# Patient Record
Sex: MALE | Race: WHITE | HISPANIC OR LATINO | Employment: FULL TIME | URBAN - METROPOLITAN AREA
[De-identification: names, ages, dates, MRNs, and addresses within clinical notes are randomized per-mention and may not be internally consistent; named-entity substitution may affect disease eponyms.]

---

## 2017-01-13 RX ORDER — INSULIN GLARGINE 100 [IU]/ML
INJECTION, SOLUTION SUBCUTANEOUS
Qty: 8 PEN | Refills: 3 | Status: SHIPPED | OUTPATIENT
Start: 2017-01-13 | End: 2017-05-02 | Stop reason: SDUPTHER

## 2017-01-23 ENCOUNTER — OFFICE VISIT (OUTPATIENT)
Dept: ORTHOPEDIC SURGERY | Facility: CLINIC | Age: 52
End: 2017-01-23

## 2017-01-23 VITALS — WEIGHT: 210 LBS | HEIGHT: 66 IN | BODY MASS INDEX: 33.75 KG/M2

## 2017-01-23 DIAGNOSIS — G89.29 CHRONIC LEFT SHOULDER PAIN: Primary | ICD-10-CM

## 2017-01-23 DIAGNOSIS — M25.512 CHRONIC LEFT SHOULDER PAIN: Primary | ICD-10-CM

## 2017-01-23 PROCEDURE — 99213 OFFICE O/P EST LOW 20 MIN: CPT | Performed by: ORTHOPAEDIC SURGERY

## 2017-01-23 PROCEDURE — 20610 DRAIN/INJ JOINT/BURSA W/O US: CPT | Performed by: ORTHOPAEDIC SURGERY

## 2017-01-23 RX ADMIN — BUPIVACAINE HYDROCHLORIDE 2 ML: 5 INJECTION, SOLUTION PERINEURAL at 17:00

## 2017-01-23 RX ADMIN — LIDOCAINE HYDROCHLORIDE 2 ML: 10 INJECTION, SOLUTION INFILTRATION; PERINEURAL at 17:00

## 2017-01-23 RX ADMIN — METHYLPREDNISOLONE ACETATE 80 MG: 80 INJECTION, SUSPENSION INTRA-ARTICULAR; INTRALESIONAL; INTRAMUSCULAR; SOFT TISSUE at 17:00

## 2017-01-23 NOTE — PROGRESS NOTES
"Left Shoulder Follow Up      Patient: Marek Diego        YOB: 1965            Chief Complaints: left Shoulder pain      History of Present Illness: This patient is here follow left shoulder MRI.  I will get this previously he does have a tiny interstitial tear of the rotator cuff she's got glenoid labral tear and some degenerative changes seen as well as a tear along here the head of the biceps.  We talked about injection the past she like to do that and that if the injection fails we will consider arthroscopy.  His symptoms are still moderate to severe intermittent in nature pain with activity      Physical Exam: 51 y.o. male  General Appearance:    Alert, cooperative, in no acute distress                   Vitals:    01/23/17 1640   Weight: 210 lb (95.3 kg)   Height: 66\" (167.6 cm)        Patient is alert and read ×3 no acute distress appears her above-listed at height weight and age.  Affect is normal respiratory rate is normal unlabored. Heart rate regular rate rhythm, sclera, dentition and hearing are normal for the purpose of this exam.      Ortho Exam    Physical exam of the left shoulder reveals no overlying skin changes no lymphedema no lymphadenopathy.  Patient has active flexion 180 with mild symptoms abduction is similar external rotation is to 50 and internal rotation to the upper lumbar spine with mild symptoms.  Patient has good rotator cuff strength 4+ over 5 with isometric strength testing with pain.  Patient has a positive impingement and a positive Dykes sign.  Patient has good cervical range of motion which is full and asymptomatic no radicular symptoms.  Patient has a normal elbow exam.  Good distal pulses are presentPatient has pain with overhead activity and a positive Neer sign and a positive empty can sign           MRI is as above and was reviewed     Assessment/Plan:       left shoulder pain with glenoid labral tear some degenerative changes plan is to proceed with a " glenohumeral joint injection fails to improve we would consider arthroscopy           Large Joint Arthrocentesis  Date/Time: 1/23/2017 5:00 PM  Consent given by: patient  Site marked: site marked  Timeout: Immediately prior to procedure a time out was called to verify the correct patient, procedure, equipment, support staff and site/side marked as required   Supporting Documentation  Indications: pain   Procedure Details  Location: shoulder - L glenohumeral  Preparation: Patient was prepped and draped in the usual sterile fashion  Needle size: 25 G  Approach: posterior  Medications administered: 80 mg methylPREDNISolone acetate 80 MG/ML; 2 mL lidocaine 1 %; 2 mL bupivacaine  Patient tolerance: patient tolerated the procedure well with no immediate complications

## 2017-01-23 NOTE — MR AVS SNAPSHOT
Marek Diego   1/23/2017 3:15 PM   Office Visit    Dept Phone:  [phone]   Encounter #:  [id number]    Provider:  Brianna Castillo MD   Department:  [facility]                Your Full Care Plan              Your Updated Medication List          This list is accurate as of: 1/23/17  5:08 PM.  Always use your most recent med list.                amLODIPine-benazepril 5-20 MG per capsule   Commonly known as:  LOTREL 5-20   Take 1 capsule by mouth daily.       AXIRON 30 MG/ACT solution   Generic drug:  Testosterone   Apply 1 pump to each arm every day       Chlorcyclizine-Pseudoephed 25-60 MG tablet       CLARITIN PO       Dulaglutide 1.5 MG/0.5ML solution pen-injector   Commonly known as:  TRULICITY   Inject 1.5 mg under the skin 1 (One) Time Per Week.       esomeprazole 40 MG capsule   Commonly known as:  nexIUM   TAKE 1 CAPSULE BY MOUTH ONE TIME A DAY       ezetimibe 10 MG tablet   Commonly known as:  ZETIA   One by mouth daily for cholesterol       * glimepiride 4 MG tablet   Commonly known as:  AMARYL       * glimepiride 4 MG tablet   Commonly known as:  AMARYL   TAKE 1/2 TABLET BY MOUTH IN THE MORNING and take 1 & 1/2 tablet in the evening       INVOKANA 300 MG tablet   Generic drug:  Canagliflozin   TAKE 1 TABLET BY MOUTH EVERY DAY       LANTUS SOLOSTAR 100 UNIT/ML injection pen   Generic drug:  Insulin Glargine   INJECT 80 UNITS SUBCUTANEOUSLY DAILY-LOYALTY [id number]       metFORMIN 1000 MG tablet   Commonly known as:  GLUCOPHAGE   TAKE 1 TABLET BY MOUTH TWO TIMES A DAY       montelukast 10 MG tablet   Commonly known as:  SINGULAIR   TAKE 1 TABLET BY MOUTH ONE TIME A DAY       nystatin-triamcinolone 316384-3.1 UNIT/GM-% cream   Commonly known as:  MYCOLOG II   Apply as directed twice a day       rosuvastatin 40 MG tablet   Commonly known as:  CRESTOR   Take 1 tablet by mouth every night.       sertraline 50 MG tablet      Commonly known as:  ZOLOFT   TAKE 1 TABLET BY MOUTH ONE TIME A DAY       vitamin D3 5000 UNITS capsule capsule       * Notice:  This list has 2 medication(s) that are the same as other medications prescribed for you. Read the directions carefully, and ask your doctor or other care provider to review them with you.            We Performed the Following     Large Joint Arthrocentesis       You Were Diagnosed With        Codes Comments    Chronic left shoulder pain    -  Primary ICD-10-CM: M25.512, G89.29  ICD-9-CM: 719.41, 338.29       Instructions     None    Patient Instructions History      Upcoming Appointments     Visit Type Date Time Department    FOLLOW UP 2017  3:15 PM [facility]    [facility] 2017  8:10 AM [facility]  [place]    OFFICE VISIT 2017  7:50 AM [facility]      Intui[employer] Signup     [facility]wi allows you to send messages to your doctor, view your test results, renew your prescriptions, schedule appointments, and more. To sign up, go to [employer] and click on the Sign Up Now link in the New User? box. Enter your Kiwi Activation Code exactly as it appears below along with the last four digits of your Social Security Number and your Date of Birth () to complete the sign-up process. If you do not sign up before the expiration date, you must request a new code.    Kiwi Activation Code: [email or web address]  Expires: 2017  5:32 AM    If you have questions, you can email [email or web address] or call [phone] to talk to our Kiwi staff. Remember, Kiwi is NOT to be used for urgent needs. For medical emergencies, dial 911.               Other Info from Your Visit           Your Appointments     2017  8:10 AM EDT   [facility] (--)    [address]  [facility] [address]   [phone]            2017  7:50 AM EDT   Office  "Visit with Demario Quezada MD   [facility] (--)    [address] [place] [id number] [phone]           Arrive 15 minutes prior to appointment.              Other Notes About Your Plan     Please DO NOT MODIFY THIS CHART!!! such as moving active problems to past medical history.  As the primary care physician, I cannot assess problems in the past.  Those problems are resolved, not active.  You are causing unnecessary work for me and my staff.  If you didn't author it, leave it alone.  Demario Quezada M.D. Internal medicine.              Allergies     Latex  Itching      Reason for Visit     Left Shoulder - Follow-up, Pain           Vital Signs     Height Weight Body Mass Index Smoking Status          66\" (167.6 cm) 210 lb (95.3 kg) 33.89 kg/m2 Never Smoker        Problems and Diagnoses Noted     Chronic left shoulder pain    -  Primary        "

## 2017-01-24 DIAGNOSIS — Z12.11 SCREEN FOR COLON CANCER: Primary | ICD-10-CM

## 2017-01-25 PROBLEM — G89.29 CHRONIC LEFT SHOULDER PAIN: Status: ACTIVE | Noted: 2017-01-25

## 2017-01-25 PROBLEM — M25.512 CHRONIC LEFT SHOULDER PAIN: Status: ACTIVE | Noted: 2017-01-25

## 2017-01-25 RX ORDER — METHYLPREDNISOLONE ACETATE 80 MG/ML
80 INJECTION, SUSPENSION INTRA-ARTICULAR; INTRALESIONAL; INTRAMUSCULAR; SOFT TISSUE
Status: COMPLETED | OUTPATIENT
Start: 2017-01-23 | End: 2017-01-23

## 2017-01-25 RX ORDER — BUPIVACAINE HYDROCHLORIDE 5 MG/ML
2 INJECTION, SOLUTION PERINEURAL
Status: COMPLETED | OUTPATIENT
Start: 2017-01-23 | End: 2017-01-23

## 2017-01-25 RX ORDER — LIDOCAINE HYDROCHLORIDE 10 MG/ML
2 INJECTION, SOLUTION INFILTRATION; PERINEURAL
Status: COMPLETED | OUTPATIENT
Start: 2017-01-23 | End: 2017-01-23

## 2017-03-28 RX ORDER — GLIMEPIRIDE 4 MG/1
TABLET ORAL
Qty: 60 TABLET | Refills: 5 | Status: SHIPPED | OUTPATIENT
Start: 2017-03-28 | End: 2017-09-05 | Stop reason: SDUPTHER

## 2017-03-29 RX ORDER — AMLODIPINE BESYLATE AND BENAZEPRIL HYDROCHLORIDE 5; 20 MG/1; MG/1
CAPSULE ORAL
Qty: 30 CAPSULE | Refills: 3 | Status: SHIPPED | OUTPATIENT
Start: 2017-03-29 | End: 2017-08-06 | Stop reason: SDUPTHER

## 2017-04-27 PROBLEM — E11.9 DIABETIC EYE EXAM: Status: ACTIVE | Noted: 2017-04-27

## 2017-04-27 PROBLEM — E11.9 ENCOUNTER FOR DIABETIC FOOT EXAM: Status: ACTIVE | Noted: 2017-04-27

## 2017-04-27 PROBLEM — Z01.00 DIABETIC EYE EXAM: Status: ACTIVE | Noted: 2017-04-27

## 2017-05-02 ENCOUNTER — OFFICE VISIT (OUTPATIENT)
Dept: INTERNAL MEDICINE | Facility: CLINIC | Age: 52
End: 2017-05-02

## 2017-05-02 VITALS
HEART RATE: 95 BPM | BODY MASS INDEX: 33.27 KG/M2 | OXYGEN SATURATION: 98 % | HEIGHT: 66 IN | WEIGHT: 207 LBS | SYSTOLIC BLOOD PRESSURE: 132 MMHG | DIASTOLIC BLOOD PRESSURE: 76 MMHG

## 2017-05-02 DIAGNOSIS — J45.40 MODERATE PERSISTENT ASTHMA WITHOUT COMPLICATION: Chronic | ICD-10-CM

## 2017-05-02 DIAGNOSIS — B35.2 TINEA MANUS: Chronic | ICD-10-CM

## 2017-05-02 DIAGNOSIS — E78.5 HYPERLIPIDEMIA, UNSPECIFIED HYPERLIPIDEMIA TYPE: Chronic | ICD-10-CM

## 2017-05-02 DIAGNOSIS — M75.112 INCOMPLETE TEAR OF LEFT ROTATOR CUFF: ICD-10-CM

## 2017-05-02 DIAGNOSIS — Z91.09 MULTIPLE ENVIRONMENTAL ALLERGIES: Chronic | ICD-10-CM

## 2017-05-02 DIAGNOSIS — S46.212D RUPTURE OF LEFT BICEPS TENDON, SUBSEQUENT ENCOUNTER: ICD-10-CM

## 2017-05-02 DIAGNOSIS — E11.9 TYPE 2 DIABETES MELLITUS WITHOUT COMPLICATION, WITHOUT LONG-TERM CURRENT USE OF INSULIN (HCC): Primary | Chronic | ICD-10-CM

## 2017-05-02 DIAGNOSIS — R80.9 MICROALBUMINURIA: Chronic | ICD-10-CM

## 2017-05-02 DIAGNOSIS — E29.1 HYPOGONADISM MALE: Chronic | ICD-10-CM

## 2017-05-02 DIAGNOSIS — K21.00 GASTROESOPHAGEAL REFLUX DISEASE WITH ESOPHAGITIS: Chronic | ICD-10-CM

## 2017-05-02 DIAGNOSIS — I10 BENIGN ESSENTIAL HYPERTENSION: Chronic | ICD-10-CM

## 2017-05-02 DIAGNOSIS — E11.9 ENCOUNTER FOR DIABETIC FOOT EXAM (HCC): Chronic | ICD-10-CM

## 2017-05-02 PROBLEM — Z01.00 DIABETIC EYE EXAM (HCC): Chronic | Status: ACTIVE | Noted: 2017-04-27

## 2017-05-02 PROBLEM — M25.512 CHRONIC LEFT SHOULDER PAIN: Status: RESOLVED | Noted: 2017-01-25 | Resolved: 2017-05-02

## 2017-05-02 PROBLEM — G89.29 CHRONIC LEFT SHOULDER PAIN: Status: RESOLVED | Noted: 2017-01-25 | Resolved: 2017-05-02

## 2017-05-02 PROCEDURE — 99214 OFFICE O/P EST MOD 30 MIN: CPT | Performed by: INTERNAL MEDICINE

## 2017-05-02 RX ORDER — TERBINAFINE HYDROCHLORIDE 250 MG/1
250 TABLET ORAL DAILY
Qty: 30 TABLET | Refills: 0 | Status: SHIPPED | OUTPATIENT
Start: 2017-05-02 | End: 2017-06-01

## 2017-05-03 RX ORDER — ROSUVASTATIN CALCIUM 40 MG/1
TABLET, COATED ORAL
Qty: 30 TABLET | Refills: 1 | Status: SHIPPED | OUTPATIENT
Start: 2017-05-03 | End: 2017-08-22 | Stop reason: SDUPTHER

## 2017-06-01 ENCOUNTER — OFFICE VISIT (OUTPATIENT)
Dept: INTERNAL MEDICINE | Facility: CLINIC | Age: 52
End: 2017-06-01

## 2017-06-01 VITALS
DIASTOLIC BLOOD PRESSURE: 58 MMHG | OXYGEN SATURATION: 98 % | HEART RATE: 79 BPM | BODY MASS INDEX: 33.37 KG/M2 | SYSTOLIC BLOOD PRESSURE: 114 MMHG | HEIGHT: 66 IN | WEIGHT: 207.6 LBS

## 2017-06-01 DIAGNOSIS — R80.9 MICROALBUMINURIA: Chronic | ICD-10-CM

## 2017-06-01 DIAGNOSIS — B35.2 TINEA MANUS: Primary | Chronic | ICD-10-CM

## 2017-06-01 DIAGNOSIS — E11.9 DIABETIC EYE EXAM (HCC): Chronic | ICD-10-CM

## 2017-06-01 DIAGNOSIS — Z00.00 ROUTINE PHYSICAL EXAMINATION: ICD-10-CM

## 2017-06-01 DIAGNOSIS — E78.5 HYPERLIPIDEMIA, UNSPECIFIED HYPERLIPIDEMIA TYPE: Chronic | ICD-10-CM

## 2017-06-01 DIAGNOSIS — I10 BENIGN ESSENTIAL HYPERTENSION: Chronic | ICD-10-CM

## 2017-06-01 DIAGNOSIS — Z51.81 THERAPEUTIC DRUG MONITORING: ICD-10-CM

## 2017-06-01 DIAGNOSIS — E55.9 VITAMIN D DEFICIENCY: Chronic | ICD-10-CM

## 2017-06-01 DIAGNOSIS — Z12.11 COLON CANCER SCREENING: ICD-10-CM

## 2017-06-01 DIAGNOSIS — Z01.00 DIABETIC EYE EXAM (HCC): Chronic | ICD-10-CM

## 2017-06-01 DIAGNOSIS — E29.1 HYPOGONADISM MALE: Chronic | ICD-10-CM

## 2017-06-01 DIAGNOSIS — E11.9 TYPE 2 DIABETES MELLITUS WITHOUT COMPLICATION, WITHOUT LONG-TERM CURRENT USE OF INSULIN (HCC): Chronic | ICD-10-CM

## 2017-06-01 PROCEDURE — 99214 OFFICE O/P EST MOD 30 MIN: CPT | Performed by: INTERNAL MEDICINE

## 2017-06-01 NOTE — PROGRESS NOTES
06/01/2017    Patient Information  Marek Diego                                                                                          [address] [address] [facility] [address]      1965  [phone]      Chief Complaint:     Follow-up rash on hand and abdomen, questionable tinea, hyperlipidemia and recent medication change, poorly controlled type 2 diabetes, hypogonadism, hypertension.  No new acute complaints.    History of Present Illness:    Patient with a history of medical problems as outlined in the chief complaint presents today for a follow-up.  We started him on terbinafine about one month ago for a rash on his hands and abdomen.  This will be described in detail below.  We also adjusted his cholesterol medication by reducing his Crestor in one half.  He seemed to tolerate this change well.  Past medical history reviewed and updated where necessary including health maintenance parameters.  This reveals he needs a colonoscopy and also needs a diabetic eye exam.    Review of Systems   Constitution: Negative.   HENT: Negative.    Eyes: Negative.    Cardiovascular: Negative.    Respiratory: Negative.    Endocrine: Negative.    Hematologic/Lymphatic: Negative.    Skin: Positive for rash.   Musculoskeletal: Negative.    Gastrointestinal: Negative.    Genitourinary: Negative.    Neurological: Negative.    Psychiatric/Behavioral: Negative.    Allergic/Immunologic: Negative.        Active Problems:    Patient Active Problem List   Diagnosis   • Allergic rhinitis   • Benign essential hypertension   • Chronic neck pain   • Depression with anxiety   • Gastroesophageal reflux disease with esophagitis   • Hyperlipidemia   • Hypogonadism male, on TRT.   • Microalbuminuria   • Moderate persistent asthma   • Multiple environmental allergies   • Non morbid obesity   • Type 2 diabetes mellitus   • Vitamin D deficiency   • Incomplete tear of left rotator cuff   • Routine physical examination   •  Therapeutic drug monitoring   • Right bundle branch block   • Rupture of left biceps tendon   • Tinea manus   • Diabetic eye exam   • Diabetic foot exam         Past Medical History:   Diagnosis Date   • History of acute bronchitis with bronchospasm 2/22/2016 11/11/2016--patient seen in follow-up and reports his cough is better but he still does not feel totally well.  Chest exam is clear today.  I recommend continuation of the Advair and give it more time.  11/02/2016--patient with a history of moderate persistent asthma presents with a one-week history of chest congestion, cough productive of green phlegm, subjective but not documented fever without chills.  No significant hip symptoms.  Patient does have an inhaler but has not been using it.  Has been taking over-the-counter Mucinex without any relief.  Examination reveals an obvious cough.  There is only mild end expiratory wheezes present and an occasional rhonchi but no signs of consolidation.  Patient would prefer to avoid prednisone due to elevated blood sugars.  Advair 250/50, 2 puffs twice a day until symptoms resolved, at least 2 weeks.  Levaquin 750 mg by mouth daily × 8 days.  Tussionex.  04/19/2014--patient reports symptoms resolved.  02/10/2014--patient presents with a 5 day history of head co   • HIstory of Acute sinusitis 02/10/2014    04/19/2014--patient reports symptoms resolved.  02/10/2014--patient presents with a 5 day history of head congestion and posterior nasal drainage as well as cough productive of yellow phlegm. He was tender to percussion over the sinuses, has boggy nasal mucosa, lungs revealed mild bilateral rhonchi. Treated with Levaquin 750 mg daily x8 days. Stahist AD one by mouth twice a day. Tussionex 1 teaspo   • History of candidal balanitis 08/26/2013 08/26/2013--Patient developed fungal balanitis secondary to Invokana. 12/17/2013--patient had recurrence of fungal balanitis that was due to transparent from his wife who  had a yeast infection. Treated with Diflucan. 04/19/2013--patient reports symptoms resolved.   • History of Diffuse abdominal pain 2/24/2016 08/26/2016--patient seen in follow-up and reports his abdominal pain has resolved.  03/18/2016--patient seen in follow-up and reports his symptoms are now intermittent and somewhat better.  No new complaints.  CT scan of the abdomen with contrast reviewed.  Note that the insurance company would not allow us to perform the pelvic CT.  This reveals normal liver, gallbladder, pancreas, spleen.  Kidneys are also normal without kidney stone except there is a 3 mm cyst arising from the upper pole right kidney.  Multiple centimeters/subcentimeter sized central has enteric lymph nodes are noted.  A few centimeter sized portal lymph nodes are also noted.  Largest lymph node is precaval, 17 mm in maximum diameter.  These all could be reactive in nature.  Lymphangitis or potential lymphoma possible, however less likely particularly given the patient's spleen is normal.  Diverticulosis without diverticulitis noted.  Small bowel and stomach as well as duodenum appear normal.  Also noted is indeterminate nodule right low   • HIstory of Dysphagia 08/12/2015 08/12/2015--EGD revealed esophagitis and a distal esophageal stricture that was dilated. Small sliding hiatal hernia. Proximal gastric ulcer and gastritis present. Dysphagia resolved after dilatation.   05/22/2015--patient presents with a several month history of progressively worsening intermittent dysphagia of solids but not liquids. He describes solid food sometimes sticking and points to his u   • History of echocardiogram 04/23/2014 04/23/2014--echocardiogram reveals normal left ventricular systolic function with ejection fraction 55%. Left ventricular wall motion is normal. The right ventricle is moderately to severely dilated. Right ventricular systolic function is mildly reduced. The right atrium is moderately dilated.  Saline contrast study revealed no evidence of PFO/ASD. Status post PFO closure. There is trace mitral reg   • HIstory of eosinophilic gastroenteritis 1990s 1990s--patient had significant epigastric discomfort and workup including an EGD revealed eosinophilic gastroenteritis that was treated with prednisone and resulted in resolution.   • History of esophageal stricture 08/12/2015 08/12/2015--EGD revealed esophagitis and a distal esophageal stricture that was dilated. Small sliding hiatal hernia. Proximal gastric ulcer and gastritis present. Dysphagia resolved after dilatation. Pathology of the gastric antrum was benign with no significant histologic abnormality. Distal esophagus biopsy negative for intestinal metaplasia or dysplasia.   05/22/2015--patient presents with a s   • History of Influenza A (H1N1) 02/06/2015 02/06/2015--patient presents with a 3 day history of illness. He is complaining of fever, chills, diffuse body aches, marked fatigue, marked coughing and headache. Influenza swab is positive for a period Tamiflu 75 mg by mouth twice a day 5 days initiated. Tussionex 1 teaspoon by mouth every 12 hours as needed for cough. Also recommend an over-the-counter flu preparation, rest, fluids, Tylenol as    • History of Mesenteric lymphadenopathy 2/24/2016 09/13/2016--CT scan of the chest, abdomen, and pelvis with contrast reveals no lymphadenopathy within the abdomen or pelvis.  Mild hepatic steatosis.  Previously noted right lower lobe pulmonary nodule is currently calcified and consistent with a calcified granuloma.  08/26/2016--patient seen in follow-up and reports his abdominal pain has resolved.  Repeat CT scan of the abdomen and pelvis as well as chest ordered to reevaluate the mesenteric lymphadenopathy and pulmonary nodule.  03/18/2016--patient seen in follow-up and reports his symptoms are now intermittent and somewhat better.  No new complaints.  CT scan of the abdomen with contrast  reviewed.  Note that the insurance company would not allow us to perform the pelvic CT.  This reveals normal liver, gallbladder, pancreas, spleen.  Kidneys are also normal without kidney stone except there is a 3 mm cyst arising from the upper pole right kidney.  Multiple centimeters/subcentimeter sized central has enteric lymph nodes are noted.  A few centimeter size   • History of overnight oximetry 05/15/2013     05/15/2013--overnight oximetry revealed oxygen saturations less than 90% for six minutes and 52 seconds. Oxygen saturations less than 88 were two minutes and 44 seconds. Oxygen saturations greater than 90% for 98.5 percent of the study. There were 56 desaturation events of less than three minutes duration during which the mean high was 96.1% and the mean low was 89.6%. There were 11 desaturation    • HIstory of Pulmonary hypertension, 04/23/2014--resolved after PFO/ASD closure. 04/23/2014 04/23/2014--echocardiogram reveals normal left ventricular systolic function with ejection fraction 55%. Left ventricular wall motion is normal. The right ventricle is moderately to severely dilated. Right ventricular systolic function is mildly reduced. The right atrium is moderately dilated. Saline contrast study revealed no evidence of PFO/ASD. Status post PFO closure. There is trace mitral reg   • History of Pulmonary nodule, 03/07/2016--5 mm indeterminate nodule right lower lobe.  09/13/2016--consistent with calcified granuloma. 3/7/2016    09/13/2016--CT scan of the chest, abdomen, and pelvis with contrast reveals no lymphadenopathy within the abdomen or pelvis.  Mild hepatic steatosis.  Previously noted right lower lobe pulmonary nodule is currently calcified and consistent with a calcified granuloma.  03/07/2016--CT scan of the abdomen performed for complaints of abdominal discomfort revealed a 5 mm nodular focus right lower lobe which is indeterminate.  Recommend repeat CT scan in 6 months.   • HIstory of  repair of Congenital atrial septal defect 03/2012 March 2012--percutaneous closure of secundum ASD.   • History of Rotator cuff tendinitis 10/30/2015    12/19/2015--patient reports his left shoulder pain has resolved. He continues to have neck pain.   11/10/2015--left shoulder injected with 80 mg of Depo-Medrol with 3 mL of Xylocaine.   10/30/2015--patient presents with at least a one-month history of intermittent left-sided neck pain that is associated with left shoulder pain as well. The pain is described as shooting and is 8 out of 10 intensity         Past Surgical History:   Procedure Laterality Date   • ATRIAL SEPTAL DEFECT REPAIR  03/2012 March 2012--percutaneous closure of secundum ASD.  Dr. Mcpherson   • ESOPHAGEAL DILATATION  08/12/2015 08/12/2015--EGD revealed esophagitis and a distal esophageal stricture that was dilated. Small sliding hiatal hernia. Proximal gastric ulcer and gastritis present. Dysphagia resolved after dilatation. 05/22/2015--patient presents with a several month history of progressively worsening intermittent dysphagia of solids but not liquids. He describes solid food sometimes sticking and points to his upp   • ESOPHAGOSCOPY / EGD  08/12/2015 08/12/2015--EGD revealed esophagitis and a distal esophageal stricture that was dilated. Small sliding hiatal hernia. Proximal gastric ulcer and gastritis present. Dysphagia resolved after dilatation. 05/22/2015--patient presents with a several month history of progressively worsening intermittent dysphagia of solids but not liquids. He describes solid food sometimes sticking and points to his upp   • KNEE ACL RECONSTRUCTION Right 02/11/2013 02/11/2013--knee arthroscopy with anterior cruciate ligament repair   • TONSILLECTOMY  2009 2009--tonsillectomy as an adult.         Allergies   Allergen Reactions   • Latex Itching           Current Outpatient Prescriptions:   •  amLODIPine-benazepril (LOTREL 5-20) 5-20 MG per capsule, TAKE 1  CAPSULE BY MOUTH ONE TIME A DAY , Disp: 30 capsule, Rfl: 3  •  AXIRON 30 MG/ACT solution, Apply 1 pump to each arm every day, Disp: 90 mL, Rfl: 3  •  Chlorcyclizine-Pseudoephed 25-60 MG tablet, Take 1 tablet by mouth as needed. Due to Allergies , Disp: , Rfl:   •  Cholecalciferol (VITAMIN D3) 5000 UNITS capsule capsule, Take 1 capsule by mouth daily., Disp: , Rfl:   •  Dulaglutide (TRULICITY) 1.5 MG/0.5ML solution pen-injector, Inject 1.5 mg under the skin 1 (One) Time Per Week., Disp: 4 pen, Rfl: 11  •  esomeprazole (NexIUM) 40 MG capsule, TAKE 1 CAPSULE BY MOUTH ONE TIME A DAY , Disp: 30 capsule, Rfl: 11  •  ezetimibe (ZETIA) 10 MG tablet, One by mouth daily for cholesterol, Disp: 30 tablet, Rfl: 11  •  glimepiride (AMARYL) 4 MG tablet, take 1/2 tablet by mouth in the morning and 1 & 1/2 tablets in the evening, Disp: 60 tablet, Rfl: 5  •  Insulin Glargine (LANTUS SOLOSTAR) 100 UNIT/ML injection pen, Inject 80 units subcutaneously daily as directed., Disp: 10 pen, Rfl: 11  •  INVOKANA 300 MG tablet, TAKE 1 TABLET BY MOUTH EVERY DAY, Disp: 90 tablet, Rfl: 1  •  Loratadine (CLARITIN PO), Take 1 capsule by mouth daily as needed (when necessary for allergies.)., Disp: , Rfl:   •  metFORMIN (GLUCOPHAGE) 1000 MG tablet, TAKE 1 TABLET BY MOUTH TWO TIMES A DAY , Disp: 60 tablet, Rfl: 5  •  montelukast (SINGULAIR) 10 MG tablet, TAKE 1 TABLET BY MOUTH ONE TIME A DAY , Disp: 30 tablet, Rfl: 5  •  nystatin-triamcinolone (MYCOLOG II) 623329-2.1 UNIT/GM-% cream, Apply as directed twice a day, Disp: 30 g, Rfl: 2  •  rosuvastatin (CRESTOR) 40 MG tablet, TAKE 1 TABLET BY MOUTH AT BEDTIME , Disp: 30 tablet, Rfl: 1  •  sertraline (ZOLOFT) 50 MG tablet, TAKE 1 TABLET BY MOUTH ONE TIME A DAY , Disp: 30 tablet, Rfl: 1  •  terbinafine (lamiSIL) 250 MG tablet, Take 1 tablet by mouth Daily., Disp: 30 tablet, Rfl: 0      Family History   Problem Relation Age of Onset   • Diabetes Mother    • Stomach cancer Mother    • Diabetes Maternal  "Grandmother          Social History     Social History   • Marital status:      Spouse name: N/A   • Number of children: N/A   • Years of education: N/A     Occupational History   •  for [employer]      Social History Main Topics   • Smoking status: Never Smoker   • Smokeless tobacco: Never Used   • Alcohol use No   • Drug use: No   • Sexual activity: Yes     Partners: Female     Other Topics Concern   • Not on file     Social History Narrative         Vitals:    06/01/17 0812   BP: 114/58   Pulse: 79   SpO2: 98%   Weight: 207 lb 9.6 oz (94.2 kg)   Height: 66\" (167.6 cm)          Physical Exam:    General: Alert and oriented x 3. Obese.  No acute distress.  Normal affect.  HEENT: Pupils equal, round, reactive to light; extraocular movements intact; sclerae nonicteric; pharynx, ear canals and TMs normal.  Neck: Without JVD, thyromegaly, bruit, or adenopathy.  Lungs: Clear to auscultation in all fields.  Heart: Regular rate and rhythm without murmur, rub, gallop, or click.  Abdomen: Soft, nontender, without hepatosplenomegaly or hernia.  Bowel sounds normal.  : Deferred.  Rectal: Deferred.  Extremities: Without clubbing, cyanosis, edema, or pulse deficit.  Neurologic: Intact without focal deficit.  Normal station and gait observed during ingress and egress from the examination room.  Skin: the erythematous rash on his hands remains unchanged.  Musculoskeletal: Unremarkable.      Lab/other results:      Assessment/Plan:     Diagnosis Plan   1. Tinea manus     2. Hyperlipidemia, unspecified hyperlipidemia type     3. Type 2 diabetes mellitus without complication, without long-term current use of insulin     4. Hypogonadism male, on TRT.     5. Benign essential hypertension     6. Vitamin D deficiency     7. Therapeutic drug monitoring     8. Microalbuminuria     9. Routine physical examination       The diagnosis of tinea Mannis is now suspect.  He has failed terbinafine and therefore dermatology " referral indicated.  He has hyperlipidemia and a recent medication adjustment.  He needs lab work to monitor his liver functions and his lipid profile.  He has type 2 diabetes that is not in good control and I have strongly recommended weight loss. He has symptomatic hypogonadism with therapeutic testosterone levels.  Blood pressure seems controlled.  Note that the diagnosis of physical examination was added to the assessment and plan in order to facilitate ordering future lab work.    Plan is as follows: Check CMP, NMR, CPK.  Patient will follow-up on the phone for the results.  Dermatology referral given.  Discontinue terbinafine.  Patient will follow-up after 11/11/2017 for his annual exam.  Colonoscopy ordered.            Procedures

## 2017-06-03 LAB
ALBUMIN SERPL-MCNC: 4 G/DL (ref 3.5–5.2)
ALBUMIN/GLOB SERPL: 1.5 G/DL
ALP SERPL-CCNC: 77 U/L (ref 39–117)
ALT SERPL-CCNC: 24 U/L (ref 1–41)
AST SERPL-CCNC: 17 U/L (ref 1–40)
BILIRUB SERPL-MCNC: 0.3 MG/DL (ref 0.1–1.2)
BUN SERPL-MCNC: 25 MG/DL (ref 6–20)
BUN/CREAT SERPL: 22.1 (ref 7–25)
CALCIUM SERPL-MCNC: 9.9 MG/DL (ref 8.6–10.5)
CHLORIDE SERPL-SCNC: 99 MMOL/L (ref 98–107)
CHOLEST SERPL-MCNC: 99 MG/DL (ref 100–199)
CK SERPL-CCNC: 136 U/L (ref 20–200)
CO2 SERPL-SCNC: 25.2 MMOL/L (ref 22–29)
CREAT SERPL-MCNC: 1.13 MG/DL (ref 0.76–1.27)
GLOBULIN SER CALC-MCNC: 2.6 GM/DL
GLUCOSE SERPL-MCNC: 104 MG/DL (ref 65–99)
HDL SERPL-SCNC: 20.7 UMOL/L
HDLC SERPL-MCNC: 23 MG/DL
LDL SERPL QN: 19.6 NM
LDL SERPL-SCNC: 772 NMOL/L
LDL SMALL SERPL-SCNC: 627 NMOL/L
LDLC SERPL CALC-MCNC: 28 MG/DL (ref 0–99)
POTASSIUM SERPL-SCNC: 5.2 MMOL/L (ref 3.5–5.2)
PROT SERPL-MCNC: 6.6 G/DL (ref 6–8.5)
SODIUM SERPL-SCNC: 140 MMOL/L (ref 136–145)
TRIGL SERPL-MCNC: 239 MG/DL (ref 0–149)

## 2017-06-05 RX ORDER — CANAGLIFLOZIN 300 MG/1
TABLET, FILM COATED ORAL
Qty: 90 TABLET | Refills: 1 | Status: SHIPPED | OUTPATIENT
Start: 2017-06-05 | End: 2017-12-17 | Stop reason: SDUPTHER

## 2017-06-23 RX ORDER — MONTELUKAST SODIUM 10 MG/1
TABLET ORAL
Qty: 30 TABLET | Refills: 4 | Status: SHIPPED | OUTPATIENT
Start: 2017-06-23 | End: 2017-11-19 | Stop reason: SDUPTHER

## 2017-07-03 RX ORDER — TESTOSTERONE 30 MG/1.5ML
30 SOLUTION TOPICAL DAILY
Qty: 90 ML | Refills: 3 | OUTPATIENT
Start: 2017-07-03 | End: 2018-02-13 | Stop reason: SDUPTHER

## 2017-07-26 ENCOUNTER — TELEPHONE (OUTPATIENT)
Dept: ORTHOPEDIC SURGERY | Facility: CLINIC | Age: 52
End: 2017-07-26

## 2017-07-26 NOTE — TELEPHONE ENCOUNTER
I am sorry but my schedule is full this week, He should try the [facility], Aime Carbajal, Mani Meier, or Demario Jung

## 2017-07-28 ENCOUNTER — OFFICE VISIT (OUTPATIENT)
Dept: ORTHOPEDIC SURGERY | Facility: CLINIC | Age: 52
End: 2017-07-28

## 2017-07-28 VITALS — WEIGHT: 209.6 LBS | TEMPERATURE: 98 F | BODY MASS INDEX: 33.68 KG/M2 | HEIGHT: 66 IN

## 2017-07-28 DIAGNOSIS — M79.672 LEFT FOOT PAIN: ICD-10-CM

## 2017-07-28 DIAGNOSIS — M76.72 PERONEAL TENDINITIS OF LEFT LOWER EXTREMITY: Primary | ICD-10-CM

## 2017-07-28 DIAGNOSIS — M25.572 ACUTE LEFT ANKLE PAIN: ICD-10-CM

## 2017-07-28 PROCEDURE — 99213 OFFICE O/P EST LOW 20 MIN: CPT | Performed by: ORTHOPAEDIC SURGERY

## 2017-07-28 PROCEDURE — 73600 X-RAY EXAM OF ANKLE: CPT | Performed by: ORTHOPAEDIC SURGERY

## 2017-07-28 PROCEDURE — 73630 X-RAY EXAM OF FOOT: CPT | Performed by: ORTHOPAEDIC SURGERY

## 2017-07-28 NOTE — PROGRESS NOTES
Patient:  Marek Diego is a 52 y.o. male    Chief Complaint/ Reason for Visit:    Chief Complaint   Patient presents with   • Left Foot - Establish Care, Pain   • Left Ankle - Pain, Establish Care       HPI:  This pleasant gentleman tells me that about 5-6 weeks ago he started an exercise program involving walking and running to work on personal fitness and weight loss.  He tells me that he was typically doing a run/walk 2 miles a day perhaps 5 days a week.  He says that he had been doing this for about 3-4 weeks then notes that about 2 weeks ago after a workout routine in the evening he woke up the next morning and noticed an aching pain on the lateral aspect of his left ankle and foot.  He says he did not have any injury or misstep during his workout the day before, and did not have any pain or problems during or even after the workout that evening.  He did not have any pain until the next morning.  He does not think he saw any swelling and he has not noted any bruising or discoloration.  He is felt perhaps at small catching sensation in the area episodically especially earlier in the day.  His pain is mild and it is associated with walking and standing.  It's alleviated by rest.    He denies any history of a serious injury to either of his feet or ankles, but says that he has played soccer in some form for much of his life, and says that he had twisted both of his ankles many times, though none of these injuries does he recall ever requiring any medical intervention casts or orthoses.  He did not have any antecedent pain or complaints of instability with particular attention to the left foot and ankle.    In addition to his recent workout routine described above, the patient also regularly coaches soccer.      PMH:    Past Medical History:   Diagnosis Date   • History of acute bronchitis with bronchospasm 2/22/2016 11/11/2016--patient seen in follow-up and reports his cough is better but he still does not  feel totally well.  Chest exam is clear today.  I recommend continuation of the Advair and give it more time.  11/02/2016--patient with a history of moderate persistent asthma presents with a one-week history of chest congestion, cough productive of green phlegm, subjective but not documented fever without chills.  No significant hip symptoms.  Patient does have an inhaler but has not been using it.  Has been taking over-the-counter Mucinex without any relief.  Examination reveals an obvious cough.  There is only mild end expiratory wheezes present and an occasional rhonchi but no signs of consolidation.  Patient would prefer to avoid prednisone due to elevated blood sugars.  Advair 250/50, 2 puffs twice a day until symptoms resolved, at least 2 weeks.  Levaquin 750 mg by mouth daily × 8 days.  Tussionex.  04/19/2014--patient reports symptoms resolved.  02/10/2014--patient presents with a 5 day history of head co   • HIstory of Acute sinusitis 02/10/2014    04/19/2014--patient reports symptoms resolved.  02/10/2014--patient presents with a 5 day history of head congestion and posterior nasal drainage as well as cough productive of yellow phlegm. He was tender to percussion over the sinuses, has boggy nasal mucosa, lungs revealed mild bilateral rhonchi. Treated with Levaquin 750 mg daily x8 days. Stahist AD one by mouth twice a day. Tussionex 1 teaspo   • History of candidal balanitis 08/26/2013 08/26/2013--Patient developed fungal balanitis secondary to Invokana. 12/17/2013--patient had recurrence of fungal balanitis that was due to transparent from his wife who had a yeast infection. Treated with Diflucan. 04/19/2013--patient reports symptoms resolved.   • History of Diffuse abdominal pain 2/24/2016 08/26/2016--patient seen in follow-up and reports his abdominal pain has resolved.  03/18/2016--patient seen in follow-up and reports his symptoms are now intermittent and somewhat better.  No new complaints.  CT  scan of the abdomen with contrast reviewed.  Note that the insurance company would not allow us to perform the pelvic CT.  This reveals normal liver, gallbladder, pancreas, spleen.  Kidneys are also normal without kidney stone except there is a 3 mm cyst arising from the upper pole right kidney.  Multiple centimeters/subcentimeter sized central has enteric lymph nodes are noted.  A few centimeter sized portal lymph nodes are also noted.  Largest lymph node is precaval, 17 mm in maximum diameter.  These all could be reactive in nature.  Lymphangitis or potential lymphoma possible, however less likely particularly given the patient's spleen is normal.  Diverticulosis without diverticulitis noted.  Small bowel and stomach as well as duodenum appear normal.  Also noted is indeterminate nodule right low   • HIstory of Dysphagia 08/12/2015 08/12/2015--EGD revealed esophagitis and a distal esophageal stricture that was dilated. Small sliding hiatal hernia. Proximal gastric ulcer and gastritis present. Dysphagia resolved after dilatation.   05/22/2015--patient presents with a several month history of progressively worsening intermittent dysphagia of solids but not liquids. He describes solid food sometimes sticking and points to his u   • History of echocardiogram 04/23/2014 04/23/2014--echocardiogram reveals normal left ventricular systolic function with ejection fraction 55%. Left ventricular wall motion is normal. The right ventricle is moderately to severely dilated. Right ventricular systolic function is mildly reduced. The right atrium is moderately dilated. Saline contrast study revealed no evidence of PFO/ASD. Status post PFO closure. There is trace mitral reg   • HIstory of eosinophilic gastroenteritis 1990s 1990s--patient had significant epigastric discomfort and workup including an EGD revealed eosinophilic gastroenteritis that was treated with prednisone and resulted in resolution.   • History of  esophageal stricture 08/12/2015 08/12/2015--EGD revealed esophagitis and a distal esophageal stricture that was dilated. Small sliding hiatal hernia. Proximal gastric ulcer and gastritis present. Dysphagia resolved after dilatation. Pathology of the gastric antrum was benign with no significant histologic abnormality. Distal esophagus biopsy negative for intestinal metaplasia or dysplasia.   05/22/2015--patient presents with a s   • History of Influenza A (H1N1) 02/06/2015 02/06/2015--patient presents with a 3 day history of illness. He is complaining of fever, chills, diffuse body aches, marked fatigue, marked coughing and headache. Influenza swab is positive for a period Tamiflu 75 mg by mouth twice a day 5 days initiated. Tussionex 1 teaspoon by mouth every 12 hours as needed for cough. Also recommend an over-the-counter flu preparation, rest, fluids, Tylenol as    • History of Mesenteric lymphadenopathy 2/24/2016 09/13/2016--CT scan of the chest, abdomen, and pelvis with contrast reveals no lymphadenopathy within the abdomen or pelvis.  Mild hepatic steatosis.  Previously noted right lower lobe pulmonary nodule is currently calcified and consistent with a calcified granuloma.  08/26/2016--patient seen in follow-up and reports his abdominal pain has resolved.  Repeat CT scan of the abdomen and pelvis as well as chest ordered to reevaluate the mesenteric lymphadenopathy and pulmonary nodule.  03/18/2016--patient seen in follow-up and reports his symptoms are now intermittent and somewhat better.  No new complaints.  CT scan of the abdomen with contrast reviewed.  Note that the insurance company would not allow us to perform the pelvic CT.  This reveals normal liver, gallbladder, pancreas, spleen.  Kidneys are also normal without kidney stone except there is a 3 mm cyst arising from the upper pole right kidney.  Multiple centimeters/subcentimeter sized central has enteric lymph nodes are noted.  A few  centimeter size   • History of overnight oximetry 05/15/2013     05/15/2013--overnight oximetry revealed oxygen saturations less than 90% for six minutes and 52 seconds. Oxygen saturations less than 88 were two minutes and 44 seconds. Oxygen saturations greater than 90% for 98.5 percent of the study. There were 56 desaturation events of less than three minutes duration during which the mean high was 96.1% and the mean low was 89.6%. There were 11 desaturation    • HIstory of Pulmonary hypertension, 04/23/2014--resolved after PFO/ASD closure. 04/23/2014 04/23/2014--echocardiogram reveals normal left ventricular systolic function with ejection fraction 55%. Left ventricular wall motion is normal. The right ventricle is moderately to severely dilated. Right ventricular systolic function is mildly reduced. The right atrium is moderately dilated. Saline contrast study revealed no evidence of PFO/ASD. Status post PFO closure. There is trace mitral reg   • History of Pulmonary nodule, 03/07/2016--5 mm indeterminate nodule right lower lobe.  09/13/2016--consistent with calcified granuloma. 3/7/2016    09/13/2016--CT scan of the chest, abdomen, and pelvis with contrast reveals no lymphadenopathy within the abdomen or pelvis.  Mild hepatic steatosis.  Previously noted right lower lobe pulmonary nodule is currently calcified and consistent with a calcified granuloma.  03/07/2016--CT scan of the abdomen performed for complaints of abdominal discomfort revealed a 5 mm nodular focus right lower lobe which is indeterminate.  Recommend repeat CT scan in 6 months.   • HIstory of repair of Congenital atrial septal defect 03/2012 March 2012--percutaneous closure of secundum ASD.   • History of Rotator cuff tendinitis 10/30/2015    12/19/2015--patient reports his left shoulder pain has resolved. He continues to have neck pain.   11/10/2015--left shoulder injected with 80 mg of Depo-Medrol with 3 mL of Xylocaine.    10/30/2015--patient presents with at least a one-month history of intermittent left-sided neck pain that is associated with left shoulder pain as well. The pain is described as shooting and is 8 out of 10 intensity       PSH:    Past Surgical History:   Procedure Laterality Date   • ATRIAL SEPTAL DEFECT REPAIR  03/2012 March 2012--percutaneous closure of secundum ASD.  Dr. Mcpherson   • ESOPHAGEAL DILATATION  08/12/2015 08/12/2015--EGD revealed esophagitis and a distal esophageal stricture that was dilated. Small sliding hiatal hernia. Proximal gastric ulcer and gastritis present. Dysphagia resolved after dilatation. 05/22/2015--patient presents with a several month history of progressively worsening intermittent dysphagia of solids but not liquids. He describes solid food sometimes sticking and points to his upp   • ESOPHAGOSCOPY / EGD  08/12/2015 08/12/2015--EGD revealed esophagitis and a distal esophageal stricture that was dilated. Small sliding hiatal hernia. Proximal gastric ulcer and gastritis present. Dysphagia resolved after dilatation. 05/22/2015--patient presents with a several month history of progressively worsening intermittent dysphagia of solids but not liquids. He describes solid food sometimes sticking and points to his upp   • KNEE ACL RECONSTRUCTION Right 02/11/2013 02/11/2013--knee arthroscopy with anterior cruciate ligament repair   • KNEE ACL RECONSTRUCTION     • TONSILLECTOMY  2009 2009--tonsillectomy as an adult.       Social Hx:    Social History     Social History   • Marital status:      Spouse name: N/A   • Number of children: N/A   • Years of education: N/A     Occupational History   •  for [employer]      Social History Main Topics   • Smoking status: Never Smoker   • Smokeless tobacco: Never Used   • Alcohol use No   • Drug use: No   • Sexual activity: Yes     Partners: Female     Other Topics Concern   • Not on file     Social History Narrative        Family Hx:    Family History   Problem Relation Age of Onset   • Diabetes Mother    • Stomach cancer Mother    • Diabetes Maternal Grandmother        Meds:    Current Outpatient Prescriptions:   •  amLODIPine-benazepril (LOTREL 5-20) 5-20 MG per capsule, TAKE 1 CAPSULE BY MOUTH ONE TIME A DAY , Disp: 30 capsule, Rfl: 3  •  Cholecalciferol (VITAMIN D3) 5000 UNITS capsule capsule, Take 1 capsule by mouth daily., Disp: , Rfl:   •  Dulaglutide (TRULICITY) 1.5 MG/0.5ML solution pen-injector, Inject 1.5 mg under the skin 1 (One) Time Per Week., Disp: 4 pen, Rfl: 11  •  esomeprazole (NexIUM) 40 MG capsule, TAKE 1 CAPSULE BY MOUTH ONE TIME A DAY , Disp: 30 capsule, Rfl: 11  •  ezetimibe (ZETIA) 10 MG tablet, One by mouth daily for cholesterol, Disp: 30 tablet, Rfl: 11  •  glimepiride (AMARYL) 4 MG tablet, take 1/2 tablet by mouth in the morning and 1 & 1/2 tablets in the evening, Disp: 60 tablet, Rfl: 5  •  Insulin Glargine (LANTUS SOLOSTAR) 100 UNIT/ML injection pen, Inject 80 units subcutaneously daily as directed., Disp: 10 pen, Rfl: 11  •  INVOKANA 300 MG tablet, TAKE 1 TABLET BY MOUTH EVERY DAY, Disp: 90 tablet, Rfl: 1  •  Loratadine (CLARITIN PO), Take 1 capsule by mouth daily as needed (when necessary for allergies.)., Disp: , Rfl:   •  metFORMIN (GLUCOPHAGE) 1000 MG tablet, TAKE 1 TABLET BY MOUTH TWO TIMES A DAY , Disp: 60 tablet, Rfl: 5  •  montelukast (SINGULAIR) 10 MG tablet, TAKE 1 TABLET BY MOUTH ONE TIME A DAY , Disp: 30 tablet, Rfl: 4  •  rosuvastatin (CRESTOR) 40 MG tablet, TAKE 1 TABLET BY MOUTH AT BEDTIME , Disp: 30 tablet, Rfl: 1  •  sertraline (ZOLOFT) 50 MG tablet, TAKE 1 TABLET BY MOUTH ONE TIME A DAY , Disp: 30 tablet, Rfl: 5  •  Testosterone (AXIRON) 30 MG/ACT solution, Place 30 mg on the skin Daily., Disp: 90 mL, Rfl: 3    Allergies:    Allergies   Allergen Reactions   • Latex Itching       ROS:  Review of Systems    Vitals:    07/28/17 0849   Temp: 98 °F (36.7 °C)   TempSrc: Temporal Artery   "  Weight: 209 lb 9.6 oz (95.1 kg)   Height: 66\" (167.6 cm)   Body mass index is 33.83 kg/(m^2).      Physical Exam    The patient is awake, alert, and oriented ×3.  The patient is in no acute distress.  Breathing is regular and unlabored with a respiratory rate of 12/m.  Extraocular movements and pupillary responses are symmetrically intact. Sclerae are anicteric.   Hearing is within normal limits.  Speech is within normal limits.  There is no jugular venous distention.    He walks well with no obvious limp.  He has no atrophy or asymmetry of the musculature of the lower extremities.  His Achilles tendons are very tight bilaterally, and he has well-developed calf musculature.    Left foot and ankle: There is no swelling, bruising, deformity, or discoloration.  He has a full active range of motion.  He has tenderness on the lateral aspect of the left ankle and hindfoot, over the distal paronychial tendons.  There is, however, no subluxation of the peroneal tendons, nor could I elicit any.  I did not feel any crepitus in this area, or otherwise around the left ankle, on active or passive ranges of motion.  His left calf was quite soft and nontender with no venous cord.  Anterior drawer was about 2 mm on the left ankle compared to 0-1 mm on the right.  However, I would not classify the left ankle is grossly unstable.  There was no subtalar instability.  The patient had no tenderness over the fifth metatarsal.  Pedal pulses are intact, regular, and with normal amplitude and a current heart rate of about 72 bpm.  Skin and nails are unremarkable.  Motor strength is 5 over 5.    Radiology: X-rays: A 5 view series of the left foot and ankle including AP and oblique of each of the lateral that shows both, was ordered and reviewed to assess patient's complaints of lateral foot and ankle pain.  Comparison images were not immediately available.  On the lateral, it looks as though the patient may have had a very remote injury " involving his posterior malleolus, though he denies any knowledge of such an injury.  I also note some minor spurring on the anterior aspect of the tibial plafond.  On the AP of the foot, incidental note is made of what is likely a long-standing, mild hallux valgus.  I do not see any acute abnormalities on the images of this patient's left foot or ankle.  These films appear otherwise essentially normal.          Assessment:  1. Peroneal tendinitis of left lower extremity    - Ambulatory Referral to Physical Therapy Evaluate and treat, Ortho    2. Left foot pain    - XR Foot 3+ View Left  - Ambulatory Referral to Physical Therapy Evaluate and treat, Ortho    3. Acute left ankle pain    - XR Ankle 2 View Left  - Ambulatory Referral to Physical Therapy Evaluate and treat, Ortho          Plan:  I discussed everything with the patient at length.  I explained that he likely had strained his peroneal tendons, and perhaps, through mildly excessive activity, had created a peroneal tendinitis.  I explained the anatomy to him.  I went over the x-ray findings with him as well.    I answered all of his questions, and formulated a treatment plan.  He told me that he already had a soft ankle brace at home, but says that it was more uncomfortable wearing it than without it.    I recommended that he avoid running and other aggressive athletic activity until this problem subsided.  We discussed alternative methods for working out and cardiovascular fitness.  He agreed and voiced understanding.    Importantly, I recommended physical therapy and he also agreed.  Appropriate referral was created.  I will see him back in a few weeks for progress check.      Orders Placed This Encounter   Procedures   • XR Foot 3+ View Left     Order Specific Question:   Reason for Exam:     Answer:   left foot pain   • XR Ankle 2 View Left     Order Specific Question:   Reason for Exam:     Answer:   left ankle pain   • Ambulatory Referral to Physical  Therapy Evaluate and treat, Ortho     Referral Priority:   Routine     Referral Type:   Therapy     Referral Reason:   Specialty Services Required     Requested Specialty:   Physical Therapy     Number of Visits Requested:   1

## 2017-08-07 ENCOUNTER — TREATMENT (OUTPATIENT)
Dept: PHYSICAL THERAPY | Facility: CLINIC | Age: 52
End: 2017-08-07

## 2017-08-07 DIAGNOSIS — M76.72 PERONEAL TENDONITIS, LEFT: ICD-10-CM

## 2017-08-07 DIAGNOSIS — M79.672 LEFT FOOT PAIN: Primary | ICD-10-CM

## 2017-08-07 PROCEDURE — 97161 PT EVAL LOW COMPLEX 20 MIN: CPT | Performed by: PHYSICAL THERAPIST

## 2017-08-07 PROCEDURE — 97014 ELECTRIC STIMULATION THERAPY: CPT | Performed by: PHYSICAL THERAPIST

## 2017-08-07 PROCEDURE — 97110 THERAPEUTIC EXERCISES: CPT | Performed by: PHYSICAL THERAPIST

## 2017-08-07 RX ORDER — AMLODIPINE BESYLATE AND BENAZEPRIL HYDROCHLORIDE 5; 20 MG/1; MG/1
CAPSULE ORAL
Qty: 30 CAPSULE | Refills: 2 | Status: SHIPPED | OUTPATIENT
Start: 2017-08-07 | End: 2017-11-05 | Stop reason: SDUPTHER

## 2017-08-07 NOTE — PROGRESS NOTES
Physical Therapy Initial Evaluation and Plan of Care    Patient: Marek Diego   : 1965  Diagnosis/ICD-10 Code:  Left foot pain [M79.672]  Referring practitioner: Hema Garcia MD  Date of Initial Visit: 2017  Today's Date: 2017  Patient seen for 1 sessions           Subjective Questionnaire: LEFS: 59      Subjective Evaluation    History of Present Illness  Mechanism of injury: Pt reports pain began about 3 weeks ago. Pt reports he is diabetic and had recently increased his activity 9 weeks ago. Began a walk/run program as well as lifting weights. Pt reports he does not recall injuring his ankle or foot, but he woke up one morning feeling as if he twisted his ankle. Pain is less severe now, but he can tell he is walking a little differently/compensating. No pain at rest. After sitting for a prolonged period, ankle is stiff upon standing. Pain decreases as he walks. Increased pain with stairs. Pt reports he is still going to gym, walking around track, and using elliptical.  Pt reports he takes tylenol prn.   Pt reports history of mild ankle sprains. 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 Tore R ACL about 5 years ago.      Patient Occupation: , sedentary Quality of life: good    Pain  Current pain ratin  At worst pain ratin  Location: L lateral ankle, sole of foot  Quality: throbbing  Relieving factors: rest  Aggravating factors: ambulation and stairs  Progression: improved    Social Support  Lives in: multiple-level home  Lives with: spouse    Hand dominance: right    Diagnostic Tests  X-ray: normal    Treatments  Current treatment: physical therapy  Patient Goals  Patient goals for therapy: decreased pain, increased strength, independence with ADLs/IADLs and return to sport/leisure activities  Patient goal: Short Term Goals: 2-4 weeks. Patient will:  1. Be independent with initial HEP  2. Tolerate CKC ankle strengthening w/o increased pain    Long Term Goals: 4-6 weeks. Patient  will:  1. Demonstrate improved Left lower extremity MMT of >/= 4+/5 to allow for return to recreational/daily activities without increased pain.  2. Demonstrate normal lower extremity flexibility to allow for walking/ADL's/performance of household tasks without pain.  3. Have no soft tissue restriction in involved musculature.  4. Report pain of </= 1/10 with all daily activities.  6. Perceived disability </= 15% as measured on LEFS  7. Be independent with long term HEP           Objective     Tenderness   Left Ankle/Foot   No tenderness.     Neurological Testing     Additional Neurological Details  Pt denies tingling/numbness    Active Range of Motion   Left Ankle/Foot   Dorsiflexion (ke): 2 degrees   Plantar flexion: 65 degrees   Inversion: 35 degrees   Eversion: 65 degrees     Right Ankle/Foot   Dorsiflexion (ke): 2 degrees   Plantar flexion: 65 degrees   Inversion: 40 degrees   Eversion: 50 degrees     Strength/Myotome Testing     Left Ankle/Foot   Dorsiflexion: 5  Plantar flexion: 5  Inversion: 5  Eversion: 4+    Additional Strength Details  P! PF, eversion, inversion    Tests   Left Ankle/Foot   Negative for calcaneal squeeze, valgus tilt and varus tilt.     Ambulation     Observational Gait   Gait: within functional limits          Assessment & Plan     Assessment  Impairments: abnormal or restricted ROM, activity intolerance, impaired balance, impaired physical strength, lacks appropriate home exercise program and pain with function  Assessment details: Pt will benefit from skilled PT services in order to address listed impairments and increase tolerance to normal daily activities including ADL's, work, and recreational activities.    Prognosis: good    Goals  Short Term Goals: 2-4 weeks. Patient will:  1. Be independent with initial HEP  2. Tolerate CKC ankle strengthening w/o increased pain    Long Term Goals: 4-6 weeks. Patient will:  1. Demonstrate improved Left lower extremity MMT of >/= 4+/5 to allow  for return to recreational/daily activities without increased pain.  2. Demonstrate normal lower extremity flexibility to allow for walking/ADL's/performance of household tasks without pain.  3. Have no soft tissue restriction in involved musculature.  4. Report pain of </= 1/10 with all daily activities.  6. Perceived disability </= 15% as measured on LEFS  7. Be independent with long term HEP    Plan  Therapy options: will be seen for skilled physical therapy services  Planned modality interventions: cryotherapy, electrical stimulation/Kazakh stimulation, microcurrent electrical stimulation, thermotherapy (hydrocollator packs) and ultrasound  Planned therapy interventions: abdominal trunk stabilization, ADL retraining, balance/weight-bearing training, body mechanics training, flexibility, functional ROM exercises, gait training, home exercise program, joint mobilization, manual therapy, neuromuscular re-education, soft tissue mobilization, spinal/joint mobilization, strengthening, stretching and therapeutic activities  Frequency: 2x week  Duration in weeks: 6  Treatment plan discussed with: patient  Functional Limitations: walking and uncomfortable because of pain      Manual Therapy:    5     mins  [id number];  Therapeutic Exercise:    15     mins  [id number];     Neuromuscular Jairon:    0    mins  30922;    Therapeutic Activity:     0     mins  35057;     Gait Trainin     mins  [id number];     Ultrasound:     0     mins  36586;    Electrical Stimulation:    15     mins  [id number] ( );  Dry Needling     0     mins self-pay    Timed Treatment:   35   mins   Total Treatment:     60   mins    PT SIGNATURE: Latoya Mueller PT   DATE TREATMENT INITIATED: 2017    Initial Certification  Certification Period: 2017  I certify that the therapy services are furnished while this patient is under my care.  The services outlined above are required by this patient, and will be reviewed every 90 days.     PHYSICIAN:  Hema Garcia MD      DATE:     Please sign and return via fax to [phone].. Thank you, [facility].

## 2017-08-16 ENCOUNTER — TREATMENT (OUTPATIENT)
Dept: PHYSICAL THERAPY | Facility: CLINIC | Age: 52
End: 2017-08-16

## 2017-08-16 DIAGNOSIS — M76.72 PERONEAL TENDONITIS, LEFT: ICD-10-CM

## 2017-08-16 DIAGNOSIS — M79.672 LEFT FOOT PAIN: Primary | ICD-10-CM

## 2017-08-16 PROCEDURE — 97140 MANUAL THERAPY 1/> REGIONS: CPT | Performed by: PHYSICAL THERAPIST

## 2017-08-16 PROCEDURE — 97110 THERAPEUTIC EXERCISES: CPT | Performed by: PHYSICAL THERAPIST

## 2017-08-16 NOTE — PROGRESS NOTES
Physical Therapy Daily Progress Note      Subjective   Pt reports he is having pain posterior to lateral malleolus and along 5th metatarsal. Still going to gym regularly w/o increased pain. Pain is worse at night when laying in bed.    Objective   Non-TTP  L ankle ROM WNL    See Exercise, Manual, and Modality Logs for complete treatment.       Assessment/Plan  Good tolerance to exercise progressions w/o increased pain. Updated HEP. Educated pt in use of K tape to decrease stress on peroneal tendons.               Manual Therapy:    10     mins  32420;  Therapeutic Exercise:    13     mins  82758;     Neuromuscular Jairon:    0    mins  92782;    Therapeutic Activity:     0     mins  34330;     Gait Trainin     mins  57403;     Ultrasound:     10     mins  81808;    Work Hardening           0      mins 80114  Iontophoresis               0   mins 08843    Timed Treatment:   33   mins   Total Treatment:     33   mins    Latoya Mueller, PT  Physical Therapist

## 2017-08-22 RX ORDER — ROSUVASTATIN CALCIUM 40 MG/1
TABLET, COATED ORAL
Qty: 30 TABLET | Refills: 0 | Status: SHIPPED | OUTPATIENT
Start: 2017-08-22 | End: 2017-10-19 | Stop reason: SDUPTHER

## 2017-08-23 ENCOUNTER — TELEPHONE (OUTPATIENT)
Dept: INTERNAL MEDICINE | Facility: CLINIC | Age: 52
End: 2017-08-23

## 2017-08-23 DIAGNOSIS — B37.42 CANDIDAL BALANITIS: Primary | ICD-10-CM

## 2017-08-23 RX ORDER — FLUCONAZOLE 200 MG/1
TABLET ORAL
Qty: 7 TABLET | Refills: 0 | Status: SHIPPED | OUTPATIENT
Start: 2017-08-23 | End: 2017-11-16 | Stop reason: SDUPTHER

## 2017-08-23 NOTE — TELEPHONE ENCOUNTER
Pt C/O of rash in private area. Wife called stating that you always gave them Diflucan. She said the rash is from  e [employer]. Call [phone].

## 2017-08-28 ENCOUNTER — TREATMENT (OUTPATIENT)
Dept: PHYSICAL THERAPY | Facility: CLINIC | Age: 52
End: 2017-08-28

## 2017-08-28 DIAGNOSIS — M76.72 PERONEAL TENDONITIS, LEFT: ICD-10-CM

## 2017-08-28 DIAGNOSIS — M79.672 LEFT FOOT PAIN: Primary | ICD-10-CM

## 2017-08-28 PROCEDURE — 97110 THERAPEUTIC EXERCISES: CPT | Performed by: PHYSICAL THERAPIST

## 2017-08-28 PROCEDURE — 97140 MANUAL THERAPY 1/> REGIONS: CPT | Performed by: PHYSICAL THERAPIST

## 2017-08-28 NOTE — PROGRESS NOTES
Physical Therapy Daily Progress Note    Subjective   Pt is still having dull ache, 2/10, along plantar aspect of 5th metatarsal and along peroneals. Pain has improved and is not increased with any particular activity. Pt reports he didn't do anything last week because he was on vacation.    Objective   See Exercise, Manual, and Modality Logs for complete treatment.       Assessment/Plan  Pt had increased tenderness in lateral lower leg along peroneals and soleus. Good tolerance to STM. Reported mild ankle pain initially w/ squatting. Demonstrated adequate hip strength during SL lowering w/o significant knee valgus. Plan to follow-up in 2 weeks to address persistant tenderness and pain w/ ADLs.               Manual Therapy:   15     mins  10539;  Therapeutic Exercise:    10     mins  93320;     Neuromuscular Jairon:    0    mins  04230;    Therapeutic Activity:     0     mins  09731;     Gait Trainin     mins  53731;     Ultrasound:     8     mins  52772;    Work Hardening           0      mins 54391  Iontophoresis               0   mins 30390    Timed Treatment:   33   mins   Total Treatment:     33   mins    Latoya Mueller, PT  Physical Therapist

## 2017-09-05 RX ORDER — GLIMEPIRIDE 4 MG/1
TABLET ORAL
Qty: 60 TABLET | Refills: 4 | Status: SHIPPED | OUTPATIENT
Start: 2017-09-05 | End: 2018-05-09 | Stop reason: SDUPTHER

## 2017-09-05 RX ORDER — GLIMEPIRIDE 4 MG/1
4 TABLET ORAL 2 TIMES DAILY
Qty: 60 TABLET | Refills: 5 | Status: SHIPPED | OUTPATIENT
Start: 2017-09-05 | End: 2017-09-22 | Stop reason: SDUPTHER

## 2017-09-22 RX ORDER — GLIMEPIRIDE 4 MG/1
TABLET ORAL
Qty: 60 TABLET | Refills: 5 | Status: SHIPPED | OUTPATIENT
Start: 2017-09-22 | End: 2017-11-20 | Stop reason: SDUPTHER

## 2017-10-13 ENCOUNTER — PREP FOR SURGERY (OUTPATIENT)
Dept: OTHER | Facility: HOSPITAL | Age: 52
End: 2017-10-13

## 2017-10-13 DIAGNOSIS — Z12.11 SCREENING FOR COLON CANCER: Primary | ICD-10-CM

## 2017-10-13 RX ORDER — ESOMEPRAZOLE MAGNESIUM 40 MG/1
CAPSULE, DELAYED RELEASE ORAL
Qty: 30 CAPSULE | Refills: 10 | Status: SHIPPED | OUTPATIENT
Start: 2017-10-13 | End: 2018-09-18 | Stop reason: SDUPTHER

## 2017-10-20 RX ORDER — ROSUVASTATIN CALCIUM 40 MG/1
TABLET, COATED ORAL
Qty: 30 TABLET | Refills: 1 | Status: SHIPPED | OUTPATIENT
Start: 2017-10-20 | End: 2018-02-05 | Stop reason: SDUPTHER

## 2017-10-24 PROBLEM — Z12.11 SCREENING FOR COLON CANCER: Status: ACTIVE | Noted: 2017-10-24

## 2017-11-06 RX ORDER — AMLODIPINE BESYLATE AND BENAZEPRIL HYDROCHLORIDE 5; 20 MG/1; MG/1
CAPSULE ORAL
Qty: 30 CAPSULE | Refills: 1 | Status: SHIPPED | OUTPATIENT
Start: 2017-11-06 | End: 2018-01-05 | Stop reason: SDUPTHER

## 2017-11-10 DIAGNOSIS — E55.9 VITAMIN D DEFICIENCY: Chronic | ICD-10-CM

## 2017-11-10 DIAGNOSIS — Z00.00 ROUTINE PHYSICAL EXAMINATION: ICD-10-CM

## 2017-11-10 DIAGNOSIS — E78.5 HYPERLIPIDEMIA, UNSPECIFIED HYPERLIPIDEMIA TYPE: Chronic | ICD-10-CM

## 2017-11-10 DIAGNOSIS — E29.1 HYPOGONADISM MALE: Chronic | ICD-10-CM

## 2017-11-10 DIAGNOSIS — E11.9 TYPE 2 DIABETES MELLITUS WITHOUT COMPLICATION, WITHOUT LONG-TERM CURRENT USE OF INSULIN (HCC): Chronic | ICD-10-CM

## 2017-11-10 DIAGNOSIS — Z51.81 THERAPEUTIC DRUG MONITORING: ICD-10-CM

## 2017-11-12 DIAGNOSIS — E78.5 HYPERLIPIDEMIA, UNSPECIFIED HYPERLIPIDEMIA TYPE: ICD-10-CM

## 2017-11-13 RX ORDER — EZETIMIBE 10 MG/1
TABLET ORAL
Qty: 30 TABLET | Refills: 0 | Status: SHIPPED | OUTPATIENT
Start: 2017-11-13 | End: 2017-12-15 | Stop reason: SDUPTHER

## 2017-11-16 ENCOUNTER — TELEPHONE (OUTPATIENT)
Dept: INTERNAL MEDICINE | Facility: CLINIC | Age: 52
End: 2017-11-16

## 2017-11-16 DIAGNOSIS — B37.42 CANDIDAL BALANITIS: ICD-10-CM

## 2017-11-16 LAB
25(OH)D3+25(OH)D2 SERPL-MCNC: 50.5 NG/ML (ref 30–100)
ALBUMIN SERPL-MCNC: 4.3 G/DL (ref 3.5–5.2)
ALBUMIN/CREAT UR: <5.4 MG/G CREAT (ref 0–30)
ALBUMIN/GLOB SERPL: 2 G/DL
ALP SERPL-CCNC: 82 U/L (ref 39–117)
ALT SERPL-CCNC: 24 U/L (ref 1–41)
APPEARANCE UR: CLEAR
AST SERPL-CCNC: 7 U/L (ref 1–40)
BILIRUB SERPL-MCNC: 0.2 MG/DL (ref 0.1–1.2)
BILIRUB UR QL STRIP: NEGATIVE
BUN SERPL-MCNC: 23 MG/DL (ref 6–20)
BUN/CREAT SERPL: 19 (ref 7–25)
CALCIUM SERPL-MCNC: 9.8 MG/DL (ref 8.6–10.5)
CHLORIDE SERPL-SCNC: 102 MMOL/L (ref 98–107)
CHOLEST SERPL-MCNC: 103 MG/DL (ref 100–199)
CK SERPL-CCNC: 125 U/L (ref 20–200)
CO2 SERPL-SCNC: 25.3 MMOL/L (ref 22–29)
COLOR UR: YELLOW
CREAT SERPL-MCNC: 1.21 MG/DL (ref 0.76–1.27)
CREAT UR-MCNC: 55.8 MG/DL
ERYTHROCYTE [DISTWIDTH] IN BLOOD BY AUTOMATED COUNT: 13.8 % (ref 11.5–14.5)
GFR SERPLBLD CREATININE-BSD FMLA CKD-EPI: 63 ML/MIN/1.73
GFR SERPLBLD CREATININE-BSD FMLA CKD-EPI: 77 ML/MIN/1.73
GLOBULIN SER CALC-MCNC: 2.1 GM/DL
GLUCOSE SERPL-MCNC: 216 MG/DL (ref 65–99)
GLUCOSE UR QL: ABNORMAL
HBA1C MFR BLD: 9.9 % (ref 4.8–5.6)
HCT VFR BLD AUTO: 41.7 % (ref 40.4–52.2)
HDL SERPL-SCNC: 25.9 UMOL/L
HDLC SERPL-MCNC: 25 MG/DL
HGB BLD-MCNC: 13 G/DL (ref 13.7–17.6)
HGB UR QL STRIP: NEGATIVE
KETONES UR QL STRIP: NEGATIVE
LDL SERPL QN: 19.6 NM
LDL SERPL-SCNC: 880 NMOL/L
LDL SMALL SERPL-SCNC: 741 NMOL/L
LDLC SERPL CALC-MCNC: 41 MG/DL (ref 0–99)
LEUKOCYTE ESTERASE UR QL STRIP: NEGATIVE
MCH RBC QN AUTO: 28.3 PG (ref 27–32.7)
MCHC RBC AUTO-ENTMCNC: 31.2 G/DL (ref 32.6–36.4)
MCV RBC AUTO: 90.7 FL (ref 79.8–96.2)
MICROALBUMIN UR-MCNC: <3 UG/ML
NITRITE UR QL STRIP: NEGATIVE
PH UR STRIP: 6 [PH] (ref 5–8)
PLATELET # BLD AUTO: 292 10*3/MM3 (ref 140–500)
POTASSIUM SERPL-SCNC: 4.6 MMOL/L (ref 3.5–5.2)
PROT SERPL-MCNC: 6.4 G/DL (ref 6–8.5)
PROT UR QL STRIP: NEGATIVE
PSA SERPL-MCNC: 0.23 NG/ML (ref 0–4)
RBC # BLD AUTO: 4.6 10*6/MM3 (ref 4.6–6)
SODIUM SERPL-SCNC: 143 MMOL/L (ref 136–145)
SP GR UR: ABNORMAL (ref 1–1.03)
T3FREE SERPL-MCNC: 3.6 PG/ML (ref 2–4.4)
T4 FREE SERPL-MCNC: 0.99 NG/DL (ref 0.93–1.7)
TESTOST SERPL-MCNC: 514 NG/DL (ref 264–916)
TESTOSTERONE.FREE+WB MFR SERPL: 46.5 % (ref 9–46)
TESTOSTERONE.FREE+WB SERPL-MCNC: 239 NG/DL (ref 40–250)
TRIGL SERPL-MCNC: 184 MG/DL (ref 0–149)
TSH SERPL DL<=0.005 MIU/L-ACNC: 3.15 MIU/ML (ref 0.27–4.2)
UROBILINOGEN UR STRIP-MCNC: ABNORMAL MG/DL
WBC # BLD AUTO: 7.86 10*3/MM3 (ref 4.5–10.7)

## 2017-11-16 RX ORDER — FLUCONAZOLE 200 MG/1
TABLET ORAL
Qty: 7 TABLET | Refills: 0 | Status: ON HOLD | OUTPATIENT
Start: 2017-11-16 | End: 2017-11-29

## 2017-11-20 ENCOUNTER — OFFICE VISIT (OUTPATIENT)
Dept: INTERNAL MEDICINE | Facility: CLINIC | Age: 52
End: 2017-11-20

## 2017-11-20 VITALS
WEIGHT: 207.2 LBS | HEART RATE: 88 BPM | OXYGEN SATURATION: 98 % | BODY MASS INDEX: 33.3 KG/M2 | HEIGHT: 66 IN | SYSTOLIC BLOOD PRESSURE: 118 MMHG | DIASTOLIC BLOOD PRESSURE: 68 MMHG

## 2017-11-20 DIAGNOSIS — Z91.09 MULTIPLE ENVIRONMENTAL ALLERGIES: Chronic | ICD-10-CM

## 2017-11-20 DIAGNOSIS — R06.83 SNORING: ICD-10-CM

## 2017-11-20 DIAGNOSIS — E11.9 TYPE 2 DIABETES MELLITUS WITHOUT COMPLICATION, WITHOUT LONG-TERM CURRENT USE OF INSULIN (HCC): Chronic | ICD-10-CM

## 2017-11-20 DIAGNOSIS — E29.1 HYPOGONADISM MALE: Chronic | ICD-10-CM

## 2017-11-20 DIAGNOSIS — E11.9 DIABETIC EYE EXAM (HCC): Chronic | ICD-10-CM

## 2017-11-20 DIAGNOSIS — K21.00 GASTROESOPHAGEAL REFLUX DISEASE WITH ESOPHAGITIS: Chronic | ICD-10-CM

## 2017-11-20 DIAGNOSIS — Z00.00 ROUTINE PHYSICAL EXAMINATION: Primary | ICD-10-CM

## 2017-11-20 DIAGNOSIS — R80.9 MICROALBUMINURIA: Chronic | ICD-10-CM

## 2017-11-20 DIAGNOSIS — E11.9 ENCOUNTER FOR DIABETIC FOOT EXAM (HCC): Chronic | ICD-10-CM

## 2017-11-20 DIAGNOSIS — Z01.00 DIABETIC EYE EXAM (HCC): Chronic | ICD-10-CM

## 2017-11-20 DIAGNOSIS — K59.09 CHRONIC CONSTIPATION: Chronic | ICD-10-CM

## 2017-11-20 DIAGNOSIS — J45.40 MODERATE PERSISTENT ASTHMA WITHOUT COMPLICATION: Chronic | ICD-10-CM

## 2017-11-20 DIAGNOSIS — E78.49 OTHER HYPERLIPIDEMIA: Chronic | ICD-10-CM

## 2017-11-20 DIAGNOSIS — Z51.81 THERAPEUTIC DRUG MONITORING: ICD-10-CM

## 2017-11-20 DIAGNOSIS — I10 BENIGN ESSENTIAL HYPERTENSION: Chronic | ICD-10-CM

## 2017-11-20 DIAGNOSIS — E55.9 VITAMIN D DEFICIENCY: Chronic | ICD-10-CM

## 2017-11-20 PROBLEM — B37.42 CANDIDAL BALANITIS: Status: RESOLVED | Noted: 2017-08-23 | Resolved: 2017-11-20

## 2017-11-20 PROBLEM — Z12.11 COLON CANCER SCREENING: Status: ACTIVE | Noted: 2017-10-24

## 2017-11-20 PROBLEM — Z12.11 SCREENING FOR COLON CANCER: Status: RESOLVED | Noted: 2017-10-24 | Resolved: 2017-11-20

## 2017-11-20 PROCEDURE — 99213 OFFICE O/P EST LOW 20 MIN: CPT | Performed by: INTERNAL MEDICINE

## 2017-11-20 PROCEDURE — 99396 PREV VISIT EST AGE 40-64: CPT | Performed by: INTERNAL MEDICINE

## 2017-11-20 RX ORDER — MONTELUKAST SODIUM 10 MG/1
TABLET ORAL
Qty: 30 TABLET | Refills: 3 | Status: SHIPPED | OUTPATIENT
Start: 2017-11-20 | End: 2018-03-21 | Stop reason: SDUPTHER

## 2017-11-20 RX ORDER — POLYETHYLENE GLYCOL 3350 17 G/17G
POWDER, FOR SOLUTION ORAL
Start: 2017-11-20 | End: 2020-03-23

## 2017-11-20 NOTE — PROGRESS NOTES
11/20/2017    Patient Information  Marek Diego                                                                                          [id number] [address] [id number]      1965  [phone]      Chief Complaint:     Routine annual physical examination and follow-up.  Complaining of chronic constipation.    History of Present Illness:    Patient with a history of type 2 diabetes, hyperlipidemia, esophageal reflux, hypertension, hypogonadism, moderate persistent asthma and allergy, vitamin D deficiency.  He presents today for his routine annual physical exam and follow-up lab work in order to monitor his chronic medical issues.  He has complaints of constipation as described below.  Past medical history reviewed and updated where necessary including health maintenance parameters.  This reveals he needs a diabetic eye exam which she is going Month it also reveals he needs a colonoscopy which is going Week.    The history regarding chronic constipation:    11/20/2017--patient reports approximately 8 month history of intermittent constipation that at times can last as much as a month.  He notes his blood sugar readings tend to increase when this happens.  He is scheduled for colonoscopy next week.  I recommend a generic probiotic daily as well as MiraLAX and adjust as needed.    Review of Systems   Constitution: Negative.   HENT: Negative.    Eyes: Negative.    Cardiovascular: Negative.    Respiratory: Positive for snoring.    Endocrine: Negative.    Hematologic/Lymphatic: Negative.    Skin: Negative.    Musculoskeletal: Negative.    Gastrointestinal: Positive for constipation.   Genitourinary: Negative.    Neurological: Negative.    Psychiatric/Behavioral: Negative.    Allergic/Immunologic: Negative.        Active Problems:    Patient Active Problem List   Diagnosis   • Allergic rhinitis   • Benign essential hypertension   • Chronic neck pain   • Depression with anxiety   • Gastroesophageal  reflux disease with esophagitis   • Hyperlipidemia   • Hypogonadism male, on TRT.   • Microalbuminuria   • Moderate persistent asthma   • Multiple environmental allergies   • Non morbid obesity   • Type 2 diabetes mellitus   • Vitamin D deficiency   • Routine physical examination   • Therapeutic drug monitoring   • Right bundle branch block   • Tinea manus   • Diabetic eye exam   • Diabetic foot exam   • Colon cancer screening   • Chronic constipation         Past Medical History:   Diagnosis Date   • Allergic rhinitis 10/24/2013    10/24/2013--skin testing revealed impressive reactions to grass following, tree pollen, weed pollen, ragweed, dust mite, and cat. Recommend immunotherapy.   • Benign essential hypertension 2/22/2016   • Chronic constipation 11/20/2017 11/20/2017--patient reports approximately 8 month history of intermittent constipation that at times can last as much as a month.  He notes his blood sugar readings tend to increase when this happens.  He is scheduled for colonoscopy next week.  I recommend a generic probiotic daily as well as MiraLAX and adjust as needed.   • Chronic neck pain 10/30/2015    12/19/2015--patient reports his left shoulder pain has resolved. He continues to have neck pain. X-ray of the cervical spine ordered. Physical therapy ordered.   11/10/2015--left shoulder injected with 80 mg of Depo-Medrol with 3 mL of Xylocaine.   10/30/2015--patient presents with at least a one-month history of intermittent left-sided neck pain that is associated with left shoulder pain as well. The pain is described as shooting and is 8 out of 10 intensity at its worst. The pain is worse with certain movements of his head and left shoulder. No trauma. No numbness or paresthesias.   • Depression with anxiety 2/22/2016   • Diabetic eye exam 5/27/2016    May 2016--patient reports a normal diabetic eye exam.   • Diabetic foot exam 4/27/2017 05/02/2017--routine diabetic foot exam reveals no evidence  of diabetic foot ulcer or pre-ulcerative callus.  Pulses are palpable and there is no signs of ischemia.  Sensation subjectively intact.   • Gastroesophageal reflux disease with esophagitis 5/22/2015 08/12/2015--EGD revealed esophagitis and a distal esophageal stricture that was dilated. Small sliding hiatal hernia. Proximal gastric ulcer and gastritis present. Dysphagia resolved after dilatation. Pathology of the gastric antrum was benign with no significant histologic abnormality. Distal esophagus biopsy negative for intestinal metaplasia or dysplasia.   05/22/2015--patient presents with a several month history of progressively worsening intermittent dysphagia of solids but not liquids. He describes solid food sometimes sticking and points to his upper chest/lower throat. No other associated symptoms. Patient referred to Dr. Aime Parada for an EGD. This is negative, may need ENT evaluation.   • History of acute bronchitis with bronchospasm 2/22/2016 11/11/2016--patient seen in follow-up and reports his cough is better but he still does not feel totally well.  Chest exam is clear today.  I recommend continuation of the Advair and give it more time.  11/02/2016--patient with a history of moderate persistent asthma presents with a one-week history of chest congestion, cough productive of green phlegm, subjective but not documented fever without chills.  No significant hip symptoms.  Patient does have an inhaler but has not been using it.  Has been taking over-the-counter Mucinex without any relief.  Examination reveals an obvious cough.  There is only mild end expiratory wheezes present and an occasional rhonchi but no signs of consolidation.  Patient would prefer to avoid prednisone due to elevated blood sugars.  Advair 250/50, 2 puffs twice a day until symptoms resolved, at least 2 weeks.  Levaquin 750 mg by mouth daily × 8 days.  Tussionex.  04/19/2014--patient reports symptoms resolved.  02/10/2014--patient  presents with a 5 day history of head co   • HIstory of Acute sinusitis 02/10/2014    04/19/2014--patient reports symptoms resolved.  02/10/2014--patient presents with a 5 day history of head congestion and posterior nasal drainage as well as cough productive of yellow phlegm. He was tender to percussion over the sinuses, has boggy nasal mucosa, lungs revealed mild bilateral rhonchi. Treated with Levaquin 750 mg daily x8 days. Stahist AD one by mouth twice a day. Tussionex 1 teaspo   • History of Candidal balanitis 8/23/2017 08/23/2017--patient called in the office with symptoms consistent with recurrence of fungal balanitis.  Diflucan 200 mg by mouth daily ×1 week.  12/17/2013--patient had recurrence of fungal balanitis that was due to transparent from his wife who had a yeast infection. Treated with Diflucan.   08/26/2013--Patient developed fungal balanitis secondary to Invokana.    • History of Diffuse abdominal pain 2/24/2016 08/26/2016--patient seen in follow-up and reports his abdominal pain has resolved.  03/18/2016--patient seen in follow-up and reports his symptoms are now intermittent and somewhat better.  No new complaints.  CT scan of the abdomen with contrast reviewed.  Note that the insurance company would not allow us to perform the pelvic CT.  This reveals normal liver, gallbladder, pancreas, spleen.  Kidneys are also normal without kidney stone except there is a 3 mm cyst arising from the upper pole right kidney.  Multiple centimeters/subcentimeter sized central has enteric lymph nodes are noted.  A few centimeter sized portal lymph nodes are also noted.  Largest lymph node is precaval, 17 mm in maximum diameter.  These all could be reactive in nature.  Lymphangitis or potential lymphoma possible, however less likely particularly given the patient's spleen is normal.  Diverticulosis without diverticulitis noted.  Small bowel and stomach as well as duodenum appear normal.  Also noted is  indeterminate nodule right low   • HIstory of Dysphagia 08/12/2015 08/12/2015--EGD revealed esophagitis and a distal esophageal stricture that was dilated. Small sliding hiatal hernia. Proximal gastric ulcer and gastritis present. Dysphagia resolved after dilatation.   05/22/2015--patient presents with a several month history of progressively worsening intermittent dysphagia of solids but not liquids. He describes solid food sometimes sticking and points to his u   • History of echocardiogram 04/23/2014 04/23/2014--echocardiogram reveals normal left ventricular systolic function with ejection fraction 55%. Left ventricular wall motion is normal. The right ventricle is moderately to severely dilated. Right ventricular systolic function is mildly reduced. The right atrium is moderately dilated. Saline contrast study revealed no evidence of PFO/ASD. Status post PFO closure. There is trace mitral reg   • HIstory of eosinophilic gastroenteritis 1990s    1990s--patient had significant epigastric discomfort and workup including an EGD revealed eosinophilic gastroenteritis that was treated with prednisone and resulted in resolution.   • History of esophageal stricture 08/12/2015 08/12/2015--EGD revealed esophagitis and a distal esophageal stricture that was dilated. Small sliding hiatal hernia. Proximal gastric ulcer and gastritis present. Dysphagia resolved after dilatation. Pathology of the gastric antrum was benign with no significant histologic abnormality. Distal esophagus biopsy negative for intestinal metaplasia or dysplasia.   05/22/2015--patient presents with a s   • History of Incomplete tear of left rotator cuff 11/24/2014 05/02/2017--patient reports he saw the orthopedic surgeon and received cortisone injections ×2.  He reports that this was effective in no longer has pain.  10/07/2016--MRI of the left shoulder reveals a complete tear of the long head of the biceps and suspected glenoid labrum tear.   5 mm wide interstitial tear of the distal supraspinatus.  08/26/2016--patient seen in follow-up and reports his left shoulder pain has returned.  Orthopedic referral given.  01/23/2015--patient seen in follow up and reports his symptoms have resolved.   11/24/2014--patient presents with at least a one-month history of progressively worsening left shoulder pain. The pain is worse with certain movements such as flexing the left arm at the elbow when picking something up. Also extension and abduction. A total of 80 mg of Depo-Medrol with 3 mL of Xylocaine was injected, one half of this amount was injected into the shoulder itself via a posterior approach and the other half was injected into the bicipital groove area. X-ray of   • History of Influenza A (H1N1) 02/06/2015 02/06/2015--patient presents with a 3 day history of illness. He is complaining of fever, chills, diffuse body aches, marked fatigue, marked coughing and headache. Influenza swab is positive for a period Tamiflu 75 mg by mouth twice a day 5 days initiated. Tussionex 1 teaspoon by mouth every 12 hours as needed for cough. Also recommend an over-the-counter flu preparation, rest, fluids, Tylenol as    • History of Mesenteric lymphadenopathy 2/24/2016 09/13/2016--CT scan of the chest, abdomen, and pelvis with contrast reveals no lymphadenopathy within the abdomen or pelvis.  Mild hepatic steatosis.  Previously noted right lower lobe pulmonary nodule is currently calcified and consistent with a calcified granuloma.  08/26/2016--patient seen in follow-up and reports his abdominal pain has resolved.  Repeat CT scan of the abdomen and pelvis as well as chest ordered to reevaluate the mesenteric lymphadenopathy and pulmonary nodule.  03/18/2016--patient seen in follow-up and reports his symptoms are now intermittent and somewhat better.  No new complaints.  CT scan of the abdomen with contrast reviewed.  Note that the insurance company would not allow us  to perform the pelvic CT.  This reveals normal liver, gallbladder, pancreas, spleen.  Kidneys are also normal without kidney stone except there is a 3 mm cyst arising from the upper pole right kidney.  Multiple centimeters/subcentimeter sized central has enteric lymph nodes are noted.  A few centimeter size   • History of overnight oximetry 05/15/2013     05/15/2013--overnight oximetry revealed oxygen saturations less than 90% for six minutes and 52 seconds. Oxygen saturations less than 88 were two minutes and 44 seconds. Oxygen saturations greater than 90% for 98.5 percent of the study. There were 56 desaturation events of less than three minutes duration during which the mean high was 96.1% and the mean low was 89.6%. There were 11 desaturation    • HIstory of Pulmonary hypertension, 04/23/2014--resolved after PFO/ASD closure. 04/23/2014 04/23/2014--echocardiogram reveals normal left ventricular systolic function with ejection fraction 55%. Left ventricular wall motion is normal. The right ventricle is moderately to severely dilated. Right ventricular systolic function is mildly reduced. The right atrium is moderately dilated. Saline contrast study revealed no evidence of PFO/ASD. Status post PFO closure. There is trace mitral reg   • History of Pulmonary nodule, 03/07/2016--5 mm indeterminate nodule right lower lobe.  09/13/2016--consistent with calcified granuloma. 3/7/2016    09/13/2016--CT scan of the chest, abdomen, and pelvis with contrast reveals no lymphadenopathy within the abdomen or pelvis.  Mild hepatic steatosis.  Previously noted right lower lobe pulmonary nodule is currently calcified and consistent with a calcified granuloma.  03/07/2016--CT scan of the abdomen performed for complaints of abdominal discomfort revealed a 5 mm nodular focus right lower lobe which is indeterminate.  Recommend repeat CT scan in 6 months.   • HIstory of repair of Congenital atrial septal defect 03/2012 March  2012--percutaneous closure of secundum ASD.   • History of Rotator cuff tendinitis 10/30/2015    12/19/2015--patient reports his left shoulder pain has resolved. He continues to have neck pain.   11/10/2015--left shoulder injected with 80 mg of Depo-Medrol with 3 mL of Xylocaine.   10/30/2015--patient presents with at least a one-month history of intermittent left-sided neck pain that is associated with left shoulder pain as well. The pain is described as shooting and is 8 out of 10 intensity   • History of Rupture of left biceps tendon 11/11/2016 05/02/2017--patient reports he saw the orthopedic surgeon and received cortisone injections ×2.  He reports that this was effective in no longer has pain.  10/07/2016--MRI of the left shoulder reveals a complete tear of the long head of the biceps and suspected glenoid labrum tear.  5 mm wide interstitial tear of the distal supraspinatus.  08/26/2016--patient seen in follow-up and reports his left shoulder pain has returned.  Orthopedic referral given.  01/23/2015--patient seen in follow up and reports his symptoms have resolved.   11/24/2014--patient presents with at least a one-month history of progressively worsening left shoulder pain. The pain is worse with certain movements such as flexing the left arm at the elbow when picking something up. Also extension and abduction. A total of 80 mg of Depo-Medrol with 3 mL of Xylocaine was injected, one half of this amount was injected into the shoulder itself via a posterior approach and the other half was injected into the bicipital groove area. X-ray of   • Hyperlipidemia 2/22/2016   • Hypogonadism male, on TRT. 12/26/2012 12/26/2012--treatment for hypogonadism begun.   • Microalbuminuria 10/19/2014    10/19/2014--urine microalbumin mildly elevated at 27.8. Normal range 0.0--17.0.   • Moderate persistent asthma 2/22/2016    Seasonal, spring and fall.   • Multiple environmental allergies 10/24/2013    10/24/2013--skin  testing revealed impressive reactions to grass following, tree pollen, weed pollen, ragweed, dust mite, and cat. Recommend immunotherapy.   • Non morbid obesity 2/22/2016   • Right bundle branch block     ECG reveals sinus rhythm, rate of 75, right bundle branch block, left anterior hemiblock   • Type 2 diabetes mellitus 1/1/2004 2004--initial diagnosis of type 2 diabetes.   • Vitamin D deficiency 2/22/2016         Past Surgical History:   Procedure Laterality Date   • ATRIAL SEPTAL DEFECT REPAIR  03/2012 March 2012--percutaneous closure of secundum ASD.  Dr. Mcpherson   • ESOPHAGEAL DILATATION  08/12/2015 08/12/2015--EGD revealed esophagitis and a distal esophageal stricture that was dilated. Small sliding hiatal hernia. Proximal gastric ulcer and gastritis present. Dysphagia resolved after dilatation. 05/22/2015--patient presents with a several month history of progressively worsening intermittent dysphagia of solids but not liquids. He describes solid food sometimes sticking and points to his upp   • ESOPHAGOSCOPY / EGD  08/12/2015 08/12/2015--EGD revealed esophagitis and a distal esophageal stricture that was dilated. Small sliding hiatal hernia. Proximal gastric ulcer and gastritis present. Dysphagia resolved after dilatation. 05/22/2015--patient presents with a several month history of progressively worsening intermittent dysphagia of solids but not liquids. He describes solid food sometimes sticking and points to his upp   • KNEE ACL RECONSTRUCTION Right 02/11/2013 02/11/2013--knee arthroscopy with anterior cruciate ligament repair   • TONSILLECTOMY  2009 2009--tonsillectomy as an adult.         Allergies   Allergen Reactions   • Latex Itching           Current Outpatient Prescriptions:   •  amLODIPine-benazepril (LOTREL 5-20) 5-20 MG per capsule, TAKE 1 CAPSULE BY MOUTH ONE TIME A DAY , Disp: 30 capsule, Rfl: 1  •  Cholecalciferol (VITAMIN D3) 5000 UNITS capsule capsule, Take 1 capsule by mouth  daily., Disp: , Rfl:   •  Dulaglutide (TRULICITY) 1.5 MG/0.5ML solution pen-injector, Inject 1.5 mg under the skin 1 (One) Time Per Week., Disp: 4 pen, Rfl: 11  •  esomeprazole (nexIUM) 40 MG capsule, TAKE 1 CAPSULE BY MOUTH ONE TIME A DAY , Disp: 30 capsule, Rfl: 10  •  ezetimibe (ZETIA) 10 MG tablet, TAKE ONE TABLET BY MOUTH DAILY FOR CHOLESTEROL, Disp: 30 tablet, Rfl: 0  •  fluconazole (DIFLUCAN) 200 MG tablet, 1 by mouth daily until gone, Disp: 7 tablet, Rfl: 0  •  glimepiride (AMARYL) 4 MG tablet, take 1/2 tablet by mouth in the morning and take 1&1/2 in the evening, Disp: 60 tablet, Rfl: 4  •  Insulin Glargine (LANTUS SOLOSTAR) 100 UNIT/ML injection pen, Inject 80 units subcutaneously daily as directed., Disp: 10 pen, Rfl: 11  •  INVOKANA 300 MG tablet, TAKE 1 TABLET BY MOUTH EVERY DAY, Disp: 90 tablet, Rfl: 1  •  Loratadine (CLARITIN PO), Take 1 capsule by mouth daily as needed (when necessary for allergies.)., Disp: , Rfl:   •  metFORMIN (GLUCOPHAGE) 1000 MG tablet, TAKE 1 TABLET BY MOUTH TWO TIMES A DAY , Disp: 60 tablet, Rfl: 2  •  montelukast (SINGULAIR) 10 MG tablet, TAKE 1 TABLET BY MOUTH ONE TIME A DAY , Disp: 30 tablet, Rfl: 3  •  rosuvastatin (CRESTOR) 40 MG tablet, TAKE 1 TABLET BY MOUTH AT BEDTIME , Disp: 30 tablet, Rfl: 1  •  sertraline (ZOLOFT) 50 MG tablet, TAKE 1 TABLET BY MOUTH ONE TIME A DAY , Disp: 30 tablet, Rfl: 5  •  Testosterone (AXIRON) 30 MG/ACT solution, Place 30 mg on the skin Daily., Disp: 90 mL, Rfl: 3      Family History   Problem Relation Age of Onset   • Diabetes Mother    • Stomach cancer Mother    • Diabetes Maternal Grandmother          Social History     Social History   • Marital status:      Spouse name: N/A   • Number of children: N/A   • Years of education: N/A     Occupational History   •  for [employer]      Social History Main Topics   • Smoking status: Never Smoker   • Smokeless tobacco: Never Used   • Alcohol use No   • Drug use: No   • Sexual  "activity: Yes     Partners: Female     Other Topics Concern   • Not on file     Social History Narrative         Vitals:    11/20/17 0757   BP: 118/68   Pulse: 88   SpO2: 98%   Weight: 207 lb 3.2 oz (94 kg)   Height: 66\" (167.6 cm)          Physical Exam:    General: Alert and oriented x 3, with appropriate affect; no acute distress. Obese. HEENT: pupils equal, round, and reactive to light; extraocular movements intact; sclera nonicteric; nasal mucosa normal; pharynx normal; tympanic membranes and ear canals normal.  Neck: without JVD, thyromegaly, bruit, or adenopathy.  Lungs: clear to auscultation in all fields.  Heart: auscultation reveals regular rate and rhythm without murmur, rub, gallop, or click.  Abdomen: is soft and nontender, without hepato-splenomegaly, mass or hernia. Normal bowel sounds; .  Urologic exam: reveals normal male genitalia without testicular mass or penile/scrotal lesion.  Digital rectal exam and Prostate: deferred.  Extremities: are without clubbing, cyanosis, or edema.  Vascular: no signs of peripheral arterial disease or venous insufficiency/varicosities.  Neurological: intact without focal deficit, including cranial and peripheral nerves.  Station and gait observed to be normal during ingress and egress from the examination area.  Sensation and deep tendon reflexes tested if clinically indicated and are normal.  Musculoskeletal: exam is normal, without signs of synovitis, significant degeneration or deformity. Skin examination: without rash or significant lesions.      Lab/other results:    NMR reveals a total cholesterol of only 103.  Triglycerides mildly elevated at 184.  LDL particle number is excellent at 880.  Small LDL particle number mildly elevated at 741.  HDL particle number is low at 25.9.  CMP normal except blood sugar elevated at 216.  Urinalysis normal except 3+ glucose as expected.  CBC normal except hemoglobin low at 13.0.  However, hematocrit is normal at 41.7.  " Microalbumin/creatinine ratio was less than 5.4.  Total testosterone normal at 514.  Free and weakly bound testosterone normal at 239.  Hemoglobin A1c elevated at 9.9.  Thyroid function tests normal.  PSA normal at 0.228.  Vitamin D normal.  CPK normal.    Assessment/Plan:     Diagnosis Plan   1. Routine physical examination     2. Type 2 diabetes mellitus without complication, without long-term current use of insulin     3. Hyperlipidemia     4. Gastroesophageal reflux disease with esophagitis     5. Benign essential hypertension     6. Hypogonadism male, on TRT.     7. Microalbuminuria     8. Moderate persistent asthma without complication     9. Multiple environmental allergies     10. Vitamin D deficiency     11. Diabetic eye exam     12. Chronic constipation     13. Therapeutic drug monitoring     14. Diabetic foot exam       Patient presents with essentially normal annual exam except for the following issues: He has poorly controlled type 2 diabetes and I do think he would benefit from diabetic educator/nutritionist consultation.  Hyperlipidemia is under the best control but we can get it.  Reflux symptoms controlled.  Blood pressure controlled.  Patient has symptomatic hypogonadism and has therapeutic testosterone levels.  Microalbuminuria has improved.  His asthma and allergy stable at present time.  Vitamin D therapeutic.  He is scheduled for diabetic eye exam in the near future.  Diabetic foot exam documented under that diagnosis.  Patient has new complaints of chronic constipation as described.    Plan is as follows: Start a generic probiotic once or twice daily.  Use MiraLAX for constipation and adjust as needed.  Diabetic educator referral given.  I will have patient follow-up in about 4 months with lab prior to reassess his situation.    Addendum: At the end of visit patient indicates that his wife says that he snores very badly and he is concerned about the possibility of sleep apnea.  He had a  borderline overnight oximetry test back in 2013 and I think it would be reasonable to repeat this.    Procedures

## 2017-11-29 ENCOUNTER — ANESTHESIA EVENT (OUTPATIENT)
Dept: GASTROENTEROLOGY | Facility: HOSPITAL | Age: 52
End: 2017-11-29

## 2017-11-29 ENCOUNTER — HOSPITAL ENCOUNTER (OUTPATIENT)
Facility: HOSPITAL | Age: 52
Setting detail: HOSPITAL OUTPATIENT SURGERY
Discharge: HOME | End: 2017-11-29
Attending: SURGERY | Admitting: SURGERY

## 2017-11-29 ENCOUNTER — ANESTHESIA (OUTPATIENT)
Dept: GASTROENTEROLOGY | Facility: HOSPITAL | Age: 52
End: 2017-11-29

## 2017-11-29 VITALS
TEMPERATURE: 98 F | HEIGHT: 65 IN | WEIGHT: 203.6 LBS | SYSTOLIC BLOOD PRESSURE: 118 MMHG | DIASTOLIC BLOOD PRESSURE: 68 MMHG | RESPIRATION RATE: 14 BRPM | BODY MASS INDEX: 33.92 KG/M2 | HEART RATE: 84 BPM | OXYGEN SATURATION: 96 %

## 2017-11-29 DIAGNOSIS — Z12.11 SCREENING FOR COLON CANCER: ICD-10-CM

## 2017-11-29 LAB
GLUCOSE BLDC GLUCOMTR-MCNC: 85 MG/DL (ref 70–130)
GLUCOSE BLDC GLUCOMTR-MCNC: 85 MG/DL (ref 70–130)

## 2017-11-29 PROCEDURE — 25010000002 PROPOFOL 10 MG/ML EMULSION: Performed by: ANESTHESIOLOGY

## 2017-11-29 PROCEDURE — S0260 H&P FOR SURGERY: HCPCS | Performed by: SURGERY

## 2017-11-29 PROCEDURE — 45384 COLONOSCOPY W/LESION REMOVAL: CPT | Performed by: SURGERY

## 2017-11-29 PROCEDURE — 45385 COLONOSCOPY W/LESION REMOVAL: CPT | Performed by: SURGERY

## 2017-11-29 PROCEDURE — 88305 TISSUE EXAM BY PATHOLOGIST: CPT | Performed by: SURGERY

## 2017-11-29 PROCEDURE — 25010000002 FENTANYL CITRATE (PF) 100 MCG/2ML SOLUTION: Performed by: ANESTHESIOLOGY

## 2017-11-29 PROCEDURE — 82962 GLUCOSE BLOOD TEST: CPT

## 2017-11-29 RX ORDER — SODIUM CHLORIDE, SODIUM LACTATE, POTASSIUM CHLORIDE, CALCIUM CHLORIDE 600; 310; 30; 20 MG/100ML; MG/100ML; MG/100ML; MG/100ML
30 INJECTION, SOLUTION INTRAVENOUS CONTINUOUS PRN
Status: DISCONTINUED | OUTPATIENT
Start: 2017-11-29 | End: 2017-11-29 | Stop reason: HOSPADM

## 2017-11-29 RX ORDER — LIDOCAINE HYDROCHLORIDE 20 MG/ML
INJECTION, SOLUTION INFILTRATION; PERINEURAL AS NEEDED
Status: DISCONTINUED | OUTPATIENT
Start: 2017-11-29 | End: 2017-11-29 | Stop reason: SURG

## 2017-11-29 RX ORDER — PROPOFOL 10 MG/ML
VIAL (ML) INTRAVENOUS CONTINUOUS PRN
Status: DISCONTINUED | OUTPATIENT
Start: 2017-11-29 | End: 2017-11-29 | Stop reason: SURG

## 2017-11-29 RX ORDER — SODIUM CHLORIDE 0.9 % (FLUSH) 0.9 %
1-10 SYRINGE (ML) INJECTION AS NEEDED
Status: DISCONTINUED | OUTPATIENT
Start: 2017-11-29 | End: 2017-11-29 | Stop reason: HOSPADM

## 2017-11-29 RX ORDER — FENTANYL CITRATE 50 UG/ML
INJECTION, SOLUTION INTRAMUSCULAR; INTRAVENOUS AS NEEDED
Status: DISCONTINUED | OUTPATIENT
Start: 2017-11-29 | End: 2017-11-29 | Stop reason: SURG

## 2017-11-29 RX ORDER — PROPOFOL 10 MG/ML
VIAL (ML) INTRAVENOUS AS NEEDED
Status: DISCONTINUED | OUTPATIENT
Start: 2017-11-29 | End: 2017-11-29 | Stop reason: SURG

## 2017-11-29 RX ADMIN — LIDOCAINE HYDROCHLORIDE 50 MG: 20 INJECTION, SOLUTION INFILTRATION; PERINEURAL at 10:02

## 2017-11-29 RX ADMIN — FENTANYL CITRATE 50 MCG: 50 INJECTION INTRAMUSCULAR; INTRAVENOUS at 10:02

## 2017-11-29 RX ADMIN — SODIUM CHLORIDE, POTASSIUM CHLORIDE, SODIUM LACTATE AND CALCIUM CHLORIDE: 600; 310; 30; 20 INJECTION, SOLUTION INTRAVENOUS at 10:02

## 2017-11-29 RX ADMIN — PROPOFOL 70 MG: 10 INJECTION, EMULSION INTRAVENOUS at 10:03

## 2017-11-29 RX ADMIN — PROPOFOL 200 MCG/KG/MIN: 10 INJECTION, EMULSION INTRAVENOUS at 10:03

## 2017-11-29 RX ADMIN — SODIUM CHLORIDE, POTASSIUM CHLORIDE, SODIUM LACTATE AND CALCIUM CHLORIDE 30 ML/HR: 600; 310; 30; 20 INJECTION, SOLUTION INTRAVENOUS at 09:47

## 2017-11-29 NOTE — ANESTHESIA PREPROCEDURE EVALUATION
Anesthesia Evaluation     Patient summary reviewed and Nursing notes reviewed          Airway   Mallampati: III  possible difficult intubation  Dental - normal exam     Pulmonary - normal exam   (+) asthma,   Cardiovascular - normal exam    (+) hypertension, dysrhythmias, hyperlipidemia      Neuro/Psych  GI/Hepatic/Renal/Endo    (+) obesity,  diabetes mellitus,     Musculoskeletal     (+) neck pain,   Abdominal    Substance History      OB/GYN          Other   (+) arthritis                                 Anesthesia Plan    ASA 3     MAC     intravenous induction   Anesthetic plan and risks discussed with patient.

## 2017-11-29 NOTE — ANESTHESIA POSTPROCEDURE EVALUATION
"Patient: Marek Diego    Procedure Summary     Date Anesthesia Start Anesthesia Stop Room / Location    11/29/17 1001 1041  [facility]       Procedure Diagnosis Surgeon Provider    COLONOSCOPY into cecum and ti with hot snare polypectomy, hot biopsy polypectomy (N/A ) Screening for colon cancer  (Screening for colon cancer [Z12.11]) MD Nayeli Peace MD          Anesthesia Type: MAC  Last vitals  BP   118/68 (11/29/17 1100)   Temp   36.7 °C (98 °F) (11/29/17 1041)   Pulse   84 (11/29/17 1100)   Resp   14 (11/29/17 1100)     SpO2   96 % (11/29/17 1100)     Post Anesthesia Care and Evaluation    Patient location during evaluation: PHASE II  Patient participation: complete - patient participated  Level of consciousness: awake  Pain management: adequate  Airway patency: patent  Anesthetic complications: No anesthetic complications    Cardiovascular status: acceptable  Respiratory status: acceptable  Hydration status: acceptable    Comments: /68 (BP Location: Left arm, Patient Position: Lying)  Pulse 84  Temp 36.7 °C (98 °F) (Oral)   Resp 14  Ht 65\" (165.1 cm)  Wt 203 lb 9.6 oz (92.4 kg)  SpO2 96%  BMI 33.88 kg/m2      "

## 2017-11-30 LAB
CYTO UR: NORMAL
LAB AP CASE REPORT: NORMAL
Lab: NORMAL
PATH REPORT.FINAL DX SPEC: NORMAL
PATH REPORT.GROSS SPEC: NORMAL

## 2017-12-06 ENCOUNTER — HOSPITAL ENCOUNTER (OUTPATIENT)
Dept: DIABETES SERVICES | Facility: HOSPITAL | Age: 52
Setting detail: RECURRING SERIES
Discharge: HOME | End: 2017-12-06

## 2017-12-06 PROCEDURE — G0109 DIAB MANAGE TRN IND/GROUP: HCPCS

## 2017-12-07 ENCOUNTER — TELEPHONE (OUTPATIENT)
Dept: SURGERY | Facility: CLINIC | Age: 52
End: 2017-12-07

## 2017-12-07 DIAGNOSIS — G47.34 NOCTURNAL HYPOXEMIA: Primary | ICD-10-CM

## 2017-12-07 DIAGNOSIS — R06.83 SNORING: ICD-10-CM

## 2017-12-07 NOTE — TELEPHONE ENCOUNTER
"----- Message from Wellington Chang MD sent at 12/6/2017  1:11 PM EST -----  I called patient and left a message about the recent colonoscopy.  The path report shows a tubular adenoma with low-grade dysplasia.  As such, the next recommended colonoscopy should take place in 5 years. It should be scheduled as \"history of adenomatous colon polyp (Z86.010)\"    "

## 2017-12-13 ENCOUNTER — HOSPITAL ENCOUNTER (OUTPATIENT)
Dept: DIABETES SERVICES | Facility: HOSPITAL | Age: 52
Setting detail: RECURRING SERIES
Discharge: HOME | End: 2017-12-13

## 2017-12-13 PROCEDURE — G0109 DIAB MANAGE TRN IND/GROUP: HCPCS

## 2017-12-15 DIAGNOSIS — E78.5 HYPERLIPIDEMIA, UNSPECIFIED HYPERLIPIDEMIA TYPE: ICD-10-CM

## 2017-12-15 RX ORDER — EZETIMIBE 10 MG/1
TABLET ORAL
Qty: 30 TABLET | Refills: 2 | Status: SHIPPED | OUTPATIENT
Start: 2017-12-15 | End: 2018-03-12 | Stop reason: SDUPTHER

## 2017-12-18 ENCOUNTER — OFFICE VISIT (OUTPATIENT)
Dept: INTERNAL MEDICINE | Facility: CLINIC | Age: 52
End: 2017-12-18

## 2017-12-18 VITALS
OXYGEN SATURATION: 99 % | DIASTOLIC BLOOD PRESSURE: 64 MMHG | HEART RATE: 98 BPM | WEIGHT: 210 LBS | BODY MASS INDEX: 34.99 KG/M2 | SYSTOLIC BLOOD PRESSURE: 118 MMHG | HEIGHT: 65 IN | TEMPERATURE: 100.3 F

## 2017-12-18 DIAGNOSIS — R50.9 FEBRILE ILLNESS: Primary | ICD-10-CM

## 2017-12-18 DIAGNOSIS — Z86.010 HISTORY OF COLON POLYPS: Chronic | ICD-10-CM

## 2017-12-18 DIAGNOSIS — R06.83 SNORING: ICD-10-CM

## 2017-12-18 DIAGNOSIS — E11.9 TYPE 2 DIABETES MELLITUS WITHOUT COMPLICATION, WITHOUT LONG-TERM CURRENT USE OF INSULIN (HCC): Chronic | ICD-10-CM

## 2017-12-18 DIAGNOSIS — G47.34 NOCTURNAL HYPOXEMIA: ICD-10-CM

## 2017-12-18 DIAGNOSIS — B37.42 CANDIDAL BALANITIS: ICD-10-CM

## 2017-12-18 PROBLEM — Z86.0100 HISTORY OF COLON POLYPS: Status: ACTIVE | Noted: 2017-12-18

## 2017-12-18 PROBLEM — Z86.0100 HISTORY OF COLON POLYPS: Chronic | Status: ACTIVE | Noted: 2017-12-18

## 2017-12-18 PROBLEM — Z12.11 COLON CANCER SCREENING: Status: RESOLVED | Noted: 2017-10-24 | Resolved: 2017-12-18

## 2017-12-18 LAB
EXPIRATION DATE: NORMAL
FLUAV AG NPH QL: NORMAL
FLUBV AG NPH QL: NORMAL
INTERNAL CONTROL: NORMAL
Lab: NORMAL

## 2017-12-18 PROCEDURE — 99214 OFFICE O/P EST MOD 30 MIN: CPT | Performed by: INTERNAL MEDICINE

## 2017-12-18 PROCEDURE — 87804 INFLUENZA ASSAY W/OPTIC: CPT | Performed by: INTERNAL MEDICINE

## 2017-12-18 RX ORDER — FLUCONAZOLE 200 MG/1
TABLET ORAL
Qty: 7 TABLET | Refills: 4 | Status: SHIPPED | OUTPATIENT
Start: 2017-12-18 | End: 2018-05-09

## 2017-12-18 RX ORDER — CANAGLIFLOZIN 300 MG/1
TABLET, FILM COATED ORAL
Qty: 90 TABLET | Refills: 0 | Status: SHIPPED | OUTPATIENT
Start: 2017-12-18 | End: 2017-12-22 | Stop reason: SDUPTHER

## 2017-12-18 NOTE — PROGRESS NOTES
2017    Patient Information  Marek Diego                                                                                          [id number] [facility] [address]      1965  [phone]      Chief Complaint:     Complaining of fever and possible flu.  Follow-up overnight oximetry testing to evaluate nocturnal hypoxemia and snoring.  Follow-up recent colonoscopy and discovery of colon polyps.  Complaining of return of rash and irritation of penis.    History of Present Illness:    Patient with a history of type 2 diabetes, hyperlipidemia, hypertension, chronic constipation, environmental allergies/allergic rhinitis, hypogonadism, moderate persistent asthma.  He presents today with complaints of a febrile illness as will be described below.  Patient also recently had a colonoscopy and this will be described below as will evaluation of nocturnal hypoxemia.  Past medical history reviewed and updated where necessary including health maintenance parameters.  This reveals he needs a diabetic eye exam.    The history regarding recent febrile illness:          The history regarding nocturnal hypoxemia and snorin2017--patient seen in follow-up and the results of the overnight oximetry discussed.  Although the oxygen desaturation index is only a little about 5, his symptoms are significant and therefore we will proceed with a sleep study.    2017--overnight oximetry reveals oxygen desaturation index of 5.04.  Oxygen saturations less than or equal to 88% for 21 minutes and 24 seconds.  Oxygen saturations less than or equal to 89% for 45 minutes and 36 seconds.    2017--At the end of visit patient indicates that his wife says that he snores very badly and he is concerned about the possibility of sleep apnea.  He had a borderline overnight oximetry test back in  and I think it would be reasonable to repeat this.    The history regarding recent colonoscopy and colon  polyps:    11/29/2017--colonoscopy revealed 2 small (3 mm) polyps in the sigmoid colon and cecum.  Removed.  Bowel prep.  Sigmoid diverticuli noted.  No hemorrhoids.  Pathology returned hyperplastic polyp of the sigmoid and the cecal polyp was a tubular adenoma.  Repeat study due in 5 years.    The history regarding candidal balanitis:    12/18/2017--diabetic patient who takes Invokana and has recurrent candida balanitis again presents with penile irritation consistent with Candida balanitis.  Diflucan 200 mg per day ×7 days.  Since patient will be prone for recurrence, I will add refills to this prescription.    08/23/2017--patient called in the office with symptoms consistent with recurrence of fungal balanitis.  Diflucan 200 mg by mouth daily ×1 week.    12/17/2013--patient had recurrence of fungal balanitis that was due to transparent from his wife who had a yeast infection. Treated with Diflucan.     08/26/2013--Patient developed fungal balanitis secondary to Invokana.     Review of Systems   Constitution: Positive for chills, fever and malaise/fatigue.   HENT: Negative.    Eyes: Negative.    Cardiovascular: Negative.    Respiratory: Positive for sleep disturbances due to breathing and snoring.    Endocrine: Negative.    Hematologic/Lymphatic: Negative.    Skin: Negative.    Musculoskeletal: Negative.    Gastrointestinal: Negative.    Genitourinary: Negative.    Neurological: Negative.    Psychiatric/Behavioral: Negative.    Allergic/Immunologic: Negative.        Active Problems:    Patient Active Problem List   Diagnosis   • Allergic rhinitis   • Benign essential hypertension   • Chronic neck pain   • Depression with anxiety   • Gastroesophageal reflux disease with esophagitis   • Hyperlipidemia   • Hypogonadism male, on TRT.   • Microalbuminuria   • Moderate persistent asthma   • Multiple environmental allergies   • Non morbid obesity   • Type 2 diabetes mellitus   • Vitamin D deficiency   • Routine physical  examination   • Therapeutic drug monitoring   • Right bundle branch block   • Tinea manus   • Diabetic eye exam   • Diabetic foot exam   • Candidal balanitis   • Chronic constipation   • Snoring   • Nocturnal hypoxemia   • History of colon polyps, 11/29/2017--tubular adenoma times one.  Hyperplastic ×1.  Repeat 5 years.   • Febrile illness         Past Medical History:   Diagnosis Date   • Allergic rhinitis 10/24/2013    10/24/2013--skin testing revealed impressive reactions to grass following, tree pollen, weed pollen, ragweed, dust mite, and cat. Recommend immunotherapy.   • Benign essential hypertension 2/22/2016   • Chronic constipation 11/20/2017 11/20/2017--patient reports approximately 8 month history of intermittent constipation that at times can last as much as a month.  He notes his blood sugar readings tend to increase when this happens.  He is scheduled for colonoscopy next week.  I recommend a generic probiotic daily as well as MiraLAX and adjust as needed.   • Chronic neck pain 10/30/2015    12/19/2015--patient reports his left shoulder pain has resolved. He continues to have neck pain. X-ray of the cervical spine ordered. Physical therapy ordered.   11/10/2015--left shoulder injected with 80 mg of Depo-Medrol with 3 mL of Xylocaine.   10/30/2015--patient presents with at least a one-month history of intermittent left-sided neck pain that is associated with left shoulder pain as well. The pain is described as shooting and is 8 out of 10 intensity at its worst. The pain is worse with certain movements of his head and left shoulder. No trauma. No numbness or paresthesias.   • Depression with anxiety 2/22/2016   • Diabetic eye exam 5/27/2016    May 2016--patient reports a normal diabetic eye exam.   • Diabetic foot exam 4/27/2017 05/02/2017--routine diabetic foot exam reveals no evidence of diabetic foot ulcer or pre-ulcerative callus.  Pulses are palpable and there is no signs of ischemia.   Sensation subjectively intact.   • Gastroesophageal reflux disease with esophagitis 5/22/2015 08/12/2015--EGD revealed esophagitis and a distal esophageal stricture that was dilated. Small sliding hiatal hernia. Proximal gastric ulcer and gastritis present. Dysphagia resolved after dilatation. Pathology of the gastric antrum was benign with no significant histologic abnormality. Distal esophagus biopsy negative for intestinal metaplasia or dysplasia.   05/22/2015--patient presents with a several month history of progressively worsening intermittent dysphagia of solids but not liquids. He describes solid food sometimes sticking and points to his upper chest/lower throat. No other associated symptoms. Patient referred to Dr. Aime Parada for an EGD. This is negative, may need ENT evaluation.   • History of acute bronchitis with bronchospasm 2/22/2016 11/11/2016--patient seen in follow-up and reports his cough is better but he still does not feel totally well.  Chest exam is clear today.  I recommend continuation of the Advair and give it more time.  11/02/2016--patient with a history of moderate persistent asthma presents with a one-week history of chest congestion, cough productive of green phlegm, subjective but not documented fever without chills.  No significant hip symptoms.  Patient does have an inhaler but has not been using it.  Has been taking over-the-counter Mucinex without any relief.  Examination reveals an obvious cough.  There is only mild end expiratory wheezes present and an occasional rhonchi but no signs of consolidation.  Patient would prefer to avoid prednisone due to elevated blood sugars.  Advair 250/50, 2 puffs twice a day until symptoms resolved, at least 2 weeks.  Levaquin 750 mg by mouth daily × 8 days.  Tussionex.  04/19/2014--patient reports symptoms resolved.  02/10/2014--patient presents with a 5 day history of head co   • HIstory of Acute sinusitis 02/10/2014    04/19/2014--patient  reports symptoms resolved.  02/10/2014--patient presents with a 5 day history of head congestion and posterior nasal drainage as well as cough productive of yellow phlegm. He was tender to percussion over the sinuses, has boggy nasal mucosa, lungs revealed mild bilateral rhonchi. Treated with Levaquin 750 mg daily x8 days. Stahist AD one by mouth twice a day. Tussionex 1 teaspo   • History of Candidal balanitis 8/23/2017 08/23/2017--patient called in the office with symptoms consistent with recurrence of fungal balanitis.  Diflucan 200 mg by mouth daily ×1 week.  12/17/2013--patient had recurrence of fungal balanitis that was due to transparent from his wife who had a yeast infection. Treated with Diflucan.   08/26/2013--Patient developed fungal balanitis secondary to Invokana.    • History of colon polyps, 11/29/2017--tubular adenoma times one.  Hyperplastic ×1.  Repeat 5 years. 12/18/2017 11/29/2017--colonoscopy revealed 2 small (3 mm) polyps in the sigmoid colon and cecum.  Removed.  Bowel prep.  Sigmoid diverticuli noted.  No hemorrhoids.  Pathology returned hyperplastic polyp of the sigmoid and the cecal polyp was a tubular adenoma.   • History of Diffuse abdominal pain 2/24/2016 08/26/2016--patient seen in follow-up and reports his abdominal pain has resolved.  03/18/2016--patient seen in follow-up and reports his symptoms are now intermittent and somewhat better.  No new complaints.  CT scan of the abdomen with contrast reviewed.  Note that the insurance company would not allow us to perform the pelvic CT.  This reveals normal liver, gallbladder, pancreas, spleen.  Kidneys are also normal without kidney stone except there is a 3 mm cyst arising from the upper pole right kidney.  Multiple centimeters/subcentimeter sized central has enteric lymph nodes are noted.  A few centimeter sized portal lymph nodes are also noted.  Largest lymph node is precaval, 17 mm in maximum diameter.  These all could be  reactive in nature.  Lymphangitis or potential lymphoma possible, however less likely particularly given the patient's spleen is normal.  Diverticulosis without diverticulitis noted.  Small bowel and stomach as well as duodenum appear normal.  Also noted is indeterminate nodule right low   • HIstory of Dysphagia 08/12/2015 08/12/2015--EGD revealed esophagitis and a distal esophageal stricture that was dilated. Small sliding hiatal hernia. Proximal gastric ulcer and gastritis present. Dysphagia resolved after dilatation.   05/22/2015--patient presents with a several month history of progressively worsening intermittent dysphagia of solids but not liquids. He describes solid food sometimes sticking and points to his u   • History of echocardiogram 04/23/2014 04/23/2014--echocardiogram reveals normal left ventricular systolic function with ejection fraction 55%. Left ventricular wall motion is normal. The right ventricle is moderately to severely dilated. Right ventricular systolic function is mildly reduced. The right atrium is moderately dilated. Saline contrast study revealed no evidence of PFO/ASD. Status post PFO closure. There is trace mitral reg   • HIstory of eosinophilic gastroenteritis 1990s    1990s--patient had significant epigastric discomfort and workup including an EGD revealed eosinophilic gastroenteritis that was treated with prednisone and resulted in resolution.   • History of esophageal stricture 08/12/2015 08/12/2015--EGD revealed esophagitis and a distal esophageal stricture that was dilated. Small sliding hiatal hernia. Proximal gastric ulcer and gastritis present. Dysphagia resolved after dilatation. Pathology of the gastric antrum was benign with no significant histologic abnormality. Distal esophagus biopsy negative for intestinal metaplasia or dysplasia.   05/22/2015--patient presents with a s   • History of Incomplete tear of left rotator cuff 11/24/2014 05/02/2017--patient  reports he saw the orthopedic surgeon and received cortisone injections ×2.  He reports that this was effective in no longer has pain.  10/07/2016--MRI of the left shoulder reveals a complete tear of the long head of the biceps and suspected glenoid labrum tear.  5 mm wide interstitial tear of the distal supraspinatus.  08/26/2016--patient seen in follow-up and reports his left shoulder pain has returned.  Orthopedic referral given.  01/23/2015--patient seen in follow up and reports his symptoms have resolved.   11/24/2014--patient presents with at least a one-month history of progressively worsening left shoulder pain. The pain is worse with certain movements such as flexing the left arm at the elbow when picking something up. Also extension and abduction. A total of 80 mg of Depo-Medrol with 3 mL of Xylocaine was injected, one half of this amount was injected into the shoulder itself via a posterior approach and the other half was injected into the bicipital groove area. X-ray of   • History of Influenza A (H1N1) 02/06/2015 02/06/2015--patient presents with a 3 day history of illness. He is complaining of fever, chills, diffuse body aches, marked fatigue, marked coughing and headache. Influenza swab is positive for a period Tamiflu 75 mg by mouth twice a day 5 days initiated. Tussionex 1 teaspoon by mouth every 12 hours as needed for cough. Also recommend an over-the-counter flu preparation, rest, fluids, Tylenol as    • History of Mesenteric lymphadenopathy 2/24/2016 09/13/2016--CT scan of the chest, abdomen, and pelvis with contrast reveals no lymphadenopathy within the abdomen or pelvis.  Mild hepatic steatosis.  Previously noted right lower lobe pulmonary nodule is currently calcified and consistent with a calcified granuloma.  08/26/2016--patient seen in follow-up and reports his abdominal pain has resolved.  Repeat CT scan of the abdomen and pelvis as well as chest ordered to reevaluate the mesenteric  lymphadenopathy and pulmonary nodule.  03/18/2016--patient seen in follow-up and reports his symptoms are now intermittent and somewhat better.  No new complaints.  CT scan of the abdomen with contrast reviewed.  Note that the insurance company would not allow us to perform the pelvic CT.  This reveals normal liver, gallbladder, pancreas, spleen.  Kidneys are also normal without kidney stone except there is a 3 mm cyst arising from the upper pole right kidney.  Multiple centimeters/subcentimeter sized central has enteric lymph nodes are noted.  A few centimeter size   • History of overnight oximetry 05/15/2013     05/15/2013--overnight oximetry revealed oxygen saturations less than 90% for six minutes and 52 seconds. Oxygen saturations less than 88 were two minutes and 44 seconds. Oxygen saturations greater than 90% for 98.5 percent of the study. There were 56 desaturation events of less than three minutes duration during which the mean high was 96.1% and the mean low was 89.6%. There were 11 desaturation    • HIstory of Pulmonary hypertension, 04/23/2014--resolved after PFO/ASD closure. 04/23/2014 04/23/2014--echocardiogram reveals normal left ventricular systolic function with ejection fraction 55%. Left ventricular wall motion is normal. The right ventricle is moderately to severely dilated. Right ventricular systolic function is mildly reduced. The right atrium is moderately dilated. Saline contrast study revealed no evidence of PFO/ASD. Status post PFO closure. There is trace mitral reg   • History of Pulmonary nodule, 03/07/2016--5 mm indeterminate nodule right lower lobe.  09/13/2016--consistent with calcified granuloma. 3/7/2016    09/13/2016--CT scan of the chest, abdomen, and pelvis with contrast reveals no lymphadenopathy within the abdomen or pelvis.  Mild hepatic steatosis.  Previously noted right lower lobe pulmonary nodule is currently calcified and consistent with a calcified granuloma.   03/07/2016--CT scan of the abdomen performed for complaints of abdominal discomfort revealed a 5 mm nodular focus right lower lobe which is indeterminate.  Recommend repeat CT scan in 6 months.   • HIstory of repair of Congenital atrial septal defect 03/2012 March 2012--percutaneous closure of secundum ASD.   • History of Rotator cuff tendinitis 10/30/2015    12/19/2015--patient reports his left shoulder pain has resolved. He continues to have neck pain.   11/10/2015--left shoulder injected with 80 mg of Depo-Medrol with 3 mL of Xylocaine.   10/30/2015--patient presents with at least a one-month history of intermittent left-sided neck pain that is associated with left shoulder pain as well. The pain is described as shooting and is 8 out of 10 intensity   • History of Rupture of left biceps tendon 11/11/2016 05/02/2017--patient reports he saw the orthopedic surgeon and received cortisone injections ×2.  He reports that this was effective in no longer has pain.  10/07/2016--MRI of the left shoulder reveals a complete tear of the long head of the biceps and suspected glenoid labrum tear.  5 mm wide interstitial tear of the distal supraspinatus.  08/26/2016--patient seen in follow-up and reports his left shoulder pain has returned.  Orthopedic referral given.  01/23/2015--patient seen in follow up and reports his symptoms have resolved.   11/24/2014--patient presents with at least a one-month history of progressively worsening left shoulder pain. The pain is worse with certain movements such as flexing the left arm at the elbow when picking something up. Also extension and abduction. A total of 80 mg of Depo-Medrol with 3 mL of Xylocaine was injected, one half of this amount was injected into the shoulder itself via a posterior approach and the other half was injected into the bicipital groove area. X-ray of   • Hyperlipidemia 2/22/2016   • Hypogonadism male, on TRT. 12/26/2012 12/26/2012--treatment for  hypogonadism begun.   • Microalbuminuria 10/19/2014    10/19/2014--urine microalbumin mildly elevated at 27.8. Normal range 0.0--17.0.   • Moderate persistent asthma 2/22/2016    Seasonal, spring and fall.   • Multiple environmental allergies 10/24/2013    10/24/2013--skin testing revealed impressive reactions to grass following, tree pollen, weed pollen, ragweed, dust mite, and cat. Recommend immunotherapy.   • Non morbid obesity 2/22/2016   • Right bundle branch block     ECG reveals sinus rhythm, rate of 75, right bundle branch block, left anterior hemiblock   • Type 2 diabetes mellitus 1/1/2004 2004--initial diagnosis of type 2 diabetes.   • Vitamin D deficiency 2/22/2016         Past Surgical History:   Procedure Laterality Date   • ATRIAL SEPTAL DEFECT REPAIR  03/2012 March 2012--percutaneous closure of secundum ASD.  Dr. Mcpherson   • COLONOSCOPY N/A 11/29/2017 11/29/2017--colonoscopy revealed 2 small (3 mm) polyps in the sigmoid colon and cecum.  Removed.  Bowel prep.  Sigmoid diverticuli noted.  No hemorrhoids.  Pathology returned hyperplastic polyp of the sigmoid and the cecal polyp was a tubular adenoma.   • ESOPHAGEAL DILATATION  08/12/2015 08/12/2015--EGD revealed esophagitis and a distal esophageal stricture that was dilated. Small sliding hiatal hernia. Proximal gastric ulcer and gastritis present. Dysphagia resolved after dilatation. 05/22/2015--patient presents with a several month history of progressively worsening intermittent dysphagia of solids but not liquids. He describes solid food sometimes sticking and points to his upp   • ESOPHAGOSCOPY / EGD  08/12/2015 08/12/2015--EGD revealed esophagitis and a distal esophageal stricture that was dilated. Small sliding hiatal hernia. Proximal gastric ulcer and gastritis present. Dysphagia resolved after dilatation. 05/22/2015--patient presents with a several month history of progressively worsening intermittent dysphagia of solids but not  liquids. He describes solid food sometimes sticking and points to his upp   • KNEE ACL RECONSTRUCTION Right 02/11/2013 02/11/2013--knee arthroscopy with anterior cruciate ligament repair   • TONSILLECTOMY  2009 2009--tonsillectomy as an adult.         Allergies   Allergen Reactions   • Latex Itching           Current Outpatient Prescriptions:   •  amLODIPine-benazepril (LOTREL 5-20) 5-20 MG per capsule, TAKE 1 CAPSULE BY MOUTH ONE TIME A DAY , Disp: 30 capsule, Rfl: 1  •  Cholecalciferol (VITAMIN D3) 5000 UNITS capsule capsule, Take 1 capsule by mouth daily., Disp: , Rfl:   •  Dulaglutide (TRULICITY) 1.5 MG/0.5ML solution pen-injector, Inject 1.5 mg under the skin 1 (One) Time Per Week., Disp: 4 pen, Rfl: 11  •  esomeprazole (nexIUM) 40 MG capsule, TAKE 1 CAPSULE BY MOUTH ONE TIME A DAY , Disp: 30 capsule, Rfl: 10  •  ezetimibe (ZETIA) 10 MG tablet, TAKE ONE TABLET BY MOUTH DAILY FOR CHOLESTEROL, Disp: 30 tablet, Rfl: 2  •  glimepiride (AMARYL) 4 MG tablet, take 1/2 tablet by mouth in the morning and take 1&1/2 in the evening  , Disp: 60 tablet, Rfl: 4  •  Insulin Glargine (LANTUS SOLOSTAR) 100 UNIT/ML injection pen, Inject 80 units subcutaneously daily as directed., Disp: 10 pen, Rfl: 11  •  INVOKANA 300 MG tablet, TAKE 1 TABLET BY MOUTH EVERY DAY, Disp: 90 tablet, Rfl: 0  •  Loratadine (CLARITIN PO), Take 1 capsule by mouth daily as needed (when necessary for allergies.)., Disp: , Rfl:   •  metFORMIN (GLUCOPHAGE) 1000 MG tablet, Take 1 tablet by mouth 2 (Two) Times a Day With Meals., Disp: 180 tablet, Rfl: 1  •  montelukast (SINGULAIR) 10 MG tablet, TAKE 1 TABLET BY MOUTH ONE TIME A DAY , Disp: 30 tablet, Rfl: 3  •  Probiotic capsule, 1 by mouth daily, Disp: , Rfl:   •  rosuvastatin (CRESTOR) 40 MG tablet, TAKE 1 TABLET BY MOUTH AT BEDTIME , Disp: 30 tablet, Rfl: 1  •  sertraline (ZOLOFT) 50 MG tablet, TAKE 1 TABLET BY MOUTH ONE TIME A DAY , Disp: 30 tablet, Rfl: 5  •  Testosterone (AXIRON) 30 MG/ACT  "solution, Place 30 mg on the skin Daily., Disp: 90 mL, Rfl: 3  •  polyethylene glycol (MIRALAX) packet, Take orally as directed daily for constipation, Disp: , Rfl:       Family History   Problem Relation Age of Onset   • Diabetes Mother    • Stomach cancer Mother    • Diabetes Maternal Grandmother          Social History     Social History   • Marital status:      Spouse name: N/A   • Number of children: N/A   • Years of education: N/A     Occupational History   •  for [employer]      Social History Main Topics   • Smoking status: Never Smoker   • Smokeless tobacco: Never Used   • Alcohol use No   • Drug use: No   • Sexual activity: Yes     Partners: Female     Other Topics Concern   • Not on file     Social History Narrative         Vitals:    12/18/17 1538   BP: 118/64   Pulse: 98   Temp: 100.3 °F (37.9 °C)   SpO2: 99%   Weight: 95.3 kg (210 lb)   Height: 165.1 cm (65\")          Physical Exam:    General: Alert and oriented x 3.  Obese. No acute distress.  Normal affect.  HEENT: Pupils equal, round, reactive to light; extraocular movements intact; sclerae nonicteric; pharynx, ear canals and TMs normal.  Neck: Without JVD, thyromegaly, bruit, or adenopathy.  Lungs: Clear to auscultation in all fields.  Heart: Regular rate and rhythm without murmur, rub, gallop, or click.  Abdomen: Soft, nontender, without hepatosplenomegaly or hernia.  Bowel sounds normal.  : Deferred.  Rectal: Deferred.  Extremities: Without clubbing, cyanosis, edema, or pulse deficit.  Neurologic: Intact without focal deficit.  Normal station and gait observed during ingress and egress from the examination room.  Skin: Without significant lesion.  Musculoskeletal: Unremarkable.      Lab/other results:    I reviewed the results of the recent overnight oximetry test as well as a colonoscopy and pathology report with the patient.    Influenza swab returned    Assessment/Plan:     Diagnosis Plan   1. Febrile illness     2. " Nocturnal hypoxemia     3. Snoring     4. History of colon polyps, 11/29/2017--tubular adenoma times one.  Hyperplastic ×1.  Repeat 5 years.     5. Candidal balanitis     6. Type 2 diabetes mellitus without complication, without long-term current use of insulin         Patient presents with a febrile illness possibly consistent with influenza but his influenza test is negative.  I still suspect viral illness. Patient has somewhat mild nocturnal hypoxemia given the overall clinical picture, sleep study indicated.  Patient has a history of colon polyps on a recent colonoscopy.  Patient is aware that tubular adenomatous colon polyps due increased risk for colon cancer and he is aware of her repeat study needs to be done in about 5 years.  Patient has a return of candidal balanitis as described.  He is prone to this given his type 2 diabetes and treatment with Invokana.    Plan is as follows: Recommend symptomatic over-the-counter medication such as TheraFlu, rest, Tylenol, plenty of fluids.  Home sleep study ordered.  Diflucan 200 mg by mouth daily ×1 week.  Refills given.  Home sleep study ordered.  Patient will keep previously scheduled lab and follow-up appointment, or follow-up as needed.    Procedures

## 2017-12-20 ENCOUNTER — HOSPITAL ENCOUNTER (OUTPATIENT)
Dept: DIABETES SERVICES | Facility: HOSPITAL | Age: 52
Setting detail: RECURRING SERIES
Discharge: HOME | End: 2017-12-20

## 2017-12-20 ENCOUNTER — TELEPHONE (OUTPATIENT)
Dept: INTERNAL MEDICINE | Facility: CLINIC | Age: 52
End: 2017-12-20

## 2017-12-20 PROCEDURE — G0109 DIAB MANAGE TRN IND/GROUP: HCPCS

## 2017-12-20 RX ORDER — AZITHROMYCIN 250 MG/1
TABLET, FILM COATED ORAL
Qty: 6 TABLET | Refills: 0 | Status: SHIPPED | OUTPATIENT
Start: 2017-12-20 | End: 2018-05-09

## 2017-12-20 RX ORDER — OSELTAMIVIR PHOSPHATE 75 MG/1
75 CAPSULE ORAL 2 TIMES DAILY
Qty: 10 CAPSULE | Refills: 0 | Status: SHIPPED | OUTPATIENT
Start: 2017-12-20 | End: 2018-05-09

## 2017-12-20 NOTE — TELEPHONE ENCOUNTER
I still think this may be influenza despite the negative flu test.  Tamiflu 75 mg, one by mouth twice a day ×5 days.  Also send in a Z-Preet times one.

## 2017-12-20 NOTE — TELEPHONE ENCOUNTER
Pt is not feeling any better. Can you please call in an Antibiotic. Very congested and sore all over. Call [phone]

## 2018-01-05 RX ORDER — AMLODIPINE BESYLATE AND BENAZEPRIL HYDROCHLORIDE 5; 20 MG/1; MG/1
CAPSULE ORAL
Qty: 30 CAPSULE | Refills: 4 | Status: SHIPPED | OUTPATIENT
Start: 2018-01-05 | End: 2018-07-18 | Stop reason: SDUPTHER

## 2018-01-07 DIAGNOSIS — E11.9 TYPE 2 DIABETES MELLITUS WITHOUT COMPLICATION, WITHOUT LONG-TERM CURRENT USE OF INSULIN (HCC): ICD-10-CM

## 2018-01-08 ENCOUNTER — OFFICE VISIT (OUTPATIENT)
Dept: ORTHOPEDIC SURGERY | Facility: CLINIC | Age: 53
End: 2018-01-08

## 2018-01-08 VITALS — HEIGHT: 66 IN | BODY MASS INDEX: 32.62 KG/M2 | WEIGHT: 203 LBS | TEMPERATURE: 97.6 F

## 2018-01-08 DIAGNOSIS — M25.512 CHRONIC PAIN OF BOTH SHOULDERS: Primary | ICD-10-CM

## 2018-01-08 DIAGNOSIS — G89.29 CHRONIC PAIN OF BOTH SHOULDERS: Primary | ICD-10-CM

## 2018-01-08 DIAGNOSIS — M25.511 CHRONIC PAIN OF BOTH SHOULDERS: Primary | ICD-10-CM

## 2018-01-08 PROCEDURE — 73030 X-RAY EXAM OF SHOULDER: CPT | Performed by: ORTHOPAEDIC SURGERY

## 2018-01-08 PROCEDURE — 99214 OFFICE O/P EST MOD 30 MIN: CPT | Performed by: ORTHOPAEDIC SURGERY

## 2018-01-08 PROCEDURE — 20610 DRAIN/INJ JOINT/BURSA W/O US: CPT | Performed by: ORTHOPAEDIC SURGERY

## 2018-01-08 RX ORDER — DULAGLUTIDE 1.5 MG/.5ML
INJECTION, SOLUTION SUBCUTANEOUS
Qty: 2 ML | Refills: 10 | Status: SHIPPED | OUTPATIENT
Start: 2018-01-08 | End: 2019-01-10 | Stop reason: SDUPTHER

## 2018-01-08 RX ADMIN — METHYLPREDNISOLONE ACETATE 80 MG: 80 INJECTION, SUSPENSION INTRA-ARTICULAR; INTRALESIONAL; INTRAMUSCULAR; SOFT TISSUE at 16:20

## 2018-01-08 RX ADMIN — BUPIVACAINE HYDROCHLORIDE 4 ML: 5 INJECTION, SOLUTION EPIDURAL; INTRACAUDAL at 16:20

## 2018-01-08 NOTE — PROGRESS NOTES
New Shoulder      Patient: Marek Diego        YOB: 1965    Medical Record Number: [id number]        Chief Complaints:  Right shoulder pain  Left shoulder pain  Chief Complaint   Patient presents with   • Right Shoulder - Pain, Establish Care   • Left Shoulder - Pain, Establish Care            History of Present Illness: This is a  52 y.o. male who presents with Complaints of bilateral shoulder pain he is right-hand-dominant states her right one got a couple months ago when it popped when he was doing some twisting-type motion.  She had pain since that time as intermittent and moderate I've seen him in the past for the left he states is the same as it was last time right is worse and left the left is intermittent and mild to moderate does have night pain on both he is right-hand-dominant his symptoms are aching burning she's tried anti-inflammatories ice and heat he is a  his past medical history is remarkable for diabetes and depression anxiety GERD constipation chronic neck pain          Allergies:   Allergies   Allergen Reactions   • Latex Itching       Medications:   Home Medications:  Current Outpatient Prescriptions on File Prior to Visit   Medication Sig   • amLODIPine-benazepril (LOTREL 5-20) 5-20 MG per capsule TAKE 1 CAPSULE BY MOUTH ONE TIME A DAY    • azithromycin (ZITHROMAX) 250 MG tablet Take 2 tablets the first day, then 1 tablet daily for 4 days.   • Canagliflozin (INVOKANA) 300 MG tablet Take 300 mg by mouth Daily.   • Cholecalciferol (VITAMIN D3) 5000 UNITS capsule capsule Take 1 capsule by mouth daily.   • esomeprazole (nexIUM) 40 MG capsule TAKE 1 CAPSULE BY MOUTH ONE TIME A DAY    • ezetimibe (ZETIA) 10 MG tablet TAKE ONE TABLET BY MOUTH DAILY FOR CHOLESTEROL   • fluconazole (DIFLUCAN) 200 MG tablet 1 by mouth daily until gone   • glimepiride (AMARYL) 4 MG tablet take 1/2 tablet by mouth in the morning and take 1&1/2 in the evening   • Insulin Glargine (LANTUS  SOLOSTAR) 100 UNIT/ML injection pen Inject 80 units subcutaneously daily as directed.   • Loratadine (CLARITIN PO) Take 1 capsule by mouth daily as needed (when necessary for allergies.).   • metFORMIN (GLUCOPHAGE) 1000 MG tablet Take 1 tablet by mouth 2 (Two) Times a Day With Meals.   • montelukast (SINGULAIR) 10 MG tablet TAKE 1 TABLET BY MOUTH ONE TIME A DAY    • Probiotic capsule 1 by mouth daily   • rosuvastatin (CRESTOR) 40 MG tablet TAKE 1 TABLET BY MOUTH AT BEDTIME    • sertraline (ZOLOFT) 50 MG tablet TAKE 1 TABLET BY MOUTH ONE TIME A DAY    • Testosterone (AXIRON) 30 MG/ACT solution Place 30 mg on the skin Daily.   • TRULICITY 1.5 MG/0.5ML solution pen-injector inject 1.5mg under the skin once weekly as directed   • oseltamivir (TAMIFLU) 75 MG capsule Take 1 capsule by mouth 2 (Two) Times a Day.   • polyethylene glycol (MIRALAX) packet Take orally as directed daily for constipation     No current facility-administered medications on file prior to visit.      Current Medications:  Scheduled Meds:  Continuous Infusions:  No current facility-administered medications for this visit.   PRN Meds:.    Past Medical History:   Diagnosis Date   • Allergic rhinitis 10/24/2013    10/24/2013--skin testing revealed impressive reactions to grass following, tree pollen, weed pollen, ragweed, dust mite, and cat. Recommend immunotherapy.   • Benign essential hypertension 2/22/2016   • Chronic constipation 11/20/2017 11/20/2017--patient reports approximately 8 month history of intermittent constipation that at times can last as much as a month.  He notes his blood sugar readings tend to increase when this happens.  He is scheduled for colonoscopy next week.  I recommend a generic probiotic daily as well as MiraLAX and adjust as needed.   • Chronic neck pain 10/30/2015    12/19/2015--patient reports his left shoulder pain has resolved. He continues to have neck pain. X-ray of the cervical spine ordered. Physical therapy  ordered.   11/10/2015--left shoulder injected with 80 mg of Depo-Medrol with 3 mL of Xylocaine.   10/30/2015--patient presents with at least a one-month history of intermittent left-sided neck pain that is associated with left shoulder pain as well. The pain is described as shooting and is 8 out of 10 intensity at its worst. The pain is worse with certain movements of his head and left shoulder. No trauma. No numbness or paresthesias.   • Depression with anxiety 2/22/2016   • Diabetic eye exam 5/27/2016    May 2016--patient reports a normal diabetic eye exam.   • Diabetic foot exam 4/27/2017 05/02/2017--routine diabetic foot exam reveals no evidence of diabetic foot ulcer or pre-ulcerative callus.  Pulses are palpable and there is no signs of ischemia.  Sensation subjectively intact.   • Gastroesophageal reflux disease with esophagitis 5/22/2015 08/12/2015--EGD revealed esophagitis and a distal esophageal stricture that was dilated. Small sliding hiatal hernia. Proximal gastric ulcer and gastritis present. Dysphagia resolved after dilatation. Pathology of the gastric antrum was benign with no significant histologic abnormality. Distal esophagus biopsy negative for intestinal metaplasia or dysplasia.   05/22/2015--patient presents with a several month history of progressively worsening intermittent dysphagia of solids but not liquids. He describes solid food sometimes sticking and points to his upper chest/lower throat. No other associated symptoms. Patient referred to Dr. Aime Parada for an EGD. This is negative, may need ENT evaluation.   • History of acute bronchitis with bronchospasm 2/22/2016 11/11/2016--patient seen in follow-up and reports his cough is better but he still does not feel totally well.  Chest exam is clear today.  I recommend continuation of the Advair and give it more time.  11/02/2016--patient with a history of moderate persistent asthma presents with a one-week history of chest  congestion, cough productive of green phlegm, subjective but not documented fever without chills.  No significant hip symptoms.  Patient does have an inhaler but has not been using it.  Has been taking over-the-counter Mucinex without any relief.  Examination reveals an obvious cough.  There is only mild end expiratory wheezes present and an occasional rhonchi but no signs of consolidation.  Patient would prefer to avoid prednisone due to elevated blood sugars.  Advair 250/50, 2 puffs twice a day until symptoms resolved, at least 2 weeks.  Levaquin 750 mg by mouth daily × 8 days.  Tussionex.  04/19/2014--patient reports symptoms resolved.  02/10/2014--patient presents with a 5 day history of head co   • HIstory of Acute sinusitis 02/10/2014    04/19/2014--patient reports symptoms resolved.  02/10/2014--patient presents with a 5 day history of head congestion and posterior nasal drainage as well as cough productive of yellow phlegm. He was tender to percussion over the sinuses, has boggy nasal mucosa, lungs revealed mild bilateral rhonchi. Treated with Levaquin 750 mg daily x8 days. Stahist AD one by mouth twice a day. Tussionex 1 teaspo   • History of Candidal balanitis 8/23/2017 08/23/2017--patient called in the office with symptoms consistent with recurrence of fungal balanitis.  Diflucan 200 mg by mouth daily ×1 week.  12/17/2013--patient had recurrence of fungal balanitis that was due to transparent from his wife who had a yeast infection. Treated with Diflucan.   08/26/2013--Patient developed fungal balanitis secondary to Invokana.    • History of colon polyps, 11/29/2017--tubular adenoma times one.  Hyperplastic ×1.  Repeat 5 years. 12/18/2017 11/29/2017--colonoscopy revealed 2 small (3 mm) polyps in the sigmoid colon and cecum.  Removed.  Bowel prep.  Sigmoid diverticuli noted.  No hemorrhoids.  Pathology returned hyperplastic polyp of the sigmoid and the cecal polyp was a tubular adenoma.   • History  of Diffuse abdominal pain 2/24/2016 08/26/2016--patient seen in follow-up and reports his abdominal pain has resolved.  03/18/2016--patient seen in follow-up and reports his symptoms are now intermittent and somewhat better.  No new complaints.  CT scan of the abdomen with contrast reviewed.  Note that the insurance company would not allow us to perform the pelvic CT.  This reveals normal liver, gallbladder, pancreas, spleen.  Kidneys are also normal without kidney stone except there is a 3 mm cyst arising from the upper pole right kidney.  Multiple centimeters/subcentimeter sized central has enteric lymph nodes are noted.  A few centimeter sized portal lymph nodes are also noted.  Largest lymph node is precaval, 17 mm in maximum diameter.  These all could be reactive in nature.  Lymphangitis or potential lymphoma possible, however less likely particularly given the patient's spleen is normal.  Diverticulosis without diverticulitis noted.  Small bowel and stomach as well as duodenum appear normal.  Also noted is indeterminate nodule right low   • HIstory of Dysphagia 08/12/2015 08/12/2015--EGD revealed esophagitis and a distal esophageal stricture that was dilated. Small sliding hiatal hernia. Proximal gastric ulcer and gastritis present. Dysphagia resolved after dilatation.   05/22/2015--patient presents with a several month history of progressively worsening intermittent dysphagia of solids but not liquids. He describes solid food sometimes sticking and points to his u   • History of echocardiogram 04/23/2014 04/23/2014--echocardiogram reveals normal left ventricular systolic function with ejection fraction 55%. Left ventricular wall motion is normal. The right ventricle is moderately to severely dilated. Right ventricular systolic function is mildly reduced. The right atrium is moderately dilated. Saline contrast study revealed no evidence of PFO/ASD. Status post PFO closure. 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   • History of esophageal stricture 08/12/2015 08/12/2015--EGD revealed esophagitis and a distal esophageal stricture that was dilated. Small sliding hiatal hernia. Proximal gastric ulcer and gastritis present. Dysphagia resolved after dilatation. Pathology of the gastric antrum was benign with no significant histologic abnormality. Distal esophagus biopsy negative for intestinal metaplasia or dysplasia.   05/22/2015--patient presents with a s   • History of Incomplete tear of left rotator cuff 11/24/2014 05/02/2017--patient reports he saw the orthopedic surgeon and received cortisone injections ×2.  He reports that this was effective in no longer has pain.  10/07/2016--MRI of the left shoulder reveals a complete tear of the long head of the biceps and suspected glenoid labrum tear.  5 mm wide interstitial tear of the distal supraspinatus.  08/26/2016--patient seen in follow-up and reports his left shoulder pain has returned.  Orthopedic referral given.  01/23/2015--patient seen in follow up and reports his symptoms have resolved.   11/24/2014--patient presents with at least a one-month history of progressively worsening left shoulder pain. The pain is worse with certain movements such as flexing the left arm at the elbow when picking something up. Also extension and abduction. A total of 80 mg of Depo-Medrol with 3 mL of Xylocaine was injected, one half of this amount was injected into the shoulder itself via a posterior approach and the other half was injected into the bicipital groove area. X-ray of   • History of Influenza A (H1N1) 02/06/2015 02/06/2015--patient presents with a 3 day history of illness. He is complaining of fever, chills, diffuse body aches, marked fatigue,  marked coughing and headache. Influenza swab is positive for a period Tamiflu 75 mg by mouth twice a day 5 days initiated. Tussionex 1 teaspoon by mouth every 12 hours as needed for cough. Also recommend an over-the-counter flu preparation, rest, fluids, Tylenol as    • History of Mesenteric lymphadenopathy 2/24/2016 09/13/2016--CT scan of the chest, abdomen, and pelvis with contrast reveals no lymphadenopathy within the abdomen or pelvis.  Mild hepatic steatosis.  Previously noted right lower lobe pulmonary nodule is currently calcified and consistent with a calcified granuloma.  08/26/2016--patient seen in follow-up and reports his abdominal pain has resolved.  Repeat CT scan of the abdomen and pelvis as well as chest ordered to reevaluate the mesenteric lymphadenopathy and pulmonary nodule.  03/18/2016--patient seen in follow-up and reports his symptoms are now intermittent and somewhat better.  No new complaints.  CT scan of the abdomen with contrast reviewed.  Note that the insurance company would not allow us to perform the pelvic CT.  This reveals normal liver, gallbladder, pancreas, spleen.  Kidneys are also normal without kidney stone except there is a 3 mm cyst arising from the upper pole right kidney.  Multiple centimeters/subcentimeter sized central has enteric lymph nodes are noted.  A few centimeter size   • History of overnight oximetry 05/15/2013     05/15/2013--overnight oximetry revealed oxygen saturations less than 90% for six minutes and 52 seconds. Oxygen saturations less than 88 were two minutes and 44 seconds. Oxygen saturations greater than 90% for 98.5 percent of the study. There were 56 desaturation events of less than three minutes duration during which the mean high was 96.1% and the mean low was 89.6%. There were 11 desaturation    • HIstory of Pulmonary hypertension, 04/23/2014--resolved after PFO/ASD closure. 04/23/2014 04/23/2014--echocardiogram reveals normal left ventricular  systolic function with ejection fraction 55%. Left ventricular wall motion is normal. The right ventricle is moderately to severely dilated. Right ventricular systolic function is mildly reduced. The right atrium is moderately dilated. Saline contrast study revealed no evidence of PFO/ASD. Status post PFO closure. There is trace mitral reg   • History of Pulmonary nodule, 03/07/2016--5 mm indeterminate nodule right lower lobe.  09/13/2016--consistent with calcified granuloma. 3/7/2016    09/13/2016--CT scan of the chest, abdomen, and pelvis with contrast reveals no lymphadenopathy within the abdomen or pelvis.  Mild hepatic steatosis.  Previously noted right lower lobe pulmonary nodule is currently calcified and consistent with a calcified granuloma.  03/07/2016--CT scan of the abdomen performed for complaints of abdominal discomfort revealed a 5 mm nodular focus right lower lobe which is indeterminate.  Recommend repeat CT scan in 6 months.   • HIstory of repair of Congenital atrial septal defect 03/2012 March 2012--percutaneous closure of secundum ASD.   • History of Rotator cuff tendinitis 10/30/2015    12/19/2015--patient reports his left shoulder pain has resolved. He continues to have neck pain.   11/10/2015--left shoulder injected with 80 mg of Depo-Medrol with 3 mL of Xylocaine.   10/30/2015--patient presents with at least a one-month history of intermittent left-sided neck pain that is associated with left shoulder pain as well. The pain is described as shooting and is 8 out of 10 intensity   • History of Rupture of left biceps tendon 11/11/2016 05/02/2017--patient reports he saw the orthopedic surgeon and received cortisone injections ×2.  He reports that this was effective in no longer has pain.  10/07/2016--MRI of the left shoulder reveals a complete tear of the long head of the biceps and suspected glenoid labrum tear.  5 mm wide interstitial tear of the distal supraspinatus.  08/26/2016--patient  seen in follow-up and reports his left shoulder pain has returned.  Orthopedic referral given.  01/23/2015--patient seen in follow up and reports his symptoms have resolved.   11/24/2014--patient presents with at least a one-month history of progressively worsening left shoulder pain. The pain is worse with certain movements such as flexing the left arm at the elbow when picking something up. Also extension and abduction. A total of 80 mg of Depo-Medrol with 3 mL of Xylocaine was injected, one half of this amount was injected into the shoulder itself via a posterior approach and the other half was injected into the bicipital groove area. X-ray of   • Hyperlipidemia 2/22/2016   • Hypogonadism male, on TRT. 12/26/2012 12/26/2012--treatment for hypogonadism begun.   • Microalbuminuria 10/19/2014    10/19/2014--urine microalbumin mildly elevated at 27.8. Normal range 0.0--17.0.   • Moderate persistent asthma 2/22/2016    Seasonal, spring and fall.   • Multiple environmental allergies 10/24/2013    10/24/2013--skin testing revealed impressive reactions to grass following, tree pollen, weed pollen, ragweed, dust mite, and cat. Recommend immunotherapy.   • Non morbid obesity 2/22/2016   • Right bundle branch block     ECG reveals sinus rhythm, rate of 75, right bundle branch block, left anterior hemiblock   • Type 2 diabetes mellitus 1/1/2004 2004--initial diagnosis of type 2 diabetes.   • Vitamin D deficiency 2/22/2016        Past Surgical History:   Procedure Laterality Date   • ATRIAL SEPTAL DEFECT REPAIR  03/2012 March 2012--percutaneous closure of secundum ASD.  Dr. Mcpherson   • COLONOSCOPY N/A 11/29/2017 11/29/2017--colonoscopy revealed 2 small (3 mm) polyps in the sigmoid colon and cecum.  Removed.  Bowel prep.  Sigmoid diverticuli noted.  No hemorrhoids.  Pathology returned hyperplastic polyp of the sigmoid and the cecal polyp was a tubular adenoma.   • ESOPHAGEAL DILATATION  08/12/2015 08/12/2015--EGD  "revealed esophagitis and a distal esophageal stricture that was dilated. Small sliding hiatal hernia. Proximal gastric ulcer and gastritis present. Dysphagia resolved after dilatation. 05/22/2015--patient presents with a several month history of progressively worsening intermittent dysphagia of solids but not liquids. He describes solid food sometimes sticking and points to his upp   • ESOPHAGOSCOPY / EGD  08/12/2015 08/12/2015--EGD revealed esophagitis and a distal esophageal stricture that was dilated. Small sliding hiatal hernia. Proximal gastric ulcer and gastritis present. Dysphagia resolved after dilatation. 05/22/2015--patient presents with a several month history of progressively worsening intermittent dysphagia of solids but not liquids. He describes solid food sometimes sticking and points to his upp   • KNEE ACL RECONSTRUCTION Right 02/11/2013 02/11/2013--knee arthroscopy with anterior cruciate ligament repair   • TONSILLECTOMY  2009 2009--tonsillectomy as an adult.        Social History     Occupational History   •  for [employer]      Social History Main Topics   • Smoking status: Never Smoker   • Smokeless tobacco: Never Used   • Alcohol use No   • Drug use: No   • Sexual activity: Yes     Partners: Female    Social History     Social History Narrative        Family History   Problem Relation Age of Onset   • Diabetes Mother    • Stomach cancer Mother    • Diabetes Maternal Grandmother              Review of Systems: 14 point review of systems are remarkable for her pain only the remainder are negative    Review of Systems      Physical Exam: 52 y.o. male  General Appearance:    Alert, cooperative, in no acute distress                 Vitals:    01/08/18 1541   Temp: 97.6 °F (36.4 °C)   TempSrc: Temporal Artery    Weight: 92.1 kg (203 lb)   Height: 167.6 cm (66\")      Patient is alert and read ×3 no acute distress appears her above-listed at height weight and age.  Affect is " normal respiratory rate is normal unlabored. Heart rate regular rate rhythm, sclera, dentition and hearing are normal for the purpose of this exam.    Ortho Exam Physical exam of the right shoulder reveals no overlying skin changes no lymphedema no lymphadenopathy.  Patient has active flexion 180 with mild symptoms abduction is similar external rotation is to 50 and internal rotation to the upper lumbar spine with mild symptoms.  Patient has good rotator cuff strength 4+ over 5 with isometric strength testing with pain.  Patient has a positive impingement and a positive Dykes sign.  Patient has good cervical range of motion which is full and asymptomatic no radicular symptoms.  Patient has a normal elbow exam.  Good distal pulses are present  Patient has pain with overhead activity and a positive Neer sign and a positive empty can sign , a positive drop arm and a definitive painful arc  Physical exam of the left shoulder reveals no overlying skin changes no lymphedema no lymphadenopathy.  Patient has active flexion 180 with mild symptoms abduction is similar external rotation is to 50 and internal rotation to the upper lumbar spine with mild symptoms.  Patient has good rotator cuff strength 4+ over 5 with isometric strength testing with pain.  Patient has a positive impingement and a positive Dykes sign.  Patient has good cervical range of motion which is full and asymptomatic no radicular symptoms.  Patient has a normal elbow exam.  Good distal pulses are presentPatient has pain with overhead activity and a positive Neer sign and a positive empty can sign  They have a positive drop arm any definitive painful arc      Large Joint Arthrocentesis  Date/Time: 1/8/2018 4:20 PM  Consent given by: patient  Site marked: site marked  Timeout: Immediately prior to procedure a time out was called to verify the correct patient, procedure, equipment, support staff and site/side marked as required   Supporting  Documentation  Indications: pain   Procedure Details  Location: shoulder - R subacromial bursa  Preparation: Patient was prepped and draped in the usual sterile fashion  Needle size: 22 G  Approach: posterior  Medications administered: 80 mg methylPREDNISolone acetate 80 MG/ML; 4 mL bupivacaine (PF) 0.5 %  Patient tolerance: patient tolerated the procedure well with no immediate complications                Radiology:   AP, Scapular Y and Axillary Lateral of the right and left shoulder were ordered/reviewed to evauate shoulder pain.  I do have comparative films on the left not on the right these show some acromioclavicular arthritis otherwise no acute bony pathology    Assessment/Plan:    Bilateral shoulder pain I think is rotator cuff in origin I think he benefit from an injection which she like to do we talked about the importance of physical therapy he fails improvement we will pursue other means of testing

## 2018-01-11 RX ORDER — BUPIVACAINE HYDROCHLORIDE 5 MG/ML
4 INJECTION, SOLUTION EPIDURAL; INTRACAUDAL
Status: COMPLETED | OUTPATIENT
Start: 2018-01-08 | End: 2018-01-08

## 2018-01-11 RX ORDER — METHYLPREDNISOLONE ACETATE 80 MG/ML
80 INJECTION, SUSPENSION INTRA-ARTICULAR; INTRALESIONAL; INTRAMUSCULAR; SOFT TISSUE
Status: COMPLETED | OUTPATIENT
Start: 2018-01-08 | End: 2018-01-08

## 2018-01-22 ENCOUNTER — TREATMENT (OUTPATIENT)
Dept: PHYSICAL THERAPY | Facility: CLINIC | Age: 53
End: 2018-01-22

## 2018-01-22 DIAGNOSIS — G89.29 CHRONIC PAIN OF BOTH SHOULDERS: Primary | ICD-10-CM

## 2018-01-22 DIAGNOSIS — M25.511 CHRONIC PAIN OF BOTH SHOULDERS: Primary | ICD-10-CM

## 2018-01-22 DIAGNOSIS — M25.512 CHRONIC PAIN OF BOTH SHOULDERS: Primary | ICD-10-CM

## 2018-01-22 PROCEDURE — 97110 THERAPEUTIC EXERCISES: CPT | Performed by: PHYSICAL THERAPIST

## 2018-01-22 PROCEDURE — 97161 PT EVAL LOW COMPLEX 20 MIN: CPT | Performed by: PHYSICAL THERAPIST

## 2018-01-22 NOTE — PROGRESS NOTES
Physical Therapy Initial Evaluation and Plan of Care      Patient: Marek Diego   : 1965  Diagnosis/ICD-10 Code:  Chronic pain of both shoulders [M25.511, G89.29, M25.512]  Referring practitioner: Brianna Castillo MD  Date of Initial Visit: 2018  Today's Date: 2018  Patient seen for 1 sessions           Subjective Questionnaire: QuickDASH: 54.55%      Subjective Evaluation    History of Present Illness  Date of onset: 2018  Mechanism of injury: Patient reports having left shoulder pain for years, recently onset of right shoulder while working on his car-states he felt a pop in the top of his shoulder.  Reports the pain is the worst in the mornings due to sleeping on his sides.    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    Subjective comment: Patient reports pain in both shoulders, most recently his right side.  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    Social Support  Lives in: multiple-level home  Lives with: spouse    Hand dominance: right    Diagnostic Tests  X-ray: normal (Right shoulder)  MRI studies: abnormal (Left shoulder 1+ yrs ago)    Treatments  Previous treatment: injection treatment  Current treatment: injection treatment  Patient Goals  Patient goals for therapy: decreased pain  Patient goal: Avoid surgery, improve sleep quality.           Objective       Postural Observations    Additional  Postural Observation Details  Forward head, rounded shoulders posture.    Palpation     Additional Palpation Details  No TTP    Tenderness     Additional Tenderness Details  No TTP    Active Range of Motion   Left Shoulder   Flexion: 156 degrees   Extension: 52 degrees   Abduction: 145 degrees   External rotation 90°: 84 degrees   Internal rotation 90°: 37 degrees     Right Shoulder   Flexion: 137 degrees   Extension: 66 degrees   Abduction: 150 degrees   External rotation 90°: 72 degrees  Internal rotation 90°: 38 degrees     Joint Play   Left Shoulder  Joints within functional limits are the anterior capsule. Hypomobile in the posterior capsule, inferior capsule and thoracic spine.    Right Shoulder  Joints within functional limits are the anterior capsule. Hypomobile in the posterior capsule, inferior capsule and thoracic spine.     Strength/Myotome Testing     Left Shoulder     Planes of Motion   Flexion: 5   Extension: 5   Abduction: 4   Adduction: 5   External rotation at 0°: 4   Internal rotation at 0°: 5     Isolated Muscles   Biceps: 5   Lower trapezius: 4-   Middle trapezius: 4-   Triceps: 5     Right Shoulder     Planes of Motion   Flexion: 5   Extension: 5   Abduction: 4   Adduction: 5   External rotation at 0°: 4   Internal rotation at 0°: 5     Isolated Muscles   Biceps: 5   Lower trapezius: 4-   Middle trapezius: 4-   Triceps: 5     Tests     Left Shoulder   Positive Hawkin's and Neer's.   Negative empty can and full can.     Right Shoulder   Positive Hawkin's and Neer's.   Negative empty can and full can.          Assessment & Plan     Assessment  Impairments: abnormal or restricted ROM, impaired physical strength and pain with function  Assessment details: Marek Diego is a pleasant 52 y.o. male that presents with signs and symptoms consistent with the above diagnosis. Pt would benefit from skilled PT services in order to address listed impairments and increase tolerance to normal daily activities  including ADLs, work and recreational activities    Prognosis: good  Prognosis details: Pt is a good candidate for skilled PT intervention to restore functional AROM and strength to return to previous level of ADL's.    Functional Limitations: lifting, sleeping, uncomfortable because of pain and reaching overhead  Goals  Plan Goals: Short Term (2 wks):  1. Patient to have increased right shoulder ROM WNLs to restore functional joint mobility and achieve symmetry with uninvolved side.  2. Patient will have increased right shoulder strength to 5/5 to allow for increased joint stability and to decrease joint/soft tissue stresses.  3. Patient will have increased bilateral scapular strength to 5/5 to allow for increased scapular stabilization and to decrease joint/soft tissue stresses.  4. Patient will have increased GHJ mobility to WNLs to allow for restoration of normal joint mechanics and decrease joint/soft tissue stresses.    Long Term Goal (4 wks):  1.  Patient will have improved DASH score of 5% or less.  2.  Patient will reports decreased shoulder pain to 0/10 to allow for restorative sleep and return to PLOF/Work/Sport/Leisure activities.  3.  Patient will be independent in performance of HEP for carryover upon discharge from skilled PT services.      Plan  Therapy options: will be seen for skilled physical therapy services  Planned modality interventions: iontophoresis, TENS, ultrasound, cryotherapy, thermotherapy (hydrocollator packs) and high voltage pulsed current (pain management)  Planned therapy interventions: manual therapy, soft tissue mobilization, strengthening, stretching, functional ROM exercises, joint mobilization, home exercise program and spinal/joint mobilization  Frequency: 2x week  Duration in weeks: 4  Treatment plan discussed with: patient  Plan details: Pt was educated on the importance of their HEP and their current need for continued skilled physical therapy. Patients goals and potential  limitations were discussed and pt is in agreement with current plan of care and treatment emphasis.            Manual Therapy:         mins  34041;  Therapeutic Exercise:    10     mins  69829;     Neuromuscular Jairon:        mins  12808;    Therapeutic Activity:          mins  08877;     Gait Training:           mins  27137;     Ultrasound:          mins  46022;    Electrical Stimulation:         mins  74978 ( );  Dry Needling          mins self-pay    Timed Treatment:   10   mins   Total Treatment:     45   mins    PT SIGNATURE: Carissa Chávez, JACOBO   DATE TREATMENT INITIATED: 1/22/2018    Initial Certification  Certification Period: 4/22/2018  I certify that the therapy services are furnished while this patient is under my care.  The services outlined above are required by this patient, and will be reviewed every 90 days.     PHYSICIAN: Brianna Castillo MD      DATE:     Please sign and return via fax to [phone].. Thank you, [facility].

## 2018-01-26 ENCOUNTER — TREATMENT (OUTPATIENT)
Dept: PHYSICAL THERAPY | Facility: CLINIC | Age: 53
End: 2018-01-26

## 2018-01-26 DIAGNOSIS — M25.512 CHRONIC PAIN OF BOTH SHOULDERS: Primary | ICD-10-CM

## 2018-01-26 DIAGNOSIS — M25.511 CHRONIC PAIN OF BOTH SHOULDERS: Primary | ICD-10-CM

## 2018-01-26 DIAGNOSIS — G89.29 CHRONIC PAIN OF BOTH SHOULDERS: Primary | ICD-10-CM

## 2018-01-26 PROCEDURE — 97110 THERAPEUTIC EXERCISES: CPT | Performed by: PHYSICAL THERAPIST

## 2018-01-26 PROCEDURE — 97140 MANUAL THERAPY 1/> REGIONS: CPT | Performed by: PHYSICAL THERAPIST

## 2018-01-26 NOTE — PROGRESS NOTES
Physical Therapy Daily Progress Note        Patient: Marek Diego   : 1965  Diagnosis/ICD-10 Code:  Chronic pain of both shoulders [M25.511, G89.29, M25.512]  Referring practitioner: Brianna Castillo MD  Date of Initial Visit: Type: THERAPY  Noted: 2018  Today's Date: 2018  Patient seen for 2 sessions         Marek Diego reports:       Subjective   Patient reports his shoulder are the same.  Reports the stretches have been difficult.    Objective   See Exercise, Manual, and Modality Logs for complete treatment.       Assessment/Plan  Patient tolerated treatment and exercise progression well with improved ROM post treatment.  Progress per Plan of Care           Manual Therapy:    10     mins  97918;  Therapeutic Exercise:    20     mins  60351;     Neuromuscular Jairon:        mins  62543;    Therapeutic Activity:          mins  40660;     Gait Training:           mins  61533;     Ultrasound:          mins  85482;    Electrical Stimulation:         mins  25961 ( );  Dry Needling          mins self-pay    Timed Treatment:   30   mins   Total Treatment:     30   mins    Carissa Chávez, PT  Physical Therapist

## 2018-02-02 ENCOUNTER — TREATMENT (OUTPATIENT)
Dept: PHYSICAL THERAPY | Facility: CLINIC | Age: 53
End: 2018-02-02

## 2018-02-02 DIAGNOSIS — G89.29 CHRONIC PAIN OF BOTH SHOULDERS: Primary | ICD-10-CM

## 2018-02-02 DIAGNOSIS — M25.512 CHRONIC PAIN OF BOTH SHOULDERS: Primary | ICD-10-CM

## 2018-02-02 DIAGNOSIS — M25.511 CHRONIC PAIN OF BOTH SHOULDERS: Primary | ICD-10-CM

## 2018-02-02 PROCEDURE — 97140 MANUAL THERAPY 1/> REGIONS: CPT | Performed by: PHYSICAL THERAPIST

## 2018-02-02 PROCEDURE — 97035 APP MDLTY 1+ULTRASOUND EA 15: CPT | Performed by: PHYSICAL THERAPIST

## 2018-02-02 NOTE — PROGRESS NOTES
Physical Therapy Daily Progress Note        Patient: Marek Diego   : 1965  Diagnosis/ICD-10 Code:  Chronic pain of both shoulders [M25.511, G89.29, M25.512]  Referring practitioner: Brianna Castillo MD  Date of Initial Visit: Type: THERAPY  Noted: 2018  Today's Date: 2018  Patient seen for 3 sessions         Marek Diego reports:    Subjective   Patient reports his shoulders continue to hurt.  States the pain is a deep ache in the shoulders, not a pain he can touch from the outside.    Objective   See Exercise, Manual, and Modality Logs for complete treatment.       Assessment/Plan  Patient tolerated treatment well with decreased pain post treatment.  Progress per Plan of Care           Manual Therapy:    8     mins  72013;  Therapeutic Exercise:         mins  62469;     Neuromuscular Jairon:        mins  94674;    Therapeutic Activity:          mins  83042;     Gait Training:           mins  40949;     Ultrasound:     20     mins  92691;    Electrical Stimulation:         mins  59562 ( );  Dry Needling          mins self-pay    Timed Treatment:   28   mins   Total Treatment:     30   mins    Carissa Chávez PT  Physical Therapist

## 2018-02-06 RX ORDER — ROSUVASTATIN CALCIUM 40 MG/1
TABLET, COATED ORAL
Qty: 30 TABLET | Refills: 2 | Status: SHIPPED | OUTPATIENT
Start: 2018-02-06 | End: 2018-07-18 | Stop reason: SDUPTHER

## 2018-02-09 ENCOUNTER — TREATMENT (OUTPATIENT)
Dept: PHYSICAL THERAPY | Facility: CLINIC | Age: 53
End: 2018-02-09

## 2018-02-09 DIAGNOSIS — M25.511 CHRONIC PAIN OF BOTH SHOULDERS: Primary | ICD-10-CM

## 2018-02-09 DIAGNOSIS — M25.512 CHRONIC PAIN OF BOTH SHOULDERS: Primary | ICD-10-CM

## 2018-02-09 DIAGNOSIS — G89.29 CHRONIC PAIN OF BOTH SHOULDERS: Primary | ICD-10-CM

## 2018-02-09 PROCEDURE — 97140 MANUAL THERAPY 1/> REGIONS: CPT | Performed by: PHYSICAL THERAPIST

## 2018-02-09 NOTE — PROGRESS NOTES
Physical Therapy Daily Progress Note        Patient: Marek Diego   : 1965  Diagnosis/ICD-10 Code:  Chronic pain of both shoulders [M25.511, G89.29, M25.512]  Referring practitioner: Brianna Castillo MD  Date of Initial Visit: Type: THERAPY  Noted: 2018  Today's Date: 2018  Patient seen for 4 sessions         Marek Diego reports:     Subjective   Patient reports he had a few hours of relief after last session.  States the tape felt itchy but stayed on for a few days.  Reports he wonders if he is sleeping in a way that is aggravating his shoulders.    Objective   See Exercise, Manual, and Modality Logs for complete treatment.       Assessment/Plan  Patient tolerated treatment well with decreased pain intensity and improved pectoral/anterior shoulder flexibility.  US deferred due to patient being limited on time.  Progress per Plan of Care           Manual Therapy:    25     mins  54859;  Therapeutic Exercise:         mins  78725;     Neuromuscular Jairon:        mins  13304;    Therapeutic Activity:          mins  84644;     Gait Training:           mins  69195;     Ultrasound:          mins  20558;    Electrical Stimulation:         mins  70885 ( );  Dry Needling          mins self-pay    Timed Treatment:   25   mins   Total Treatment:     30   mins    Carissa Chávez PT  Physical Therapist

## 2018-02-12 ENCOUNTER — OFFICE VISIT (OUTPATIENT)
Dept: ORTHOPEDIC SURGERY | Facility: CLINIC | Age: 53
End: 2018-02-12

## 2018-02-12 ENCOUNTER — TREATMENT (OUTPATIENT)
Dept: PHYSICAL THERAPY | Facility: CLINIC | Age: 53
End: 2018-02-12

## 2018-02-12 VITALS — HEIGHT: 66 IN | WEIGHT: 203 LBS | BODY MASS INDEX: 32.62 KG/M2 | TEMPERATURE: 98.4 F

## 2018-02-12 DIAGNOSIS — M25.512 CHRONIC PAIN OF BOTH SHOULDERS: Primary | ICD-10-CM

## 2018-02-12 DIAGNOSIS — G89.29 CHRONIC PAIN OF BOTH SHOULDERS: Primary | ICD-10-CM

## 2018-02-12 DIAGNOSIS — M75.101 TEAR OF RIGHT ROTATOR CUFF, UNSPECIFIED TEAR EXTENT: Primary | ICD-10-CM

## 2018-02-12 DIAGNOSIS — M25.511 CHRONIC PAIN OF BOTH SHOULDERS: Primary | ICD-10-CM

## 2018-02-12 PROCEDURE — 97035 APP MDLTY 1+ULTRASOUND EA 15: CPT | Performed by: PHYSICAL THERAPIST

## 2018-02-12 PROCEDURE — 97140 MANUAL THERAPY 1/> REGIONS: CPT | Performed by: PHYSICAL THERAPIST

## 2018-02-12 PROCEDURE — 99213 OFFICE O/P EST LOW 20 MIN: CPT | Performed by: ORTHOPAEDIC SURGERY

## 2018-02-12 NOTE — PROGRESS NOTES
Physical Therapy Daily Progress Note        Patient: Marek Diego   : 1965  Diagnosis/ICD-10 Code:  Chronic pain of both shoulders [M25.511, G89.29, M25.512]  Referring practitioner: Brianna Castillo MD  Date of Initial Visit: Type: THERAPY  Noted: 2018  Today's Date: 2018  Patient seen for 5 sessions         Marek Diego reports:       Subjective   Patient reports his shoulder felt a little better after last session and he can raise them higher with less pain but the pain is still there.  Patient reports he has been having some neck pain and is was more bothersome this weekend than usual.    Objective   TTP bilateral pectoralis minor  See Exercise, Manual, and Modality Logs for complete treatment.       Assessment/Plan  Patient tolerated treatment well with decreased pain and improved pectoral muscle flexibility and decreased shoulder impingement signs post treatment.  Encouraged him to continue home stretching program and inform his doctor of his neck pain symptoms.  Progress per Plan of Care           Manual Therapy:    10     mins  62294;  Therapeutic Exercise:         mins  12381;     Neuromuscular Jairon:        mins  81106;    Therapeutic Activity:          mins  28251;     Gait Training:           mins  84906;     Ultrasound:          mins  12093;    Electrical Stimulation:         mins  94901 (MC );  Dry Needling          mins self-pay    Timed Treatment:   28   mins   Total Treatment:     35   mins    Carissa Chávez PT  Physical Therapist

## 2018-02-12 NOTE — PROGRESS NOTES
"Shoulder Follow Up      Patient: Marek Diego        YOB: 1965            Chief Complaints: right Shoulder pain  Chief Complaint   Patient presents with   • Right Shoulder - Follow-up, Pain           History of Present Illness:Patient is here follow-up of right shoulder.  We injected him last visit he got no relief at all (symptoms now are worse with physical therapy.  He has pain with activity some night pain quite frustrated      Physical Exam: 52 y.o. male  General Appearance:    Alert, cooperative, in no acute distress                   Vitals:    02/12/18 1519   Temp: 98.4 °F (36.9 °C)   TempSrc: Temporal Artery    Weight: 92.1 kg (203 lb)   Height: 167.6 cm (66\")        Patient is alert and read ×3 no acute distress appears her above-listed at height weight and age.  Affect is normal respiratory rate is normal unlabored. Heart rate regular rate rhythm, sclera, dentition and hearing are normal for the purpose of this exam.      Ortho Exam  Physical exam of the right shoulder reveals no overlying skin changes no lymphedema no lymphadenopathy.  Patient has active flexion 180 with mild symptoms abduction is similar external rotation is to 50 and internal rotation to the upper lumbar spine with mild symptoms.  Patient has good rotator cuff strength 4+ over 5 with isometric strength testing with pain.  Patient has a positive impingement and a positive Dykes sign.  Patient has good cervical range of motion which is full and asymptomatic no radicular symptoms.  Patient has a normal elbow exam.  Good distal pulses are present  Patient has pain with overhead activity and a positive Neer sign and a positive empty can sign , a positive drop arm and a definitive painful arc            Assessment/Plan:      Persistent right shoulder pain despite conservative measures plan is proceed with an MRI and follow-up after that test          "

## 2018-02-13 RX ORDER — TESTOSTERONE 30 MG/1.5ML
SOLUTION TOPICAL
Qty: 90 ML | Refills: 2 | OUTPATIENT
Start: 2018-02-13 | End: 2018-08-17

## 2018-02-16 ENCOUNTER — TELEPHONE (OUTPATIENT)
Dept: ORTHOPEDIC SURGERY | Facility: CLINIC | Age: 53
End: 2018-02-16

## 2018-03-12 DIAGNOSIS — E78.5 HYPERLIPIDEMIA, UNSPECIFIED HYPERLIPIDEMIA TYPE: ICD-10-CM

## 2018-03-12 RX ORDER — EZETIMIBE 10 MG/1
TABLET ORAL
Qty: 30 TABLET | Refills: 2 | Status: SHIPPED | OUTPATIENT
Start: 2018-03-12 | End: 2018-05-25 | Stop reason: SDUPTHER

## 2018-03-22 RX ORDER — MONTELUKAST SODIUM 10 MG/1
TABLET ORAL
Qty: 30 TABLET | Refills: 2 | Status: SHIPPED | OUTPATIENT
Start: 2018-03-22 | End: 2018-06-18 | Stop reason: SDUPTHER

## 2018-03-22 RX ORDER — GLIMEPIRIDE 4 MG/1
TABLET ORAL
Qty: 60 TABLET | Refills: 4 | Status: SHIPPED | OUTPATIENT
Start: 2018-03-22 | End: 2018-08-13 | Stop reason: SDUPTHER

## 2018-04-19 DIAGNOSIS — E11.9 TYPE 2 DIABETES MELLITUS WITHOUT COMPLICATION, WITHOUT LONG-TERM CURRENT USE OF INSULIN (HCC): Chronic | ICD-10-CM

## 2018-05-01 DIAGNOSIS — E11.9 TYPE 2 DIABETES MELLITUS WITHOUT COMPLICATION, WITHOUT LONG-TERM CURRENT USE OF INSULIN (HCC): Chronic | ICD-10-CM

## 2018-05-01 DIAGNOSIS — E29.1 HYPOGONADISM MALE: Chronic | ICD-10-CM

## 2018-05-01 DIAGNOSIS — Z51.81 THERAPEUTIC DRUG MONITORING: ICD-10-CM

## 2018-05-01 DIAGNOSIS — E78.49 OTHER HYPERLIPIDEMIA: Chronic | ICD-10-CM

## 2018-05-05 LAB
ALBUMIN SERPL-MCNC: 4 G/DL (ref 3.5–5.2)
ALBUMIN/GLOB SERPL: 1.7 G/DL
ALP SERPL-CCNC: 100 U/L (ref 39–117)
ALT SERPL-CCNC: 13 U/L (ref 1–41)
AST SERPL-CCNC: 12 U/L (ref 1–40)
BILIRUB SERPL-MCNC: 0.2 MG/DL (ref 0.1–1.2)
BUN SERPL-MCNC: 24 MG/DL (ref 6–20)
BUN/CREAT SERPL: 18.8 (ref 7–25)
CALCIUM SERPL-MCNC: 9.2 MG/DL (ref 8.6–10.5)
CHLORIDE SERPL-SCNC: 99 MMOL/L (ref 98–107)
CHOLEST SERPL-MCNC: 91 MG/DL (ref 100–199)
CK SERPL-CCNC: 107 U/L (ref 20–200)
CO2 SERPL-SCNC: 25.7 MMOL/L (ref 22–29)
CREAT SERPL-MCNC: 1.28 MG/DL (ref 0.76–1.27)
ERYTHROCYTE [DISTWIDTH] IN BLOOD BY AUTOMATED COUNT: 13.5 % (ref 11.5–14.5)
GFR SERPLBLD CREATININE-BSD FMLA CKD-EPI: 59 ML/MIN/1.73
GFR SERPLBLD CREATININE-BSD FMLA CKD-EPI: 72 ML/MIN/1.73
GLOBULIN SER CALC-MCNC: 2.4 GM/DL
GLUCOSE SERPL-MCNC: 270 MG/DL (ref 65–99)
HBA1C MFR BLD: 10.11 % (ref 4.8–5.6)
HCT VFR BLD AUTO: 44.7 % (ref 40.4–52.2)
HDL SERPL-SCNC: 20.1 UMOL/L
HDLC SERPL-MCNC: 20 MG/DL
HGB BLD-MCNC: 14.2 G/DL (ref 13.7–17.6)
LDL SERPL QN: 19.5 NM
LDL SERPL-SCNC: 763 NMOL/L
LDL SMALL SERPL-SCNC: 665 NMOL/L
LDLC SERPL CALC-MCNC: 22 MG/DL (ref 0–99)
MCH RBC QN AUTO: 28 PG (ref 27–32.7)
MCHC RBC AUTO-ENTMCNC: 31.8 G/DL (ref 32.6–36.4)
MCV RBC AUTO: 88.2 FL (ref 79.8–96.2)
PLATELET # BLD AUTO: 329 10*3/MM3 (ref 140–500)
POTASSIUM SERPL-SCNC: 5 MMOL/L (ref 3.5–5.2)
PROT SERPL-MCNC: 6.4 G/DL (ref 6–8.5)
RBC # BLD AUTO: 5.07 10*6/MM3 (ref 4.6–6)
SODIUM SERPL-SCNC: 141 MMOL/L (ref 136–145)
TESTOST SERPL-MCNC: 560 NG/DL (ref 264–916)
TESTOSTERONE.FREE+WB MFR SERPL: 51.5 % (ref 9–46)
TESTOSTERONE.FREE+WB SERPL-MCNC: 288.4 NG/DL (ref 40–250)
TRIGL SERPL-MCNC: 245 MG/DL (ref 0–149)
WBC # BLD AUTO: 9.66 10*3/MM3 (ref 4.5–10.7)

## 2018-05-09 ENCOUNTER — OFFICE VISIT (OUTPATIENT)
Dept: INTERNAL MEDICINE | Facility: CLINIC | Age: 53
End: 2018-05-09

## 2018-05-09 VITALS
HEART RATE: 98 BPM | BODY MASS INDEX: 33.75 KG/M2 | OXYGEN SATURATION: 98 % | SYSTOLIC BLOOD PRESSURE: 120 MMHG | DIASTOLIC BLOOD PRESSURE: 70 MMHG | WEIGHT: 210 LBS | HEIGHT: 66 IN

## 2018-05-09 DIAGNOSIS — R80.9 MICROALBUMINURIA: Chronic | ICD-10-CM

## 2018-05-09 DIAGNOSIS — Z91.09 MULTIPLE ENVIRONMENTAL ALLERGIES: Chronic | ICD-10-CM

## 2018-05-09 DIAGNOSIS — J45.40 MODERATE PERSISTENT ASTHMA WITHOUT COMPLICATION: Chronic | ICD-10-CM

## 2018-05-09 DIAGNOSIS — Z01.00 DIABETIC EYE EXAM (HCC): Chronic | ICD-10-CM

## 2018-05-09 DIAGNOSIS — K21.00 GASTROESOPHAGEAL REFLUX DISEASE WITH ESOPHAGITIS: Chronic | ICD-10-CM

## 2018-05-09 DIAGNOSIS — E78.49 OTHER HYPERLIPIDEMIA: Chronic | ICD-10-CM

## 2018-05-09 DIAGNOSIS — Z86.010 HISTORY OF COLON POLYPS: Chronic | ICD-10-CM

## 2018-05-09 DIAGNOSIS — F41.8 DEPRESSION WITH ANXIETY: Chronic | ICD-10-CM

## 2018-05-09 DIAGNOSIS — R06.83 SNORING: ICD-10-CM

## 2018-05-09 DIAGNOSIS — E29.1 HYPOGONADISM MALE: Chronic | ICD-10-CM

## 2018-05-09 DIAGNOSIS — G47.34 NOCTURNAL HYPOXEMIA: ICD-10-CM

## 2018-05-09 DIAGNOSIS — E55.9 VITAMIN D DEFICIENCY: Chronic | ICD-10-CM

## 2018-05-09 DIAGNOSIS — I10 BENIGN ESSENTIAL HYPERTENSION: Chronic | ICD-10-CM

## 2018-05-09 DIAGNOSIS — E11.9 TYPE 2 DIABETES MELLITUS WITHOUT COMPLICATION, WITHOUT LONG-TERM CURRENT USE OF INSULIN (HCC): Primary | Chronic | ICD-10-CM

## 2018-05-09 DIAGNOSIS — K59.09 CHRONIC CONSTIPATION: Chronic | ICD-10-CM

## 2018-05-09 DIAGNOSIS — E11.9 DIABETIC EYE EXAM (HCC): Chronic | ICD-10-CM

## 2018-05-09 PROBLEM — R50.9 FEBRILE ILLNESS: Status: RESOLVED | Noted: 2017-12-18 | Resolved: 2018-05-09

## 2018-05-09 PROBLEM — B37.42 CANDIDAL BALANITIS: Status: RESOLVED | Noted: 2017-08-23 | Resolved: 2018-05-09

## 2018-05-09 PROCEDURE — 99214 OFFICE O/P EST MOD 30 MIN: CPT | Performed by: INTERNAL MEDICINE

## 2018-05-09 RX ORDER — INSULIN GLARGINE 100 [IU]/ML
80 INJECTION, SOLUTION SUBCUTANEOUS DAILY
Refills: 0 | COMMUNITY
Start: 2018-04-20 | End: 2018-05-29 | Stop reason: SDUPTHER

## 2018-05-09 NOTE — PROGRESS NOTES
05/09/2018    Patient Information  Marek Diego                                                                                          [id number] [facility] [address]      1965  [phone]      Chief Complaint:     Follow-up type 2 diabetes, hyperlipidemia, hypogonadism, microalbuminuria, moderate persistent asthma, environmental allergies, hypertension, history of depression with anxiety, esophageal reflux, vitamin D deficiency, history of colon polyps, chronic constipation, snoring and nocturnal hypoxemia.  No new acute complaints.  Is complaining of worsening allergy symptoms as expected this time of year.    History of Present Illness:    Patient with a history of medical problems as outlined in the chief complaint presents today for a follow-up with lab prior.  Patient has undergone some personal issues including loss of his job and reports that he was unable to take a lot of his medications for months.  He now has a job back on his medications.  I'm sure his lab work will reflect this situation.  Past medical history reviewed and updated where necessary including health maintenance parameters.  This reveals he is up-to-date with the exception of diabetic eye exam the patient reports he had this in February of this year and Dr. Trevor Pereira office.  We will obtain those records.    Review of Systems   Constitution: Negative.   HENT: Negative.    Eyes: Negative.    Cardiovascular: Negative.    Respiratory: Negative.    Endocrine: Negative.    Hematologic/Lymphatic: Negative.    Skin: Negative.    Musculoskeletal: Negative.    Gastrointestinal: Negative.    Genitourinary: Negative.    Neurological: Negative.    Psychiatric/Behavioral: Negative.    Allergic/Immunologic: Positive for environmental allergies.       Active Problems:    Patient Active Problem List   Diagnosis   • Allergic rhinitis   • Benign essential hypertension   • Chronic neck pain   • Depression with anxiety   •  Gastroesophageal reflux disease with esophagitis   • Hyperlipidemia   • Hypogonadism male, on TRT.   • Microalbuminuria   • Moderate persistent asthma   • Multiple environmental allergies   • Non morbid obesity   • Type 2 diabetes mellitus   • Vitamin D deficiency   • Routine physical examination   • Therapeutic drug monitoring   • Right bundle branch block   • Tinea manus   • Diabetic eye exam   • Diabetic foot exam   • Chronic constipation   • Snoring   • Nocturnal hypoxemia   • History of colon polyps, 11/29/2017--tubular adenoma times one.  Hyperplastic ×1.  Repeat 5 years.         Past Medical History:   Diagnosis Date   • Allergic rhinitis 10/24/2013    10/24/2013--skin testing revealed impressive reactions to grass following, tree pollen, weed pollen, ragweed, dust mite, and cat. Recommend immunotherapy.   • Benign essential hypertension 2/22/2016   • Chronic constipation 11/20/2017 11/20/2017--patient reports approximately 8 month history of intermittent constipation that at times can last as much as a month.  He notes his blood sugar readings tend to increase when this happens.  He is scheduled for colonoscopy next week.  I recommend a generic probiotic daily as well as MiraLAX and adjust as needed.   • Chronic neck pain 10/30/2015    12/19/2015--patient reports his left shoulder pain has resolved. He continues to have neck pain. X-ray of the cervical spine ordered. Physical therapy ordered.   11/10/2015--left shoulder injected with 80 mg of Depo-Medrol with 3 mL of Xylocaine.   10/30/2015--patient presents with at least a one-month history of intermittent left-sided neck pain that is associated with left shoulder pain as well. The pain is described as shooting and is 8 out of 10 intensity at its worst. The pain is worse with certain movements of his head and left shoulder. No trauma. No numbness or paresthesias.   • Depression with anxiety 2/22/2016   • Diabetic eye exam 5/27/2016    May 2016--patient  reports a normal diabetic eye exam.   • Diabetic foot exam 4/27/2017 05/02/2017--routine diabetic foot exam reveals no evidence of diabetic foot ulcer or pre-ulcerative callus.  Pulses are palpable and there is no signs of ischemia.  Sensation subjectively intact.   • Gastroesophageal reflux disease with esophagitis 5/22/2015 08/12/2015--EGD revealed esophagitis and a distal esophageal stricture that was dilated. Small sliding hiatal hernia. Proximal gastric ulcer and gastritis present. Dysphagia resolved after dilatation. Pathology of the gastric antrum was benign with no significant histologic abnormality. Distal esophagus biopsy negative for intestinal metaplasia or dysplasia.   05/22/2015--patient presents with a several month history of progressively worsening intermittent dysphagia of solids but not liquids. He describes solid food sometimes sticking and points to his upper chest/lower throat. No other associated symptoms. Patient referred to Dr. Aime Parada for an EGD. This is negative, may need ENT evaluation.   • History of acute bronchitis with bronchospasm 2/22/2016 11/11/2016--patient seen in follow-up and reports his cough is better but he still does not feel totally well.  Chest exam is clear today.  I recommend continuation of the Advair and give it more time.  11/02/2016--patient with a history of moderate persistent asthma presents with a one-week history of chest congestion, cough productive of green phlegm, subjective but not documented fever without chills.  No significant hip symptoms.  Patient does have an inhaler but has not been using it.  Has been taking over-the-counter Mucinex without any relief.  Examination reveals an obvious cough.  There is only mild end expiratory wheezes present and an occasional rhonchi but no signs of consolidation.  Patient would prefer to avoid prednisone due to elevated blood sugars.  Advair 250/50, 2 puffs twice a day until symptoms resolved, at least  2 weeks.  Levaquin 750 mg by mouth daily × 8 days.  Tussionex.  04/19/2014--patient reports symptoms resolved.  02/10/2014--patient presents with a 5 day history of head co   • HIstory of Acute sinusitis 02/10/2014    04/19/2014--patient reports symptoms resolved.  02/10/2014--patient presents with a 5 day history of head congestion and posterior nasal drainage as well as cough productive of yellow phlegm. He was tender to percussion over the sinuses, has boggy nasal mucosa, lungs revealed mild bilateral rhonchi. Treated with Levaquin 750 mg daily x8 days. Stahist AD one by mouth twice a day. Tussionex 1 teaspo   • History of Candidal balanitis 8/23/2017 12/18/2017--diabetic patient who takes Invokana and has recurrent candida balanitis again presents with penile irritation consistent with Candida balanitis.  Diflucan 200 mg per day ×7 days.  Since patient will be prone for recurrence, I will add refills to this prescription.  08/23/2017--patient called in the office with symptoms consistent with recurrence of fungal balanitis.  Diflucan 200 mg by mouth daily ×1 week.  12/17/2013--patient had recurrence of fungal balanitis that was due to transparent from his wife who had a yeast infection. Treated with Diflucan.   08/26/2013--Patient developed fungal balanitis secondary to Invokana.    • History of colon polyps, 11/29/2017--tubular adenoma times one.  Hyperplastic ×1.  Repeat 5 years. 12/18/2017 11/29/2017--colonoscopy revealed 2 small (3 mm) polyps in the sigmoid colon and cecum.  Removed.  Bowel prep.  Sigmoid diverticuli noted.  No hemorrhoids.  Pathology returned hyperplastic polyp of the sigmoid and the cecal polyp was a tubular adenoma.   • HIstory of Dysphagia 08/12/2015 08/12/2015--EGD revealed esophagitis and a distal esophageal stricture that was dilated. Small sliding hiatal hernia. Proximal gastric ulcer and gastritis present. Dysphagia resolved after dilatation.   05/22/2015--patient presents  with a several month history of progressively worsening intermittent dysphagia of solids but not liquids. He describes solid food sometimes sticking and points to his u   • History of echocardiogram 04/23/2014 04/23/2014--echocardiogram reveals normal left ventricular systolic function with ejection fraction 55%. Left ventricular wall motion is normal. The right ventricle is moderately to severely dilated. Right ventricular systolic function is mildly reduced. The right atrium is moderately dilated. Saline contrast study revealed no evidence of PFO/ASD. Status post PFO closure. There is trace mitral reg   • HIstory of eosinophilic gastroenteritis 1990s    1990s--patient had significant epigastric discomfort and workup including an EGD revealed eosinophilic gastroenteritis that was treated with prednisone and resulted in resolution.   • History of esophageal stricture 08/12/2015 08/12/2015--EGD revealed esophagitis and a distal esophageal stricture that was dilated. Small sliding hiatal hernia. Proximal gastric ulcer and gastritis present. Dysphagia resolved after dilatation. Pathology of the gastric antrum was benign with no significant histologic abnormality. Distal esophagus biopsy negative for intestinal metaplasia or dysplasia.   05/22/2015--patient presents with a s   • History of Incomplete tear of left rotator cuff 11/24/2014 05/02/2017--patient reports he saw the orthopedic surgeon and received cortisone injections ×2.  He reports that this was effective in no longer has pain.  10/07/2016--MRI of the left shoulder reveals a complete tear of the long head of the biceps and suspected glenoid labrum tear.  5 mm wide interstitial tear of the distal supraspinatus.  08/26/2016--patient seen in follow-up and reports his left shoulder pain has returned.  Orthopedic referral given.  01/23/2015--patient seen in follow up and reports his symptoms have resolved.   11/24/2014--patient presents with at least a  one-month history of progressively worsening left shoulder pain. The pain is worse with certain movements such as flexing the left arm at the elbow when picking something up. Also extension and abduction. A total of 80 mg of Depo-Medrol with 3 mL of Xylocaine was injected, one half of this amount was injected into the shoulder itself via a posterior approach and the other half was injected into the bicipital groove area. X-ray of   • History of Mesenteric lymphadenopathy 2/24/2016 09/13/2016--CT scan of the chest, abdomen, and pelvis with contrast reveals no lymphadenopathy within the abdomen or pelvis.  Mild hepatic steatosis.  Previously noted right lower lobe pulmonary nodule is currently calcified and consistent with a calcified granuloma.  08/26/2016--patient seen in follow-up and reports his abdominal pain has resolved.  Repeat CT scan of the abdomen and pelvis as well as chest ordered to reevaluate the mesenteric lymphadenopathy and pulmonary nodule.  03/18/2016--patient seen in follow-up and reports his symptoms are now intermittent and somewhat better.  No new complaints.  CT scan of the abdomen with contrast reviewed.  Note that the insurance company would not allow us to perform the pelvic CT.  This reveals normal liver, gallbladder, pancreas, spleen.  Kidneys are also normal without kidney stone except there is a 3 mm cyst arising from the upper pole right kidney.  Multiple centimeters/subcentimeter sized central has enteric lymph nodes are noted.  A few centimeter size   • History of overnight oximetry 05/15/2013     05/15/2013--overnight oximetry revealed oxygen saturations less than 90% for six minutes and 52 seconds. Oxygen saturations less than 88 were two minutes and 44 seconds. Oxygen saturations greater than 90% for 98.5 percent of the study. There were 56 desaturation events of less than three minutes duration during which the mean high was 96.1% and the mean low was 89.6%. There were 11  desaturation    • HIstory of Pulmonary hypertension, 04/23/2014--resolved after PFO/ASD closure. 04/23/2014 04/23/2014--echocardiogram reveals normal left ventricular systolic function with ejection fraction 55%. Left ventricular wall motion is normal. The right ventricle is moderately to severely dilated. Right ventricular systolic function is mildly reduced. The right atrium is moderately dilated. Saline contrast study revealed no evidence of PFO/ASD. Status post PFO closure. There is trace mitral reg   • History of Pulmonary nodule, 03/07/2016--5 mm indeterminate nodule right lower lobe.  09/13/2016--consistent with calcified granuloma. 3/7/2016    09/13/2016--CT scan of the chest, abdomen, and pelvis with contrast reveals no lymphadenopathy within the abdomen or pelvis.  Mild hepatic steatosis.  Previously noted right lower lobe pulmonary nodule is currently calcified and consistent with a calcified granuloma.  03/07/2016--CT scan of the abdomen performed for complaints of abdominal discomfort revealed a 5 mm nodular focus right lower lobe which is indeterminate.  Recommend repeat CT scan in 6 months.   • HIstory of repair of Congenital atrial septal defect 03/2012 March 2012--percutaneous closure of secundum ASD.   • History of Rotator cuff tendinitis 10/30/2015    12/19/2015--patient reports his left shoulder pain has resolved. He continues to have neck pain.   11/10/2015--left shoulder injected with 80 mg of Depo-Medrol with 3 mL of Xylocaine.   10/30/2015--patient presents with at least a one-month history of intermittent left-sided neck pain that is associated with left shoulder pain as well. The pain is described as shooting and is 8 out of 10 intensity   • History of Rupture of left biceps tendon 11/11/2016 05/02/2017--patient reports he saw the orthopedic surgeon and received cortisone injections ×2.  He reports that this was effective in no longer has pain.  10/07/2016--MRI of the left shoulder  reveals a complete tear of the long head of the biceps and suspected glenoid labrum tear.  5 mm wide interstitial tear of the distal supraspinatus.  08/26/2016--patient seen in follow-up and reports his left shoulder pain has returned.  Orthopedic referral given.  01/23/2015--patient seen in follow up and reports his symptoms have resolved.   11/24/2014--patient presents with at least a one-month history of progressively worsening left shoulder pain. The pain is worse with certain movements such as flexing the left arm at the elbow when picking something up. Also extension and abduction. A total of 80 mg of Depo-Medrol with 3 mL of Xylocaine was injected, one half of this amount was injected into the shoulder itself via a posterior approach and the other half was injected into the bicipital groove area. X-ray of   • Hyperlipidemia 2/22/2016   • Hypogonadism male, on TRT. 12/26/2012 12/26/2012--treatment for hypogonadism begun.   • Microalbuminuria 10/19/2014    10/19/2014--urine microalbumin mildly elevated at 27.8. Normal range 0.0--17.0.   • Moderate persistent asthma 2/22/2016    Seasonal, spring and fall.   • Multiple environmental allergies 10/24/2013    10/24/2013--skin testing revealed impressive reactions to grass following, tree pollen, weed pollen, ragweed, dust mite, and cat. Recommend immunotherapy.   • Non morbid obesity 2/22/2016   • Right bundle branch block     ECG reveals sinus rhythm, rate of 75, right bundle branch block, left anterior hemiblock   • Type 2 diabetes mellitus 1/1/2004 2004--initial diagnosis of type 2 diabetes.   • Vitamin D deficiency 2/22/2016         Past Surgical History:   Procedure Laterality Date   • ATRIAL SEPTAL DEFECT REPAIR  03/2012 March 2012--percutaneous closure of secundum ASD.  Dr. Mcpherson   • COLONOSCOPY N/A 11/29/2017 11/29/2017--colonoscopy revealed 2 small (3 mm) polyps in the sigmoid colon and cecum.  Removed.  Bowel prep.  Sigmoid diverticuli noted.  No  hemorrhoids.  Pathology returned hyperplastic polyp of the sigmoid and the cecal polyp was a tubular adenoma.   • ESOPHAGEAL DILATATION  08/12/2015 08/12/2015--EGD revealed esophagitis and a distal esophageal stricture that was dilated. Small sliding hiatal hernia. Proximal gastric ulcer and gastritis present. Dysphagia resolved after dilatation. 05/22/2015--patient presents with a several month history of progressively worsening intermittent dysphagia of solids but not liquids. He describes solid food sometimes sticking and points to his upp   • ESOPHAGOSCOPY / EGD  08/12/2015 08/12/2015--EGD revealed esophagitis and a distal esophageal stricture that was dilated. Small sliding hiatal hernia. Proximal gastric ulcer and gastritis present. Dysphagia resolved after dilatation. 05/22/2015--patient presents with a several month history of progressively worsening intermittent dysphagia of solids but not liquids. He describes solid food sometimes sticking and points to his upp   • KNEE ACL RECONSTRUCTION Right 02/11/2013 02/11/2013--knee arthroscopy with anterior cruciate ligament repair   • TONSILLECTOMY  2009 2009--tonsillectomy as an adult.         Allergies   Allergen Reactions   • Latex Itching           Current Outpatient Prescriptions:   •  amLODIPine-benazepril (LOTREL 5-20) 5-20 MG per capsule, TAKE 1 CAPSULE BY MOUTH ONE TIME A DAY , Disp: 30 capsule, Rfl: 4  •  Canagliflozin (INVOKANA) 300 MG tablet, Take 300 mg by mouth Daily. PT NEED LABS AND FOLLOW UP ASAP !!!, Disp: 30 tablet, Rfl: 2  •  Cholecalciferol (VITAMIN D3) 5000 UNITS capsule capsule, Take 1 capsule by mouth daily., Disp: , Rfl:   •  esomeprazole (nexIUM) 40 MG capsule, TAKE 1 CAPSULE BY MOUTH ONE TIME A DAY , Disp: 30 capsule, Rfl: 10  •  ezetimibe (ZETIA) 10 MG tablet, TAKE ONE TABLET BY MOUTH DAILY FOR CHOLESTEROL, Disp: 30 tablet, Rfl: 2  •  glimepiride (AMARYL) 4 MG tablet, take 1/2 tablet by mouth in the morning and 1 & 1/2  "tablets in the evening , Disp: 60 tablet, Rfl: 4  •  Insulin Glargine (BASAGLAR KWIKPEN) 100 UNIT/ML injection pen, 80 Units Daily., Disp: , Rfl: 0  •  Loratadine (CLARITIN PO), Take 1 capsule by mouth daily as needed (when necessary for allergies.)., Disp: , Rfl:   •  metFORMIN (GLUCOPHAGE) 1000 MG tablet, Take 1 tablet by mouth 2 (Two) Times a Day With Meals., Disp: 180 tablet, Rfl: 1  •  montelukast (SINGULAIR) 10 MG tablet, TAKE 1 TABLET BY MOUTH ONE TIME A DAY , Disp: 30 tablet, Rfl: 2  •  Probiotic capsule, 1 by mouth daily, Disp: , Rfl:   •  rosuvastatin (CRESTOR) 40 MG tablet, TAKE 1 TABLET BY MOUTH AT BEDTIME , Disp: 30 tablet, Rfl: 2  •  sertraline (ZOLOFT) 50 MG tablet, TAKE 1 TABLET BY MOUTH ONE TIME A DAY , Disp: 30 tablet, Rfl: 4  •  Testosterone 30 MG/ACT solution, Apply 1 pump to each arm every day, Disp: 90 mL, Rfl: 2  •  TRULICITY 1.5 MG/0.5ML solution pen-injector, inject 1.5mg under the skin once weekly as directed, Disp: 2 mL, Rfl: 10  •  polyethylene glycol (MIRALAX) packet, Take orally as directed daily for constipation, Disp: , Rfl:       Family History   Problem Relation Age of Onset   • Diabetes Mother    • Stomach cancer Mother    • Diabetes Maternal Grandmother          Social History     Social History   • Marital status:      Spouse name: N/A   • Number of children: N/A   • Years of education: N/A     Occupational History   •  for [employer]      Social History Main Topics   • Smoking status: Never Smoker   • Smokeless tobacco: Never Used   • Alcohol use No   • Drug use: No   • Sexual activity: Yes     Partners: Female     Other Topics Concern   • Not on file     Social History Narrative   • No narrative on file         Vitals:    05/09/18 0727   BP: 120/70   Pulse: 98   SpO2: 98%   Weight: 95.3 kg (210 lb)   Height: 167.6 cm (65.98\")          Physical Exam:    General: Alert and oriented x 3.  No acute distress. Obese. Normal affect.  HEENT: Pupils equal, round, " reactive to light; extraocular movements intact; sclerae nonicteric; pharynx, ear canals and TMs normal.  Neck: Without JVD, thyromegaly, bruit, or adenopathy.  Lungs: Clear to auscultation in all fields.  Heart: Regular rate and rhythm without murmur, rub, gallop, or click.  Abdomen: Soft, nontender, without hepatosplenomegaly or hernia.  Bowel sounds normal.  : Deferred.  Rectal: Deferred.  Extremities: Without clubbing, cyanosis, edema, or pulse deficit.  Neurologic: Intact without focal deficit.  Normal station and gait observed during ingress and egress from the examination room.  Skin: Without significant lesion.  Musculoskeletal: Unremarkable.      Lab/other results:    NMR reveals total cholesterol 91.  Triglycerides elevated at 245.  LDL particle number excellent at 763.  Small LDL particle number elevated at 665.  HDL particle number is low at 20.1.  CMP normal except blood sugar elevated at 270, BUN elevated at 24, creatinine elevated at 1.28.  CBC is normal.    Testosterone is normal at 560.  Free and weakly bound testosterone slightly elevated at 288.4.hemoglobin A1c 10.11.  CPK normal.    Assessment/Plan:     Diagnosis Plan   1. Type 2 diabetes mellitus without complication, without long-term current use of insulin     2. Hyperlipidemia     3. Hypogonadism male, on TRT.     4. Microalbuminuria     5. Moderate persistent asthma without complication     6. Multiple environmental allergies     7. Benign essential hypertension     8. Depression with anxiety     9. Gastroesophageal reflux disease with esophagitis     10. Vitamin D deficiency     11. History of colon polyps, 11/29/2017--tubular adenoma times one.  Hyperplastic ×1.  Repeat 5 years.     12. Chronic constipation     13. Diabetic eye exam     14. Snoring     15. Nocturnal hypoxemia       She has type 2 diabetes is under poor control.  This is because of unintentional noncompliance of medication due to the fact that he lost his job and  insurance.  He is now back on his medication and hopefully things will improve.  Hyperlipidemia is under the best control we can get it.  His testosterone levels are therapeutic.  Microalbuminuria has been stable.  Currently his asthma is stable but his allergies are acting up.  Due to his poorly controlled blood sugar, I would not want to consider steroid injection at this time.  Blood pressure seems to be controlled.  Depression and anxiety controlled with Zoloft.  Vitamin D therapeutic.  Patient has a history of colon polyps and is up-to-date on his colonoscopy.  Chronic constipation is being treated with MiraLAX.  Patient is up-to-date on his diabetic eye exam despite what the computer indicates.  Patient does have snoring and nocturnal hypoxemia and suspected sleep apnea and I have encouraged him to get the home sleep study.    Plan is as follows: No change in current medical regimen.  Strongly encouraged patient to work on diet and weight loss.  I will have him follow-up with lab priorin about 3-4 months to reassess the situation.        Procedures

## 2018-05-15 DIAGNOSIS — E11.9 TYPE 2 DIABETES MELLITUS WITHOUT COMPLICATION, WITHOUT LONG-TERM CURRENT USE OF INSULIN (HCC): Chronic | ICD-10-CM

## 2018-05-15 RX ORDER — INSULIN GLARGINE 100 [IU]/ML
INJECTION, SOLUTION SUBCUTANEOUS
Qty: 27 PEN | Refills: 1 | Status: SHIPPED | OUTPATIENT
Start: 2018-05-15 | End: 2018-08-17 | Stop reason: ALTCHOICE

## 2018-05-17 DIAGNOSIS — E11.9 TYPE 2 DIABETES MELLITUS WITHOUT COMPLICATION, WITHOUT LONG-TERM CURRENT USE OF INSULIN (HCC): Chronic | ICD-10-CM

## 2018-05-18 RX ORDER — INSULIN GLARGINE 100 [IU]/ML
INJECTION, SOLUTION SUBCUTANEOUS
Qty: 30 PEN | Refills: 0 | Status: SHIPPED | OUTPATIENT
Start: 2018-05-18 | End: 2018-05-29 | Stop reason: SDUPTHER

## 2018-05-25 ENCOUNTER — TELEPHONE (OUTPATIENT)
Dept: ORTHOPEDIC SURGERY | Facility: CLINIC | Age: 53
End: 2018-05-25

## 2018-05-25 DIAGNOSIS — E78.5 HYPERLIPIDEMIA, UNSPECIFIED HYPERLIPIDEMIA TYPE: ICD-10-CM

## 2018-05-25 RX ORDER — EZETIMIBE 10 MG/1
10 TABLET ORAL DAILY
Qty: 90 TABLET | Refills: 0 | Status: SHIPPED | OUTPATIENT
Start: 2018-05-25 | End: 2018-09-12 | Stop reason: SDUPTHER

## 2018-05-26 DIAGNOSIS — E11.9 TYPE 2 DIABETES MELLITUS WITHOUT COMPLICATION, WITHOUT LONG-TERM CURRENT USE OF INSULIN (HCC): Chronic | ICD-10-CM

## 2018-05-29 RX ORDER — INSULIN GLARGINE 100 [IU]/ML
80 INJECTION, SOLUTION SUBCUTANEOUS DAILY
Qty: 30 PEN | Refills: 0 | Status: SHIPPED | OUTPATIENT
Start: 2018-05-29 | End: 2018-09-25 | Stop reason: SDUPTHER

## 2018-05-30 NOTE — TELEPHONE ENCOUNTER
Have a call into the patient to see if his insurance is the same. Will let you know, but will try to get it without another office visit. I will take care of this. The order from February is still good. I do not need a new order.

## 2018-06-10 ENCOUNTER — APPOINTMENT (OUTPATIENT)
Dept: MRI IMAGING | Facility: HOSPITAL | Age: 53
End: 2018-06-10
Attending: ORTHOPAEDIC SURGERY

## 2018-06-17 ENCOUNTER — HOSPITAL ENCOUNTER (OUTPATIENT)
Dept: MRI IMAGING | Facility: HOSPITAL | Age: 53
Discharge: HOME | End: 2018-06-17
Attending: ORTHOPAEDIC SURGERY | Admitting: ORTHOPAEDIC SURGERY

## 2018-06-17 DIAGNOSIS — M75.101 TEAR OF RIGHT ROTATOR CUFF, UNSPECIFIED TEAR EXTENT: ICD-10-CM

## 2018-06-17 PROCEDURE — 73221 MRI JOINT UPR EXTREM W/O DYE: CPT

## 2018-06-19 RX ORDER — MONTELUKAST SODIUM 10 MG/1
TABLET ORAL
Qty: 30 TABLET | Refills: 1 | Status: SHIPPED | OUTPATIENT
Start: 2018-06-19 | End: 2018-08-13 | Stop reason: SDUPTHER

## 2018-07-03 ENCOUNTER — OFFICE VISIT (OUTPATIENT)
Dept: ORTHOPEDIC SURGERY | Facility: CLINIC | Age: 53
End: 2018-07-03

## 2018-07-03 VITALS — BODY MASS INDEX: 34.04 KG/M2 | TEMPERATURE: 97.6 F | WEIGHT: 211.8 LBS | HEIGHT: 66 IN

## 2018-07-03 DIAGNOSIS — M75.121 COMPLETE TEAR OF RIGHT ROTATOR CUFF: Primary | ICD-10-CM

## 2018-07-03 PROCEDURE — 99213 OFFICE O/P EST LOW 20 MIN: CPT | Performed by: ORTHOPAEDIC SURGERY

## 2018-07-18 RX ORDER — AMLODIPINE BESYLATE AND BENAZEPRIL HYDROCHLORIDE 5; 20 MG/1; MG/1
CAPSULE ORAL
Qty: 30 CAPSULE | Refills: 1 | Status: SHIPPED | OUTPATIENT
Start: 2018-07-18 | End: 2018-09-18 | Stop reason: SDUPTHER

## 2018-07-18 RX ORDER — ROSUVASTATIN CALCIUM 40 MG/1
TABLET, COATED ORAL
Qty: 30 TABLET | Refills: 1 | Status: SHIPPED | OUTPATIENT
Start: 2018-07-18 | End: 2018-09-12 | Stop reason: SDUPTHER

## 2018-07-27 DIAGNOSIS — E11.9 TYPE 2 DIABETES MELLITUS WITHOUT COMPLICATION, WITHOUT LONG-TERM CURRENT USE OF INSULIN (HCC): Chronic | ICD-10-CM

## 2018-07-27 RX ORDER — INSULIN GLARGINE 100 [IU]/ML
INJECTION, SOLUTION SUBCUTANEOUS
Qty: 30 ML | Refills: 0 | Status: SHIPPED | OUTPATIENT
Start: 2018-07-27 | End: 2018-08-17 | Stop reason: SDUPTHER

## 2018-08-06 ENCOUNTER — OFFICE VISIT (OUTPATIENT)
Dept: INTERNAL MEDICINE | Facility: CLINIC | Age: 53
End: 2018-08-06

## 2018-08-06 VITALS
WEIGHT: 211.2 LBS | RESPIRATION RATE: 16 BRPM | HEIGHT: 66 IN | DIASTOLIC BLOOD PRESSURE: 69 MMHG | BODY MASS INDEX: 33.94 KG/M2 | OXYGEN SATURATION: 99 % | TEMPERATURE: 98.6 F | HEART RATE: 77 BPM | SYSTOLIC BLOOD PRESSURE: 99 MMHG

## 2018-08-06 DIAGNOSIS — E11.9 TYPE 2 DIABETES MELLITUS WITHOUT COMPLICATION, WITHOUT LONG-TERM CURRENT USE OF INSULIN (HCC): Chronic | ICD-10-CM

## 2018-08-06 DIAGNOSIS — I10 BENIGN ESSENTIAL HYPERTENSION: Chronic | ICD-10-CM

## 2018-08-06 DIAGNOSIS — J20.9 ACUTE BRONCHITIS WITH BRONCHOSPASM: ICD-10-CM

## 2018-08-06 DIAGNOSIS — J30.1 CHRONIC SEASONAL ALLERGIC RHINITIS DUE TO POLLEN: Chronic | ICD-10-CM

## 2018-08-06 DIAGNOSIS — Z91.09 MULTIPLE ENVIRONMENTAL ALLERGIES: Chronic | ICD-10-CM

## 2018-08-06 DIAGNOSIS — J45.40 MODERATE PERSISTENT ASTHMA WITHOUT COMPLICATION: Primary | Chronic | ICD-10-CM

## 2018-08-06 PROCEDURE — 99214 OFFICE O/P EST MOD 30 MIN: CPT | Performed by: INTERNAL MEDICINE

## 2018-08-06 RX ORDER — CEFDINIR 300 MG/1
CAPSULE ORAL
Qty: 20 CAPSULE | Refills: 0 | Status: SHIPPED | OUTPATIENT
Start: 2018-08-06 | End: 2018-08-17

## 2018-08-06 NOTE — PROGRESS NOTES
08/06/2018    Patient Information  Marek Diego                                                                                          [id number] [facility] [address]      1965  [phone]      Chief Complaint:     Complaining of head and chest congestion and wheezing.    History of Present Illness:    Patient with a history of environmental allergies/allergic rhinitis and moderate persistent asthma presents with a one-week history of head congestion, cough and wheezing that is only occasionally productive of yellowish phlegm.  He has posterior nasal drainage as well.  No fever, chills, or other significant signs or symptoms.  He is more short of breath with exertion.  No pain anywhere.  Past medical history reviewed and updated where necessary including health maintenance parameters.  This reveals he is up-to-date or else accounted for.    Review of Systems   Constitution: Negative.   HENT: Positive for congestion.    Eyes: Negative.    Cardiovascular: Negative.    Respiratory: Positive for cough, sputum production and wheezing.    Endocrine: Negative.    Hematologic/Lymphatic: Negative.    Skin: Negative.    Musculoskeletal: Negative.    Gastrointestinal: Negative.    Genitourinary: Negative.    Neurological: Negative.    Psychiatric/Behavioral: Negative.    Allergic/Immunologic: Negative.        Active Problems:    Patient Active Problem List   Diagnosis   • Allergic rhinitis   • Benign essential hypertension   • Chronic neck pain   • Depression with anxiety   • Gastroesophageal reflux disease with esophagitis   • Hyperlipidemia   • Hypogonadism male, on TRT.   • Microalbuminuria   • Moderate persistent asthma   • Multiple environmental allergies   • Non morbid obesity   • Type 2 diabetes mellitus (CMS/[facility])   • Vitamin D deficiency   • Routine physical examination   • Therapeutic drug monitoring   • Right bundle branch block   • Tinea manus   • Diabetic eye exam (CMS/[facility])   •  Diabetic foot exam   • Chronic constipation   • Snoring   • Nocturnal hypoxemia   • History of colon polyps, 11/29/2017--tubular adenoma times one.  Hyperplastic ×1.  Repeat 5 years.   • Acute bronchitis with bronchospasm         Past Medical History:   Diagnosis Date   • Allergic rhinitis 10/24/2013    10/24/2013--skin testing revealed impressive reactions to grass following, tree pollen, weed pollen, ragweed, dust mite, and cat. Recommend immunotherapy.   • Benign essential hypertension 2/22/2016   • Chronic constipation 11/20/2017 11/20/2017--patient reports approximately 8 month history of intermittent constipation that at times can last as much as a month.  He notes his blood sugar readings tend to increase when this happens.  He is scheduled for colonoscopy next week.  I recommend a generic probiotic daily as well as MiraLAX and adjust as needed.   • Chronic neck pain 10/30/2015    12/19/2015--patient reports his left shoulder pain has resolved. He continues to have neck pain. X-ray of the cervical spine ordered. Physical therapy ordered.   11/10/2015--left shoulder injected with 80 mg of Depo-Medrol with 3 mL of Xylocaine.   10/30/2015--patient presents with at least a one-month history of intermittent left-sided neck pain that is associated with left shoulder pain as well. The pain is described as shooting and is 8 out of 10 intensity at its worst. The pain is worse with certain movements of his head and left shoulder. No trauma. No numbness or paresthesias.   • Depression with anxiety 2/22/2016   • Diabetic eye exam (CMS/[facility]) 5/27/2016    May 2016--patient reports a normal diabetic eye exam.   • Diabetic foot exam 4/27/2017 05/02/2017--routine diabetic foot exam reveals no evidence of diabetic foot ulcer or pre-ulcerative callus.  Pulses are palpable and there is no signs of ischemia.  Sensation subjectively intact.   • Gastroesophageal reflux disease with esophagitis 5/22/2015 08/12/2015--EGD  revealed esophagitis and a distal esophageal stricture that was dilated. Small sliding hiatal hernia. Proximal gastric ulcer and gastritis present. Dysphagia resolved after dilatation. Pathology of the gastric antrum was benign with no significant histologic abnormality. Distal esophagus biopsy negative for intestinal metaplasia or dysplasia.   05/22/2015--patient presents with a several month history of progressively worsening intermittent dysphagia of solids but not liquids. He describes solid food sometimes sticking and points to his upper chest/lower throat. No other associated symptoms. Patient referred to Dr. Aime Parada for an EGD. This is negative, may need ENT evaluation.   • History of colon polyps, 11/29/2017--tubular adenoma times one.  Hyperplastic ×1.  Repeat 5 years. 12/18/2017 11/29/2017--colonoscopy revealed 2 small (3 mm) polyps in the sigmoid colon and cecum.  Removed.  Bowel prep.  Sigmoid diverticuli noted.  No hemorrhoids.  Pathology returned hyperplastic polyp of the sigmoid and the cecal polyp was a tubular adenoma.   • HIstory of eosinophilic gastroenteritis 1990s    1990s--patient had significant epigastric discomfort and workup including an EGD revealed eosinophilic gastroenteritis that was treated with prednisone and resulted in resolution.   • History of esophageal stricture 08/12/2015 08/12/2015--EGD revealed esophagitis and a distal esophageal stricture that was dilated. Small sliding hiatal hernia. Proximal gastric ulcer and gastritis present. Dysphagia resolved after dilatation. Pathology of the gastric antrum was benign with no significant histologic abnormality. Distal esophagus biopsy negative for intestinal metaplasia or dysplasia.   05/22/2015--patient presents with a s   • HIstory of Pulmonary hypertension, 04/23/2014--resolved after PFO/ASD closure. 04/23/2014 04/23/2014--echocardiogram reveals normal left ventricular systolic function with ejection fraction 55%. Left  ventricular wall motion is normal. The right ventricle is moderately to severely dilated. Right ventricular systolic function is mildly reduced. The right atrium is moderately dilated. Saline contrast study revealed no evidence of PFO/ASD. Status post PFO closure. There is trace mitral reg   • History of Pulmonary nodule, 03/07/2016--5 mm indeterminate nodule right lower lobe.  09/13/2016--consistent with calcified granuloma. 3/7/2016    09/13/2016--CT scan of the chest, abdomen, and pelvis with contrast reveals no lymphadenopathy within the abdomen or pelvis.  Mild hepatic steatosis.  Previously noted right lower lobe pulmonary nodule is currently calcified and consistent with a calcified granuloma.  03/07/2016--CT scan of the abdomen performed for complaints of abdominal discomfort revealed a 5 mm nodular focus right lower lobe which is indeterminate.  Recommend repeat CT scan in 6 months.   • HIstory of repair of Congenital atrial septal defect 03/2012 March 2012--percutaneous closure of secundum ASD.   • Hyperlipidemia 2/22/2016   • Hypogonadism male, on TRT. 12/26/2012 12/26/2012--treatment for hypogonadism begun.   • Microalbuminuria 10/19/2014    10/19/2014--urine microalbumin mildly elevated at 27.8. Normal range 0.0--17.0.   • Moderate persistent asthma 2/22/2016    Seasonal, spring and fall.   • Multiple environmental allergies 10/24/2013    10/24/2013--skin testing revealed impressive reactions to grass following, tree pollen, weed pollen, ragweed, dust mite, and cat. Recommend immunotherapy.   • Non morbid obesity 2/22/2016   • Right bundle branch block     ECG reveals sinus rhythm, rate of 75, right bundle branch block, left anterior hemiblock   • Type 2 diabetes mellitus (CMS/[facility]) 1/1/2004 2004--initial diagnosis of type 2 diabetes.   • Vitamin D deficiency 2/22/2016         Past Surgical History:   Procedure Laterality Date   • ATRIAL SEPTAL DEFECT REPAIR  03/2012 March 2012--percutaneous  closure of secundum ASD.  Dr. Mcpherson   • COLONOSCOPY N/A 11/29/2017 11/29/2017--colonoscopy revealed 2 small (3 mm) polyps in the sigmoid colon and cecum.  Removed.  Bowel prep.  Sigmoid diverticuli noted.  No hemorrhoids.  Pathology returned hyperplastic polyp of the sigmoid and the cecal polyp was a tubular adenoma.   • ESOPHAGEAL DILATATION  08/12/2015 08/12/2015--EGD revealed esophagitis and a distal esophageal stricture that was dilated. Small sliding hiatal hernia. Proximal gastric ulcer and gastritis present. Dysphagia resolved after dilatation. 05/22/2015--patient presents with a several month history of progressively worsening intermittent dysphagia of solids but not liquids. He describes solid food sometimes sticking and points to his upp   • ESOPHAGOSCOPY / EGD  08/12/2015 08/12/2015--EGD revealed esophagitis and a distal esophageal stricture that was dilated. Small sliding hiatal hernia. Proximal gastric ulcer and gastritis present. Dysphagia resolved after dilatation. 05/22/2015--patient presents with a several month history of progressively worsening intermittent dysphagia of solids but not liquids. He describes solid food sometimes sticking and points to his upp   • KNEE ACL RECONSTRUCTION Right 02/11/2013 02/11/2013--knee arthroscopy with anterior cruciate ligament repair   • TONSILLECTOMY  2009 2009--tonsillectomy as an adult.         Allergies   Allergen Reactions   • Latex Itching           Current Outpatient Prescriptions:   •  amLODIPine-benazepril (LOTREL 5-20) 5-20 MG per capsule, TAKE 1 CAPSULE BY MOUTH ONE TIME A DAY , Disp: 30 capsule, Rfl: 1  •  Canagliflozin (INVOKANA) 300 MG tablet, Take 300 mg by mouth Daily. PT NEED LABS AND FOLLOW UP ASAP !!!, Disp: 30 tablet, Rfl: 5  •  Cholecalciferol (VITAMIN D3) 5000 UNITS capsule capsule, Take 1 capsule by mouth daily., Disp: , Rfl:   •  esomeprazole (nexIUM) 40 MG capsule, TAKE 1 CAPSULE BY MOUTH ONE TIME A DAY , Disp: 30 capsule,  Rfl: 10  •  ezetimibe (ZETIA) 10 MG tablet, Take 1 tablet by mouth Daily. for cholesterol., Disp: 90 tablet, Rfl: 0  •  glimepiride (AMARYL) 4 MG tablet, take 1/2 tablet by mouth in the morning and 1 & 1/2 tablets in the evening , Disp: 60 tablet, Rfl: 4  •  Insulin Glargine (BASAGLAR KWIKPEN) 100 UNIT/ML injection pen, Inject 80 Units under the skin Daily., Disp: 30 pen, Rfl: 0  •  Insulin Glargine (BASAGLAR KWIKPEN) 100 UNIT/ML injection pen, INJECT 80 UNITS UNDER THE SKIN DAILY. INJECT 80 UNITS SUBCUTANEOUSLY DAILY AS DIRECTED. PT NEEDS LAB, Disp: 30 mL, Rfl: 0  •  Loratadine (CLARITIN PO), Take 1 capsule by mouth daily as needed (when necessary for allergies.)., Disp: , Rfl:   •  metFORMIN (GLUCOPHAGE) 1000 MG tablet, TAKE 1 TABLET BY MOUTH TWO TIMES A DAY WITH MEALS, Disp: 180 tablet, Rfl: 0  •  montelukast (SINGULAIR) 10 MG tablet, TAKE 1 TABLET BY MOUTH ONE TIME A DAY , Disp: 30 tablet, Rfl: 1  •  polyethylene glycol (MIRALAX) packet, Take orally as directed daily for constipation, Disp: , Rfl:   •  Probiotic capsule, 1 by mouth daily, Disp: , Rfl:   •  rosuvastatin (CRESTOR) 40 MG tablet, TAKE 1 TABLET BY MOUTH AT BEDTIME , Disp: 30 tablet, Rfl: 1  •  sertraline (ZOLOFT) 50 MG tablet, TAKE 1 TABLET BY MOUTH ONE TIME A DAY , Disp: 30 tablet, Rfl: 3  •  Testosterone 30 MG/ACT solution, Apply 1 pump to each arm every day, Disp: 90 mL, Rfl: 2  •  TRULICITY 1.5 MG/0.5ML solution pen-injector, inject 1.5mg under the skin once weekly as directed, Disp: 2 mL, Rfl: 10  •  LANTUS SOLOSTAR 100 UNIT/ML injection pen, INJECT 80 UNITS SUBCUTANEOUSLY DAILY AS DIRECTED., Disp: 27 pen, Rfl: 1      Family History   Problem Relation Age of Onset   • Diabetes Mother    • Stomach cancer Mother    • Diabetes Maternal Grandmother          Social History     Social History   • Marital status:      Spouse name: N/A   • Number of children: N/A   • Years of education: N/A     Occupational History   •  for  "[employer]      Social History Main Topics   • Smoking status: Never Smoker   • Smokeless tobacco: Never Used   • Alcohol use No   • Drug use: No   • Sexual activity: Yes     Partners: Female     Other Topics Concern   • Not on file     Social History Narrative   • No narrative on file         Vitals:    08/06/18 1519   BP: 99/69   BP Location: Right arm   Patient Position: Sitting   Cuff Size: Large Adult   Pulse: 77   Resp: 16   Temp: 98.6 °F (37 °C)   TempSrc: Oral   SpO2: 99%   Weight: 95.8 kg (211 lb 3.2 oz)   Height: 167.6 cm (66\")          Physical Exam:    General: Alert and oriented x 3.  No acute distress. Obese. Normal affect.  HEENT: Pupils equal, round, reactive to light; extraocular movements intact; sclerae nonicteric; pharynx, ear canals and TMs normal.  Boggy nasal mucosa but no definite tenderness to percussion over the sinuses.  Neck: Without JVD, thyromegaly, bruit, or adenopathy.  Lungs: Mild bilateral wheezes and rhonchi without signs of consolidation.  Heart: Regular rate and rhythm without murmur, rub, gallop, or click.  Abdomen: Soft, nontender, without hepatosplenomegaly or hernia.  Bowel sounds normal.  : Deferred.  Rectal: Deferred.  Extremities: Without clubbing, cyanosis, edema, or pulse deficit.  Neurologic: Intact without focal deficit.  Normal station and gait observed during ingress and egress from the examination room.  Skin: Without significant lesion.  Musculoskeletal: Unremarkable.      Lab/other results:      Assessment/Plan:     Diagnosis Plan   1. Moderate persistent asthma without complication     2. Acute bronchitis with bronchospasm     3. Multiple environmental allergies     4. Allergic rhinitis     5. Benign essential hypertension     6. Type 2 diabetes mellitus without complication, without long-term current use of insulin (CMS/[facility])       Patient with moderate persistent asthma as well as multiple environmental allergies/allergic rhinitis that are undoubtedly playing a " role.  Patient does have type 2 diabetes is been poorly controlled and we would rather try to avoid systemic steroids if at all possible.  Patient has used Advair in the past but has not had to use this continuously.  I think that may be a good option.  He has scant phlegm production that is yellow and we will err on the side of caution and treat him with an antibiotic as well.    Plan is as follows: Cefdinir 300 mg by mouth twice a day ×10 days.  Advair 250/50, use twice a day as directed.  I have recommended that he continue this at least for a month and perhaps longer.  Stahist AD one by mouth every 6 hours, not greater than 6 in 24 hours.        Procedures

## 2018-08-09 DIAGNOSIS — E55.9 VITAMIN D DEFICIENCY: Chronic | ICD-10-CM

## 2018-08-09 DIAGNOSIS — E11.9 TYPE 2 DIABETES MELLITUS WITHOUT COMPLICATION, WITHOUT LONG-TERM CURRENT USE OF INSULIN (HCC): Chronic | ICD-10-CM

## 2018-08-09 DIAGNOSIS — E78.49 OTHER HYPERLIPIDEMIA: Chronic | ICD-10-CM

## 2018-08-09 DIAGNOSIS — E29.1 HYPOGONADISM MALE: Chronic | ICD-10-CM

## 2018-08-13 RX ORDER — GLIMEPIRIDE 4 MG/1
TABLET ORAL
Qty: 60 TABLET | Refills: 3 | Status: SHIPPED | OUTPATIENT
Start: 2018-08-13 | End: 2018-08-17 | Stop reason: DRUGHIGH

## 2018-08-13 RX ORDER — MONTELUKAST SODIUM 10 MG/1
TABLET ORAL
Qty: 30 TABLET | Refills: 0 | Status: SHIPPED | OUTPATIENT
Start: 2018-08-13 | End: 2018-08-17 | Stop reason: SDUPTHER

## 2018-08-15 LAB
25(OH)D3+25(OH)D2 SERPL-MCNC: 49.5 NG/ML (ref 30–100)
ALBUMIN SERPL-MCNC: 4.2 G/DL (ref 3.5–5.2)
ALBUMIN/CREAT UR: 4.4 MG/G CREAT (ref 0–30)
ALBUMIN/GLOB SERPL: 1.8 G/DL
ALP SERPL-CCNC: 79 U/L (ref 39–117)
ALT SERPL-CCNC: 58 U/L (ref 1–41)
AST SERPL-CCNC: 39 U/L (ref 1–40)
BILIRUB SERPL-MCNC: 0.2 MG/DL (ref 0.1–1.2)
BUN SERPL-MCNC: 20 MG/DL (ref 6–20)
BUN/CREAT SERPL: 16.4 (ref 7–25)
CALCIUM SERPL-MCNC: 9.8 MG/DL (ref 8.6–10.5)
CHLORIDE SERPL-SCNC: 102 MMOL/L (ref 98–107)
CHOLEST SERPL-MCNC: 100 MG/DL (ref 100–199)
CK SERPL-CCNC: 131 U/L (ref 20–200)
CO2 SERPL-SCNC: 24.4 MMOL/L (ref 22–29)
CREAT SERPL-MCNC: 1.22 MG/DL (ref 0.76–1.27)
CREAT UR-MCNC: 79 MG/DL
GLOBULIN SER CALC-MCNC: 2.4 GM/DL
GLUCOSE SERPL-MCNC: 158 MG/DL (ref 65–99)
HBA1C MFR BLD: 9.92 % (ref 4.8–5.6)
HDL SERPL-SCNC: 24 UMOL/L
HDLC SERPL-MCNC: 26 MG/DL
LDL SERPL QN: 19.6 NM
LDL SERPL-SCNC: 564 NMOL/L
LDL SMALL SERPL-SCNC: 479 NMOL/L
LDLC SERPL CALC-MCNC: 34 MG/DL (ref 0–99)
MICROALBUMIN UR-MCNC: 3.5 UG/ML
POTASSIUM SERPL-SCNC: 5.2 MMOL/L (ref 3.5–5.2)
PROT SERPL-MCNC: 6.6 G/DL (ref 6–8.5)
SODIUM SERPL-SCNC: 140 MMOL/L (ref 136–145)
T3FREE SERPL-MCNC: 3.1 PG/ML (ref 2–4.4)
T4 FREE SERPL-MCNC: 1.14 NG/DL (ref 0.93–1.7)
TESTOST SERPL-MCNC: 193 NG/DL (ref 264–916)
TESTOSTERONE.FREE+WB MFR SERPL: 38 % (ref 9–46)
TESTOSTERONE.FREE+WB SERPL-MCNC: 73.3 NG/DL (ref 40–250)
TRIGL SERPL-MCNC: 202 MG/DL (ref 0–149)
TSH SERPL DL<=0.005 MIU/L-ACNC: 4.49 MIU/ML (ref 0.27–4.2)

## 2018-08-17 ENCOUNTER — OFFICE VISIT (OUTPATIENT)
Dept: INTERNAL MEDICINE | Facility: CLINIC | Age: 53
End: 2018-08-17

## 2018-08-17 VITALS
OXYGEN SATURATION: 97 % | HEIGHT: 66 IN | SYSTOLIC BLOOD PRESSURE: 112 MMHG | BODY MASS INDEX: 33.27 KG/M2 | DIASTOLIC BLOOD PRESSURE: 62 MMHG | HEART RATE: 101 BPM | WEIGHT: 207 LBS

## 2018-08-17 DIAGNOSIS — J45.40 MODERATE PERSISTENT ASTHMA WITHOUT COMPLICATION: Chronic | ICD-10-CM

## 2018-08-17 DIAGNOSIS — Z51.81 THERAPEUTIC DRUG MONITORING: ICD-10-CM

## 2018-08-17 DIAGNOSIS — E03.8 SUBCLINICAL HYPOTHYROIDISM: ICD-10-CM

## 2018-08-17 DIAGNOSIS — E78.49 OTHER HYPERLIPIDEMIA: Chronic | ICD-10-CM

## 2018-08-17 DIAGNOSIS — E11.9 TYPE 2 DIABETES MELLITUS WITHOUT COMPLICATION, WITHOUT LONG-TERM CURRENT USE OF INSULIN (HCC): Chronic | ICD-10-CM

## 2018-08-17 DIAGNOSIS — K21.00 GASTROESOPHAGEAL REFLUX DISEASE WITH ESOPHAGITIS: Chronic | ICD-10-CM

## 2018-08-17 DIAGNOSIS — E29.1 HYPOGONADISM MALE: Chronic | ICD-10-CM

## 2018-08-17 DIAGNOSIS — E11.9 TYPE 2 DIABETES MELLITUS WITHOUT COMPLICATION, WITHOUT LONG-TERM CURRENT USE OF INSULIN (HCC): Primary | Chronic | ICD-10-CM

## 2018-08-17 DIAGNOSIS — R06.83 SNORING: Chronic | ICD-10-CM

## 2018-08-17 DIAGNOSIS — R80.9 MICROALBUMINURIA: Chronic | ICD-10-CM

## 2018-08-17 DIAGNOSIS — E55.9 VITAMIN D DEFICIENCY: Chronic | ICD-10-CM

## 2018-08-17 DIAGNOSIS — E66.09 NON MORBID OBESITY DUE TO EXCESS CALORIES: Chronic | ICD-10-CM

## 2018-08-17 DIAGNOSIS — Z91.09 MULTIPLE ENVIRONMENTAL ALLERGIES: Chronic | ICD-10-CM

## 2018-08-17 DIAGNOSIS — I10 BENIGN ESSENTIAL HYPERTENSION: Chronic | ICD-10-CM

## 2018-08-17 DIAGNOSIS — J20.9 ACUTE BRONCHITIS WITH BRONCHOSPASM: ICD-10-CM

## 2018-08-17 DIAGNOSIS — G47.34 NOCTURNAL HYPOXEMIA: Chronic | ICD-10-CM

## 2018-08-17 DIAGNOSIS — J30.1 CHRONIC SEASONAL ALLERGIC RHINITIS DUE TO POLLEN: Chronic | ICD-10-CM

## 2018-08-17 PROCEDURE — 99215 OFFICE O/P EST HI 40 MIN: CPT | Performed by: INTERNAL MEDICINE

## 2018-08-17 RX ORDER — MONTELUKAST SODIUM 10 MG/1
TABLET ORAL
Qty: 30 TABLET | Refills: 2 | Status: SHIPPED | OUTPATIENT
Start: 2018-08-17 | End: 2018-12-07 | Stop reason: SDUPTHER

## 2018-08-17 RX ORDER — PHENTERMINE HYDROCHLORIDE 37.5 MG/1
37.5 TABLET ORAL
Qty: 30 TABLET | Refills: 1 | Status: SHIPPED | OUTPATIENT
Start: 2018-08-17 | End: 2018-10-24 | Stop reason: SDUPTHER

## 2018-08-17 RX ORDER — TESTOSTERONE GEL, 1% 10 MG/G
GEL TRANSDERMAL
Qty: 300 G | Refills: 5 | Status: SHIPPED | OUTPATIENT
Start: 2018-08-17 | End: 2019-04-12 | Stop reason: HOSPADM

## 2018-08-17 RX ORDER — GLIMEPIRIDE 4 MG/1
4 TABLET ORAL 2 TIMES DAILY
COMMUNITY
End: 2018-12-07 | Stop reason: SDUPTHER

## 2018-08-17 RX ORDER — TOPIRAMATE 25 MG/1
CAPSULE, EXTENDED RELEASE ORAL
Qty: 30 CAPSULE
Start: 2018-08-17 | End: 2018-11-19

## 2018-08-17 NOTE — PROGRESS NOTES
08/17/2018    Patient Information  Marek Diego                                                                                          [id number] [facility] [id number]      1965  [phone]      Chief Complaint:     Follow-up type 2 diabetes, hyperlipidemia, hypogonadism, microalbuminuria, moderate persistent asthma/environmental allergies/allergic rhinitis, recent acute bronchitis with bronchospasm, history of snoring and nocturnal hypoxemia and suspected sleep apnea, esophageal reflux, hypertension, vitamin D deficiency.  Patient complaining of continued cough.    History of Present Illness:    Patient with a history of medical problems as outlined in chief complaint presents today for a follow-up with lab prior in order to monitor his chronic medical issues.  Patient has had poorly controlled type 2 diabetes and we are doing our best to try to improve that situation.  However, patient has suspected underlying sleep apnea which I think may be severe and I think this is contributing to his obesity which is subsequently adversely affecting his type 2 diabetes.  Patient was previously scheduled for home sleep study but this had to be delayed because of change of jobs/insurance.  Also patient has symptomatic hypogonadism and his insurance company will no longer cover his testosterone replacement therapy.  We must switch to Testim.  Past medical history reviewed and updated where necessary including health maintenance parameters.  This reveals he is up-to-date with the exception of new shingles vaccination.  I encouraged him to stop at the local drug store to receive this.  He is aware takes 2 shot vaccination.    Review of Systems   Constitution: Positive for weight gain.   HENT: Negative.    Eyes: Negative.    Cardiovascular: Negative.    Respiratory: Positive for cough.    Endocrine: Negative.    Hematologic/Lymphatic: Negative.    Skin: Negative.    Musculoskeletal: Negative.     Gastrointestinal: Negative.    Genitourinary: Negative.    Neurological: Negative.    Psychiatric/Behavioral: Negative.    Allergic/Immunologic: Negative.        Active Problems:    Patient Active Problem List   Diagnosis   • Allergic rhinitis   • Benign essential hypertension   • Chronic neck pain   • Depression with anxiety   • Gastroesophageal reflux disease with esophagitis   • Hyperlipidemia   • Hypogonadism male, on TRT.   • Microalbuminuria   • Moderate persistent asthma   • Multiple environmental allergies   • Non morbid obesity   • Type 2 diabetes mellitus (CMS/[facility])   • Vitamin D deficiency   • Routine physical examination   • Therapeutic drug monitoring   • Right bundle branch block   • Diabetic eye exam (CMS/[facility])   • Diabetic foot exam   • Chronic constipation   • Snoring   • Nocturnal hypoxemia   • History of colon polyps, 11/29/2017--tubular adenoma times one.  Hyperplastic ×1.  Repeat 5 years.   • Subclinical hypothyroidism         Past Medical History:   Diagnosis Date   • Allergic rhinitis 10/24/2013    10/24/2013--skin testing revealed impressive reactions to grass following, tree pollen, weed pollen, ragweed, dust mite, and cat. Recommend immunotherapy.   • Benign essential hypertension 2/22/2016   • Chronic constipation 11/20/2017 11/20/2017--patient reports approximately 8 month history of intermittent constipation that at times can last as much as a month.  He notes his blood sugar readings tend to increase when this happens.  He is scheduled for colonoscopy next week.  I recommend a generic probiotic daily as well as MiraLAX and adjust as needed.   • Chronic neck pain 10/30/2015    12/19/2015--patient reports his left shoulder pain has resolved. He continues to have neck pain. X-ray of the cervical spine ordered. Physical therapy ordered.   11/10/2015--left shoulder injected with 80 mg of Depo-Medrol with 3 mL of Xylocaine.   10/30/2015--patient presents with at least a one-month history  of intermittent left-sided neck pain that is associated with left shoulder pain as well. The pain is described as shooting and is 8 out of 10 intensity at its worst. The pain is worse with certain movements of his head and left shoulder. No trauma. No numbness or paresthesias.   • Depression with anxiety 2/22/2016   • Diabetic eye exam (CMS/[facility]) 5/27/2016    May 2016--patient reports a normal diabetic eye exam.   • Diabetic foot exam 4/27/2017 05/02/2017--routine diabetic foot exam reveals no evidence of diabetic foot ulcer or pre-ulcerative callus.  Pulses are palpable and there is no signs of ischemia.  Sensation subjectively intact.   • Gastroesophageal reflux disease with esophagitis 5/22/2015 08/12/2015--EGD revealed esophagitis and a distal esophageal stricture that was dilated. Small sliding hiatal hernia. Proximal gastric ulcer and gastritis present. Dysphagia resolved after dilatation. Pathology of the gastric antrum was benign with no significant histologic abnormality. Distal esophagus biopsy negative for intestinal metaplasia or dysplasia.   05/22/2015--patient presents with a several month history of progressively worsening intermittent dysphagia of solids but not liquids. He describes solid food sometimes sticking and points to his upper chest/lower throat. No other associated symptoms. Patient referred to Dr. Aime Parada for an EGD. This is negative, may need ENT evaluation.   • History of colon polyps, 11/29/2017--tubular adenoma times one.  Hyperplastic ×1.  Repeat 5 years. 12/18/2017 11/29/2017--colonoscopy revealed 2 small (3 mm) polyps in the sigmoid colon and cecum.  Removed.  Bowel prep.  Sigmoid diverticuli noted.  No hemorrhoids.  Pathology returned hyperplastic polyp of the sigmoid and the cecal polyp was a tubular adenoma.   • HIstory of eosinophilic gastroenteritis 1990s 1990s--patient had significant epigastric discomfort and workup including an EGD revealed eosinophilic  gastroenteritis that was treated with prednisone and resulted in resolution.   • History of esophageal stricture 08/12/2015 08/12/2015--EGD revealed esophagitis and a distal esophageal stricture that was dilated. Small sliding hiatal hernia. Proximal gastric ulcer and gastritis present. Dysphagia resolved after dilatation. Pathology of the gastric antrum was benign with no significant histologic abnormality. Distal esophagus biopsy negative for intestinal metaplasia or dysplasia.   05/22/2015--patient presents with a s   • HIstory of Pulmonary hypertension, 04/23/2014--resolved after PFO/ASD closure. 04/23/2014 04/23/2014--echocardiogram reveals normal left ventricular systolic function with ejection fraction 55%. Left ventricular wall motion is normal. The right ventricle is moderately to severely dilated. Right ventricular systolic function is mildly reduced. The right atrium is moderately dilated. Saline contrast study revealed no evidence of PFO/ASD. Status post PFO closure. There is trace mitral reg   • History of Pulmonary nodule, 03/07/2016--5 mm indeterminate nodule right lower lobe.  09/13/2016--consistent with calcified granuloma. 3/7/2016    09/13/2016--CT scan of the chest, abdomen, and pelvis with contrast reveals no lymphadenopathy within the abdomen or pelvis.  Mild hepatic steatosis.  Previously noted right lower lobe pulmonary nodule is currently calcified and consistent with a calcified granuloma.  03/07/2016--CT scan of the abdomen performed for complaints of abdominal discomfort revealed a 5 mm nodular focus right lower lobe which is indeterminate.  Recommend repeat CT scan in 6 months.   • HIstory of repair of Congenital atrial septal defect 03/2012 March 2012--percutaneous closure of secundum ASD.   • Hyperlipidemia 2/22/2016   • Hypogonadism male, on TRT. 12/26/2012 12/26/2012--treatment for hypogonadism begun.   • Microalbuminuria 10/19/2014    10/19/2014--urine microalbumin  mildly elevated at 27.8. Normal range 0.0--17.0.   • Moderate persistent asthma 2/22/2016    Seasonal, spring and fall.   • Multiple environmental allergies 10/24/2013    10/24/2013--skin testing revealed impressive reactions to grass following, tree pollen, weed pollen, ragweed, dust mite, and cat. Recommend immunotherapy.   • Non morbid obesity 2/22/2016   • Right bundle branch block     ECG reveals sinus rhythm, rate of 75, right bundle branch block, left anterior hemiblock   • Type 2 diabetes mellitus (CMS/[facility]) 1/1/2004 2004--initial diagnosis of type 2 diabetes.   • Vitamin D deficiency 2/22/2016         Past Surgical History:   Procedure Laterality Date   • ATRIAL SEPTAL DEFECT REPAIR  03/2012 March 2012--percutaneous closure of secundum ASD.  Dr. Mcpherson   • COLONOSCOPY N/A 11/29/2017 11/29/2017--colonoscopy revealed 2 small (3 mm) polyps in the sigmoid colon and cecum.  Removed.  Bowel prep.  Sigmoid diverticuli noted.  No hemorrhoids.  Pathology returned hyperplastic polyp of the sigmoid and the cecal polyp was a tubular adenoma.   • ESOPHAGEAL DILATATION  08/12/2015 08/12/2015--EGD revealed esophagitis and a distal esophageal stricture that was dilated. Small sliding hiatal hernia. Proximal gastric ulcer and gastritis present. Dysphagia resolved after dilatation. 05/22/2015--patient presents with a several month history of progressively worsening intermittent dysphagia of solids but not liquids. He describes solid food sometimes sticking and points to his upp   • ESOPHAGOSCOPY / EGD  08/12/2015 08/12/2015--EGD revealed esophagitis and a distal esophageal stricture that was dilated. Small sliding hiatal hernia. Proximal gastric ulcer and gastritis present. Dysphagia resolved after dilatation. 05/22/2015--patient presents with a several month history of progressively worsening intermittent dysphagia of solids but not liquids. He describes solid food sometimes sticking and points to his upp   • KNEE  ACL RECONSTRUCTION Right 02/11/2013 02/11/2013--knee arthroscopy with anterior cruciate ligament repair   • TONSILLECTOMY  2009 2009--tonsillectomy as an adult.         Allergies   Allergen Reactions   • Latex Itching           Current Outpatient Prescriptions:   •  amLODIPine-benazepril (LOTREL 5-20) 5-20 MG per capsule, TAKE 1 CAPSULE BY MOUTH ONE TIME A DAY , Disp: 30 capsule, Rfl: 1  •  Canagliflozin (INVOKANA) 300 MG tablet, Take 300 mg by mouth Daily., Disp: 30 tablet, Rfl: 5  •  Chlorcyclizine-Pseudoephed (STAHIST AD) 25-60 MG tablet, 1 by mouth every 6 hours as needed for congestion and allergies, not greater than 3 in 24 hours, Disp: 60 tablet, Rfl: 2  •  Cholecalciferol (VITAMIN D3) 5000 UNITS capsule capsule, Take 1 capsule by mouth daily., Disp: , Rfl:   •  esomeprazole (nexIUM) 40 MG capsule, TAKE 1 CAPSULE BY MOUTH ONE TIME A DAY , Disp: 30 capsule, Rfl: 10  •  ezetimibe (ZETIA) 10 MG tablet, Take 1 tablet by mouth Daily. for cholesterol., Disp: 90 tablet, Rfl: 0  •  fluticasone-salmeterol (ADVAIR DISKUS) 250-50 MCG/DOSE DISKUS, Use inhaler twice daily as directed, Disp: 60 each, Rfl: 6  •  glimepiride (AMARYL) 4 MG tablet, Take 4 mg by mouth 2 (Two) Times a Day., Disp: , Rfl:   •  Insulin Glargine (BASAGLAR KWIKPEN) 100 UNIT/ML injection pen, Inject 80 Units under the skin Daily., Disp: 30 pen, Rfl: 0  •  Loratadine (CLARITIN PO), Take 1 capsule by mouth daily as needed (when necessary for allergies.)., Disp: , Rfl:   •  metFORMIN (GLUCOPHAGE) 1000 MG tablet, TAKE 1 TABLET BY MOUTH TWO TIMES A DAY WITH MEALS, Disp: 180 tablet, Rfl: 0  •  montelukast (SINGULAIR) 10 MG tablet, TAKE 1 TABLET BY MOUTH ONE TIME A DAY , Disp: 30 tablet, Rfl: 0  •  Probiotic capsule, 1 by mouth daily, Disp: , Rfl:   •  rosuvastatin (CRESTOR) 40 MG tablet, TAKE 1 TABLET BY MOUTH AT BEDTIME , Disp: 30 tablet, Rfl: 1  •  sertraline (ZOLOFT) 50 MG tablet, TAKE 1 TABLET BY MOUTH ONE TIME A DAY , Disp: 30 tablet, Rfl: 3  •   "TRULICITY 1.5 MG/0.5ML solution pen-injector, inject 1.5mg under the skin once weekly as directed, Disp: 2 mL, Rfl: 10  •  polyethylene glycol (MIRALAX) packet, Take orally as directed daily for constipation, Disp: , Rfl:   •  testosterone (TESTIM) 50 MG/5GM (1%) gel gel, Apply 2 packs daily as directed for low testosterone, Disp: 300 g, Rfl: 5      Family History   Problem Relation Age of Onset   • Diabetes Mother    • Stomach cancer Mother    • Diabetes Maternal Grandmother          Social History     Social History   • Marital status:      Spouse name: N/A   • Number of children: N/A   • Years of education: N/A     Occupational History   •  for [employer])      Social History Main Topics   • Smoking status: Never Smoker   • Smokeless tobacco: Never Used   • Alcohol use No   • Drug use: No   • Sexual activity: Yes     Partners: Female     Other Topics Concern   • Not on file     Social History Narrative   • No narrative on file         Vitals:    08/17/18 0733   BP: 112/62   Pulse: 101   SpO2: 97%   Weight: 93.9 kg (207 lb)   Height: 167.6 cm (65.98\")          Physical Exam:    General: Alert and oriented x 3.  No acute distress.  Normal affect.  HEENT: Pupils equal, round, reactive to light; extraocular movements intact; sclerae nonicteric; pharynx, ear canals and TMs normal.  Neck: Without JVD, thyromegaly, bruit, or adenopathy.  Lungs: Clear to auscultation in all fields.  Heart: Regular rate and rhythm without murmur, rub, gallop, or click.  Abdomen: Soft, nontender, without hepatosplenomegaly or hernia.  Bowel sounds normal.  : Deferred.  Rectal: Deferred.  Extremities: Without clubbing, cyanosis, edema, or pulse deficit.  Neurologic: Intact without focal deficit.  Normal station and gait observed during ingress and egress from the examination room.  Skin: Without significant lesion.  Musculoskeletal: Unremarkable.      Lab/other results:    NMR reveals a total " cholesterol 100.  Triglycerides elevated at 202.  LDL particle number excellent at 564.  Small LDL particle number excellent at 4 and 79.  HDL particle number is low at 24.  CMP normal except blood sugar 158, ALT elevated at 58.  Albumin/creatinine ratio normal at 4.4.  Total testosterone was low 193.  Free and weakly bound testosterone low normal at 73.3.  TSH is elevated at 4.49.  Vitamin D normal.  CPK normal.    Assessment/Plan:     Diagnosis Plan   1. Type 2 diabetes mellitus without complication, without long-term current use of insulin (CMS/[facility])  Canagliflozin (INVOKANA) 300 MG tablet   2. Hyperlipidemia     3. Hypogonadism male, on TRT.  testosterone (TESTIM) 50 MG/5GM (1%) gel gel   4. Microalbuminuria     5. Moderate persistent asthma without complication     6. Multiple environmental allergies     7. Allergic rhinitis     8. Acute bronchitis with bronchospasm     9. Snoring     10. Nocturnal hypoxemia     11. Gastroesophageal reflux disease with esophagitis     12. Benign essential hypertension     13. Vitamin D deficiency     14. Subclinical hypothyroidism     15. Non morbid obesity     16. Therapeutic drug monitoring       Patient has type 2 diabetes is under poor control.  This is despite maximum medical therapy.  Patient has non-morbid obesity and I suspect that this is playing a major role.  Patient has failed multiple diets and exercise regimens.  I do think medical management for weight loss is indicated.  Patient also has underlying suspected sleep apnea and this could certainly be playing a role as well.  This needs to be assessed as soon as possible.  Hyperlipidemia is under reasonable control.  Patient has symptomatic hypogonadism and we need to make a change in his medication because of insurance reasons.  He has moderate persistent asthma and environmental allergies and a recent episode of acute bronchitis with bronchospasm.  Patient reports he has a persistent cough after treatment and after  examining his lungs today which are clear, I think he should give it more time because I really do not want to add prednisone and all of its associated issues including weight gain and elevated blood sugar.  Blood pressure seems to be controlled.  Vitamin D therapeutic.    Plan is as follows: Sleep medicine referral given ASAP.  Repeat thyroid function tests today.  Thyroid ultrasound ordered.  Thyroid antibodies.  Start phentermine one by mouth daily along with Trokendi XR 25 mg per day.  I will have patient follow-up in 5 weeks to reassess.  We will discontinue his current testosterone replacement therapy and start Testim, 2 packs daily.  I will have patient obtain lab work prior to the follow-up visit.    Note: Greater than 40 minutes was spent today evaluating patient including history and physical, examination, reviewing lab work.  Greater than 50% of this time was spent counseling patient regarding his numerous diagnoses, prognoses, therapeutic options and plans.  Very complex patient with multiple medical problems.  [id number] level of service justified.    Procedures

## 2018-08-20 LAB
T3FREE SERPL-MCNC: 3.1 PG/ML (ref 2–4.4)
T4 FREE SERPL-MCNC: 1.2 NG/DL (ref 0.93–1.7)
THYROGLOB AB SERPL-ACNC: <1 IU/ML (ref 0–0.9)
THYROPEROXIDASE AB SERPL-ACNC: 6 IU/ML (ref 0–34)
TSH SERPL DL<=0.005 MIU/L-ACNC: 3.64 MIU/ML (ref 0.27–4.2)

## 2018-08-26 ENCOUNTER — HOSPITAL ENCOUNTER (OUTPATIENT)
Dept: ULTRASOUND IMAGING | Facility: HOSPITAL | Age: 53
Discharge: HOME | End: 2018-08-26
Admitting: INTERNAL MEDICINE

## 2018-08-26 PROCEDURE — 76536 US EXAM OF HEAD AND NECK: CPT

## 2018-08-30 ENCOUNTER — TELEPHONE (OUTPATIENT)
Dept: INTERNAL MEDICINE | Facility: CLINIC | Age: 53
End: 2018-08-30

## 2018-09-12 DIAGNOSIS — E78.5 HYPERLIPIDEMIA, UNSPECIFIED HYPERLIPIDEMIA TYPE: ICD-10-CM

## 2018-09-12 RX ORDER — ROSUVASTATIN CALCIUM 40 MG/1
TABLET, COATED ORAL
Qty: 30 TABLET | Refills: 0 | Status: SHIPPED | OUTPATIENT
Start: 2018-09-12 | End: 2018-11-07 | Stop reason: SDUPTHER

## 2018-09-13 RX ORDER — EZETIMIBE 10 MG/1
TABLET ORAL
Qty: 90 TABLET | Refills: 0 | Status: SHIPPED | OUTPATIENT
Start: 2018-09-13 | End: 2018-10-17 | Stop reason: SDUPTHER

## 2018-09-18 RX ORDER — AMLODIPINE BESYLATE AND BENAZEPRIL HYDROCHLORIDE 5; 20 MG/1; MG/1
CAPSULE ORAL
Qty: 30 CAPSULE | Refills: 2 | Status: SHIPPED | OUTPATIENT
Start: 2018-09-18 | End: 2018-12-07 | Stop reason: SDUPTHER

## 2018-09-19 RX ORDER — ESOMEPRAZOLE MAGNESIUM 40 MG/1
CAPSULE, DELAYED RELEASE ORAL
Qty: 30 CAPSULE | Refills: 9 | Status: SHIPPED | OUTPATIENT
Start: 2018-09-19 | End: 2019-08-05 | Stop reason: SDUPTHER

## 2018-09-25 DIAGNOSIS — E11.9 TYPE 2 DIABETES MELLITUS WITHOUT COMPLICATION, WITHOUT LONG-TERM CURRENT USE OF INSULIN (HCC): Chronic | ICD-10-CM

## 2018-09-26 RX ORDER — INSULIN GLARGINE 100 [IU]/ML
INJECTION, SOLUTION SUBCUTANEOUS
Qty: 30 ML | Refills: 2 | Status: SHIPPED | OUTPATIENT
Start: 2018-09-26 | End: 2019-01-04 | Stop reason: SDUPTHER

## 2018-09-27 DIAGNOSIS — E29.1 HYPOGONADISM MALE: Chronic | ICD-10-CM

## 2018-09-27 DIAGNOSIS — Z51.81 THERAPEUTIC DRUG MONITORING: ICD-10-CM

## 2018-10-02 LAB
TESTOST SERPL-MCNC: 203 NG/DL (ref 264–916)
TESTOSTERONE.FREE+WB MFR SERPL: 36.7 % (ref 9–46)
TESTOSTERONE.FREE+WB SERPL-MCNC: 74.5 NG/DL (ref 40–250)

## 2018-10-05 ENCOUNTER — OFFICE VISIT (OUTPATIENT)
Dept: INTERNAL MEDICINE | Facility: CLINIC | Age: 53
End: 2018-10-05

## 2018-10-05 VITALS
WEIGHT: 200.4 LBS | BODY MASS INDEX: 32.21 KG/M2 | HEART RATE: 90 BPM | DIASTOLIC BLOOD PRESSURE: 64 MMHG | HEIGHT: 66 IN | OXYGEN SATURATION: 98 % | SYSTOLIC BLOOD PRESSURE: 120 MMHG

## 2018-10-05 DIAGNOSIS — J45.40 MODERATE PERSISTENT ASTHMA WITHOUT COMPLICATION: Chronic | ICD-10-CM

## 2018-10-05 DIAGNOSIS — Z23 NEED FOR INFLUENZA VACCINATION: ICD-10-CM

## 2018-10-05 DIAGNOSIS — E11.9 TYPE 2 DIABETES MELLITUS WITHOUT COMPLICATION, WITHOUT LONG-TERM CURRENT USE OF INSULIN (HCC): Chronic | ICD-10-CM

## 2018-10-05 DIAGNOSIS — E04.1 THYROID NODULE: ICD-10-CM

## 2018-10-05 DIAGNOSIS — E29.1 HYPOGONADISM MALE: Chronic | ICD-10-CM

## 2018-10-05 DIAGNOSIS — R80.9 MICROALBUMINURIA: Chronic | ICD-10-CM

## 2018-10-05 DIAGNOSIS — E66.09 NON MORBID OBESITY DUE TO EXCESS CALORIES: Chronic | ICD-10-CM

## 2018-10-05 DIAGNOSIS — E03.8 SUBCLINICAL HYPOTHYROIDISM: Primary | ICD-10-CM

## 2018-10-05 DIAGNOSIS — Z91.09 MULTIPLE ENVIRONMENTAL ALLERGIES: Chronic | ICD-10-CM

## 2018-10-05 DIAGNOSIS — R63.5 ABNORMAL WEIGHT GAIN: ICD-10-CM

## 2018-10-05 DIAGNOSIS — I10 BENIGN ESSENTIAL HYPERTENSION: Chronic | ICD-10-CM

## 2018-10-05 PROCEDURE — 90471 IMMUNIZATION ADMIN: CPT | Performed by: INTERNAL MEDICINE

## 2018-10-05 PROCEDURE — 99214 OFFICE O/P EST MOD 30 MIN: CPT | Performed by: INTERNAL MEDICINE

## 2018-10-05 PROCEDURE — 90674 CCIIV4 VAC NO PRSV 0.5 ML IM: CPT | Performed by: INTERNAL MEDICINE

## 2018-10-05 NOTE — PROGRESS NOTES
10/05/2018    Patient Information  Marek Diego                                                                                          [address] [address] [facility] [address]      1965  [phone]      Chief Complaint:     Follow-up possible subclinical hypothyroidism, thyroid ultrasound, hypertension, hypogonadism, recent medication change, type 2 diabetes, environmental allergies and asthma, abnormal weight gain/nonmorbid obesity.  Patient complaining of worsening allergy symptoms    History of Present Illness:    Patient with a history of medical problems as outlined in chief complaint presents today to follow-up on several issues.  He was noted to have possible subclinical hypothyroidism repeat ordered repeat thyroid function tests which we will review today.  Also schedule him for a thyroid ultrasound which we will review.  Patient has symptomatic hypogonadism and we had to change his testosterone replacement therapy.  He had testosterone levels which we will review.  Also patient has had problems with abnormal weight gain/nonmorbid obesity as described below.  He is here today to follow-up on medical management of that.  Past medical history reviewed and updated where necessary including health maintenance parameters.  This reveals he is up-to-date or else accounted for other than he needs influenza vaccination which we can give today.    The history regarding medical management for weight loss/abnormal weight gain/obesity:    10/05/2018--patient seen in follow-up and current weight is down 7 pounds at 200 pounds.  Patient could not tolerate the Trokendi XR.  He seems to be tolerating the phentermine.  Continue current regimen and reassess in about 6 weeks.    08/17/2018--patient is a long history of problems with abnormal weight gain/obesity.  He has multiple comorbidities justify medical management.  Current weight is 207 pounds.  Phentermine one by mouth daily along with Trokendi XR  initiated.    Review of Systems   Constitution: Negative.   HENT: Negative.    Eyes: Negative.    Cardiovascular: Negative.    Respiratory: Negative.    Endocrine: Negative.    Hematologic/Lymphatic: Negative.    Skin: Negative.    Musculoskeletal: Negative.    Gastrointestinal: Negative.    Genitourinary: Negative.    Neurological: Negative.    Psychiatric/Behavioral: Negative.    Allergic/Immunologic: Positive for environmental allergies.       Active Problems:    Patient Active Problem List   Diagnosis   • Allergic rhinitis   • Benign essential hypertension   • Chronic neck pain   • Depression with anxiety   • Gastroesophageal reflux disease with esophagitis   • Hyperlipidemia   • Hypogonadism male, on TRT.   • Microalbuminuria   • Moderate persistent asthma   • Multiple environmental allergies   • Non morbid obesity   • Type 2 diabetes mellitus (CMS/[facility])   • Vitamin D deficiency   • Routine physical examination   • Therapeutic drug monitoring   • Right bundle branch block   • Diabetic eye exam (CMS/[facility])   • Diabetic foot exam   • Chronic constipation   • Snoring   • Nocturnal hypoxemia   • History of colon polyps, 11/29/2017--tubular adenoma times one.  Hyperplastic ×1.  Repeat 5 years.   • Subclinical hypothyroidism   • Thyroid nodule   • Abnormal weight gain         Past Medical History:   Diagnosis Date   • Allergic rhinitis 10/24/2013    10/24/2013--skin testing revealed impressive reactions to grass following, tree pollen, weed pollen, ragweed, dust mite, and cat. Recommend immunotherapy.   • Benign essential hypertension 2/22/2016   • Chronic constipation 11/20/2017 11/20/2017--patient reports approximately 8 month history of intermittent constipation that at times can last as much as a month.  He notes his blood sugar readings tend to increase when this happens.  He is scheduled for colonoscopy next week.  I recommend a generic probiotic daily as well as MiraLAX and adjust as needed.   • Chronic neck  pain 10/30/2015    12/19/2015--patient reports his left shoulder pain has resolved. He continues to have neck pain. X-ray of the cervical spine ordered. Physical therapy ordered.   11/10/2015--left shoulder injected with 80 mg of Depo-Medrol with 3 mL of Xylocaine.   10/30/2015--patient presents with at least a one-month history of intermittent left-sided neck pain that is associated with left shoulder pain as well. The pain is described as shooting and is 8 out of 10 intensity at its worst. The pain is worse with certain movements of his head and left shoulder. No trauma. No numbness or paresthesias.   • Depression with anxiety 2/22/2016   • Diabetic eye exam (CMS/[facility]) 5/27/2016    May 2016--patient reports a normal diabetic eye exam.   • Diabetic foot exam 4/27/2017 05/02/2017--routine diabetic foot exam reveals no evidence of diabetic foot ulcer or pre-ulcerative callus.  Pulses are palpable and there is no signs of ischemia.  Sensation subjectively intact.   • Gastroesophageal reflux disease with esophagitis 5/22/2015 08/12/2015--EGD revealed esophagitis and a distal esophageal stricture that was dilated. Small sliding hiatal hernia. Proximal gastric ulcer and gastritis present. Dysphagia resolved after dilatation. Pathology of the gastric antrum was benign with no significant histologic abnormality. Distal esophagus biopsy negative for intestinal metaplasia or dysplasia.   05/22/2015--patient presents with a several month history of progressively worsening intermittent dysphagia of solids but not liquids. He describes solid food sometimes sticking and points to his upper chest/lower throat. No other associated symptoms. Patient referred to Dr. Aime Parada for an EGD. This is negative, may need ENT evaluation.   • History of colon polyps, 11/29/2017--tubular adenoma times one.  Hyperplastic ×1.  Repeat 5 years. 12/18/2017 11/29/2017--colonoscopy revealed 2 small (3 mm) polyps in the sigmoid colon and  cecum.  Removed.  Bowel prep.  Sigmoid diverticuli noted.  No hemorrhoids.  Pathology returned hyperplastic polyp of the sigmoid and the cecal polyp was a tubular adenoma.   • HIstory of eosinophilic gastroenteritis 1990s    1990s--patient had significant epigastric discomfort and workup including an EGD revealed eosinophilic gastroenteritis that was treated with prednisone and resulted in resolution.   • History of esophageal stricture 08/12/2015 08/12/2015--EGD revealed esophagitis and a distal esophageal stricture that was dilated. Small sliding hiatal hernia. Proximal gastric ulcer and gastritis present. Dysphagia resolved after dilatation. Pathology of the gastric antrum was benign with no significant histologic abnormality. Distal esophagus biopsy negative for intestinal metaplasia or dysplasia.   05/22/2015--patient presents with a s   • HIstory of Pulmonary hypertension, 04/23/2014--resolved after PFO/ASD closure. 04/23/2014 04/23/2014--echocardiogram reveals normal left ventricular systolic function with ejection fraction 55%. Left ventricular wall motion is normal. The right ventricle is moderately to severely dilated. Right ventricular systolic function is mildly reduced. The right atrium is moderately dilated. Saline contrast study revealed no evidence of PFO/ASD. Status post PFO closure. There is trace mitral reg   • History of Pulmonary nodule, 03/07/2016--5 mm indeterminate nodule right lower lobe.  09/13/2016--consistent with calcified granuloma. 3/7/2016    09/13/2016--CT scan of the chest, abdomen, and pelvis with contrast reveals no lymphadenopathy within the abdomen or pelvis.  Mild hepatic steatosis.  Previously noted right lower lobe pulmonary nodule is currently calcified and consistent with a calcified granuloma.  03/07/2016--CT scan of the abdomen performed for complaints of abdominal discomfort revealed a 5 mm nodular focus right lower lobe which is indeterminate.  Recommend repeat CT  scan in 6 months.   • HIstory of repair of Congenital atrial septal defect 03/2012 March 2012--percutaneous closure of secundum ASD.   • Hyperlipidemia 2/22/2016   • Hypogonadism male, on TRT. 12/26/2012 12/26/2012--treatment for hypogonadism begun.   • Microalbuminuria 10/19/2014    10/19/2014--urine microalbumin mildly elevated at 27.8. Normal range 0.0--17.0.   • Moderate persistent asthma 2/22/2016    Seasonal, spring and fall.   • Multiple environmental allergies 10/24/2013    10/24/2013--skin testing revealed impressive reactions to grass following, tree pollen, weed pollen, ragweed, dust mite, and cat. Recommend immunotherapy.   • Non morbid obesity 2/22/2016   • Right bundle branch block     ECG reveals sinus rhythm, rate of 75, right bundle branch block, left anterior hemiblock   • Thyroid nodule 10/5/2018    08/27/2018--thyroid ultrasound reveals subcentimeter nodule in the right thyroid lobe.  Possibly cystic.  There is a question of an ill-defined 1 cm nodular lesion posteriorly in the mid left lateral thyroid.  Appearance could be technical in origin.  Recommend repeat thyroid ultrasound in 6 months.  08/17/2018--routine follow-up.  TSH mildly elevated at 4.49.  Free T3 and free T4 are normal.  Repeat thyroid function tests ordered and returned normal.  Thyroid ultrasound ordered.   • Type 2 diabetes mellitus (CMS/HCC) 1/1/2004    2004--initial diagnosis of type 2 diabetes.   • Vitamin D deficiency 2/22/2016         Past Surgical History:   Procedure Laterality Date   • ATRIAL SEPTAL DEFECT REPAIR  03/2012 March 2012--percutaneous closure of secundum ASD.  Dr. Mcpherson   • COLONOSCOPY N/A 11/29/2017 11/29/2017--colonoscopy revealed 2 small (3 mm) polyps in the sigmoid colon and cecum.  Removed.  Bowel prep.  Sigmoid diverticuli noted.  No hemorrhoids.  Pathology returned hyperplastic polyp of the sigmoid and the cecal polyp was a tubular adenoma.   • ESOPHAGEAL DILATATION  08/12/2015     08/12/2015--EGD revealed esophagitis and a distal esophageal stricture that was dilated. Small sliding hiatal hernia. Proximal gastric ulcer and gastritis present. Dysphagia resolved after dilatation. 05/22/2015--patient presents with a several month history of progressively worsening intermittent dysphagia of solids but not liquids. He describes solid food sometimes sticking and points to his upp   • ESOPHAGOSCOPY / EGD  08/12/2015 08/12/2015--EGD revealed esophagitis and a distal esophageal stricture that was dilated. Small sliding hiatal hernia. Proximal gastric ulcer and gastritis present. Dysphagia resolved after dilatation. 05/22/2015--patient presents with a several month history of progressively worsening intermittent dysphagia of solids but not liquids. He describes solid food sometimes sticking and points to his upp   • KNEE ACL RECONSTRUCTION Right 02/11/2013 02/11/2013--knee arthroscopy with anterior cruciate ligament repair   • TONSILLECTOMY  2009 2009--tonsillectomy as an adult.         Allergies   Allergen Reactions   • Latex Itching           Current Outpatient Prescriptions:   •  amLODIPine-benazepril (LOTREL 5-20) 5-20 MG per capsule, TAKE 1 CAPSULE BY MOUTH ONE TIME A DAY , Disp: 30 capsule, Rfl: 2  •  Canagliflozin (INVOKANA) 300 MG tablet, Take 300 mg by mouth Daily., Disp: 30 tablet, Rfl: 5  •  Chlorcyclizine-Pseudoephed (STAHIST AD) 25-60 MG tablet, 1 by mouth every 6 hours as needed for congestion and allergies, not greater than 3 in 24 hours, Disp: 60 tablet, Rfl: 2  •  Cholecalciferol (VITAMIN D3) 5000 UNITS capsule capsule, Take 1 capsule by mouth daily., Disp: , Rfl:   •  esomeprazole (nexIUM) 40 MG capsule, TAKE 1 CAPSULE BY MOUTH ONE TIME A DAY , Disp: 30 capsule, Rfl: 9  •  ezetimibe (ZETIA) 10 MG tablet, TAKE 1 TABLET BY MOUTH ONE TIME A DAY , Disp: 90 tablet, Rfl: 0  •  fluticasone-salmeterol (ADVAIR DISKUS) 250-50 MCG/DOSE DISKUS, Use inhaler twice daily as directed, Disp:  60 each, Rfl: 6  •  glimepiride (AMARYL) 4 MG tablet, Take 4 mg by mouth 2 (Two) Times a Day., Disp: , Rfl:   •  Insulin Glargine (BASAGLAR KWIKPEN) 100 UNIT/ML injection pen, inject 80 units subcutaneously once daily, Disp: 30 mL, Rfl: 2  •  Loratadine (CLARITIN PO), Take 1 capsule by mouth daily as needed (when necessary for allergies.)., Disp: , Rfl:   •  metFORMIN (GLUCOPHAGE) 1000 MG tablet, TAKE 1 TABLET BY MOUTH TWO TIMES A DAY WITH MEALS, Disp: 180 tablet, Rfl: 0  •  montelukast (SINGULAIR) 10 MG tablet, TAKE 1 TABLET BY MOUTH ONE TIME A DAY , Disp: 30 tablet, Rfl: 2  •  phentermine (ADIPEX-P) 37.5 MG tablet, Take 1 tablet by mouth Every Morning Before Breakfast., Disp: 30 tablet, Rfl: 1  •  polyethylene glycol (MIRALAX) packet, Take orally as directed daily for constipation, Disp: , Rfl:   •  Probiotic capsule, 1 by mouth daily, Disp: , Rfl:   •  rosuvastatin (CRESTOR) 40 MG tablet, TAKE 1 TABLET BY MOUTH AT BEDTIME , Disp: 30 tablet, Rfl: 0  •  sertraline (ZOLOFT) 50 MG tablet, TAKE 1 TABLET BY MOUTH ONE TIME A DAY , Disp: 30 tablet, Rfl: 0  •  testosterone (TESTIM) 50 MG/5GM (1%) gel gel, Apply 2 packs daily as directed for low testosterone, Disp: 300 g, Rfl: 5  •  TRULICITY 1.5 MG/0.5ML solution pen-injector, inject 1.5mg under the skin once weekly as directed, Disp: 2 mL, Rfl: 10  •  Topiramate ER (TROKENDI XR) 25 MG capsule sustained-release 24 hr, One by mouth daily as directed, Disp: 30 capsule, Rfl:       Family History   Problem Relation Age of Onset   • Diabetes Mother    • Stomach cancer Mother    • Diabetes Maternal Grandmother          Social History     Social History   • Marital status:      Spouse name: N/A   • Number of children: N/A   • Years of education: N/A     Occupational History   •  for [employer])      Social History Main Topics   • Smoking status: Never Smoker   • Smokeless tobacco: Never Used   • Alcohol use No   • Drug use: No   • Sexual  "activity: Yes     Partners: Female     Other Topics Concern   • Not on file     Social History Narrative   • No narrative on file         Vitals:    10/05/18 0732   BP: 120/64   BP Location: Right arm   Pulse: 90   SpO2: 98%   Weight: 90.9 kg (200 lb 6.4 oz)   Height: 167.6 cm (65.98\")          Physical Exam:    General: Alert and oriented x 3.  No acute distress. Obese. Normal affect.  HEENT: Pupils equal, round, reactive to light; extraocular movements intact; sclerae nonicteric; pharynx, ear canals and TMs normal.  Neck: Without JVD, thyromegaly, bruit, or adenopathy.  Lungs: Clear to auscultation in all fields.  Heart: Regular rate and rhythm without murmur, rub, gallop, or click.  Abdomen: Soft, nontender, without hepatosplenomegaly or hernia.  Bowel sounds normal.  : Deferred.  Rectal: Deferred.  Extremities: Without clubbing, cyanosis, edema, or pulse deficit.  Neurologic: Intact without focal deficit.  Normal station and gait observed during ingress and egress from the examination room.  Skin: Without significant lesion.  Musculoskeletal: Unremarkable.      Lab/other results:    Repeat thyroid function tests returned normal.  Thyroid ultrasound reviewed and this reveals a right thyroid nodule.total testosterone is low at 203.  However, free and weakly bound testosterone is normal at 74.5.    Assessment/Plan:     Diagnosis Plan   1. Subclinical hypothyroidism     2. Thyroid nodule     3. Benign essential hypertension     4. Hypogonadism male, on TRT.     5. Type 2 diabetes mellitus without complication, without long-term current use of insulin (CMS/[facility])     6. Multiple environmental allergies     7. Moderate persistent asthma without complication     8. Microalbuminuria     9. Non morbid obesity     10. Abnormal weight gain     11. Need for influenza vaccination  Flucelvax Quad=>4Years ([id number])     The diagnosis of subclinical hypothyroidism is questionable what we will continue to monitor.  However, he " has a thyroid nodule that needs to be reevaluated with a repeat ultrasound in the next 6-12 months.  Blood pressure seems controlled.  Patient has symptomatic hypogonadism and has fairly therapeutic testosterone levels after medication adjustment.  His type 2 diabetes is under poor control and therefore weight loss is important.  He is made some good progress with medical management for weight loss although he did not tolerate the Trokendi XR.  We will have to use phentermine alone.    Plan is as follows: Influenza vaccine given.  Continue phentermine.  Patient will follow-up in about 6 weeks to reassess the weight loss.        Procedures

## 2018-10-17 DIAGNOSIS — E78.5 HYPERLIPIDEMIA, UNSPECIFIED HYPERLIPIDEMIA TYPE: ICD-10-CM

## 2018-10-18 RX ORDER — EZETIMIBE 10 MG/1
TABLET ORAL
Qty: 90 TABLET | Refills: 1 | Status: SHIPPED | OUTPATIENT
Start: 2018-10-18 | End: 2019-04-06 | Stop reason: SDUPTHER

## 2018-10-24 DIAGNOSIS — E66.09 NON MORBID OBESITY DUE TO EXCESS CALORIES: Chronic | ICD-10-CM

## 2018-10-25 RX ORDER — PHENTERMINE HYDROCHLORIDE 37.5 MG/1
TABLET ORAL
Qty: 30 TABLET | Refills: 2 | OUTPATIENT
Start: 2018-10-25 | End: 2019-05-14 | Stop reason: SDUPTHER

## 2018-11-07 RX ORDER — ROSUVASTATIN CALCIUM 40 MG/1
TABLET, COATED ORAL
Qty: 30 TABLET | Refills: 5 | Status: SHIPPED | OUTPATIENT
Start: 2018-11-07 | End: 2020-01-20

## 2018-11-19 ENCOUNTER — OFFICE VISIT (OUTPATIENT)
Dept: INTERNAL MEDICINE | Facility: CLINIC | Age: 53
End: 2018-11-19

## 2018-11-19 VITALS
DIASTOLIC BLOOD PRESSURE: 68 MMHG | BODY MASS INDEX: 31.88 KG/M2 | OXYGEN SATURATION: 99 % | TEMPERATURE: 98.5 F | WEIGHT: 198.4 LBS | HEIGHT: 66 IN | HEART RATE: 68 BPM | SYSTOLIC BLOOD PRESSURE: 122 MMHG

## 2018-11-19 DIAGNOSIS — Z00.00 ROUTINE PHYSICAL EXAMINATION: ICD-10-CM

## 2018-11-19 DIAGNOSIS — E03.8 SUBCLINICAL HYPOTHYROIDISM: Chronic | ICD-10-CM

## 2018-11-19 DIAGNOSIS — Z91.09 MULTIPLE ENVIRONMENTAL ALLERGIES: Chronic | ICD-10-CM

## 2018-11-19 DIAGNOSIS — E78.49 OTHER HYPERLIPIDEMIA: Chronic | ICD-10-CM

## 2018-11-19 DIAGNOSIS — Z51.81 THERAPEUTIC DRUG MONITORING: ICD-10-CM

## 2018-11-19 DIAGNOSIS — E11.9 TYPE 2 DIABETES MELLITUS WITHOUT COMPLICATION, WITHOUT LONG-TERM CURRENT USE OF INSULIN (HCC): Chronic | ICD-10-CM

## 2018-11-19 DIAGNOSIS — R80.9 MICROALBUMINURIA: Chronic | ICD-10-CM

## 2018-11-19 DIAGNOSIS — E29.1 HYPOGONADISM MALE: Chronic | ICD-10-CM

## 2018-11-19 DIAGNOSIS — E66.09 NON MORBID OBESITY DUE TO EXCESS CALORIES: Chronic | ICD-10-CM

## 2018-11-19 DIAGNOSIS — J30.1 CHRONIC SEASONAL ALLERGIC RHINITIS DUE TO POLLEN: Chronic | ICD-10-CM

## 2018-11-19 DIAGNOSIS — G89.29 CHRONIC PAIN OF LEFT THUMB: ICD-10-CM

## 2018-11-19 DIAGNOSIS — M79.645 CHRONIC PAIN OF LEFT THUMB: ICD-10-CM

## 2018-11-19 DIAGNOSIS — E55.9 VITAMIN D DEFICIENCY: Chronic | ICD-10-CM

## 2018-11-19 DIAGNOSIS — R63.5 ABNORMAL WEIGHT GAIN: Primary | Chronic | ICD-10-CM

## 2018-11-19 PROBLEM — E04.1 THYROID NODULE: Chronic | Status: ACTIVE | Noted: 2018-10-05

## 2018-11-19 PROCEDURE — 99214 OFFICE O/P EST MOD 30 MIN: CPT | Performed by: INTERNAL MEDICINE

## 2018-11-19 NOTE — PROGRESS NOTES
11/19/2018    Patient Information  Marek Diego                                                                                          [id number] [address] [facility] [address]      1965  [unfilled]  There is no work phone number on file.    Chief Complaint:     Follow-up medical management for weight loss.  Complaining of chest congestion and worsening allergy symptoms.  Complaining of left thumb pain.    History of Present Illness:    Patient with a history of type 2 diabetes that has not been controlled.  He also has subclinical hypothyroidism, thyroid nodule, hypertension, hyperlipidemia, and symptomatic hypogonadism. He presents today to follow-up on medical management for weight loss as described below.  Patient has complaints of left thumb pain as described under that diagnosis.  Also patient is complaining of worsening allergy symptoms.  He reports he has not been using Advair regularly.  His wife reports he is wheezing some particularly at night.  He had allergy testing about 5 years ago and I explained to him I think he would benefit from reevaluation.  Past medical history reviewed and updated where necessary including health maintenance parameters.  This reveals he is up-to-date or else accounted for except for the new shingles vaccine which he can receive at the local drug store.    The history regarding abnormal weight gain/nonmorbid obesity:    11/19/2018--current weight is 198 pounds representing a 2 pound weight loss.  Encouraged patient to work harder.    10/05/2018--patient seen in follow-up and current weight is down 7 pounds at 200 pounds.  Patient could not tolerate the Trokendi XR.  He seems to be tolerating the phentermine.  Continue current regimen and reassess in about 6 weeks.    08/17/2018--patient is a long history of problems with abnormal weight gain/obesity.  He has multiple comorbidities justify medical management.  Current weight is 207 pounds.  Phentermine one  by mouth daily along with Trokendi XR initiated.    Review of Systems   Constitution: Positive for weight gain.   HENT: Positive for congestion.    Eyes: Negative.    Cardiovascular: Negative.    Respiratory: Positive for cough and wheezing.    Endocrine: Negative.    Hematologic/Lymphatic: Negative.    Skin: Negative.    Musculoskeletal: Negative.         Left thumb pain.  No history of trauma.   Gastrointestinal: Negative.    Genitourinary: Negative.    Neurological: Negative.    Psychiatric/Behavioral: Negative.    Allergic/Immunologic: Positive for environmental allergies.       Active Problems:    Patient Active Problem List   Diagnosis   • Allergic rhinitis   • Benign essential hypertension   • Chronic neck pain   • Depression with anxiety   • Gastroesophageal reflux disease with esophagitis   • Hyperlipidemia   • Hypogonadism male, on TRT.   • Microalbuminuria   • Moderate persistent asthma   • Multiple environmental allergies   • Non morbid obesity   • Type 2 diabetes mellitus (CMS/HCC)   • Vitamin D deficiency   • Routine physical examination   • Therapeutic drug monitoring   • Right bundle branch block   • Diabetic eye exam (CMS/HCC)   • Diabetic foot exam   • Chronic constipation   • Snoring   • Nocturnal hypoxemia   • History of colon polyps, 11/29/2017--tubular adenoma times one.  Hyperplastic ×1.  Repeat 5 years.   • Subclinical hypothyroidism   • Thyroid nodule   • Abnormal weight gain   • Chronic pain of left thumb         Past Medical History:   Diagnosis Date   • Allergic rhinitis 10/24/2013    10/24/2013--skin testing revealed impressive reactions to grass following, tree pollen, weed pollen, ragweed, dust mite, and cat. Recommend immunotherapy.   • Benign essential hypertension 2/22/2016   • Chronic constipation 11/20/2017 11/20/2017--patient reports approximately 8 month history of intermittent constipation that at times can last as much as a month.  He notes his blood sugar readings tend to  increase when this happens.  He is scheduled for colonoscopy next week.  I recommend a generic probiotic daily as well as MiraLAX and adjust as needed.   • Chronic neck pain 10/30/2015    12/19/2015--patient reports his left shoulder pain has resolved. He continues to have neck pain. X-ray of the cervical spine ordered. Physical therapy ordered.   11/10/2015--left shoulder injected with 80 mg of Depo-Medrol with 3 mL of Xylocaine.   10/30/2015--patient presents with at least a one-month history of intermittent left-sided neck pain that is associated with left shoulder pain as well. The pain is described as shooting and is 8 out of 10 intensity at its worst. The pain is worse with certain movements of his head and left shoulder. No trauma. No numbness or paresthesias.   • Depression with anxiety 2/22/2016   • Diabetic eye exam (CMS/[facility]) 5/27/2016    May 2016--patient reports a normal diabetic eye exam.   • Diabetic foot exam 4/27/2017 05/02/2017--routine diabetic foot exam reveals no evidence of diabetic foot ulcer or pre-ulcerative callus.  Pulses are palpable and there is no signs of ischemia.  Sensation subjectively intact.   • Gastroesophageal reflux disease with esophagitis 5/22/2015 08/12/2015--EGD revealed esophagitis and a distal esophageal stricture that was dilated. Small sliding hiatal hernia. Proximal gastric ulcer and gastritis present. Dysphagia resolved after dilatation. Pathology of the gastric antrum was benign with no significant histologic abnormality. Distal esophagus biopsy negative for intestinal metaplasia or dysplasia.   05/22/2015--patient presents with a several month history of progressively worsening intermittent dysphagia of solids but not liquids. He describes solid food sometimes sticking and points to his upper chest/lower throat. No other associated symptoms. Patient referred to Dr. Aime Parada for an EGD. This is negative, may need ENT evaluation.   • History of colon polyps,  11/29/2017--tubular adenoma times one.  Hyperplastic ×1.  Repeat 5 years. 12/18/2017 11/29/2017--colonoscopy revealed 2 small (3 mm) polyps in the sigmoid colon and cecum.  Removed.  Bowel prep.  Sigmoid diverticuli noted.  No hemorrhoids.  Pathology returned hyperplastic polyp of the sigmoid and the cecal polyp was a tubular adenoma.   • HIstory of eosinophilic gastroenteritis 1990s    1990s--patient had significant epigastric discomfort and workup including an EGD revealed eosinophilic gastroenteritis that was treated with prednisone and resulted in resolution.   • History of esophageal stricture 08/12/2015 08/12/2015--EGD revealed esophagitis and a distal esophageal stricture that was dilated. Small sliding hiatal hernia. Proximal gastric ulcer and gastritis present. Dysphagia resolved after dilatation. Pathology of the gastric antrum was benign with no significant histologic abnormality. Distal esophagus biopsy negative for intestinal metaplasia or dysplasia.   05/22/2015--patient presents with a s   • HIstory of Pulmonary hypertension, 04/23/2014--resolved after PFO/ASD closure. 04/23/2014 04/23/2014--echocardiogram reveals normal left ventricular systolic function with ejection fraction 55%. Left ventricular wall motion is normal. The right ventricle is moderately to severely dilated. Right ventricular systolic function is mildly reduced. The right atrium is moderately dilated. Saline contrast study revealed no evidence of PFO/ASD. Status post PFO closure. There is trace mitral reg   • History of Pulmonary nodule, 03/07/2016--5 mm indeterminate nodule right lower lobe.  09/13/2016--consistent with calcified granuloma. 3/7/2016    09/13/2016--CT scan of the chest, abdomen, and pelvis with contrast reveals no lymphadenopathy within the abdomen or pelvis.  Mild hepatic steatosis.  Previously noted right lower lobe pulmonary nodule is currently calcified and consistent with a calcified granuloma.   03/07/2016--CT scan of the abdomen performed for complaints of abdominal discomfort revealed a 5 mm nodular focus right lower lobe which is indeterminate.  Recommend repeat CT scan in 6 months.   • HIstory of repair of Congenital atrial septal defect 03/2012 March 2012--percutaneous closure of secundum ASD.   • Hyperlipidemia 2/22/2016   • Hypogonadism male, on TRT. 12/26/2012 12/26/2012--treatment for hypogonadism begun.   • Microalbuminuria 10/19/2014    10/19/2014--urine microalbumin mildly elevated at 27.8. Normal range 0.0--17.0.   • Moderate persistent asthma 2/22/2016    Seasonal, spring and fall.   • Multiple environmental allergies 10/24/2013    10/24/2013--skin testing revealed impressive reactions to grass following, tree pollen, weed pollen, ragweed, dust mite, and cat. Recommend immunotherapy.   • Non morbid obesity 2/22/2016   • Right bundle branch block     ECG reveals sinus rhythm, rate of 75, right bundle branch block, left anterior hemiblock   • Subclinical hypothyroidism 8/17/2018 08/27/2018--thyroid ultrasound reveals subcentimeter nodule in the right thyroid lobe.  Possibly cystic.  There is a question of an ill-defined 1 cm nodular lesion posteriorly in the mid left lateral thyroid.  Appearance could be technical in origin.  Recommend repeat thyroid ultrasound in 6 months.  08/17/2018--routine follow-up.  TSH mildly elevated at 4.49.  Free T3 and free T4 are normal.  Rep   • Thyroid nodule 10/5/2018    08/27/2018--thyroid ultrasound reveals subcentimeter nodule in the right thyroid lobe.  Possibly cystic.  There is a question of an ill-defined 1 cm nodular lesion posteriorly in the mid left lateral thyroid.  Appearance could be technical in origin.  Recommend repeat thyroid ultrasound in 6 months.  08/17/2018--routine follow-up.  TSH mildly elevated at 4.49.  Free T3 and free T4 are normal.  Repeat thyroid function tests ordered and returned normal.  Thyroid ultrasound ordered.   •  Type 2 diabetes mellitus (CMS/[facility]) 1/1/2004 2004--initial diagnosis of type 2 diabetes.   • Vitamin D deficiency 2/22/2016         Past Surgical History:   Procedure Laterality Date   • ATRIAL SEPTAL DEFECT REPAIR  03/2012 March 2012--percutaneous closure of secundum ASD.  Dr. Mcpherson   • ESOPHAGEAL DILATATION  08/12/2015 08/12/2015--EGD revealed esophagitis and a distal esophageal stricture that was dilated. Small sliding hiatal hernia. Proximal gastric ulcer and gastritis present. Dysphagia resolved after dilatation. 05/22/2015--patient presents with a several month history of progressively worsening intermittent dysphagia of solids but not liquids. He describes solid food sometimes sticking and points to his upp   • ESOPHAGOSCOPY / EGD  08/12/2015 08/12/2015--EGD revealed esophagitis and a distal esophageal stricture that was dilated. Small sliding hiatal hernia. Proximal gastric ulcer and gastritis present. Dysphagia resolved after dilatation. 05/22/2015--patient presents with a several month history of progressively worsening intermittent dysphagia of solids but not liquids. He describes solid food sometimes sticking and points to his upp   • KNEE ACL RECONSTRUCTION Right 02/11/2013 02/11/2013--knee arthroscopy with anterior cruciate ligament repair   • TONSILLECTOMY  2009 2009--tonsillectomy as an adult.         Allergies   Allergen Reactions   • Latex Itching           Current Outpatient Medications:   •  amLODIPine-benazepril (LOTREL 5-20) 5-20 MG per capsule, TAKE 1 CAPSULE BY MOUTH ONE TIME A DAY , Disp: 30 capsule, Rfl: 2  •  Canagliflozin (INVOKANA) 300 MG tablet, Take 300 mg by mouth Daily., Disp: 30 tablet, Rfl: 5  •  Chlorcyclizine-Pseudoephed (STAHIST AD) 25-60 MG tablet, 1 by mouth every 6 hours as needed for congestion and allergies, not greater than 3 in 24 hours, Disp: 60 tablet, Rfl: 2  •  Cholecalciferol (VITAMIN D3) 5000 UNITS capsule capsule, Take 1 capsule by mouth daily., Disp:  , Rfl:   •  esomeprazole (nexIUM) 40 MG capsule, TAKE 1 CAPSULE BY MOUTH ONE TIME A DAY , Disp: 30 capsule, Rfl: 9  •  ezetimibe (ZETIA) 10 MG tablet, TAKE 1 TABLET BY MOUTH ONE TIME A DAY , Disp: 90 tablet, Rfl: 1  •  fluticasone-salmeterol (ADVAIR DISKUS) 250-50 MCG/DOSE DISKUS, Use inhaler twice daily as directed, Disp: 60 each, Rfl: 6  •  glimepiride (AMARYL) 4 MG tablet, Take 4 mg by mouth 2 (Two) Times a Day., Disp: , Rfl:   •  Insulin Glargine (BASAGLAR KWIKPEN) 100 UNIT/ML injection pen, inject 80 units subcutaneously once daily, Disp: 30 mL, Rfl: 2  •  Loratadine (CLARITIN PO), Take 1 capsule by mouth daily as needed (when necessary for allergies.)., Disp: , Rfl:   •  metFORMIN (GLUCOPHAGE) 1000 MG tablet, TAKE 1 TABLET BY MOUTH TWO TIMES A DAY WITH MEALS, Disp: 180 tablet, Rfl: 0  •  montelukast (SINGULAIR) 10 MG tablet, TAKE 1 TABLET BY MOUTH ONE TIME A DAY , Disp: 30 tablet, Rfl: 2  •  phentermine (ADIPEX-P) 37.5 MG tablet, TAKE 1 TABLET BY MOUTH IN THE MORNING BEFORE BREAKFAST , Disp: 30 tablet, Rfl: 2  •  polyethylene glycol (MIRALAX) packet, Take orally as directed daily for constipation, Disp: , Rfl:   •  Probiotic capsule, 1 by mouth daily, Disp: , Rfl:   •  rosuvastatin (CRESTOR) 40 MG tablet, TAKE 1 TABLET BY MOUTH AT BEDTIME , Disp: 30 tablet, Rfl: 5  •  sertraline (ZOLOFT) 50 MG tablet, TAKE 1 TABLET BY MOUTH ONE TIME A DAY , Disp: 30 tablet, Rfl: 1  •  testosterone (TESTIM) 50 MG/5GM (1%) gel gel, Apply 2 packs daily as directed for low testosterone, Disp: 300 g, Rfl: 5  •  TRULICITY 1.5 MG/0.5ML solution pen-injector, inject 1.5mg under the skin once weekly as directed, Disp: 2 mL, Rfl: 10      Family History   Problem Relation Age of Onset   • Diabetes Mother    • Stomach cancer Mother    • Diabetes Maternal Grandmother          Social History     Socioeconomic History   • Marital status:      Spouse name: [place]    • Number of children: 2   • Years of education: 16   • Highest education  "level: Bachelor's degree (e.g., BA, AB, BS)   Social Needs   • Financial resource strain: Not hard at all   • Food insecurity - worry: Never true   • Food insecurity - inability: Never true   • Transportation needs - medical: No   • Transportation needs - non-medical: No   Occupational History   • Occupation:  for [employer]   Tobacco Use   • Smoking status: Never Smoker   • Smokeless tobacco: Never Used   Substance and Sexual Activity   • Alcohol use: No   • Drug use: No   • Sexual activity: Yes     Partners: Female   Other Topics Concern   • Not on file   Social History Narrative   • Not on file         Vitals:    11/19/18 0730   BP: 122/68   BP Location: Right arm   Pulse: 68   Temp: 98.5 °F (36.9 °C)   SpO2: 99%   Weight: 90 kg (198 lb 6.4 oz)   Height: 167.6 cm (65.98\")          Physical Exam:    General: Alert and oriented x 3.  No acute distress. Obese. Normal affect.  HEENT: Pupils equal, round, reactive to light; extraocular movements intact; sclerae nonicteric; pharynx, ear canals and TMs normal.  Neck: Without JVD, thyromegaly, bruit, or adenopathy.  Lungs: Clear to auscultation in all fields.  Heart: Regular rate and rhythm without murmur, rub, gallop, or click.  Abdomen: Soft, nontender, without hepatosplenomegaly or hernia.  Bowel sounds normal.  : Deferred.  Rectal: Deferred.  Extremities: Without clubbing, cyanosis, edema, or pulse deficit.  Neurologic: Intact without focal deficit.  Normal station and gait observed during ingress and egress from the examination room.  Skin: Without significant lesion.  Musculoskeletal: Unremarkable, except left thumb has definite clicking at the DIP joint.  No deformity.    Lab/other results:      Assessment/Plan:     Diagnosis Plan   1. Abnormal weight gain     2. Non morbid obesity     3. Allergic rhinitis     4. Multiple environmental allergies     5. Chronic pain of left thumb     6. Type 2 diabetes mellitus without " complication, without long-term current use of insulin (CMS/[facility])     7. Vitamin D deficiency     8. Hyperlipidemia     9. Hypogonadism male, on TRT.     10. Subclinical hypothyroidism     11. Routine physical examination     12. Therapeutic drug monitoring       Patient with multiple comorbidities including poorly controlled type 2 diabetes presents for follow-up for medical management for weight gain/nonmorbid obesity.  He continues to make slow but steady progress on the current regimen.  I do think that continued medical management is justified.  Patient also presents with worsening allergy symptoms.  Patient has had significant allergies for many many years and I think he would benefit from reevaluation by the environmental allergens.  His hyperlipidemia and hypogonadism as well as type 2 diabetes and needs to be reevaluated in the near future.  Patient is just about due for his annual physical.    Plan is as follows: Allergy referral given.  Hand surgery referral given.  We will schedule routine physical with lab in about one month.        Procedures

## 2018-12-07 DIAGNOSIS — B37.42 CANDIDAL BALANITIS: ICD-10-CM

## 2018-12-07 RX ORDER — FLUCONAZOLE 200 MG/1
TABLET ORAL
Qty: 7 TABLET | Refills: 0 | Status: SHIPPED | OUTPATIENT
Start: 2018-12-07 | End: 2018-12-31

## 2018-12-10 RX ORDER — AMLODIPINE BESYLATE AND BENAZEPRIL HYDROCHLORIDE 5; 20 MG/1; MG/1
CAPSULE ORAL
Qty: 30 CAPSULE | Refills: 1 | Status: SHIPPED | OUTPATIENT
Start: 2018-12-10 | End: 2019-02-05 | Stop reason: SDUPTHER

## 2018-12-10 RX ORDER — MONTELUKAST SODIUM 10 MG/1
TABLET ORAL
Qty: 30 TABLET | Refills: 1 | Status: SHIPPED | OUTPATIENT
Start: 2018-12-10 | End: 2019-02-05 | Stop reason: SDUPTHER

## 2018-12-10 RX ORDER — GLIMEPIRIDE 4 MG/1
TABLET ORAL
Qty: 60 TABLET | Refills: 1 | Status: SHIPPED | OUTPATIENT
Start: 2018-12-10 | End: 2019-01-10 | Stop reason: SDUPTHER

## 2018-12-21 DIAGNOSIS — E11.9 TYPE 2 DIABETES MELLITUS WITHOUT COMPLICATION, WITHOUT LONG-TERM CURRENT USE OF INSULIN (HCC): Chronic | ICD-10-CM

## 2018-12-21 DIAGNOSIS — E78.49 OTHER HYPERLIPIDEMIA: Chronic | ICD-10-CM

## 2018-12-21 DIAGNOSIS — R80.9 MICROALBUMINURIA: Chronic | ICD-10-CM

## 2018-12-21 DIAGNOSIS — E29.1 HYPOGONADISM MALE: Chronic | ICD-10-CM

## 2018-12-21 DIAGNOSIS — E03.8 SUBCLINICAL HYPOTHYROIDISM: Chronic | ICD-10-CM

## 2018-12-21 DIAGNOSIS — Z00.00 ROUTINE PHYSICAL EXAMINATION: ICD-10-CM

## 2018-12-27 LAB
25(OH)D3+25(OH)D2 SERPL-MCNC: 38.5 NG/ML (ref 30–100)
ALBUMIN SERPL-MCNC: 4 G/DL (ref 3.5–5.2)
ALBUMIN/CREAT UR: <3.2 MG/G CREAT (ref 0–30)
ALBUMIN/GLOB SERPL: 1.7 G/DL
ALP SERPL-CCNC: 83 U/L (ref 39–117)
ALT SERPL-CCNC: 31 U/L (ref 1–41)
AST SERPL-CCNC: 19 U/L (ref 1–40)
BILIRUB SERPL-MCNC: 0.2 MG/DL (ref 0.1–1.2)
BUN SERPL-MCNC: 25 MG/DL (ref 6–20)
BUN/CREAT SERPL: 19.8 (ref 7–25)
CALCIUM SERPL-MCNC: 9.4 MG/DL (ref 8.6–10.5)
CHLORIDE SERPL-SCNC: 102 MMOL/L (ref 98–107)
CHOLEST SERPL-MCNC: 148 MG/DL (ref 100–199)
CK SERPL-CCNC: 249 U/L (ref 20–200)
CO2 SERPL-SCNC: 23.8 MMOL/L (ref 22–29)
CREAT SERPL-MCNC: 1.26 MG/DL (ref 0.76–1.27)
CREAT UR-MCNC: 93 MG/DL
ERYTHROCYTE [DISTWIDTH] IN BLOOD BY AUTOMATED COUNT: 14.8 % (ref 11.5–14.5)
GLOBULIN SER CALC-MCNC: 2.3 GM/DL
GLUCOSE SERPL-MCNC: 150 MG/DL (ref 65–99)
HBA1C MFR BLD: 8.4 % (ref 4.8–5.6)
HCT VFR BLD AUTO: 42.8 % (ref 40.4–52.2)
HDL SERPL-SCNC: 23.6 UMOL/L
HDLC SERPL-MCNC: 31 MG/DL
HGB BLD-MCNC: 13.5 G/DL (ref 13.7–17.6)
LDL SERPL QN: 20 NM
LDL SERPL-SCNC: 1433 NMOL/L
LDL SMALL SERPL-SCNC: 1005 NMOL/L
LDLC SERPL CALC-MCNC: 81 MG/DL (ref 0–99)
MCH RBC QN AUTO: 28.8 PG (ref 27–32.7)
MCHC RBC AUTO-ENTMCNC: 31.5 G/DL (ref 32.6–36.4)
MCV RBC AUTO: 91.3 FL (ref 79.8–96.2)
MICROALBUMIN UR-MCNC: <3 UG/ML
PLATELET # BLD AUTO: 290 10*3/MM3 (ref 140–500)
POTASSIUM SERPL-SCNC: 4.6 MMOL/L (ref 3.5–5.2)
PROT SERPL-MCNC: 6.3 G/DL (ref 6–8.5)
PSA SERPL-MCNC: 0.31 NG/ML (ref 0–4)
RBC # BLD AUTO: 4.69 10*6/MM3 (ref 4.6–6)
SODIUM SERPL-SCNC: 141 MMOL/L (ref 136–145)
T3FREE SERPL-MCNC: 3 PG/ML (ref 2–4.4)
T4 FREE SERPL-MCNC: 1.25 NG/DL (ref 0.93–1.7)
TESTOST SERPL-MCNC: 206 NG/DL (ref 264–916)
TESTOSTERONE.FREE+WB MFR SERPL: 36.7 % (ref 9–46)
TESTOSTERONE.FREE+WB SERPL-MCNC: 75.6 NG/DL (ref 40–250)
TRIGL SERPL-MCNC: 180 MG/DL (ref 0–149)
TSH SERPL DL<=0.005 MIU/L-ACNC: 3.25 MIU/ML (ref 0.27–4.2)
WBC # BLD AUTO: 8.72 10*3/MM3 (ref 4.5–10.7)

## 2018-12-31 ENCOUNTER — OFFICE VISIT (OUTPATIENT)
Dept: INTERNAL MEDICINE | Facility: CLINIC | Age: 53
End: 2018-12-31

## 2018-12-31 VITALS
HEIGHT: 66 IN | WEIGHT: 194 LBS | DIASTOLIC BLOOD PRESSURE: 68 MMHG | OXYGEN SATURATION: 99 % | HEART RATE: 64 BPM | SYSTOLIC BLOOD PRESSURE: 130 MMHG | BODY MASS INDEX: 31.18 KG/M2

## 2018-12-31 DIAGNOSIS — Z86.010 HISTORY OF COLON POLYPS: Chronic | ICD-10-CM

## 2018-12-31 DIAGNOSIS — Z00.00 ROUTINE PHYSICAL EXAMINATION: Primary | ICD-10-CM

## 2018-12-31 DIAGNOSIS — E55.9 VITAMIN D DEFICIENCY: Chronic | ICD-10-CM

## 2018-12-31 DIAGNOSIS — Z91.09 MULTIPLE ENVIRONMENTAL ALLERGIES: Chronic | ICD-10-CM

## 2018-12-31 DIAGNOSIS — E11.9 TYPE 2 DIABETES MELLITUS WITHOUT COMPLICATION, WITHOUT LONG-TERM CURRENT USE OF INSULIN (HCC): Chronic | ICD-10-CM

## 2018-12-31 DIAGNOSIS — E29.1 HYPOGONADISM MALE: Chronic | ICD-10-CM

## 2018-12-31 DIAGNOSIS — J45.40 MODERATE PERSISTENT ASTHMA WITHOUT COMPLICATION: Chronic | ICD-10-CM

## 2018-12-31 DIAGNOSIS — R80.9 MICROALBUMINURIA: Chronic | ICD-10-CM

## 2018-12-31 DIAGNOSIS — I10 BENIGN ESSENTIAL HYPERTENSION: Chronic | ICD-10-CM

## 2018-12-31 DIAGNOSIS — K21.00 GASTROESOPHAGEAL REFLUX DISEASE WITH ESOPHAGITIS: Chronic | ICD-10-CM

## 2018-12-31 DIAGNOSIS — E11.9 ENCOUNTER FOR DIABETIC FOOT EXAM (HCC): Chronic | ICD-10-CM

## 2018-12-31 DIAGNOSIS — E03.8 SUBCLINICAL HYPOTHYROIDISM: Chronic | ICD-10-CM

## 2018-12-31 DIAGNOSIS — E04.1 THYROID NODULE: Chronic | ICD-10-CM

## 2018-12-31 DIAGNOSIS — E78.49 OTHER HYPERLIPIDEMIA: Chronic | ICD-10-CM

## 2018-12-31 PROBLEM — G89.29 CHRONIC PAIN OF LEFT THUMB: Status: RESOLVED | Noted: 2018-11-19 | Resolved: 2018-12-31

## 2018-12-31 PROBLEM — M79.645 CHRONIC PAIN OF LEFT THUMB: Status: RESOLVED | Noted: 2018-11-19 | Resolved: 2018-12-31

## 2018-12-31 PROCEDURE — 99396 PREV VISIT EST AGE 40-64: CPT | Performed by: INTERNAL MEDICINE

## 2018-12-31 NOTE — PROGRESS NOTES
12/31/2018    Patient Information  Marek Diego                                                                                          [id number] [address] [facility] [address]      1965  [unfilled]  There is no work phone number on file.    Chief Complaint:     Routine annual physical and follow-up lab work.    History of Present Illness:    Patient with a history of type 2 diabetes, hyperlipidemia, hypogonadism, microalbuminuria, esophageal reflux, hypertension, asthma and allergy, vitamin D deficiency, history of colon polyps and subclinical hypothyroidism with thyroid nodule.  He presents today for his routine annual exam and follow-up lab work.  Patient reports he is doing fairly well and has no new acute complaints.  His past medical history reviewed and updated where necessary including health maintenance parameters.  This reveals he is up-to-date or else accounted.    Review of Systems   Constitution: Negative.   HENT: Negative.    Eyes: Negative.    Cardiovascular: Negative.    Respiratory: Negative.    Endocrine: Negative.    Hematologic/Lymphatic: Negative.    Skin: Negative.    Musculoskeletal: Negative.    Gastrointestinal: Negative.    Genitourinary: Negative.    Neurological: Negative.    Psychiatric/Behavioral: Negative.    Allergic/Immunologic: Negative.        Active Problems:    Patient Active Problem List   Diagnosis   • Allergic rhinitis   • Benign essential hypertension   • Chronic neck pain   • Depression with anxiety   • Gastroesophageal reflux disease with esophagitis   • Hyperlipidemia   • Hypogonadism male, on TRT.   • Microalbuminuria   • Moderate persistent asthma   • Multiple environmental allergies   • Non morbid obesity   • Type 2 diabetes mellitus (CMS/HCC)   • Vitamin D deficiency   • Routine physical examination   • Therapeutic drug monitoring   • Right bundle branch block   • Diabetic eye exam (CMS/HCC)   • Diabetic foot exam   • Chronic constipation   •  Snoring   • Nocturnal hypoxemia   • History of colon polyps, 11/29/2017--tubular adenoma times one.  Hyperplastic ×1.  Repeat 5 years.   • Subclinical hypothyroidism   • Thyroid nodule   • Abnormal weight gain         Past Medical History:   Diagnosis Date   • Allergic rhinitis 10/24/2013    10/24/2013--skin testing revealed impressive reactions to grass following, tree pollen, weed pollen, ragweed, dust mite, and cat. Recommend immunotherapy.   • Benign essential hypertension 2/22/2016   • Chronic constipation 11/20/2017 11/20/2017--patient reports approximately 8 month history of intermittent constipation that at times can last as much as a month.  He notes his blood sugar readings tend to increase when this happens.  He is scheduled for colonoscopy next week.  I recommend a generic probiotic daily as well as MiraLAX and adjust as needed.   • Chronic neck pain 10/30/2015    12/19/2015--patient reports his left shoulder pain has resolved. He continues to have neck pain. X-ray of the cervical spine ordered. Physical therapy ordered.   11/10/2015--left shoulder injected with 80 mg of Depo-Medrol with 3 mL of Xylocaine.   10/30/2015--patient presents with at least a one-month history of intermittent left-sided neck pain that is associated with left shoulder pain as well. The pain is described as shooting and is 8 out of 10 intensity at its worst. The pain is worse with certain movements of his head and left shoulder. No trauma. No numbness or paresthesias.   • Depression with anxiety 2/22/2016   • Diabetic eye exam (CMS/[facility]) 5/27/2016    May 2016--patient reports a normal diabetic eye exam.   • Diabetic foot exam 4/27/2017 05/02/2017--routine diabetic foot exam reveals no evidence of diabetic foot ulcer or pre-ulcerative callus.  Pulses are palpable and there is no signs of ischemia.  Sensation subjectively intact.   • Gastroesophageal reflux disease with esophagitis 5/22/2015 08/12/2015--EGD revealed  esophagitis and a distal esophageal stricture that was dilated. Small sliding hiatal hernia. Proximal gastric ulcer and gastritis present. Dysphagia resolved after dilatation. Pathology of the gastric antrum was benign with no significant histologic abnormality. Distal esophagus biopsy negative for intestinal metaplasia or dysplasia.   05/22/2015--patient presents with a several month history of progressively worsening intermittent dysphagia of solids but not liquids. He describes solid food sometimes sticking and points to his upper chest/lower throat. No other associated symptoms. Patient referred to Dr. Aime Parada for an EGD. This is negative, may need ENT evaluation.   • History of colon polyps, 11/29/2017--tubular adenoma times one.  Hyperplastic ×1.  Repeat 5 years. 12/18/2017 11/29/2017--colonoscopy revealed 2 small (3 mm) polyps in the sigmoid colon and cecum.  Removed.  Bowel prep.  Sigmoid diverticuli noted.  No hemorrhoids.  Pathology returned hyperplastic polyp of the sigmoid and the cecal polyp was a tubular adenoma.   • HIstory of eosinophilic gastroenteritis 1990s    1990s--patient had significant epigastric discomfort and workup including an EGD revealed eosinophilic gastroenteritis that was treated with prednisone and resulted in resolution.   • History of esophageal stricture 08/12/2015 08/12/2015--EGD revealed esophagitis and a distal esophageal stricture that was dilated. Small sliding hiatal hernia. Proximal gastric ulcer and gastritis present. Dysphagia resolved after dilatation. Pathology of the gastric antrum was benign with no significant histologic abnormality. Distal esophagus biopsy negative for intestinal metaplasia or dysplasia.   05/22/2015--patient presents with a s   • HIstory of Pulmonary hypertension, 04/23/2014--resolved after PFO/ASD closure. 04/23/2014 04/23/2014--echocardiogram reveals normal left ventricular systolic function with ejection fraction 55%. Left  ventricular wall motion is normal. The right ventricle is moderately to severely dilated. Right ventricular systolic function is mildly reduced. The right atrium is moderately dilated. Saline contrast study revealed no evidence of PFO/ASD. Status post PFO closure. There is trace mitral reg   • History of Pulmonary nodule, 03/07/2016--5 mm indeterminate nodule right lower lobe.  09/13/2016--consistent with calcified granuloma. 3/7/2016    09/13/2016--CT scan of the chest, abdomen, and pelvis with contrast reveals no lymphadenopathy within the abdomen or pelvis.  Mild hepatic steatosis.  Previously noted right lower lobe pulmonary nodule is currently calcified and consistent with a calcified granuloma.  03/07/2016--CT scan of the abdomen performed for complaints of abdominal discomfort revealed a 5 mm nodular focus right lower lobe which is indeterminate.  Recommend repeat CT scan in 6 months.   • HIstory of repair of Congenital atrial septal defect 03/2012 March 2012--percutaneous closure of secundum ASD.   • Hyperlipidemia 2/22/2016   • Hypogonadism male, on TRT. 12/26/2012 12/26/2012--treatment for hypogonadism begun.   • Microalbuminuria 10/19/2014    10/19/2014--urine microalbumin mildly elevated at 27.8. Normal range 0.0--17.0.   • Moderate persistent asthma 2/22/2016    Seasonal, spring and fall.   • Multiple environmental allergies 10/24/2013    10/24/2013--skin testing revealed impressive reactions to grass following, tree pollen, weed pollen, ragweed, dust mite, and cat. Recommend immunotherapy.   • Non morbid obesity 2/22/2016   • Right bundle branch block     ECG reveals sinus rhythm, rate of 75, right bundle branch block, left anterior hemiblock   • Subclinical hypothyroidism 8/17/2018 08/27/2018--thyroid ultrasound reveals subcentimeter nodule in the right thyroid lobe.  Possibly cystic.  There is a question of an ill-defined 1 cm nodular lesion posteriorly in the mid left lateral thyroid.   Appearance could be technical in origin.  Recommend repeat thyroid ultrasound in 6 months.  08/17/2018--routine follow-up.  TSH mildly elevated at 4.49.  Free T3 and free T4 are normal.  Rep   • Thyroid nodule 10/5/2018    08/27/2018--thyroid ultrasound reveals subcentimeter nodule in the right thyroid lobe.  Possibly cystic.  There is a question of an ill-defined 1 cm nodular lesion posteriorly in the mid left lateral thyroid.  Appearance could be technical in origin.  Recommend repeat thyroid ultrasound in 6 months.  08/17/2018--routine follow-up.  TSH mildly elevated at 4.49.  Free T3 and free T4 are normal.  Repeat thyroid function tests ordered and returned normal.  Thyroid ultrasound ordered.   • Type 2 diabetes mellitus (CMS/HCC) 1/1/2004 2004--initial diagnosis of type 2 diabetes.   • Vitamin D deficiency 2/22/2016         Past Surgical History:   Procedure Laterality Date   • ATRIAL SEPTAL DEFECT REPAIR  03/2012 March 2012--percutaneous closure of secundum ASD.  Dr. Mcpherson   • COLONOSCOPY N/A 11/29/2017 11/29/2017--colonoscopy revealed 2 small (3 mm) polyps in the sigmoid colon and cecum.  Removed.  Bowel prep.  Sigmoid diverticuli noted.  No hemorrhoids.  Pathology returned hyperplastic polyp of the sigmoid and the cecal polyp was a tubular adenoma.   • ESOPHAGEAL DILATATION  08/12/2015 08/12/2015--EGD revealed esophagitis and a distal esophageal stricture that was dilated. Small sliding hiatal hernia. Proximal gastric ulcer and gastritis present. Dysphagia resolved after dilatation. 05/22/2015--patient presents with a several month history of progressively worsening intermittent dysphagia of solids but not liquids. He describes solid food sometimes sticking and points to his upp   • ESOPHAGOSCOPY / EGD  08/12/2015 08/12/2015--EGD revealed esophagitis and a distal esophageal stricture that was dilated. Small sliding hiatal hernia. Proximal gastric ulcer and gastritis present. Dysphagia resolved  after dilatation. 05/22/2015--patient presents with a several month history of progressively worsening intermittent dysphagia of solids but not liquids. He describes solid food sometimes sticking and points to his upp   • KNEE ACL RECONSTRUCTION Right 02/11/2013 02/11/2013--knee arthroscopy with anterior cruciate ligament repair   • TONSILLECTOMY  2009 2009--tonsillectomy as an adult.         Allergies   Allergen Reactions   • Latex Itching           Current Outpatient Medications:   •  amLODIPine-benazepril (LOTREL 5-20) 5-20 MG per capsule, TAKE 1 CAPSULE BY MOUTH ONE TIME A DAY , Disp: 30 capsule, Rfl: 1  •  Canagliflozin (INVOKANA) 300 MG tablet, Take 300 mg by mouth Daily., Disp: 30 tablet, Rfl: 5  •  Chlorcyclizine-Pseudoephed (STAHIST AD) 25-60 MG tablet, 1 by mouth every 6 hours as needed for congestion and allergies, not greater than 3 in 24 hours, Disp: 60 tablet, Rfl: 2  •  Cholecalciferol (VITAMIN D3) 5000 UNITS capsule capsule, Take 1 capsule by mouth daily., Disp: , Rfl:   •  esomeprazole (nexIUM) 40 MG capsule, TAKE 1 CAPSULE BY MOUTH ONE TIME A DAY , Disp: 30 capsule, Rfl: 9  •  ezetimibe (ZETIA) 10 MG tablet, TAKE 1 TABLET BY MOUTH ONE TIME A DAY , Disp: 90 tablet, Rfl: 1  •  fluconazole (DIFLUCAN) 200 MG tablet, TAKE 1 TABLET BY MOUTH ONE TIME A DAY UNTIL GONE, Disp: 7 tablet, Rfl: 0  •  fluticasone-salmeterol (ADVAIR DISKUS) 250-50 MCG/DOSE DISKUS, Use inhaler twice daily as directed, Disp: 60 each, Rfl: 6  •  glimepiride (AMARYL) 4 MG tablet, take 1/2 tablet by mouth in the morning. then take 1 and 1/2 tablet by mouth in the evening , Disp: 60 tablet, Rfl: 1  •  Insulin Glargine (BASAGLAR KWIKPEN) 100 UNIT/ML injection pen, inject 80 units subcutaneously once daily, Disp: 30 mL, Rfl: 2  •  Loratadine (CLARITIN PO), Take 1 capsule by mouth daily as needed (when necessary for allergies.)., Disp: , Rfl:   •  metFORMIN (GLUCOPHAGE) 1000 MG tablet, TAKE 1 TABLET BY MOUTH TWO TIMES A DAY WITH  MEALS, Disp: 180 tablet, Rfl: 1  •  montelukast (SINGULAIR) 10 MG tablet, TAKE 1 TABLET BY MOUTH ONE TIME A DAY , Disp: 30 tablet, Rfl: 1  •  phentermine (ADIPEX-P) 37.5 MG tablet, TAKE 1 TABLET BY MOUTH IN THE MORNING BEFORE BREAKFAST , Disp: 30 tablet, Rfl: 2  •  polyethylene glycol (MIRALAX) packet, Take orally as directed daily for constipation, Disp: , Rfl:   •  Probiotic capsule, 1 by mouth daily, Disp: , Rfl:   •  rosuvastatin (CRESTOR) 40 MG tablet, TAKE 1 TABLET BY MOUTH AT BEDTIME , Disp: 30 tablet, Rfl: 5  •  sertraline (ZOLOFT) 50 MG tablet, TAKE 1 TABLET BY MOUTH ONE TIME A DAY , Disp: 30 tablet, Rfl: 1  •  testosterone (TESTIM) 50 MG/5GM (1%) gel gel, Apply 2 packs daily as directed for low testosterone, Disp: 300 g, Rfl: 5  •  TRULICITY 1.5 MG/0.5ML solution pen-injector, inject 1.5mg under the skin once weekly as directed, Disp: 2 mL, Rfl: 10      Family History   Problem Relation Age of Onset   • Diabetes Mother    • Stomach cancer Mother    • Diabetes Maternal Grandmother          Social History     Socioeconomic History   • Marital status:      Spouse name: Luis    • Number of children: 2   • Years of education: 16   • Highest education level: Bachelor's degree (e.g., BA, AB, BS)   Social Needs   • Financial resource strain: Not hard at all   • Food insecurity - worry: Never true   • Food insecurity - inability: Never true   • Transportation needs - medical: No   • Transportation needs - non-medical: No   Occupational History   • 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  HEENT: pupils equal, round, and reactive to light; extraocular movements intact; sclera nonicteric; nasal mucosa normal; pharynx normal; tympanic membranes and ear canals normal.  Neck: without JVD, thyromegaly, bruit, or adenopathy.  Lungs: clear to auscultation in all fields.  Heart: auscultation reveals regular rate and rhythm without murmur, rub, gallop, or click.  Abdomen: is soft and nontender, without hepato-splenomegaly, mass or hernia. Normal bowel sounds; .  Urologic exam: reveals normal male genitalia without testicular mass or penile/scrotal lesion.  Digital rectal exam and Prostate: deferred.  Extremities: are without clubbing, cyanosis, or edema.  Vascular: no signs of peripheral arterial disease or venous insufficiency/varicosities.  Neurological: intact without focal deficit, including cranial and peripheral nerves.  Station and gait observed to be normal during ingress and egress from the examination area.  Sensation and deep tendon reflexes tested if clinically indicated and are normal.  Musculoskeletal: exam is normal, without signs of synovitis, significant degeneration or deformity. Skin examination: without rash or significant lesions.    12/31/2018--routine diabetic foot exam reveals no evidence of diabetic foot ulcer or pre-ulcerative callus.  Pulses palpable.  Sensation subjectively intact.    Lab/other results:    NMR reveals total cholesterol 148.  Triglycerides elevated at 180.  LDL particle number elevated at 1004 and 33.  Small LDL particle number elevated 1005.  HDL particle number low at 23.6.  CMP normal except blood sugar 150.  CBC essentially normal.  Albumin/creatinine ratio normal.  Total testosterone is low at 206 but free and weakly bound testosterone is normal at 75.6.  Hemoglobin A1c 8.4.  Thyroid function tests normal.  PSA normal at 0.308.  " Vitamin D normal.  CPK slightly elevated at 249.    Assessment/Plan:     Diagnosis Plan   1. Routine physical examination     2. Type 2 diabetes mellitus without complication, without long-term current use of insulin (CMS/[facility])     3. Hyperlipidemia     4. Hypogonadism male, on TRT.     5. Microalbuminuria     6. Gastroesophageal reflux disease with esophagitis     7. Benign essential hypertension     8. Moderate persistent asthma without complication     9. Multiple environmental allergies     10. Vitamin D deficiency     11. History of colon polyps, 11/29/2017--tubular adenoma times one.  Hyperplastic ×1.  Repeat 5 years.     12. Diabetic foot exam     13. Subclinical hypothyroidism     14. Thyroid nodule       Patient presents with essentially normal annual physical except for the following issues: He has type 2 diabetes is under better control than it has been previously.  Encouraged patient to continue to work on diet, lifestyle changes, exercise, and weight loss.  Hyperlipidemia is under the best control we can get it.  Patient has symptomatic hypogonadism and has not been using testosterone replacement therapy.  His levels are borderline and I think this is fine.  Microalbuminuria well controlled.  Reflux symptoms are controlled.  His blood pressure looks good.  His asthma and allergy is stable.  Patient has a history of colon polyps and had a colonoscopy about one year ago which revealed one tubular adenoma.  Repeat 5 years.  Subclinical hypothyroidism is being monitored.    Several preventative health issues discussed including review of vaccinations and recommendations, including dietary issues, exercise and weight loss.  Safe sex practices discussed.  Patient advised to wear seatbelt whenever driving and avoid texting and driving.  Also advised to look both ways before crossing the street.  Colon cancer prevention discussed and is up-to-date with colonoscopy.  Advised to avoid tobacco products and minimize  alcohol consumption.    Plan is as follows: No change in current medical regimen.  Continue to work on diet, weight loss, and exercise.  Patient will follow-up in 6 months with lab prior or follow-up as needed.    Procedures

## 2019-01-04 DIAGNOSIS — E11.9 TYPE 2 DIABETES MELLITUS WITHOUT COMPLICATION, WITHOUT LONG-TERM CURRENT USE OF INSULIN (HCC): Chronic | ICD-10-CM

## 2019-01-07 RX ORDER — INSULIN GLARGINE 100 [IU]/ML
INJECTION, SOLUTION SUBCUTANEOUS
Qty: 30 ML | Refills: 5 | Status: SHIPPED | OUTPATIENT
Start: 2019-01-07 | End: 2019-08-09 | Stop reason: SDUPTHER

## 2019-01-10 DIAGNOSIS — E11.9 TYPE 2 DIABETES MELLITUS WITHOUT COMPLICATION, WITHOUT LONG-TERM CURRENT USE OF INSULIN (HCC): ICD-10-CM

## 2019-01-10 RX ORDER — GLIMEPIRIDE 4 MG/1
TABLET ORAL
Qty: 60 TABLET | Refills: 5 | Status: SHIPPED | OUTPATIENT
Start: 2019-01-10 | End: 2019-04-12 | Stop reason: DRUGHIGH

## 2019-01-10 RX ORDER — DULAGLUTIDE 1.5 MG/.5ML
INJECTION, SOLUTION SUBCUTANEOUS
Qty: 2 ML | Refills: 9 | Status: SHIPPED | OUTPATIENT
Start: 2019-01-10 | End: 2020-01-28

## 2019-02-05 RX ORDER — MONTELUKAST SODIUM 10 MG/1
TABLET ORAL
Qty: 30 TABLET | Refills: 4 | Status: SHIPPED | OUTPATIENT
Start: 2019-02-05 | End: 2019-07-06 | Stop reason: SDUPTHER

## 2019-02-05 RX ORDER — AMLODIPINE BESYLATE AND BENAZEPRIL HYDROCHLORIDE 5; 20 MG/1; MG/1
CAPSULE ORAL
Qty: 30 CAPSULE | Refills: 4 | Status: SHIPPED | OUTPATIENT
Start: 2019-02-05 | End: 2020-02-20

## 2019-02-06 DIAGNOSIS — E66.09 NON MORBID OBESITY DUE TO EXCESS CALORIES: Chronic | ICD-10-CM

## 2019-02-06 DIAGNOSIS — B37.42 CANDIDAL BALANITIS: ICD-10-CM

## 2019-02-07 RX ORDER — FLUCONAZOLE 200 MG/1
TABLET ORAL
Qty: 7 TABLET | Refills: 0 | Status: SHIPPED | OUTPATIENT
Start: 2019-02-07 | End: 2019-06-26 | Stop reason: SDUPTHER

## 2019-02-07 RX ORDER — PHENTERMINE HYDROCHLORIDE 37.5 MG/1
TABLET ORAL
Qty: 30 TABLET | Refills: 2 | OUTPATIENT
Start: 2019-02-07

## 2019-03-11 ENCOUNTER — HOSPITAL ENCOUNTER (OUTPATIENT)
Dept: GENERAL RADIOLOGY | Facility: HOSPITAL | Age: 54
Discharge: HOME | End: 2019-03-11
Admitting: INTERNAL MEDICINE

## 2019-03-11 ENCOUNTER — OFFICE VISIT (OUTPATIENT)
Dept: INTERNAL MEDICINE | Facility: CLINIC | Age: 54
End: 2019-03-11

## 2019-03-11 VITALS
DIASTOLIC BLOOD PRESSURE: 84 MMHG | BODY MASS INDEX: 31.82 KG/M2 | WEIGHT: 198 LBS | SYSTOLIC BLOOD PRESSURE: 144 MMHG | HEART RATE: 90 BPM | OXYGEN SATURATION: 98 % | HEIGHT: 66 IN

## 2019-03-11 DIAGNOSIS — J30.1 CHRONIC SEASONAL ALLERGIC RHINITIS DUE TO POLLEN: Chronic | ICD-10-CM

## 2019-03-11 DIAGNOSIS — I10 BENIGN ESSENTIAL HYPERTENSION: Chronic | ICD-10-CM

## 2019-03-11 DIAGNOSIS — R05.3 CHRONIC COUGH: Primary | ICD-10-CM

## 2019-03-11 DIAGNOSIS — Z91.09 MULTIPLE ENVIRONMENTAL ALLERGIES: Chronic | ICD-10-CM

## 2019-03-11 DIAGNOSIS — J45.40 MODERATE PERSISTENT ASTHMA WITHOUT COMPLICATION: Chronic | ICD-10-CM

## 2019-03-11 PROCEDURE — 99214 OFFICE O/P EST MOD 30 MIN: CPT | Performed by: INTERNAL MEDICINE

## 2019-03-11 PROCEDURE — 71046 X-RAY EXAM CHEST 2 VIEWS: CPT

## 2019-03-11 NOTE — PROGRESS NOTES
03/11/2019    Patient Information  Marek Diego                                                                                          [id number] [facility] [address]      1965  [unfilled]  There is no work phone number on file.    Chief Complaint:     Complaining of cough for more than a month.    History of Present Illness:    Patient with a history of multiple medical problems including type 2 diabetes, environmental allergies/allergic rhinitis and moderate persistent asthma, hyperlipidemia, esophageal reflux, microalbuminuria.  He presents today with complaints of a chronic dry cough as described below.  Past medical history reviewed and updated were necessary including health maintenance parameters.  This reveals he is up-to-date with exception of diabetic eye exam which I encouraged him to do so.    The history regarding chronic cough:    Patient with history of environmental allergies/asthma presents with complaints of a cough for at least a month that began after cold-like symptoms.  The cough is dry.  He will suddenly have attacks of coughing to the point that he can barely stop.  No other symptoms associated with this.  No fever, chills, or other systemic signs or symptoms.  No shortness of breath.  Patient currently uses Advair but has not used it in the past couple of days.  Patient does have a history of reflux and takes Nexium for this and basically has no symptoms.  His lungs are clear today.  No coughing noted at this time.  I do think the cough is related to his asthma/allergies and I would like to make a change in his inhaler.  We will discontinue the Advair and start Anoro Ellipta daily.  I will have patient follow-up in about 4 weeks and we can reassess of medical management for weight loss at that time.    Review of Systems   Constitution: Negative.   HENT: Negative.    Eyes: Negative.    Cardiovascular: Negative.    Respiratory: Positive for cough. Negative for  shortness of breath, sputum production and wheezing.    Endocrine: Negative.    Hematologic/Lymphatic: Negative.    Skin: Negative.    Musculoskeletal: Negative.    Gastrointestinal: Negative.    Genitourinary: Negative.    Neurological: Negative.    Psychiatric/Behavioral: Negative.    Allergic/Immunologic: Negative.        Active Problems:    Patient Active Problem List   Diagnosis   • Allergic rhinitis   • Benign essential hypertension   • Chronic neck pain   • Depression with anxiety   • Gastroesophageal reflux disease with esophagitis   • Hyperlipidemia   • Hypogonadism male, no TRT.   • Microalbuminuria   • Moderate persistent asthma   • Multiple environmental allergies   • Non morbid obesity   • Type 2 diabetes mellitus (CMS/[facility])   • Vitamin D deficiency   • Routine physical examination   • Therapeutic drug monitoring   • Right bundle branch block   • Diabetic eye exam (CMS/[facility])   • Diabetic foot exam   • Chronic constipation   • Snoring   • Nocturnal hypoxemia   • History of colon polyps, 11/29/2017--tubular adenoma times one.  Hyperplastic ×1.  Repeat 5 years.   • Subclinical hypothyroidism   • Thyroid nodule   • Abnormal weight gain   • Chronic cough         Past Medical History:   Diagnosis Date   • Allergic rhinitis 10/24/2013    10/24/2013--skin testing revealed impressive reactions to grass following, tree pollen, weed pollen, ragweed, dust mite, and cat. Recommend immunotherapy.   • Benign essential hypertension 2/22/2016   • Chronic constipation 11/20/2017 11/20/2017--patient reports approximately 8 month history of intermittent constipation that at times can last as much as a month.  He notes his blood sugar readings tend to increase when this happens.  He is scheduled for colonoscopy next week.  I recommend a generic probiotic daily as well as MiraLAX and adjust as needed.   • Chronic neck pain 10/30/2015    12/19/2015--patient reports his left shoulder pain has resolved. He continues to have  neck pain. X-ray of the cervical spine ordered. Physical therapy ordered.   11/10/2015--left shoulder injected with 80 mg of Depo-Medrol with 3 mL of Xylocaine.   10/30/2015--patient presents with at least a one-month history of intermittent left-sided neck pain that is associated with left shoulder pain as well. The pain is described as shooting and is 8 out of 10 intensity at its worst. The pain is worse with certain movements of his head and left shoulder. No trauma. No numbness or paresthesias.   • Depression with anxiety 2/22/2016   • Diabetic eye exam (CMS/[facility]) 5/27/2016    May 2016--patient reports a normal diabetic eye exam.   • Diabetic foot exam 4/27/2017 05/02/2017--routine diabetic foot exam reveals no evidence of diabetic foot ulcer or pre-ulcerative callus.  Pulses are palpable and there is no signs of ischemia.  Sensation subjectively intact.   • Gastroesophageal reflux disease with esophagitis 5/22/2015 08/12/2015--EGD revealed esophagitis and a distal esophageal stricture that was dilated. Small sliding hiatal hernia. Proximal gastric ulcer and gastritis present. Dysphagia resolved after dilatation. Pathology of the gastric antrum was benign with no significant histologic abnormality. Distal esophagus biopsy negative for intestinal metaplasia or dysplasia.   05/22/2015--patient presents with a several month history of progressively worsening intermittent dysphagia of solids but not liquids. He describes solid food sometimes sticking and points to his upper chest/lower throat. No other associated symptoms. Patient referred to Dr. Aime Parada for an EGD. This is negative, may need ENT evaluation.   • History of colon polyps, 11/29/2017--tubular adenoma times one.  Hyperplastic ×1.  Repeat 5 years. 12/18/2017 11/29/2017--colonoscopy revealed 2 small (3 mm) polyps in the sigmoid colon and cecum.  Removed.  Bowel prep.  Sigmoid diverticuli noted.  No hemorrhoids.  Pathology returned hyperplastic  polyp of the sigmoid and the cecal polyp was a tubular adenoma.   • HIstory of eosinophilic gastroenteritis 1990s    1990s--patient had significant epigastric discomfort and workup including an EGD revealed eosinophilic gastroenteritis that was treated with prednisone and resulted in resolution.   • History of esophageal stricture 08/12/2015 08/12/2015--EGD revealed esophagitis and a distal esophageal stricture that was dilated. Small sliding hiatal hernia. Proximal gastric ulcer and gastritis present. Dysphagia resolved after dilatation. Pathology of the gastric antrum was benign with no significant histologic abnormality. Distal esophagus biopsy negative for intestinal metaplasia or dysplasia.   05/22/2015--patient presents with a s   • HIstory of Pulmonary hypertension, 04/23/2014--resolved after PFO/ASD closure. 04/23/2014 04/23/2014--echocardiogram reveals normal left ventricular systolic function with ejection fraction 55%. Left ventricular wall motion is normal. The right ventricle is moderately to severely dilated. Right ventricular systolic function is mildly reduced. The right atrium is moderately dilated. Saline contrast study revealed no evidence of PFO/ASD. Status post PFO closure. There is trace mitral reg   • History of Pulmonary nodule, 03/07/2016--5 mm indeterminate nodule right lower lobe.  09/13/2016--consistent with calcified granuloma. 3/7/2016    09/13/2016--CT scan of the chest, abdomen, and pelvis with contrast reveals no lymphadenopathy within the abdomen or pelvis.  Mild hepatic steatosis.  Previously noted right lower lobe pulmonary nodule is currently calcified and consistent with a calcified granuloma.  03/07/2016--CT scan of the abdomen performed for complaints of abdominal discomfort revealed a 5 mm nodular focus right lower lobe which is indeterminate.  Recommend repeat CT scan in 6 months.   • HIstory of repair of Congenital atrial septal defect 03/2012 March  2012--percutaneous closure of secundum ASD.   • Hyperlipidemia 2/22/2016   • Hypogonadism male, no TRT. 12/26/2012 12/26/2012--treatment for hypogonadism begun.   • Microalbuminuria 10/19/2014    10/19/2014--urine microalbumin mildly elevated at 27.8. Normal range 0.0--17.0.   • Moderate persistent asthma 2/22/2016    Seasonal, spring and fall.   • Multiple environmental allergies 10/24/2013    10/24/2013--skin testing revealed impressive reactions to grass following, tree pollen, weed pollen, ragweed, dust mite, and cat. Recommend immunotherapy.   • Non morbid obesity 2/22/2016   • Right bundle branch block     ECG reveals sinus rhythm, rate of 75, right bundle branch block, left anterior hemiblock   • Subclinical hypothyroidism 8/17/2018 08/27/2018--thyroid ultrasound reveals subcentimeter nodule in the right thyroid lobe.  Possibly cystic.  There is a question of an ill-defined 1 cm nodular lesion posteriorly in the mid left lateral thyroid.  Appearance could be technical in origin.  Recommend repeat thyroid ultrasound in 6 months.  08/17/2018--routine follow-up.  TSH mildly elevated at 4.49.  Free T3 and free T4 are normal.  Rep   • Thyroid nodule 10/5/2018    08/27/2018--thyroid ultrasound reveals subcentimeter nodule in the right thyroid lobe.  Possibly cystic.  There is a question of an ill-defined 1 cm nodular lesion posteriorly in the mid left lateral thyroid.  Appearance could be technical in origin.  Recommend repeat thyroid ultrasound in 6 months.  08/17/2018--routine follow-up.  TSH mildly elevated at 4.49.  Free T3 and free T4 are normal.  Repeat thyroid function tests ordered and returned normal.  Thyroid ultrasound ordered.   • Type 2 diabetes mellitus (CMS/HCC) 1/1/2004    2004--initial diagnosis of type 2 diabetes.   • Vitamin D deficiency 2/22/2016         Past Surgical History:   Procedure Laterality Date   • ATRIAL SEPTAL DEFECT REPAIR  03/2012 March 2012--percutaneous closure of  secundum ASD.  Dr. Mcpherson   • COLONOSCOPY N/A 11/29/2017 11/29/2017--colonoscopy revealed 2 small (3 mm) polyps in the sigmoid colon and cecum.  Removed.  Bowel prep.  Sigmoid diverticuli noted.  No hemorrhoids.  Pathology returned hyperplastic polyp of the sigmoid and the cecal polyp was a tubular adenoma.   • ESOPHAGEAL DILATATION  08/12/2015 08/12/2015--EGD revealed esophagitis and a distal esophageal stricture that was dilated. Small sliding hiatal hernia. Proximal gastric ulcer and gastritis present. Dysphagia resolved after dilatation. 05/22/2015--patient presents with a several month history of progressively worsening intermittent dysphagia of solids but not liquids. He describes solid food sometimes sticking and points to his upp   • ESOPHAGOSCOPY / EGD  08/12/2015 08/12/2015--EGD revealed esophagitis and a distal esophageal stricture that was dilated. Small sliding hiatal hernia. Proximal gastric ulcer and gastritis present. Dysphagia resolved after dilatation. 05/22/2015--patient presents with a several month history of progressively worsening intermittent dysphagia of solids but not liquids. He describes solid food sometimes sticking and points to his upp   • KNEE ACL RECONSTRUCTION Right 02/11/2013 02/11/2013--knee arthroscopy with anterior cruciate ligament repair   • TONSILLECTOMY  2009 2009--tonsillectomy as an adult.         Allergies   Allergen Reactions   • Latex Itching           Current Outpatient Medications:   •  amLODIPine-benazepril (LOTREL 5-20) 5-20 MG per capsule, TAKE 1 CAPSULE BY MOUTH ONE TIME A DAY , Disp: 30 capsule, Rfl: 4  •  Canagliflozin (INVOKANA) 300 MG tablet, Take 300 mg by mouth Daily., Disp: 30 tablet, Rfl: 2  •  Cholecalciferol (VITAMIN D3) 5000 UNITS capsule capsule, Take 1 capsule by mouth daily., Disp: , Rfl:   •  esomeprazole (nexIUM) 40 MG capsule, TAKE 1 CAPSULE BY MOUTH ONE TIME A DAY , Disp: 30 capsule, Rfl: 9  •  ezetimibe (ZETIA) 10 MG tablet, TAKE 1  TABLET BY MOUTH ONE TIME A DAY , Disp: 90 tablet, Rfl: 1  •  glimepiride (AMARYL) 4 MG tablet, take 1/2 tablet by mouth in the morning. then take 1 and 1/2 tablet by mouth in the evening , Disp: 60 tablet, Rfl: 5  •  Insulin Glargine (BASAGLAR KWIKPEN) 100 UNIT/ML injection pen, inject 80 units subcutaenoulsy once a day, Disp: 30 mL, Rfl: 5  •  Loratadine (CLARITIN PO), Take 1 capsule by mouth daily as needed (when necessary for allergies.)., Disp: , Rfl:   •  metFORMIN (GLUCOPHAGE) 1000 MG tablet, TAKE 1 TABLET BY MOUTH TWO TIMES A DAY WITH MEALS, Disp: 180 tablet, Rfl: 1  •  montelukast (SINGULAIR) 10 MG tablet, TAKE 1 TABLET BY MOUTH ONE TIME A DAY , Disp: 30 tablet, Rfl: 4  •  phentermine (ADIPEX-P) 37.5 MG tablet, TAKE 1 TABLET BY MOUTH IN THE MORNING BEFORE BREAKFAST , Disp: 30 tablet, Rfl: 2  •  rosuvastatin (CRESTOR) 40 MG tablet, TAKE 1 TABLET BY MOUTH AT BEDTIME , Disp: 30 tablet, Rfl: 5  •  sertraline (ZOLOFT) 50 MG tablet, TAKE 1 TABLET BY MOUTH ONE TIME A DAY , Disp: 30 tablet, Rfl: 4  •  TRULICITY 1.5 MG/0.5ML solution pen-injector, inject 1.5mg under skin once weekly as directed, Disp: 2 mL, Rfl: 9  •  Chlorcyclizine-Pseudoephed (STAHIST AD) 25-60 MG tablet, 1 by mouth every 6 hours as needed for congestion and allergies, not greater than 3 in 24 hours, Disp: 60 tablet, Rfl: 2  •  fluconazole (DIFLUCAN) 200 MG tablet, TAKE 1 TABLET BY MOUTH ONE TIME A DAY UNTIL GONE, Disp: 7 tablet, Rfl: 0  •  fluticasone-salmeterol (ADVAIR DISKUS) 250-50 MCG/DOSE DISKUS, Use inhaler twice daily as directed, Disp: 60 each, Rfl: 6  •  polyethylene glycol (MIRALAX) packet, Take orally as directed daily for constipation, Disp: , Rfl:   •  Probiotic capsule, 1 by mouth daily, Disp: , Rfl:   •  testosterone (TESTIM) 50 MG/5GM (1%) gel gel, Apply 2 packs daily as directed for low testosterone, Disp: 300 g, Rfl: 5      Family History   Problem Relation Age of Onset   • Diabetes Mother    • Stomach cancer Mother    • Diabetes  "Maternal Grandmother          Social History     Socioeconomic History   • Marital status:      Spouse name: Luis    • Number of children: 2   • Years of education: 16   • Highest education level: Bachelor's degree (e.g., BA, AB, BS)   Social Needs   • Financial resource strain: Not hard at all   • Food insecurity - worry: Never true   • Food insecurity - inability: Never true   • Transportation needs - medical: No   • Transportation needs - non-medical: No   Occupational History   • 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  No acute distress.  Normal affect. Obese. HEENT: Pupils equal, round, reactive to light; extraocular movements intact; sclerae nonicteric; pharynx, ear canals and TMs normal.  Neck: Without JVD, thyromegaly, bruit, or adenopathy.  Lungs: Clear to auscultation in all fields.  Heart: Regular rate and rhythm without murmur, rub, gallop, or click.  Abdomen: Soft, nontender, without hepatosplenomegaly or hernia.  Bowel sounds normal.  : Deferred.  Rectal: Deferred.  Extremities: Without clubbing, cyanosis, edema, or pulse deficit.  Neurologic: Intact without focal deficit.  Normal station and gait observed during ingress and egress from the examination room.  Skin: Without significant lesion.  Musculoskeletal: Unremarkable.      Lab/other results:      Assessment/Plan:     Diagnosis Plan   1. Chronic " cough     2. Moderate persistent asthma without complication     3. Multiple environmental allergies     4. Allergic rhinitis     5. Benign essential hypertension       Patient presents with a chronic dry cough for more than a month.  I think the environmental allergies/asthma is playing a role but certainly poorly controlled reflux can do this as well.  It has been sometime since patient has had a chest x-ray and I think we need to do that to rule out any pathology that might be noted.  Patient does have a calcified granuloma which is totally benign on a CT scan back in 2016.    Plan is as follows: Discontinue Advair and start Anoro Ellipta daily.  Chest x-ray PA lateral.  Patient will follow up on the phone for the results and possible further instructions.  I will have him follow-up in about 3-4 weeks to reassess the cough and we can also review his efforts at weight loss.  Patient is on phentermine and needs follow-up.        Procedures

## 2019-03-26 ENCOUNTER — CLINICAL SUPPORT (OUTPATIENT)
Dept: ORTHOPEDIC SURGERY | Facility: CLINIC | Age: 54
End: 2019-03-26

## 2019-03-26 VITALS — BODY MASS INDEX: 31.82 KG/M2 | TEMPERATURE: 98.2 F | WEIGHT: 198 LBS | HEIGHT: 66 IN

## 2019-03-26 DIAGNOSIS — M25.512 CHRONIC LEFT SHOULDER PAIN: Primary | ICD-10-CM

## 2019-03-26 DIAGNOSIS — M75.121 COMPLETE TEAR OF RIGHT ROTATOR CUFF: ICD-10-CM

## 2019-03-26 DIAGNOSIS — M75.102 TEAR OF LEFT ROTATOR CUFF, UNSPECIFIED TEAR EXTENT: ICD-10-CM

## 2019-03-26 DIAGNOSIS — G89.29 CHRONIC LEFT SHOULDER PAIN: Primary | ICD-10-CM

## 2019-03-26 PROCEDURE — 73030 X-RAY EXAM OF SHOULDER: CPT | Performed by: ORTHOPAEDIC SURGERY

## 2019-03-26 PROCEDURE — 20610 DRAIN/INJ JOINT/BURSA W/O US: CPT | Performed by: ORTHOPAEDIC SURGERY

## 2019-03-26 PROCEDURE — 99214 OFFICE O/P EST MOD 30 MIN: CPT | Performed by: ORTHOPAEDIC SURGERY

## 2019-03-26 RX ADMIN — METHYLPREDNISOLONE ACETATE 80 MG: 80 INJECTION, SUSPENSION INTRA-ARTICULAR; INTRALESIONAL; INTRAMUSCULAR; SOFT TISSUE at 13:21

## 2019-03-26 NOTE — PROGRESS NOTES
Right Shoulder Injection subacromial      Patient: Marek Diego        YOB: 1965            Chief Complaints: right Shoulder pain  No chief complaint on file.        History of Present Illness: Pt gets intermittent shoulder injections with good relief. Is here for repeat injection.      Physical Exam: 53 y.o. male  General Appearance:    Alert, cooperative, in no acute distress                 There were no vitals filed for this visit.   Patient is alert and read ×3 no acute distress appears her above-listed at height weight and age.  Affect is normal respiratory rate is normal unlabored. Heart rate regular rate rhythm, sclera, dentition and hearing are normal for the purpose of this exam.  Exam and complaints are unchanged.      Procedure:  Large Joint Arthrocentesis: R subacromial bursa  Date/Time: 3/26/2019 12:48 PM  Consent given by: patient  Site marked: site marked  Timeout: Immediately prior to procedure a time out was called to verify the correct patient, procedure, equipment, support staff and site/side marked as required   Supporting Documentation  Indications: pain   Procedure Details  Location: shoulder - R subacromial bursa  Preparation: Patient was prepped and draped in the usual sterile fashion  Needle size: 22 G  Approach: posterior  Medications administered: 80 mg methylPREDNISolone acetate 80 MG/ML; 4 mL lidocaine (cardiac) 20 MG/ML  Patient tolerance: patient tolerated the procedure well with no immediate complications                Assessment. Persistent shoulder pain with rotator cuff pathology          Plan: Is to proceed with injection    The subacromial space was injected under strict sterile technique is form on a Marcaine and Depo-Medrol this was done sterilely and tolerated tolerated  well

## 2019-03-26 NOTE — PROGRESS NOTES
New left Shoulder      Patient: Marek Diego        YOB: 1965    Medical Record Number: [id number]        Chief Complaints: left shoulder pain right shoulder pain  No chief complaint on file.          History of Present Illness: This is a 53-year-old male who has a known right shoulder rotator cuff tear who is working on getting his A1c down so we can fix the right one.  His prior A1c was 10 currently it is 8 and he is lost 20 pounds.  He is now presenting with left shoulder pain this been ongoing for several weeks no dedicated history of injury change in activity.  He has had intermittent symptoms for 4 years but much worse the last couple of weeks/months.  His symptoms are constant aching burning the shoulders worse on the opposite.  Worse with activity somewhat better with ice and injections he is a  past medical history smart for diabetes asthma      Allergies:   Allergies   Allergen Reactions   • Latex Itching       Medications:   Home Medications:  Current Outpatient Medications on File Prior to Visit   Medication Sig   • amLODIPine-benazepril (LOTREL 5-20) 5-20 MG per capsule TAKE 1 CAPSULE BY MOUTH ONE TIME A DAY    • Canagliflozin (INVOKANA) 300 MG tablet Take 300 mg by mouth Daily.   • Chlorcyclizine-Pseudoephed (STAHIST AD) 25-60 MG tablet 1 by mouth every 6 hours as needed for congestion and allergies, not greater than 3 in 24 hours   • Cholecalciferol (VITAMIN D3) 5000 UNITS capsule capsule Take 1 capsule by mouth daily.   • esomeprazole (nexIUM) 40 MG capsule TAKE 1 CAPSULE BY MOUTH ONE TIME A DAY    • ezetimibe (ZETIA) 10 MG tablet TAKE 1 TABLET BY MOUTH ONE TIME A DAY    • fluconazole (DIFLUCAN) 200 MG tablet TAKE 1 TABLET BY MOUTH ONE TIME A DAY UNTIL GONE   • glimepiride (AMARYL) 4 MG tablet take 1/2 tablet by mouth in the morning. then take 1 and 1/2 tablet by mouth in the evening    • Insulin Glargine (BASAGLAR KWIKPEN) 100 UNIT/ML injection pen inject 80 units  subcutaenoulsy once a day   • Loratadine (CLARITIN PO) Take 1 capsule by mouth daily as needed (when necessary for allergies.).   • metFORMIN (GLUCOPHAGE) 1000 MG tablet TAKE 1 TABLET BY MOUTH TWO TIMES A DAY WITH MEALS   • montelukast (SINGULAIR) 10 MG tablet TAKE 1 TABLET BY MOUTH ONE TIME A DAY    • phentermine (ADIPEX-P) 37.5 MG tablet TAKE 1 TABLET BY MOUTH IN THE MORNING BEFORE BREAKFAST    • polyethylene glycol (MIRALAX) packet Take orally as directed daily for constipation   • Probiotic capsule 1 by mouth daily   • rosuvastatin (CRESTOR) 40 MG tablet TAKE 1 TABLET BY MOUTH AT BEDTIME    • sertraline (ZOLOFT) 50 MG tablet TAKE 1 TABLET BY MOUTH ONE TIME A DAY    • testosterone (TESTIM) 50 MG/5GM (1%) gel gel Apply 2 packs daily as directed for low testosterone   • TRULICITY 1.5 MG/0.5ML solution pen-injector inject 1.5mg under skin once weekly as directed   • umeclidinium-vilanterol (ANORO ELLIPTA) 62.5-25 MCG/INH aerosol powder  inhaler Inhale once daily as directed     No current facility-administered medications on file prior to visit.      Current Medications:  Scheduled Meds:  Continuous Infusions:  No current facility-administered medications for this visit.   PRN Meds:.    Past Medical History:   Diagnosis Date   • Allergic rhinitis 10/24/2013    10/24/2013--skin testing revealed impressive reactions to grass following, tree pollen, weed pollen, ragweed, dust mite, and cat. Recommend immunotherapy.   • Benign essential hypertension 2/22/2016   • Chronic constipation 11/20/2017 11/20/2017--patient reports approximately 8 month history of intermittent constipation that at times can last as much as a month.  He notes his blood sugar readings tend to increase when this happens.  He is scheduled for colonoscopy next week.  I recommend a generic probiotic daily as well as MiraLAX and adjust as needed.   • Chronic neck pain 10/30/2015    12/19/2015--patient reports his left shoulder pain has resolved. He  continues to have neck pain. X-ray of the cervical spine ordered. Physical therapy ordered.   11/10/2015--left shoulder injected with 80 mg of Depo-Medrol with 3 mL of Xylocaine.   10/30/2015--patient presents with at least a one-month history of intermittent left-sided neck pain that is associated with left shoulder pain as well. The pain is described as shooting and is 8 out of 10 intensity at its worst. The pain is worse with certain movements of his head and left shoulder. No trauma. No numbness or paresthesias.   • Depression with anxiety 2/22/2016   • Diabetic eye exam (CMS/[facility]) 5/27/2016    May 2016--patient reports a normal diabetic eye exam.   • Diabetic foot exam 4/27/2017 05/02/2017--routine diabetic foot exam reveals no evidence of diabetic foot ulcer or pre-ulcerative callus.  Pulses are palpable and there is no signs of ischemia.  Sensation subjectively intact.   • Gastroesophageal reflux disease with esophagitis 5/22/2015 08/12/2015--EGD revealed esophagitis and a distal esophageal stricture that was dilated. Small sliding hiatal hernia. Proximal gastric ulcer and gastritis present. Dysphagia resolved after dilatation. Pathology of the gastric antrum was benign with no significant histologic abnormality. Distal esophagus biopsy negative for intestinal metaplasia or dysplasia.   05/22/2015--patient presents with a several month history of progressively worsening intermittent dysphagia of solids but not liquids. He describes solid food sometimes sticking and points to his upper chest/lower throat. No other associated symptoms. Patient referred to Dr. iAme Parada for an EGD. This is negative, may need ENT evaluation.   • History of colon polyps, 11/29/2017--tubular adenoma times one.  Hyperplastic ×1.  Repeat 5 years. 12/18/2017 11/29/2017--colonoscopy revealed 2 small (3 mm) polyps in the sigmoid colon and cecum.  Removed.  Bowel prep.  Sigmoid diverticuli noted.  No hemorrhoids.  Pathology  returned hyperplastic polyp of the sigmoid and the cecal polyp was a tubular adenoma.   • HIstory of eosinophilic gastroenteritis 1990s    1990s--patient had significant epigastric discomfort and workup including an EGD revealed eosinophilic gastroenteritis that was treated with prednisone and resulted in resolution.   • History of esophageal stricture 08/12/2015 08/12/2015--EGD revealed esophagitis and a distal esophageal stricture that was dilated. Small sliding hiatal hernia. Proximal gastric ulcer and gastritis present. Dysphagia resolved after dilatation. Pathology of the gastric antrum was benign with no significant histologic abnormality. Distal esophagus biopsy negative for intestinal metaplasia or dysplasia.   05/22/2015--patient presents with a s   • HIstory of Pulmonary hypertension, 04/23/2014--resolved after PFO/ASD closure. 04/23/2014 04/23/2014--echocardiogram reveals normal left ventricular systolic function with ejection fraction 55%. Left ventricular wall motion is normal. The right ventricle is moderately to severely dilated. Right ventricular systolic function is mildly reduced. The right atrium is moderately dilated. Saline contrast study revealed no evidence of PFO/ASD. Status post PFO closure. There is trace mitral reg   • History of Pulmonary nodule, 03/07/2016--5 mm indeterminate nodule right lower lobe.  09/13/2016--consistent with calcified granuloma. 3/7/2016    09/13/2016--CT scan of the chest, abdomen, and pelvis with contrast reveals no lymphadenopathy within the abdomen or pelvis.  Mild hepatic steatosis.  Previously noted right lower lobe pulmonary nodule is currently calcified and consistent with a calcified granuloma.  03/07/2016--CT scan of the abdomen performed for complaints of abdominal discomfort revealed a 5 mm nodular focus right lower lobe which is indeterminate.  Recommend repeat CT scan in 6 months.   • HIstory of repair of Congenital atrial septal defect 03/2012     March 2012--percutaneous closure of secundum ASD.   • Hyperlipidemia 2/22/2016   • Hypogonadism male, no TRT. 12/26/2012 12/26/2012--treatment for hypogonadism begun.   • Microalbuminuria 10/19/2014    10/19/2014--urine microalbumin mildly elevated at 27.8. Normal range 0.0--17.0.   • Moderate persistent asthma 2/22/2016    Seasonal, spring and fall.   • Multiple environmental allergies 10/24/2013    10/24/2013--skin testing revealed impressive reactions to grass following, tree pollen, weed pollen, ragweed, dust mite, and cat. Recommend immunotherapy.   • Non morbid obesity 2/22/2016   • Right bundle branch block     ECG reveals sinus rhythm, rate of 75, right bundle branch block, left anterior hemiblock   • Subclinical hypothyroidism 8/17/2018 08/27/2018--thyroid ultrasound reveals subcentimeter nodule in the right thyroid lobe.  Possibly cystic.  There is a question of an ill-defined 1 cm nodular lesion posteriorly in the mid left lateral thyroid.  Appearance could be technical in origin.  Recommend repeat thyroid ultrasound in 6 months.  08/17/2018--routine follow-up.  TSH mildly elevated at 4.49.  Free T3 and free T4 are normal.  Rep   • Thyroid nodule 10/5/2018    08/27/2018--thyroid ultrasound reveals subcentimeter nodule in the right thyroid lobe.  Possibly cystic.  There is a question of an ill-defined 1 cm nodular lesion posteriorly in the mid left lateral thyroid.  Appearance could be technical in origin.  Recommend repeat thyroid ultrasound in 6 months.  08/17/2018--routine follow-up.  TSH mildly elevated at 4.49.  Free T3 and free T4 are normal.  Repeat thyroid function tests ordered and returned normal.  Thyroid ultrasound ordered.   • Type 2 diabetes mellitus (CMS/HCC) 1/1/2004    2004--initial diagnosis of type 2 diabetes.   • Vitamin D deficiency 2/22/2016        Past Surgical History:   Procedure Laterality Date   • ATRIAL SEPTAL DEFECT REPAIR  03/2012 March 2012--percutaneous closure of  secundum ASD.  Dr. Mcpherson   • COLONOSCOPY N/A 11/29/2017 11/29/2017--colonoscopy revealed 2 small (3 mm) polyps in the sigmoid colon and cecum.  Removed.  Bowel prep.  Sigmoid diverticuli noted.  No hemorrhoids.  Pathology returned hyperplastic polyp of the sigmoid and the cecal polyp was a tubular adenoma.   • ESOPHAGEAL DILATATION  08/12/2015 08/12/2015--EGD revealed esophagitis and a distal esophageal stricture that was dilated. Small sliding hiatal hernia. Proximal gastric ulcer and gastritis present. Dysphagia resolved after dilatation. 05/22/2015--patient presents with a several month history of progressively worsening intermittent dysphagia of solids but not liquids. He describes solid food sometimes sticking and points to his upp   • ESOPHAGOSCOPY / EGD  08/12/2015 08/12/2015--EGD revealed esophagitis and a distal esophageal stricture that was dilated. Small sliding hiatal hernia. Proximal gastric ulcer and gastritis present. Dysphagia resolved after dilatation. 05/22/2015--patient presents with a several month history of progressively worsening intermittent dysphagia of solids but not liquids. He describes solid food sometimes sticking and points to his upp   • KNEE ACL RECONSTRUCTION Right 02/11/2013 02/11/2013--knee arthroscopy with anterior cruciate ligament repair   • TONSILLECTOMY  2009 2009--tonsillectomy as an adult.        Social History     Occupational History   • Occupation:  for [employer])   Tobacco Use   • Smoking status: Never Smoker   • Smokeless tobacco: Never Used   Substance and Sexual Activity   • Alcohol use: Yes     Frequency: Monthly or less     Drinks per session: 1 or 2     Binge frequency: Never   • Drug use: No   • Sexual activity: Yes     Partners: Female    Social History     Social History Narrative   • Not on file        Family History   Problem Relation Age of Onset   • Diabetes Mother    • Stomach cancer Mother    • Diabetes  Maternal Grandmother              Review of Systems: 14 point review of systems are remarkable for the pertinent positives listed in the chart by the patient the remainder are negative    Review of Systems      Physical Exam: 53 y.o. male  General Appearance:    Alert, cooperative, in no acute distress                 There were no vitals filed for this visit.   Patient is alert and read ×3 no acute distress appears her above-listed at height weight and age.  Affect is normal respiratory rate is normal unlabored. Heart rate regular rate rhythm, sclera, dentition and hearing are normal for the purpose of this exam.    Ortho Exam  Physical exam of the left shoulder reveals no overlying skin changes no lymphedema no lymphadenopathy.  Patient has active flexion 180 with mild symptoms abduction is similar external rotation is to 50 and internal rotation to the upper lumbar spine with mild symptoms.  Patient has good rotator cuff strength 4+ over 5 with isometric strength testing with pain.  Patient has a positive impingement and a positive Dykes sign.  Patient has good cervical range of motion which is full and asymptomatic no radicular symptoms.  Patient has a normal elbow exam.  Good distal pulses are presentPatient has pain with overhead activity and a positive Neer sign and a positive empty can sign  They have a positive drop arm any definitive painful arc      Physical exam of the right shoulder reveals no overlying skin changes no lymphedema no lymphadenopathy.  Patient has active flexion 180 with mild symptoms abduction is similar external rotation is to 50 and internal rotation to the upper lumbar spine with mild symptoms.  Patient has good rotator cuff strength 4+ over 5 with isometric strength testing with pain.  Patient has a positive impingement and a positive Dykes sign.  Patient has good cervical range of motion which is full and asymptomatic no radicular symptoms.  Patient has a normal elbow exam.   Good distal pulses are present  Patient has pain with overhead activity and a positive Neer sign and a positive empty can sign , a positive drop arm and a definitive painful arc  Large Joint Arthrocentesis: L subacromial bursa  Date/Time: 3/26/2019 1:21 PM  Consent given by: patient  Site marked: site marked  Timeout: Immediately prior to procedure a time out was called to verify the correct patient, procedure, equipment, support staff and site/side marked as required   Supporting Documentation  Indications: pain   Procedure Details  Location: shoulder - L subacromial bursa  Preparation: Patient was prepped and draped in the usual sterile fashion  Needle size: 22 G  Approach: posterior  Medications administered: 80 mg methylPREDNISolone acetate 80 MG/ML; 4 mL lidocaine (cardiac) 20 MG/ML  Patient tolerance: patient tolerated the procedure well with no immediate complications                Radiology:   AP, Scapular Y and Axillary Lateral of the left shoulder were ordered/reviewed to evauate shoulder pain.  He does have some acromioclavicular OA otherwise no acute pathology I have compared to previous films  Imaging Results (most recent)     Procedure Component Value Units Date/Time    XR Shoulder 2+ View Right [[id number]] Resulted:  03/26/19 1247     Updated:  03/26/19 1247    Impression:       Ordering physician's impression is located in the Encounter Note dated 03/26/19. X-ray performed in the DR room.          Assessment/Plan: Left shoulder pain I think this is rotator cuff in some fashion plan is to proceed with an injection as a diagnostic and therapeutic tool he fails to improve we will pursue an MRI on the left.  On the right he has a known rotator cuff tear is A1c is decreasing I think we can probably get him on the schedule soon for right rotator cuff repair  Cortisone Injection. See procedure note.  Cortisone Injection for DIAGNOSTIC and THERAPUTIC purposes.

## 2019-03-27 RX ORDER — METHYLPREDNISOLONE ACETATE 80 MG/ML
80 INJECTION, SUSPENSION INTRA-ARTICULAR; INTRALESIONAL; INTRAMUSCULAR; SOFT TISSUE
Status: COMPLETED | OUTPATIENT
Start: 2019-03-26 | End: 2019-03-26

## 2019-04-06 DIAGNOSIS — E78.5 HYPERLIPIDEMIA, UNSPECIFIED HYPERLIPIDEMIA TYPE: ICD-10-CM

## 2019-04-08 RX ORDER — EZETIMIBE 10 MG/1
TABLET ORAL
Qty: 30 TABLET | Refills: 3 | Status: SHIPPED | OUTPATIENT
Start: 2019-04-08 | End: 2019-08-05 | Stop reason: SDUPTHER

## 2019-04-12 ENCOUNTER — OFFICE VISIT (OUTPATIENT)
Dept: INTERNAL MEDICINE | Facility: CLINIC | Age: 54
End: 2019-04-12

## 2019-04-12 VITALS
DIASTOLIC BLOOD PRESSURE: 80 MMHG | BODY MASS INDEX: 31.5 KG/M2 | HEIGHT: 66 IN | SYSTOLIC BLOOD PRESSURE: 130 MMHG | HEART RATE: 97 BPM | OXYGEN SATURATION: 98 % | WEIGHT: 196 LBS

## 2019-04-12 DIAGNOSIS — E66.09 NON MORBID OBESITY DUE TO EXCESS CALORIES: Chronic | ICD-10-CM

## 2019-04-12 DIAGNOSIS — E11.9 TYPE 2 DIABETES MELLITUS WITHOUT COMPLICATION, WITHOUT LONG-TERM CURRENT USE OF INSULIN (HCC): Chronic | ICD-10-CM

## 2019-04-12 DIAGNOSIS — R63.5 ABNORMAL WEIGHT GAIN: Primary | Chronic | ICD-10-CM

## 2019-04-12 PROBLEM — R05.3 CHRONIC COUGH: Status: RESOLVED | Noted: 2019-03-11 | Resolved: 2019-04-12

## 2019-04-12 PROCEDURE — 99214 OFFICE O/P EST MOD 30 MIN: CPT | Performed by: INTERNAL MEDICINE

## 2019-04-12 RX ORDER — GLIMEPIRIDE 4 MG/1
4 TABLET ORAL 2 TIMES DAILY
COMMUNITY
End: 2020-01-06

## 2019-04-12 NOTE — PROGRESS NOTES
04/12/2019    Patient Information  Marek Diego                                                                                          [id number] [facility] [id number]      1965  [unfilled]  There is no work phone number on file.    Chief Complaint:     Follow-up medical management of abnormal weight gain/nonmorbid obesity and several risk factors including type 2 diabetes.    History of Present Illness:    Patient with a history of multiple cardiovascular risk factors including type 2 diabetes, hypertension, hyperlipidemia, hypogonadism, esophageal reflux.  He presents today to follow-up on medical management for weight loss as follows:    April 12, 2019--current weight is 196 pounds a 2 pound weight loss.  Patient has lost only 11 pounds since August of last year with the phentermine.  Progress is very slow and is nearly to the point that we may not be able to justify the use of phentermine although I am not totally ready to withdrawal that at this time.  I discussed with patient the keto diet/Calderon diet and have strongly recommended this.  I think it would be reasonable for him to go on the induction phase of the Calderon diet and instead of staying on this for 2 weeks, he can stay on it much longer.  Muscle cramps can sometimes be an issue as can constipation but we have ways to deal with this.  I will not stop the phentermine at this time but we will reassess at his follow-up in a little more than 2 months towards the end of June.  If there has not been sufficient progress at that time then we will definitely have to consider discontinuation of the phentermine.    11/19/2018--current weight is 198 pounds representing a 2 pound weight loss.  Encouraged patient to work harder.    10/05/2018--patient seen in follow-up and current weight is down 7 pounds at 200 pounds.  Patient could not tolerate the Trokendi XR.  He seems to be tolerating the phentermine.  Continue current regimen and  reassess in about 6 weeks.    08/17/2018--patient is a long history of problems with abnormal weight gain/obesity.  He has multiple comorbidities justify medical management.  Current weight is 207 pounds.  Phentermine one by mouth daily along with Trokendi XR initiated.    Review of Systems   Constitution: Negative.   HENT: Negative.    Eyes: Negative.    Cardiovascular: Negative.    Respiratory: Negative.    Endocrine: Negative.    Hematologic/Lymphatic: Negative.    Skin: Negative.    Musculoskeletal: Negative.    Gastrointestinal: Negative.    Genitourinary: Negative.    Neurological: Negative.    Psychiatric/Behavioral: Negative.    Allergic/Immunologic: Negative.        Active Problems:    Patient Active Problem List   Diagnosis   • Allergic rhinitis   • Benign essential hypertension   • Chronic neck pain   • Depression with anxiety   • Gastroesophageal reflux disease with esophagitis   • Hyperlipidemia   • Hypogonadism male, no TRT.   • Microalbuminuria   • Moderate persistent asthma   • Multiple environmental allergies   • Non morbid obesity   • Type 2 diabetes mellitus (CMS/HCC)   • Vitamin D deficiency   • Routine physical examination   • Therapeutic drug monitoring   • Right bundle branch block   • Diabetic eye exam (CMS/HCC)   • Diabetic foot exam   • Chronic constipation   • Snoring   • Nocturnal hypoxemia   • History of colon polyps, 11/29/2017--tubular adenoma times one.  Hyperplastic ×1.  Repeat 5 years.   • Subclinical hypothyroidism   • Thyroid nodule   • Abnormal weight gain         Past Medical History:   Diagnosis Date   • Allergic rhinitis 10/24/2013    10/24/2013--skin testing revealed impressive reactions to grass following, tree pollen, weed pollen, ragweed, dust mite, and cat. Recommend immunotherapy.   • Benign essential hypertension 2/22/2016   • Chronic constipation 11/20/2017 11/20/2017--patient reports approximately 8 month history of intermittent constipation that at times can last  as much as a month.  He notes his blood sugar readings tend to increase when this happens.  He is scheduled for colonoscopy next week.  I recommend a generic probiotic daily as well as MiraLAX and adjust as needed.   • Chronic neck pain 10/30/2015    12/19/2015--patient reports his left shoulder pain has resolved. He continues to have neck pain. X-ray of the cervical spine ordered. Physical therapy ordered.   11/10/2015--left shoulder injected with 80 mg of Depo-Medrol with 3 mL of Xylocaine.   10/30/2015--patient presents with at least a one-month history of intermittent left-sided neck pain that is associated with left shoulder pain as well. The pain is described as shooting and is 8 out of 10 intensity at its worst. The pain is worse with certain movements of his head and left shoulder. No trauma. No numbness or paresthesias.   • Depression with anxiety 2/22/2016   • Diabetic eye exam (CMS/[facility]) 5/27/2016    May 2016--patient reports a normal diabetic eye exam.   • Diabetic foot exam 4/27/2017 05/02/2017--routine diabetic foot exam reveals no evidence of diabetic foot ulcer or pre-ulcerative callus.  Pulses are palpable and there is no signs of ischemia.  Sensation subjectively intact.   • Gastroesophageal reflux disease with esophagitis 5/22/2015 08/12/2015--EGD revealed esophagitis and a distal esophageal stricture that was dilated. Small sliding hiatal hernia. Proximal gastric ulcer and gastritis present. Dysphagia resolved after dilatation. Pathology of the gastric antrum was benign with no significant histologic abnormality. Distal esophagus biopsy negative for intestinal metaplasia or dysplasia.   05/22/2015--patient presents with a several month history of progressively worsening intermittent dysphagia of solids but not liquids. He describes solid food sometimes sticking and points to his upper chest/lower throat. No other associated symptoms. Patient referred to Dr. Aime Parada for an EGD. This is  negative, may need ENT evaluation.   • History of colon polyps, 11/29/2017--tubular adenoma times one.  Hyperplastic ×1.  Repeat 5 years. 12/18/2017 11/29/2017--colonoscopy revealed 2 small (3 mm) polyps in the sigmoid colon and cecum.  Removed.  Bowel prep.  Sigmoid diverticuli noted.  No hemorrhoids.  Pathology returned hyperplastic polyp of the sigmoid and the cecal polyp was a tubular adenoma.   • HIstory of eosinophilic gastroenteritis 1990s 1990s--patient had significant epigastric discomfort and workup including an EGD revealed eosinophilic gastroenteritis that was treated with prednisone and resulted in resolution.   • History of esophageal stricture 08/12/2015 08/12/2015--EGD revealed esophagitis and a distal esophageal stricture that was dilated. Small sliding hiatal hernia. Proximal gastric ulcer and gastritis present. Dysphagia resolved after dilatation. Pathology of the gastric antrum was benign with no significant histologic abnormality. Distal esophagus biopsy negative for intestinal metaplasia or dysplasia.   05/22/2015--patient presents with a s   • HIstory of Pulmonary hypertension, 04/23/2014--resolved after PFO/ASD closure. 04/23/2014 04/23/2014--echocardiogram reveals normal left ventricular systolic function with ejection fraction 55%. Left ventricular wall motion is normal. The right ventricle is moderately to severely dilated. Right ventricular systolic function is mildly reduced. The right atrium is moderately dilated. Saline contrast study revealed no evidence of PFO/ASD. Status post PFO closure. There is trace mitral reg   • History of Pulmonary nodule, 03/07/2016--5 mm indeterminate nodule right lower lobe.  09/13/2016--consistent with calcified granuloma. 3/7/2016    09/13/2016--CT scan of the chest, abdomen, and pelvis with contrast reveals no lymphadenopathy within the abdomen or pelvis.  Mild hepatic steatosis.  Previously noted right lower lobe pulmonary nodule is  currently calcified and consistent with a calcified granuloma.  03/07/2016--CT scan of the abdomen performed for complaints of abdominal discomfort revealed a 5 mm nodular focus right lower lobe which is indeterminate.  Recommend repeat CT scan in 6 months.   • HIstory of repair of Congenital atrial septal defect 03/2012 March 2012--percutaneous closure of secundum ASD.   • Hyperlipidemia 2/22/2016   • Hypogonadism male, no TRT. 12/26/2012 12/26/2012--treatment for hypogonadism begun.   • Microalbuminuria 10/19/2014    10/19/2014--urine microalbumin mildly elevated at 27.8. Normal range 0.0--17.0.   • Moderate persistent asthma 2/22/2016    Seasonal, spring and fall.   • Multiple environmental allergies 10/24/2013    10/24/2013--skin testing revealed impressive reactions to grass following, tree pollen, weed pollen, ragweed, dust mite, and cat. Recommend immunotherapy.   • Non morbid obesity 2/22/2016   • Right bundle branch block     ECG reveals sinus rhythm, rate of 75, right bundle branch block, left anterior hemiblock   • Subclinical hypothyroidism 8/17/2018 08/27/2018--thyroid ultrasound reveals subcentimeter nodule in the right thyroid lobe.  Possibly cystic.  There is a question of an ill-defined 1 cm nodular lesion posteriorly in the mid left lateral thyroid.  Appearance could be technical in origin.  Recommend repeat thyroid ultrasound in 6 months.  08/17/2018--routine follow-up.  TSH mildly elevated at 4.49.  Free T3 and free T4 are normal.  Rep   • Thyroid nodule 10/5/2018    08/27/2018--thyroid ultrasound reveals subcentimeter nodule in the right thyroid lobe.  Possibly cystic.  There is a question of an ill-defined 1 cm nodular lesion posteriorly in the mid left lateral thyroid.  Appearance could be technical in origin.  Recommend repeat thyroid ultrasound in 6 months.  08/17/2018--routine follow-up.  TSH mildly elevated at 4.49.  Free T3 and free T4 are normal.  Repeat thyroid function tests  ordered and returned normal.  Thyroid ultrasound ordered.   • Type 2 diabetes mellitus (CMS/HCC) 1/1/2004    2004--initial diagnosis of type 2 diabetes.   • Vitamin D deficiency 2/22/2016         Past Surgical History:   Procedure Laterality Date   • ATRIAL SEPTAL DEFECT REPAIR  03/2012 March 2012--percutaneous closure of secundum ASD.  Dr. Mcpherson   • COLONOSCOPY N/A 11/29/2017 11/29/2017--colonoscopy revealed 2 small (3 mm) polyps in the sigmoid colon and cecum.  Removed.  Bowel prep.  Sigmoid diverticuli noted.  No hemorrhoids.  Pathology returned hyperplastic polyp of the sigmoid and the cecal polyp was a tubular adenoma.   • ESOPHAGEAL DILATATION  08/12/2015 08/12/2015--EGD revealed esophagitis and a distal esophageal stricture that was dilated. Small sliding hiatal hernia. Proximal gastric ulcer and gastritis present. Dysphagia resolved after dilatation. 05/22/2015--patient presents with a several month history of progressively worsening intermittent dysphagia of solids but not liquids. He describes solid food sometimes sticking and points to his upp   • ESOPHAGOSCOPY / EGD  08/12/2015 08/12/2015--EGD revealed esophagitis and a distal esophageal stricture that was dilated. Small sliding hiatal hernia. Proximal gastric ulcer and gastritis present. Dysphagia resolved after dilatation. 05/22/2015--patient presents with a several month history of progressively worsening intermittent dysphagia of solids but not liquids. He describes solid food sometimes sticking and points to his upp   • KNEE ACL RECONSTRUCTION Right 02/11/2013 02/11/2013--knee arthroscopy with anterior cruciate ligament repair   • TONSILLECTOMY  2009 2009--tonsillectomy as an adult.         Allergies   Allergen Reactions   • Latex Itching           Current Outpatient Medications:   •  amLODIPine-benazepril (LOTREL 5-20) 5-20 MG per capsule, TAKE 1 CAPSULE BY MOUTH ONE TIME A DAY , Disp: 30 capsule, Rfl: 4  •  Canagliflozin (INVOKANA)  300 MG tablet, Take 300 mg by mouth Daily., Disp: 30 tablet, Rfl: 2  •  Chlorcyclizine-Pseudoephed (STAHIST AD) 25-60 MG tablet, 1 by mouth every 6 hours as needed for congestion and allergies, not greater than 3 in 24 hours, Disp: 60 tablet, Rfl: 2  •  Cholecalciferol (VITAMIN D3) 5000 UNITS capsule capsule, Take 1 capsule by mouth daily., Disp: , Rfl:   •  esomeprazole (nexIUM) 40 MG capsule, TAKE 1 CAPSULE BY MOUTH ONE TIME A DAY , Disp: 30 capsule, Rfl: 9  •  ezetimibe (ZETIA) 10 MG tablet, TAKE 1 TABLET BY MOUTH ONE TIME A DAY , Disp: 30 tablet, Rfl: 3  •  fluconazole (DIFLUCAN) 200 MG tablet, TAKE 1 TABLET BY MOUTH ONE TIME A DAY UNTIL GONE, Disp: 7 tablet, Rfl: 0  •  glimepiride (AMARYL) 4 MG tablet, Take 4 mg by mouth 2 (Two) Times a Day., Disp: , Rfl:   •  Insulin Glargine (BASAGLAR KWIKPEN) 100 UNIT/ML injection pen, inject 80 units subcutaenoulsy once a day, Disp: 30 mL, Rfl: 5  •  Loratadine (CLARITIN PO), Take 1 capsule by mouth daily as needed (when necessary for allergies.)., Disp: , Rfl:   •  metFORMIN (GLUCOPHAGE) 1000 MG tablet, TAKE 1 TABLET BY MOUTH TWO TIMES A DAY WITH MEALS, Disp: 180 tablet, Rfl: 1  •  montelukast (SINGULAIR) 10 MG tablet, TAKE 1 TABLET BY MOUTH ONE TIME A DAY , Disp: 30 tablet, Rfl: 4  •  phentermine (ADIPEX-P) 37.5 MG tablet, TAKE 1 TABLET BY MOUTH IN THE MORNING BEFORE BREAKFAST , Disp: 30 tablet, Rfl: 2  •  polyethylene glycol (MIRALAX) packet, Take orally as directed daily for constipation, Disp: , Rfl:   •  Probiotic capsule, 1 by mouth daily, Disp: , Rfl:   •  rosuvastatin (CRESTOR) 40 MG tablet, TAKE 1 TABLET BY MOUTH AT BEDTIME , Disp: 30 tablet, Rfl: 5  •  sertraline (ZOLOFT) 50 MG tablet, TAKE 1 TABLET BY MOUTH ONE TIME A DAY , Disp: 30 tablet, Rfl: 4  •  TRULICITY 1.5 MG/0.5ML solution pen-injector, inject 1.5mg under skin once weekly as directed, Disp: 2 mL, Rfl: 9  •  umeclidinium-vilanterol (ANORO ELLIPTA) 62.5-25 MCG/INH aerosol powder  inhaler, Inhale once  "daily as directed, Disp: 60 each, Rfl: 6      Family History   Problem Relation Age of Onset   • Diabetes Mother    • Stomach cancer Mother    • Diabetes Maternal Grandmother          Social History     Socioeconomic History   • Marital status:      Spouse name: Luis    • Number of children: 2   • Years of education: 16   • Highest education level: Bachelor's degree (e.g., BA, AB, BS)   Occupational History   • Occupation:  for [employer])   Social Needs   • Financial resource strain: Not hard at all   • Food insecurity:     Worry: Never true     Inability: Never true   • Transportation needs:     Medical: No     Non-medical: No   Tobacco Use   • Smoking status: Never Smoker   • Smokeless tobacco: Never Used   Substance and Sexual Activity   • Alcohol use: Yes     Frequency: Monthly or less     Drinks per session: 1 or 2     Binge frequency: Never   • Drug use: No   • Sexual activity: Yes     Partners: Female   Lifestyle   • Physical activity:     Days per week: 5 days     Minutes per session: 60 min   • Stress: Only a little   Relationships   • Social connections:     Talks on phone: More than three times a week     Gets together: More than three times a week     Attends Baptism service: More than 4 times per year     Active member of club or organization: Yes     Attends meetings of clubs or organizations: More than 4 times per year     Relationship status:          Vitals:    04/12/19 0950   BP: 130/80   Pulse: 97   SpO2: 98%   Weight: 88.9 kg (196 lb)   Height: 167.4 cm (65.91\")          Physical Exam:    General: Alert and oriented x 3.  No acute distress.  Normal affect.  HEENT: Pupils equal, round, reactive to light; extraocular movements intact; sclerae nonicteric; pharynx, ear canals and TMs normal.  Neck: Without JVD, thyromegaly, bruit, or adenopathy.  Lungs: Clear to auscultation in all fields.  Heart: Regular rate and rhythm without murmur, rub, gallop, " or click.  Abdomen: Soft, nontender, without hepatosplenomegaly or hernia.  Bowel sounds normal.  : Deferred.  Rectal: Deferred.  Extremities: Without clubbing, cyanosis, edema, or pulse deficit.  Neurologic: Intact without focal deficit.  Normal station and gait observed during ingress and egress from the examination room.  Skin: Without significant lesion.  Musculoskeletal: Unremarkable.    Lab/other results:      Assessment/Plan:     Diagnosis Plan   1. Abnormal weight gain     2. Non morbid obesity     3. Type 2 diabetes mellitus without complication, without long-term current use of insulin (CMS/[facility])         April 12, 2019--current weight is 196 pounds a 2 pound weight loss.  Patient has lost only 11 pounds since August of last year with the phentermine.  Progress is very slow and is nearly to the point that we may not be able to justify the use of phentermine although I am not totally ready to withdrawal that at this time.  I discussed with patient the keto diet/Calderon diet and have strongly recommended this.  I think it would be reasonable for him to go on the induction phase of the Calderon diet and instead of staying on this for 2 weeks, he can stay on it much longer.  Muscle cramps can sometimes be an issue as can constipation but we have ways to deal with this.  I will not stop the phentermine at this time but we will reassess at his follow-up in a little more than 2 months towards the end of June.  If there has not been sufficient progress at that time then we will definitely have to consider discontinuation of the phentermine.      Procedures

## 2019-05-07 RX ORDER — CANAGLIFLOZIN 300 MG/1
TABLET, FILM COATED ORAL
Qty: 30 TABLET | Refills: 1 | Status: SHIPPED | OUTPATIENT
Start: 2019-05-07 | End: 2019-07-06 | Stop reason: SDUPTHER

## 2019-05-14 DIAGNOSIS — E66.09 NON MORBID OBESITY DUE TO EXCESS CALORIES: Chronic | ICD-10-CM

## 2019-05-15 RX ORDER — PHENTERMINE HYDROCHLORIDE 37.5 MG/1
TABLET ORAL
Qty: 30 TABLET | Refills: 1 | Status: SHIPPED | OUTPATIENT
Start: 2019-05-15 | End: 2019-07-25 | Stop reason: SDUPTHER

## 2019-06-07 ENCOUNTER — HOSPITAL ENCOUNTER (OUTPATIENT)
Dept: GENERAL RADIOLOGY | Facility: HOSPITAL | Age: 54
Discharge: HOME | End: 2019-06-07
Admitting: INTERNAL MEDICINE

## 2019-06-07 ENCOUNTER — OFFICE VISIT (OUTPATIENT)
Dept: INTERNAL MEDICINE | Facility: CLINIC | Age: 54
End: 2019-06-07

## 2019-06-07 VITALS
SYSTOLIC BLOOD PRESSURE: 140 MMHG | DIASTOLIC BLOOD PRESSURE: 80 MMHG | WEIGHT: 199 LBS | BODY MASS INDEX: 31.98 KG/M2 | HEIGHT: 66 IN | OXYGEN SATURATION: 99 % | TEMPERATURE: 98.6 F | HEART RATE: 97 BPM

## 2019-06-07 DIAGNOSIS — M25.552 ACUTE HIP PAIN, LEFT: Primary | ICD-10-CM

## 2019-06-07 DIAGNOSIS — M25.552 ACUTE HIP PAIN, LEFT: ICD-10-CM

## 2019-06-07 PROCEDURE — 73502 X-RAY EXAM HIP UNI 2-3 VIEWS: CPT

## 2019-06-07 PROCEDURE — 99214 OFFICE O/P EST MOD 30 MIN: CPT | Performed by: INTERNAL MEDICINE

## 2019-06-07 RX ORDER — NAPROXEN 500 MG/1
TABLET ORAL
Qty: 30 TABLET | Refills: 0 | Status: SHIPPED | OUTPATIENT
Start: 2019-06-07 | End: 2020-02-20

## 2019-06-07 NOTE — PROGRESS NOTES
06/07/2019    Patient Information  Marek Diego                                                                                          [id number] [facility] [id number]      1965  [unfilled]  There is no work phone number on file.    Chief Complaint:     Complaining of left hip pain.    History of Present Illness:    Patient with a history of type 2 diabetes, microalbuminuria, environmental allergies/moderate persistent asthma, hypogonadism, hyperlipidemia, esophageal reflux, hypertension.  He presents today with complaints of left hip pain as described below.  Past medical history reviewed and updated were necessary including health maintenance parameters.  This reveals he is up-to-date with the exception of diabetic eye exam which I encouraged him to obtain in the near future.    The history regarding acute left hip pain:    June 7, 2019--patient reports he was squatting down working on the brakes of his car and when he stood up something in his hip popped and he developed sudden pain which has persisted.  This occurred about 5 days ago.  He thought it was getting better 2 days ago but subsequently has returned just as bad or even works.  No significant pain at rest but it hurts when he gets up to move about and bear weight.  He has to walk with a limp.  Examination of the hip reveals no obvious deformity.  There is fairly good range of motion but with obvious discomfort.  Patient may have partially dislocated the left hip and it snapped back in place or perhaps she could have torn some acetabular labral cartilage.  Plan is to obtain x-ray to rule out any bony fractures.  Naproxen 500 mg p.o. twice daily.  I recommended the patient take it regularly for the next week or so.  He should avoid any activities that tend to aggravate the pain.  I will have him follow-up on the following Monday regarding the x-ray results and possible further instructions.  If his symptoms persist then he  may need an MRI of the hip.  Also we could consider physical therapy if his symptoms persist.    Review of Systems   Constitution: Negative.   HENT: Negative.    Eyes: Negative.    Cardiovascular: Negative.    Respiratory: Negative.    Endocrine: Negative.    Hematologic/Lymphatic: Negative.    Skin: Negative.    Musculoskeletal: Negative.         Left hip pain   Gastrointestinal: Negative.    Genitourinary: Negative.    Neurological: Negative.    Psychiatric/Behavioral: Negative.    Allergic/Immunologic: Negative.        Active Problems:    Patient Active Problem List   Diagnosis   • Allergic rhinitis   • Benign essential hypertension   • Chronic neck pain   • Depression with anxiety   • Gastroesophageal reflux disease with esophagitis   • Hyperlipidemia   • Hypogonadism male, no TRT.   • Microalbuminuria   • Moderate persistent asthma   • Multiple environmental allergies   • Non morbid obesity   • Type 2 diabetes mellitus (CMS/[facility])   • Vitamin D deficiency   • Routine physical examination   • Therapeutic drug monitoring   • Right bundle branch block   • Diabetic eye exam (CMS/HCC)   • Diabetic foot exam   • Chronic constipation   • Snoring   • Nocturnal hypoxemia   • History of colon polyps, 11/29/2017--tubular adenoma times one.  Hyperplastic ×1.  Repeat 5 years.   • Subclinical hypothyroidism   • Thyroid nodule   • Abnormal weight gain   • Acute hip pain, left         Past Medical History:   Diagnosis Date   • Allergic rhinitis 10/24/2013    10/24/2013--skin testing revealed impressive reactions to grass following, tree pollen, weed pollen, ragweed, dust mite, and cat. Recommend immunotherapy.   • Benign essential hypertension 2/22/2016   • Chronic constipation 11/20/2017 11/20/2017--patient reports approximately 8 month history of intermittent constipation that at times can last as much as a month.  He notes his blood sugar readings tend to increase when this happens.  He is scheduled for colonoscopy next  week.  I recommend a generic probiotic daily as well as MiraLAX and adjust as needed.   • Chronic neck pain 10/30/2015    12/19/2015--patient reports his left shoulder pain has resolved. He continues to have neck pain. X-ray of the cervical spine ordered. Physical therapy ordered.   11/10/2015--left shoulder injected with 80 mg of Depo-Medrol with 3 mL of Xylocaine.   10/30/2015--patient presents with at least a one-month history of intermittent left-sided neck pain that is associated with left shoulder pain as well. The pain is described as shooting and is 8 out of 10 intensity at its worst. The pain is worse with certain movements of his head and left shoulder. No trauma. No numbness or paresthesias.   • Depression with anxiety 2/22/2016   • Diabetic eye exam (CMS/[facility]) 5/27/2016    May 2016--patient reports a normal diabetic eye exam.   • Diabetic foot exam 4/27/2017 05/02/2017--routine diabetic foot exam reveals no evidence of diabetic foot ulcer or pre-ulcerative callus.  Pulses are palpable and there is no signs of ischemia.  Sensation subjectively intact.   • Gastroesophageal reflux disease with esophagitis 5/22/2015 08/12/2015--EGD revealed esophagitis and a distal esophageal stricture that was dilated. Small sliding hiatal hernia. Proximal gastric ulcer and gastritis present. Dysphagia resolved after dilatation. Pathology of the gastric antrum was benign with no significant histologic abnormality. Distal esophagus biopsy negative for intestinal metaplasia or dysplasia.   05/22/2015--patient presents with a several month history of progressively worsening intermittent dysphagia of solids but not liquids. He describes solid food sometimes sticking and points to his upper chest/lower throat. No other associated symptoms. Patient referred to Dr. Aime Parada for an EGD. This is negative, may need ENT evaluation.   • History of colon polyps, 11/29/2017--tubular adenoma times one.  Hyperplastic ×1.  Repeat 5  years. 12/18/2017 11/29/2017--colonoscopy revealed 2 small (3 mm) polyps in the sigmoid colon and cecum.  Removed.  Bowel prep.  Sigmoid diverticuli noted.  No hemorrhoids.  Pathology returned hyperplastic polyp of the sigmoid and the cecal polyp was a tubular adenoma.   • HIstory of eosinophilic gastroenteritis 1990s    1990s--patient had significant epigastric discomfort and workup including an EGD revealed eosinophilic gastroenteritis that was treated with prednisone and resulted in resolution.   • History of esophageal stricture 08/12/2015 08/12/2015--EGD revealed esophagitis and a distal esophageal stricture that was dilated. Small sliding hiatal hernia. Proximal gastric ulcer and gastritis present. Dysphagia resolved after dilatation. Pathology of the gastric antrum was benign with no significant histologic abnormality. Distal esophagus biopsy negative for intestinal metaplasia or dysplasia.   05/22/2015--patient presents with a s   • HIstory of Pulmonary hypertension, 04/23/2014--resolved after PFO/ASD closure. 04/23/2014 04/23/2014--echocardiogram reveals normal left ventricular systolic function with ejection fraction 55%. Left ventricular wall motion is normal. The right ventricle is moderately to severely dilated. Right ventricular systolic function is mildly reduced. The right atrium is moderately dilated. Saline contrast study revealed no evidence of PFO/ASD. Status post PFO closure. There is trace mitral reg   • History of Pulmonary nodule, 03/07/2016--5 mm indeterminate nodule right lower lobe.  09/13/2016--consistent with calcified granuloma. 3/7/2016    09/13/2016--CT scan of the chest, abdomen, and pelvis with contrast reveals no lymphadenopathy within the abdomen or pelvis.  Mild hepatic steatosis.  Previously noted right lower lobe pulmonary nodule is currently calcified and consistent with a calcified granuloma.  03/07/2016--CT scan of the abdomen performed for complaints of abdominal  discomfort revealed a 5 mm nodular focus right lower lobe which is indeterminate.  Recommend repeat CT scan in 6 months.   • HIstory of repair of Congenital atrial septal defect 03/2012 March 2012--percutaneous closure of secundum ASD.   • Hyperlipidemia 2/22/2016   • Hypogonadism male, no TRT. 12/26/2012 12/26/2012--treatment for hypogonadism begun.   • Microalbuminuria 10/19/2014    10/19/2014--urine microalbumin mildly elevated at 27.8. Normal range 0.0--17.0.   • Moderate persistent asthma 2/22/2016    Seasonal, spring and fall.   • Multiple environmental allergies 10/24/2013    10/24/2013--skin testing revealed impressive reactions to grass following, tree pollen, weed pollen, ragweed, dust mite, and cat. Recommend immunotherapy.   • Non morbid obesity 2/22/2016   • Right bundle branch block     ECG reveals sinus rhythm, rate of 75, right bundle branch block, left anterior hemiblock   • Subclinical hypothyroidism 8/17/2018 08/27/2018--thyroid ultrasound reveals subcentimeter nodule in the right thyroid lobe.  Possibly cystic.  There is a question of an ill-defined 1 cm nodular lesion posteriorly in the mid left lateral thyroid.  Appearance could be technical in origin.  Recommend repeat thyroid ultrasound in 6 months.  08/17/2018--routine follow-up.  TSH mildly elevated at 4.49.  Free T3 and free T4 are normal.  Rep   • Thyroid nodule 10/5/2018    08/27/2018--thyroid ultrasound reveals subcentimeter nodule in the right thyroid lobe.  Possibly cystic.  There is a question of an ill-defined 1 cm nodular lesion posteriorly in the mid left lateral thyroid.  Appearance could be technical in origin.  Recommend repeat thyroid ultrasound in 6 months.  08/17/2018--routine follow-up.  TSH mildly elevated at 4.49.  Free T3 and free T4 are normal.  Repeat thyroid function tests ordered and returned normal.  Thyroid ultrasound ordered.   • Type 2 diabetes mellitus (CMS/HCC) 1/1/2004    2004--initial diagnosis of  type 2 diabetes.   • Vitamin D deficiency 2/22/2016         Past Surgical History:   Procedure Laterality Date   • ATRIAL SEPTAL DEFECT REPAIR  03/2012 March 2012--percutaneous closure of secundum ASD.  Dr. Mcpherson   • COLONOSCOPY N/A 11/29/2017 11/29/2017--colonoscopy revealed 2 small (3 mm) polyps in the sigmoid colon and cecum.  Removed.  Bowel prep.  Sigmoid diverticuli noted.  No hemorrhoids.  Pathology returned hyperplastic polyp of the sigmoid and the cecal polyp was a tubular adenoma.   • ESOPHAGEAL DILATATION  08/12/2015 08/12/2015--EGD revealed esophagitis and a distal esophageal stricture that was dilated. Small sliding hiatal hernia. Proximal gastric ulcer and gastritis present. Dysphagia resolved after dilatation. 05/22/2015--patient presents with a several month history of progressively worsening intermittent dysphagia of solids but not liquids. He describes solid food sometimes sticking and points to his upp   • ESOPHAGOSCOPY / EGD  08/12/2015 08/12/2015--EGD revealed esophagitis and a distal esophageal stricture that was dilated. Small sliding hiatal hernia. Proximal gastric ulcer and gastritis present. Dysphagia resolved after dilatation. 05/22/2015--patient presents with a several month history of progressively worsening intermittent dysphagia of solids but not liquids. He describes solid food sometimes sticking and points to his upp   • KNEE ACL RECONSTRUCTION Right 02/11/2013 02/11/2013--knee arthroscopy with anterior cruciate ligament repair   • TONSILLECTOMY  2009 2009--tonsillectomy as an adult.         Allergies   Allergen Reactions   • Latex Itching           Current Outpatient Medications:   •  amLODIPine-benazepril (LOTREL 5-20) 5-20 MG per capsule, TAKE 1 CAPSULE BY MOUTH ONE TIME A DAY , Disp: 30 capsule, Rfl: 4  •  Chlorcyclizine-Pseudoephed (STAHIST AD) 25-60 MG tablet, 1 by mouth every 6 hours as needed for congestion and allergies, not greater than 3 in 24 hours, Disp:  60 tablet, Rfl: 2  •  Cholecalciferol (VITAMIN D3) 5000 UNITS capsule capsule, Take 1 capsule by mouth daily., Disp: , Rfl:   •  esomeprazole (nexIUM) 40 MG capsule, TAKE 1 CAPSULE BY MOUTH ONE TIME A DAY , Disp: 30 capsule, Rfl: 9  •  ezetimibe (ZETIA) 10 MG tablet, TAKE 1 TABLET BY MOUTH ONE TIME A DAY , Disp: 30 tablet, Rfl: 3  •  glimepiride (AMARYL) 4 MG tablet, Take 4 mg by mouth 2 (Two) Times a Day., Disp: , Rfl:   •  Insulin Glargine (BASAGLAR KWIKPEN) 100 UNIT/ML injection pen, inject 80 units subcutaenoulsy once a day, Disp: 30 mL, Rfl: 5  •  INVOKANA 300 MG tablet, TAKE 1 TABLET BY MOUTH ONE TIME A DAY , Disp: 30 tablet, Rfl: 1  •  Loratadine (CLARITIN PO), Take 1 capsule by mouth daily as needed (when necessary for allergies.)., Disp: , Rfl:   •  metFORMIN (GLUCOPHAGE) 1000 MG tablet, TAKE 1 TABLET BY MOUTH TWO TIMES A DAY WITH MEALS, Disp: 180 tablet, Rfl: 1  •  montelukast (SINGULAIR) 10 MG tablet, TAKE 1 TABLET BY MOUTH ONE TIME A DAY , Disp: 30 tablet, Rfl: 4  •  phentermine (ADIPEX-P) 37.5 MG tablet, TAKE 1 TABLET BY MOUTH ONE TIME A DAY , Disp: 30 tablet, Rfl: 1  •  Probiotic capsule, 1 by mouth daily, Disp: , Rfl:   •  rosuvastatin (CRESTOR) 40 MG tablet, TAKE 1 TABLET BY MOUTH AT BEDTIME , Disp: 30 tablet, Rfl: 5  •  sertraline (ZOLOFT) 50 MG tablet, TAKE 1 TABLET BY MOUTH ONE TIME A DAY , Disp: 30 tablet, Rfl: 4  •  TRULICITY 1.5 MG/0.5ML solution pen-injector, inject 1.5mg under skin once weekly as directed, Disp: 2 mL, Rfl: 9  •  fluconazole (DIFLUCAN) 200 MG tablet, TAKE 1 TABLET BY MOUTH ONE TIME A DAY UNTIL GONE, Disp: 7 tablet, Rfl: 0  •  polyethylene glycol (MIRALAX) packet, Take orally as directed daily for constipation, Disp: , Rfl:   •  umeclidinium-vilanterol (ANORO ELLIPTA) 62.5-25 MCG/INH aerosol powder  inhaler, Inhale once daily as directed, Disp: 60 each, Rfl: 6      Family History   Problem Relation Age of Onset   • Diabetes Mother    • Stomach cancer Mother    • Diabetes  "Maternal Grandmother          Social History     Socioeconomic History   • Marital status:      Spouse name: Luis    • Number of children: 2   • Years of education: 16   • Highest education level: Bachelor's degree (e.g., BA, AB, BS)   Occupational History   • Occupation:  for [employer])   Social Needs   • Financial resource strain: Not hard at all   • Food insecurity:     Worry: Never true     Inability: Never true   • Transportation needs:     Medical: No     Non-medical: No   Tobacco Use   • Smoking status: Never Smoker   • Smokeless tobacco: Never Used   Substance and Sexual Activity   • Alcohol use: Yes     Frequency: Monthly or less     Drinks per session: 1 or 2     Binge frequency: Never   • Drug use: No   • Sexual activity: Yes     Partners: Female   Lifestyle   • Physical activity:     Days per week: 5 days     Minutes per session: 60 min   • Stress: Only a little   Relationships   • Social connections:     Talks on phone: More than three times a week     Gets together: More than three times a week     Attends [employer] day service: More than 4 times per year     Active member of club or organization: Yes     Attends meetings of clubs or organizations: More than 4 times per year     Relationship status:          Vitals:    06/07/19 1532   BP: 140/80   BP Location: Left arm   Patient Position: Sitting   Cuff Size: Small Adult   Pulse: 97   Temp: 98.6 °F (37 °C)   TempSrc: Oral   SpO2: 99%   Weight: 90.3 kg (199 lb)   Height: 167.4 cm (65.91\")          Physical Exam:     Obese. Brief general physical examination is unremarkable.  Examination of the left hip reveals fairly good range of motion although there is some obvious pain with certain movements.  No deformity noted.  Patient is ambulatory.    Lab/other results:      Assessment/Plan:     Diagnosis Plan   1. Acute hip pain, left       Patient presents with acute left hip pain as described above.  I doubt the " patient has any fracture but I do think an x-ray is indicated.  I think patient may have partially dislocated his hip and it is obviously back in place now.  Hopefully he has not torn labral cartilage.  Time will tell.    Plan is as follows: X-ray of the left hip ordered.  Patient will follow-up on the phone for the results.  Start prescription strength Naprosyn 500 mg p.o. twice daily.  Patient may need MRI if symptoms persist.  Of asked him to avoid any sort of activity that causes undue pain.        Procedures

## 2019-06-20 DIAGNOSIS — R63.5 ABNORMAL WEIGHT GAIN: Chronic | ICD-10-CM

## 2019-06-20 DIAGNOSIS — Z51.81 THERAPEUTIC DRUG MONITORING: ICD-10-CM

## 2019-06-20 DIAGNOSIS — F41.8 DEPRESSION WITH ANXIETY: Chronic | ICD-10-CM

## 2019-06-20 DIAGNOSIS — E78.49 OTHER HYPERLIPIDEMIA: Chronic | ICD-10-CM

## 2019-06-20 DIAGNOSIS — E55.9 VITAMIN D DEFICIENCY: Chronic | ICD-10-CM

## 2019-06-20 DIAGNOSIS — E11.9 TYPE 2 DIABETES MELLITUS WITHOUT COMPLICATION, WITHOUT LONG-TERM CURRENT USE OF INSULIN (HCC): Chronic | ICD-10-CM

## 2019-06-20 DIAGNOSIS — E03.8 SUBCLINICAL HYPOTHYROIDISM: Chronic | ICD-10-CM

## 2019-06-20 DIAGNOSIS — E78.49 OTHER HYPERLIPIDEMIA: Primary | Chronic | ICD-10-CM

## 2019-06-20 DIAGNOSIS — Z00.00 ROUTINE PHYSICAL EXAMINATION: ICD-10-CM

## 2019-06-20 DIAGNOSIS — R80.9 MICROALBUMINURIA: Chronic | ICD-10-CM

## 2019-06-20 DIAGNOSIS — E29.1 HYPOGONADISM MALE: Chronic | ICD-10-CM

## 2019-06-21 LAB
25(OH)D3+25(OH)D2 SERPL-MCNC: 33.5 NG/ML (ref 30–100)
ALBUMIN SERPL-MCNC: 4.6 G/DL (ref 3.5–5.2)
ALBUMIN/CREAT UR: 12.3 MG/G CREAT (ref 0–30)
ALBUMIN/GLOB SERPL: 2.9 G/DL
ALP SERPL-CCNC: 81 U/L (ref 39–117)
ALT SERPL-CCNC: 31 U/L (ref 1–41)
AST SERPL-CCNC: 20 U/L (ref 1–40)
BILIRUB SERPL-MCNC: 0.2 MG/DL (ref 0.2–1.2)
BUN SERPL-MCNC: 12 MG/DL (ref 6–20)
BUN/CREAT SERPL: 14 (ref 7–25)
CALCIUM SERPL-MCNC: 9.1 MG/DL (ref 8.6–10.5)
CHLORIDE SERPL-SCNC: 104 MMOL/L (ref 98–107)
CHOLEST SERPL-MCNC: 117 MG/DL (ref 100–199)
CK SERPL-CCNC: 107 U/L (ref 20–200)
CO2 SERPL-SCNC: 28 MMOL/L (ref 22–29)
CREAT SERPL-MCNC: 0.86 MG/DL (ref 0.76–1.27)
CREAT UR-MCNC: 58.4 MG/DL
ERYTHROCYTE [DISTWIDTH] IN BLOOD BY AUTOMATED COUNT: 13.9 % (ref 12.3–15.4)
GLOBULIN SER CALC-MCNC: 1.6 GM/DL
GLUCOSE SERPL-MCNC: 163 MG/DL (ref 65–99)
HBA1C MFR BLD: 9.2 % (ref 4.8–5.6)
HCT VFR BLD AUTO: 42.8 % (ref 37.5–51)
HDL SERPL-SCNC: 23.7 UMOL/L
HDLC SERPL-MCNC: 31 MG/DL
HGB BLD-MCNC: 13.6 G/DL (ref 13–17.7)
LDL SERPL QN: 19.7 NM
LDL SERPL-SCNC: 1020 NMOL/L
LDL SMALL SERPL-SCNC: 820 NMOL/L
LDLC SERPL CALC-MCNC: 55 MG/DL (ref 0–99)
MCH RBC QN AUTO: 28.6 PG (ref 26.6–33)
MCHC RBC AUTO-ENTMCNC: 31.8 G/DL (ref 31.5–35.7)
MCV RBC AUTO: 90.1 FL (ref 79–97)
MICROALBUMIN UR-MCNC: 7.2 UG/ML
PLATELET # BLD AUTO: 285 10*3/MM3 (ref 140–450)
POTASSIUM SERPL-SCNC: 4.7 MMOL/L (ref 3.5–5.2)
PROT SERPL-MCNC: 6.2 G/DL (ref 6–8.5)
PSA SERPL-MCNC: 0.23 NG/ML (ref 0–4)
RBC # BLD AUTO: 4.75 10*6/MM3 (ref 4.14–5.8)
SODIUM SERPL-SCNC: 143 MMOL/L (ref 136–145)
T3FREE SERPL-MCNC: 3.2 PG/ML (ref 2–4.4)
T4 FREE SERPL-MCNC: 1.08 NG/DL (ref 0.93–1.7)
TESTOST SERPL-MCNC: 230 NG/DL (ref 264–916)
TRIGL SERPL-MCNC: 153 MG/DL (ref 0–149)
TSH SERPL DL<=0.005 MIU/L-ACNC: 2.54 MIU/ML (ref 0.27–4.2)
WBC # BLD AUTO: 7.41 10*3/MM3 (ref 3.4–10.8)

## 2019-06-26 DIAGNOSIS — B37.42 CANDIDAL BALANITIS: ICD-10-CM

## 2019-06-26 RX ORDER — FLUCONAZOLE 200 MG/1
TABLET ORAL
Qty: 7 TABLET | Refills: 0 | Status: SHIPPED | OUTPATIENT
Start: 2019-06-26 | End: 2019-09-06

## 2019-07-08 RX ORDER — CANAGLIFLOZIN 300 MG/1
TABLET, FILM COATED ORAL
Qty: 30 TABLET | Refills: 3 | Status: SHIPPED | OUTPATIENT
Start: 2019-07-08 | End: 2019-11-07 | Stop reason: SDUPTHER

## 2019-07-08 RX ORDER — MONTELUKAST SODIUM 10 MG/1
TABLET ORAL
Qty: 30 TABLET | Refills: 3 | Status: SHIPPED | OUTPATIENT
Start: 2019-07-08 | End: 2019-11-07 | Stop reason: SDUPTHER

## 2019-07-25 ENCOUNTER — OFFICE VISIT (OUTPATIENT)
Dept: INTERNAL MEDICINE | Facility: CLINIC | Age: 54
End: 2019-07-25

## 2019-07-25 VITALS
BODY MASS INDEX: 31.82 KG/M2 | WEIGHT: 198 LBS | DIASTOLIC BLOOD PRESSURE: 70 MMHG | SYSTOLIC BLOOD PRESSURE: 138 MMHG | HEIGHT: 66 IN | HEART RATE: 92 BPM | OXYGEN SATURATION: 99 %

## 2019-07-25 DIAGNOSIS — R63.5 ABNORMAL WEIGHT GAIN: Chronic | ICD-10-CM

## 2019-07-25 DIAGNOSIS — M25.552 ACUTE HIP PAIN, LEFT: ICD-10-CM

## 2019-07-25 DIAGNOSIS — R80.9 MICROALBUMINURIA: Chronic | ICD-10-CM

## 2019-07-25 DIAGNOSIS — Z91.09 MULTIPLE ENVIRONMENTAL ALLERGIES: Chronic | ICD-10-CM

## 2019-07-25 DIAGNOSIS — Z86.010 HISTORY OF COLON POLYPS: Chronic | ICD-10-CM

## 2019-07-25 DIAGNOSIS — I10 BENIGN ESSENTIAL HYPERTENSION: Chronic | ICD-10-CM

## 2019-07-25 DIAGNOSIS — F41.8 DEPRESSION WITH ANXIETY: Chronic | ICD-10-CM

## 2019-07-25 DIAGNOSIS — E04.1 THYROID NODULE: Chronic | ICD-10-CM

## 2019-07-25 DIAGNOSIS — E55.9 VITAMIN D DEFICIENCY: Chronic | ICD-10-CM

## 2019-07-25 DIAGNOSIS — E78.49 OTHER HYPERLIPIDEMIA: Chronic | ICD-10-CM

## 2019-07-25 DIAGNOSIS — J45.40 MODERATE PERSISTENT ASTHMA WITHOUT COMPLICATION: Chronic | ICD-10-CM

## 2019-07-25 DIAGNOSIS — E03.8 SUBCLINICAL HYPOTHYROIDISM: Chronic | ICD-10-CM

## 2019-07-25 DIAGNOSIS — Z00.00 ROUTINE PHYSICAL EXAMINATION: ICD-10-CM

## 2019-07-25 DIAGNOSIS — E11.9 TYPE 2 DIABETES MELLITUS WITHOUT COMPLICATION, WITHOUT LONG-TERM CURRENT USE OF INSULIN (HCC): Primary | Chronic | ICD-10-CM

## 2019-07-25 DIAGNOSIS — K21.00 GASTROESOPHAGEAL REFLUX DISEASE WITH ESOPHAGITIS: Chronic | ICD-10-CM

## 2019-07-25 DIAGNOSIS — E66.09 NON MORBID OBESITY DUE TO EXCESS CALORIES: Chronic | ICD-10-CM

## 2019-07-25 PROCEDURE — 99214 OFFICE O/P EST MOD 30 MIN: CPT | Performed by: INTERNAL MEDICINE

## 2019-07-25 RX ORDER — PHENTERMINE HYDROCHLORIDE 37.5 MG/1
TABLET ORAL
Qty: 30 TABLET | Refills: 1 | Status: SHIPPED | OUTPATIENT
Start: 2019-07-25 | End: 2019-09-06 | Stop reason: SDUPTHER

## 2019-07-25 NOTE — PROGRESS NOTES
07/25/2019    Patient Information  Marek Diego                                                                                          [address] [place] [address]      1965  [unfilled]  There is no work phone number on file.    Chief Complaint:     Follow-up type 2 diabetes, hyperlipidemia, microalbuminuria, moderate persistent asthma/environmental allergies, esophageal reflux, hypertension, depression with anxiety, history of colon polyps, subclinical hypothyroidism and thyroid nodule, recent complaints of acute pain of left hip, vitamin D deficiency, abnormal weight gain/nonmorbid obesity.  Patient reports she is left hip pain has resolved.    History of Present Illness:    Patient with a history of medical problems as outlined in chief complaint presents today for follow-up with lab prior in order to monitor his chronic medical issues.  Patient currently feels well and has no new acute complaints although his environmental allergies are bothering him, particularly his ears.  This is not anything new.  His past medical history reviewed and updated were necessary including health maintenance parameters.  This reveals he needs a diabetic eye exam and I encouraged him to do so.    The history regarding acute left hip pain:    July 25, 2019--patient reports his hip pain has resolved.  I again reviewed the x-rays which were essentially negative for hip pathology other than some calcification as noted.    June 7, 2019--patient reports he was squatting down working on the brakes of his car and when he stood up something in his hip popped and he developed sudden pain which has persisted.  This occurred about 5 days ago.  He thought it was getting better 2 days ago but subsequently has returned just as bad or even works.  No significant pain at rest but it hurts when he gets up to move about and bear weight.  He has to walk with a limp.  Examination of the hip reveals no obvious  deformity.  There is fairly good range of motion but with obvious discomfort.  Patient may have partially dislocated the left hip and it snapped back in place or perhaps she could have torn some acetabular labral cartilage.  Plan is to obtain x-ray to rule out any bony fractures.  Naproxen 500 mg p.o. twice daily.  I recommended the patient take it regularly for the next week or so.  He should avoid any activities that tend to aggravate the pain.  I will have him follow-up on the following Monday regarding the x-ray results and possible further instructions.  If his symptoms persist then he may need an MRI of the hip.  Also we could consider physical therapy if his symptoms persist.    The history regarding nonmorbid obesity/abnormal weight gain and medical management is as follows:    July 25, 2019--current weight is 198 pounds which represents about a 1 pound weight loss from June 7, 2019.  Patient reports she has not been taking the phentermine for at least a couple of weeks.  I think it is likely good that he has had a drug holiday.  Hopefully his medication will be more effective.  Patient has noticed increased appetite since being off of the medication.  I explained to him that it is getting somewhat difficult to justify continuing use of phentermine unless he begins to have significant weight loss using this medication.  Patient understands.  We will reassess the situation in about 6 weeks.  He can go ahead and restart the phentermine now.    April 12, 2019--current weight is 196 pounds a 2 pound weight loss.  Patient has lost only 11 pounds since August of last year with the phentermine.  Progress is very slow and is nearly to the point that we may not be able to justify the use of phentermine although I am not totally ready to withdrawal that at this time.  I discussed with patient the keto diet/Calderon diet and have strongly recommended this.  I think it would be reasonable for him to go on the induction  phase of the Calderon diet and instead of staying on this for 2 weeks, he can stay on it much longer.  Muscle cramps can sometimes be an issue as can constipation but we have ways to deal with this.  I will not stop the phentermine at this time but we will reassess at his follow-up in a little more than 2 months towards the end of June.  If there has not been sufficient progress at that time then we will definitely have to consider discontinuation of the phentermine.    11/19/2018--current weight is 198 pounds representing a 2 pound weight loss.  Encouraged patient to work harder.    10/05/2018--patient seen in follow-up and current weight is down 7 pounds at 200 pounds.  Patient could not tolerate the Trokendi XR.  He seems to be tolerating the phentermine.  Continue current regimen and reassess in about 6 weeks.    08/17/2018--patient is a long history of problems with abnormal weight gain/obesity.  He has multiple comorbidities justify medical management.  Current weight is 207 pounds.  Phentermine one by mouth daily along with Trokendi XR initiated.    Review of Systems   Constitution: Positive for weight gain.   HENT: Negative.    Eyes: Negative.    Cardiovascular: Negative.    Respiratory: Negative.    Endocrine: Negative.    Hematologic/Lymphatic: Negative.    Skin: Negative.    Musculoskeletal: Negative.    Gastrointestinal: Negative.    Genitourinary: Negative.    Neurological: Negative.    Psychiatric/Behavioral: Negative.    Allergic/Immunologic: Positive for environmental allergies.       Active Problems:    Patient Active Problem List   Diagnosis   • Allergic rhinitis   • Benign essential hypertension   • Chronic neck pain   • Depression with anxiety   • Gastroesophageal reflux disease with esophagitis   • Hyperlipidemia   • Hypogonadism male, no TRT.   • Microalbuminuria   • Moderate persistent asthma   • Multiple environmental allergies   • Non morbid obesity   • Type 2 diabetes mellitus (CMS/[facility])   •  Vitamin D deficiency   • Routine physical examination   • Therapeutic drug monitoring   • Right bundle branch block   • Diabetic eye exam (CMS/[facility])   • Diabetic foot exam   • Chronic constipation   • Snoring   • Nocturnal hypoxemia   • History of colon polyps, 11/29/2017--tubular adenoma times one.  Hyperplastic ×1.  Repeat 5 years.   • Subclinical hypothyroidism   • Thyroid nodule   • Abnormal weight gain   • Acute hip pain, left         Past Medical History:   Diagnosis Date   • Allergic rhinitis 10/24/2013    10/24/2013--skin testing revealed impressive reactions to grass following, tree pollen, weed pollen, ragweed, dust mite, and cat. Recommend immunotherapy.   • Benign essential hypertension 2/22/2016   • Chronic constipation 11/20/2017 11/20/2017--patient reports approximately 8 month history of intermittent constipation that at times can last as much as a month.  He notes his blood sugar readings tend to increase when this happens.  He is scheduled for colonoscopy next week.  I recommend a generic probiotic daily as well as MiraLAX and adjust as needed.   • Chronic neck pain 10/30/2015    12/19/2015--patient reports his left shoulder pain has resolved. He continues to have neck pain. X-ray of the cervical spine ordered. Physical therapy ordered.   11/10/2015--left shoulder injected with 80 mg of Depo-Medrol with 3 mL of Xylocaine.   10/30/2015--patient presents with at least a one-month history of intermittent left-sided neck pain that is associated with left shoulder pain as well. The pain is described as shooting and is 8 out of 10 intensity at its worst. The pain is worse with certain movements of his head and left shoulder. No trauma. No numbness or paresthesias.   • Depression with anxiety 2/22/2016   • Diabetic eye exam (CMS/[facility]) 5/27/2016    May 2016--patient reports a normal diabetic eye exam.   • Diabetic foot exam 4/27/2017 05/02/2017--routine diabetic foot exam reveals no evidence of  diabetic foot ulcer or pre-ulcerative callus.  Pulses are palpable and there is no signs of ischemia.  Sensation subjectively intact.   • Gastroesophageal reflux disease with esophagitis 5/22/2015 08/12/2015--EGD revealed esophagitis and a distal esophageal stricture that was dilated. Small sliding hiatal hernia. Proximal gastric ulcer and gastritis present. Dysphagia resolved after dilatation. Pathology of the gastric antrum was benign with no significant histologic abnormality. Distal esophagus biopsy negative for intestinal metaplasia or dysplasia.   05/22/2015--patient presents with a several month history of progressively worsening intermittent dysphagia of solids but not liquids. He describes solid food sometimes sticking and points to his upper chest/lower throat. No other associated symptoms. Patient referred to Dr. Aime Parada for an EGD. This is negative, may need ENT evaluation.   • History of colon polyps, 11/29/2017--tubular adenoma times one.  Hyperplastic ×1.  Repeat 5 years. 12/18/2017 11/29/2017--colonoscopy revealed 2 small (3 mm) polyps in the sigmoid colon and cecum.  Removed.  Bowel prep.  Sigmoid diverticuli noted.  No hemorrhoids.  Pathology returned hyperplastic polyp of the sigmoid and the cecal polyp was a tubular adenoma.   • HIstory of eosinophilic gastroenteritis 1990s    1990s--patient had significant epigastric discomfort and workup including an EGD revealed eosinophilic gastroenteritis that was treated with prednisone and resulted in resolution.   • History of esophageal stricture 08/12/2015 08/12/2015--EGD revealed esophagitis and a distal esophageal stricture that was dilated. Small sliding hiatal hernia. Proximal gastric ulcer and gastritis present. Dysphagia resolved after dilatation. Pathology of the gastric antrum was benign with no significant histologic abnormality. Distal esophagus biopsy negative for intestinal metaplasia or dysplasia.   05/22/2015--patient presents  with a s   • HIstory of Pulmonary hypertension, 04/23/2014--resolved after PFO/ASD closure. 04/23/2014 04/23/2014--echocardiogram reveals normal left ventricular systolic function with ejection fraction 55%. Left ventricular wall motion is normal. The right ventricle is moderately to severely dilated. Right ventricular systolic function is mildly reduced. The right atrium is moderately dilated. Saline contrast study revealed no evidence of PFO/ASD. Status post PFO closure. There is trace mitral reg   • History of Pulmonary nodule, 03/07/2016--5 mm indeterminate nodule right lower lobe.  09/13/2016--consistent with calcified granuloma. 3/7/2016    09/13/2016--CT scan of the chest, abdomen, and pelvis with contrast reveals no lymphadenopathy within the abdomen or pelvis.  Mild hepatic steatosis.  Previously noted right lower lobe pulmonary nodule is currently calcified and consistent with a calcified granuloma.  03/07/2016--CT scan of the abdomen performed for complaints of abdominal discomfort revealed a 5 mm nodular focus right lower lobe which is indeterminate.  Recommend repeat CT scan in 6 months.   • HIstory of repair of Congenital atrial septal defect 03/2012 March 2012--percutaneous closure of secundum ASD.   • Hyperlipidemia 2/22/2016   • Hypogonadism male, no TRT. 12/26/2012 12/26/2012--treatment for hypogonadism begun.   • Microalbuminuria 10/19/2014    10/19/2014--urine microalbumin mildly elevated at 27.8. Normal range 0.0--17.0.   • Moderate persistent asthma 2/22/2016    Seasonal, spring and fall.   • Multiple environmental allergies 10/24/2013    10/24/2013--skin testing revealed impressive reactions to grass following, tree pollen, weed pollen, ragweed, dust mite, and cat. Recommend immunotherapy.   • Non morbid obesity 2/22/2016   • Right bundle branch block     ECG reveals sinus rhythm, rate of 75, right bundle branch block, left anterior hemiblock   • Subclinical hypothyroidism 8/17/2018     08/27/2018--thyroid ultrasound reveals subcentimeter nodule in the right thyroid lobe.  Possibly cystic.  There is a question of an ill-defined 1 cm nodular lesion posteriorly in the mid left lateral thyroid.  Appearance could be technical in origin.  Recommend repeat thyroid ultrasound in 6 months.  08/17/2018--routine follow-up.  TSH mildly elevated at 4.49.  Free T3 and free T4 are normal.  Rep   • Thyroid nodule 10/5/2018    08/27/2018--thyroid ultrasound reveals subcentimeter nodule in the right thyroid lobe.  Possibly cystic.  There is a question of an ill-defined 1 cm nodular lesion posteriorly in the mid left lateral thyroid.  Appearance could be technical in origin.  Recommend repeat thyroid ultrasound in 6 months.  08/17/2018--routine follow-up.  TSH mildly elevated at 4.49.  Free T3 and free T4 are normal.  Repeat thyroid function tests ordered and returned normal.  Thyroid ultrasound ordered.   • Type 2 diabetes mellitus (CMS/HCC) 1/1/2004 2004--initial diagnosis of type 2 diabetes.   • Vitamin D deficiency 2/22/2016         Past Surgical History:   Procedure Laterality Date   • ATRIAL SEPTAL DEFECT REPAIR  03/2012 March 2012--percutaneous closure of secundum ASD.  Dr. Mcpherson   • COLONOSCOPY N/A 11/29/2017 11/29/2017--colonoscopy revealed 2 small (3 mm) polyps in the sigmoid colon and cecum.  Removed.  Bowel prep.  Sigmoid diverticuli noted.  No hemorrhoids.  Pathology returned hyperplastic polyp of the sigmoid and the cecal polyp was a tubular adenoma.   • ESOPHAGEAL DILATATION  08/12/2015 08/12/2015--EGD revealed esophagitis and a distal esophageal stricture that was dilated. Small sliding hiatal hernia. Proximal gastric ulcer and gastritis present. Dysphagia resolved after dilatation. 05/22/2015--patient presents with a several month history of progressively worsening intermittent dysphagia of solids but not liquids. He describes solid food sometimes sticking and points to his upp   •  ESOPHAGOSCOPY / EGD  08/12/2015 08/12/2015--EGD revealed esophagitis and a distal esophageal stricture that was dilated. Small sliding hiatal hernia. Proximal gastric ulcer and gastritis present. Dysphagia resolved after dilatation. 05/22/2015--patient presents with a several month history of progressively worsening intermittent dysphagia of solids but not liquids. He describes solid food sometimes sticking and points to his upp   • KNEE ACL RECONSTRUCTION Right 02/11/2013 02/11/2013--knee arthroscopy with anterior cruciate ligament repair   • TONSILLECTOMY  2009 2009--tonsillectomy as an adult.         Allergies   Allergen Reactions   • Latex Itching           Current Outpatient Medications:   •  amLODIPine-benazepril (LOTREL 5-20) 5-20 MG per capsule, TAKE 1 CAPSULE BY MOUTH ONE TIME A DAY , Disp: 30 capsule, Rfl: 4  •  Chlorcyclizine-Pseudoephed (STAHIST AD) 25-60 MG tablet, 1 by mouth every 6 hours as needed for congestion and allergies, not greater than 3 in 24 hours, Disp: 60 tablet, Rfl: 2  •  Cholecalciferol (VITAMIN D3) 5000 UNITS capsule capsule, Take 1 capsule by mouth daily., Disp: , Rfl:   •  esomeprazole (nexIUM) 40 MG capsule, TAKE 1 CAPSULE BY MOUTH ONE TIME A DAY , Disp: 30 capsule, Rfl: 9  •  ezetimibe (ZETIA) 10 MG tablet, TAKE 1 TABLET BY MOUTH ONE TIME A DAY , Disp: 30 tablet, Rfl: 3  •  fluconazole (DIFLUCAN) 200 MG tablet, TAKE 1 TABLET BY MOUTH ONE TIME A DAY until gone, Disp: 7 tablet, Rfl: 0  •  glimepiride (AMARYL) 4 MG tablet, Take 4 mg by mouth 2 (Two) Times a Day., Disp: , Rfl:   •  Insulin Glargine (BASAGLAR KWIKPEN) 100 UNIT/ML injection pen, inject 80 units subcutaenoulsy once a day, Disp: 30 mL, Rfl: 5  •  INVOKANA 300 MG tablet, TAKE 1 TABLET BY MOUTH ONE TIME A DAY , Disp: 30 tablet, Rfl: 3  •  Loratadine (CLARITIN PO), Take 1 capsule by mouth daily as needed (when necessary for allergies.)., Disp: , Rfl:   •  metFORMIN (GLUCOPHAGE) 1000 MG tablet, TAKE 1 TABLET BY MOUTH  TWO TIMES A DAY WITH MEALS, Disp: 180 tablet, Rfl: 0  •  montelukast (SINGULAIR) 10 MG tablet, TAKE 1 TABLET BY MOUTH ONE TIME A DAY , Disp: 30 tablet, Rfl: 3  •  naproxen (NAPROSYN) 500 MG tablet, Take 1 p.o. twice daily with food for pain/arthritis/inflammation., Disp: 30 tablet, Rfl: 0  •  phentermine (ADIPEX-P) 37.5 MG tablet, TAKE 1 TABLET BY MOUTH ONE TIME A DAY , Disp: 30 tablet, Rfl: 1  •  polyethylene glycol (MIRALAX) packet, Take orally as directed daily for constipation, Disp: , Rfl:   •  Probiotic capsule, 1 by mouth daily, Disp: , Rfl:   •  rosuvastatin (CRESTOR) 40 MG tablet, TAKE 1 TABLET BY MOUTH AT BEDTIME , Disp: 30 tablet, Rfl: 5  •  sertraline (ZOLOFT) 50 MG tablet, TAKE 1 TABLET BY MOUTH ONE TIME A DAY , Disp: 30 tablet, Rfl: 3  •  TRULICITY 1.5 MG/0.5ML solution pen-injector, inject 1.5mg under skin once weekly as directed, Disp: 2 mL, Rfl: 9  •  umeclidinium-vilanterol (ANORO ELLIPTA) 62.5-25 MCG/INH aerosol powder  inhaler, Inhale once daily as directed, Disp: 60 each, Rfl: 6      Family History   Problem Relation Age of Onset   • Diabetes Mother    • Stomach cancer Mother    • Diabetes Maternal Grandmother          Social History     Socioeconomic History   • Marital status:      Spouse name: Luis    • Number of children: 2   • Years of education: 16   • Highest education level: Bachelor's degree (e.g., BA, AB, BS)   Occupational History   • 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)   Social Needs   • Financial resource strain: Not hard at all   • Food insecurity:     Worry: Never true     Inability: Never true   • Transportation needs:     Medical: No     Non-medical: No   Tobacco Use   • Smoking status: Never Smoker   • Smokeless tobacco: Never Used   Substance and Sexual Activity   • Alcohol use: Yes     Frequency: Monthly or less     Drinks per session: 1 or 2     Binge frequency: Never   • Drug use: No   • Sexual activity: Yes     Partners: Female  "  Lifestyle   • Physical activity:     Days per week: 2 days     Minutes per session: 90 min   • Stress: To some extent   Relationships   • Social connections:     Talks on phone: More than three times a week     Gets together: More than three times a week     Attends [employer] service: More than 4 times per year     Active member of club or organization: Yes     Attends meetings of clubs or organizations: More than 4 times per year     Relationship status:          Vitals:    07/25/19 0728   BP: 138/70   BP Location: Left arm   Pulse: 92   SpO2: 99%   Weight: 89.8 kg (198 lb)   Height: 167.4 cm (65.91\")          Physical Exam:    General: Alert and oriented x 3.  No acute distress.  Normal affect. Obese. HEENT: Pupils equal, round, reactive to light; extraocular movements intact; sclerae nonicteric; pharynx, ear canals and TMs normal.  Neck: Without JVD, thyromegaly, bruit, or adenopathy.  Lungs: Clear to auscultation in all fields.  Heart: Regular rate and rhythm without murmur, rub, gallop, or click.  Abdomen: Soft, nontender, without hepatosplenomegaly or hernia.  Bowel sounds normal.  : Deferred.  Rectal: Deferred.  Extremities: Without clubbing, cyanosis, edema, or pulse deficit.  Neurologic: Intact without focal deficit.  Normal station and gait observed during ingress and egress from the examination room.  Skin: Without significant lesion.  Musculoskeletal: Unremarkable.    Lab/other results:    NMR reveals a total cholesterol of 117.  Triglycerides slightly elevated at 153.  LDL particle number slightly elevated 1020.  Small LDL particle number elevated at 820.  HDL particle number is low at 23.7.  CMP normal except blood sugar elevated 163.  CBC normal.  Albumin/creatinine ratio normal at 12.3.  Hemoglobin A1c 9.2.  Thyroid function tests are normal.  PSA normal at 0.23.  Vitamin D normal.  CPK normal.  Testosterone is low at 230.    Assessment/Plan:     Diagnosis Plan   1. Type 2 diabetes " mellitus without complication, without long-term current use of insulin (CMS/[facility])     2. Hyperlipidemia     3. Microalbuminuria     4. Moderate persistent asthma without complication     5. Multiple environmental allergies     6. Gastroesophageal reflux disease with esophagitis     7. Benign essential hypertension     8. Depression with anxiety     9. History of colon polyps, 11/29/2017--tubular adenoma times one.  Hyperplastic ×1.  Repeat 5 years.     10. Subclinical hypothyroidism     11. Thyroid nodule     12. Acute hip pain, left     13. Vitamin D deficiency     14. Abnormal weight gain     15. Non morbid obesity       Patient has type 2 diabetes that is under somewhat poor control.  Patient is on maximum medical therapy and I have encouraged him to work harder on low carbohydrate diet, exercise, and weight loss.  He is been having some hypoglycemic episodes in the morning.  He indicates he is taking glimepiride before bedtime and I think that is probably the problem.  He should take the glimepiride at breakfast time and before supper.  Hopefully this will solve that issue.  His cholesterol is under the best control that we can get it using conventional means.  Microalbuminuria is under good control.  He still has problems with his environmental allergies but his asthma seems to be fairly stable at the present time.  Blood pressure seems to be controlled.  His depression/anxiety seems to be controlled with the sertraline.  Patient has a history of colon polyps and is up-to-date on his colonoscopy.  The diagnosis of subclinical hypothyroidism is somewhat suspect but we will continue to monitor.  Acute left hip pain has resolved.  Patient continues to have problems with abnormal weight gain/nonmorbid obesity and I do think that continued medical management is indicated.  As noted, I have explained to patient that is going to be difficult to justify use of this medication in the future unless he has significant  weight loss benefit.    Plan is as follows: Patient work on decreasing carbohydrate intake, weight loss, and exercise.  Restart phentermine.  I will have patient follow-up in about 6 weeks to reassess his progress regarding his weight.  I will go ahead and schedule him for his annual exam after December 31, 2019.        Procedures

## 2019-08-05 DIAGNOSIS — E78.5 HYPERLIPIDEMIA, UNSPECIFIED HYPERLIPIDEMIA TYPE: ICD-10-CM

## 2019-08-06 RX ORDER — GLIMEPIRIDE 4 MG/1
TABLET ORAL
Qty: 60 TABLET | Refills: 4 | Status: SHIPPED | OUTPATIENT
Start: 2019-08-06 | End: 2019-09-06

## 2019-08-06 RX ORDER — EZETIMIBE 10 MG/1
TABLET ORAL
Qty: 30 TABLET | Refills: 2 | Status: SHIPPED | OUTPATIENT
Start: 2019-08-06 | End: 2019-11-07 | Stop reason: SDUPTHER

## 2019-08-07 RX ORDER — ESOMEPRAZOLE MAGNESIUM 40 MG/1
CAPSULE, DELAYED RELEASE ORAL
Qty: 30 CAPSULE | Refills: 8 | Status: SHIPPED | OUTPATIENT
Start: 2019-08-07 | End: 2020-05-06

## 2019-08-09 DIAGNOSIS — E11.9 TYPE 2 DIABETES MELLITUS WITHOUT COMPLICATION, WITHOUT LONG-TERM CURRENT USE OF INSULIN (HCC): Chronic | ICD-10-CM

## 2019-08-09 RX ORDER — INSULIN GLARGINE 100 [IU]/ML
INJECTION, SOLUTION SUBCUTANEOUS
Qty: 30 ML | Refills: 4 | Status: SHIPPED | OUTPATIENT
Start: 2019-08-09 | End: 2020-01-21

## 2019-08-19 RX ORDER — ESOMEPRAZOLE MAGNESIUM 40 MG/1
CAPSULE, DELAYED RELEASE ORAL
Qty: 30 CAPSULE | Refills: 8 | OUTPATIENT
Start: 2019-08-19

## 2019-09-06 ENCOUNTER — OFFICE VISIT (OUTPATIENT)
Dept: INTERNAL MEDICINE | Facility: CLINIC | Age: 54
End: 2019-09-06

## 2019-09-06 VITALS
SYSTOLIC BLOOD PRESSURE: 134 MMHG | HEIGHT: 66 IN | BODY MASS INDEX: 30.92 KG/M2 | DIASTOLIC BLOOD PRESSURE: 68 MMHG | WEIGHT: 192.4 LBS | OXYGEN SATURATION: 98 % | HEART RATE: 74 BPM

## 2019-09-06 DIAGNOSIS — Z91.09 MULTIPLE ENVIRONMENTAL ALLERGIES: Chronic | ICD-10-CM

## 2019-09-06 DIAGNOSIS — J30.1 CHRONIC SEASONAL ALLERGIC RHINITIS DUE TO POLLEN: Chronic | ICD-10-CM

## 2019-09-06 DIAGNOSIS — R63.5 ABNORMAL WEIGHT GAIN: Primary | Chronic | ICD-10-CM

## 2019-09-06 DIAGNOSIS — H69.83 DYSFUNCTION OF BOTH EUSTACHIAN TUBES: ICD-10-CM

## 2019-09-06 DIAGNOSIS — E66.09 NON MORBID OBESITY DUE TO EXCESS CALORIES: Chronic | ICD-10-CM

## 2019-09-06 PROBLEM — M25.552 ACUTE HIP PAIN, LEFT: Status: RESOLVED | Noted: 2019-06-07 | Resolved: 2019-09-06

## 2019-09-06 PROBLEM — H69.93 DYSFUNCTION OF BOTH EUSTACHIAN TUBES: Status: ACTIVE | Noted: 2019-09-06

## 2019-09-06 PROCEDURE — 99214 OFFICE O/P EST MOD 30 MIN: CPT | Performed by: INTERNAL MEDICINE

## 2019-09-06 RX ORDER — PHENTERMINE HYDROCHLORIDE 37.5 MG/1
TABLET ORAL
Qty: 30 TABLET | Refills: 1 | Status: SHIPPED | OUTPATIENT
Start: 2019-09-06 | End: 2020-01-09

## 2019-09-06 NOTE — PROGRESS NOTES
09/06/2019    Patient Information  Marek Diego                                                                                          [id number] [facility] [address]      1965  [unfilled]  There is no work phone number on file.    Chief Complaint:     Follow-up abnormal weight gain/nonmorbid obesity.  Complaining of problem with ears.    History of Present Illness:    Patient with a history of multiple comorbidities including type 2 diabetes, hypertension, hyperlipidemia.  He presents today to follow-up on medical management for weight loss.  Patient also has a problem with his ears as described below.  His past medical history reviewed and updated were necessary including health maintenance parameters.  This reveals he is up-to-date with the exception of influenza vaccine which he would like to defer until the next visit.    The history regarding abnormal weight gain/nonmorbid obesity:    September 6, 2019--current weight is 192 pounds representing a 6 pound weight loss.  I would like to add topiramate/Trokendi XR but patient reported he had side effects consisting of dry mouth.  Continue current regimen and reassess in about 6 to 8 weeks.    July 25, 2019--current weight is 198 pounds which represents about a 1 pound weight loss from June 7, 2019.  Patient reports she has not been taking the phentermine for at least a couple of weeks.  I think it is likely good that he has had a drug holiday.  Hopefully his medication will be more effective.  Patient has noticed increased appetite since being off of the medication.  I explained to him that it is getting somewhat difficult to justify continuing use of phentermine unless he begins to have significant weight loss using this medication.  Patient understands.  We will reassess the situation in about 6 weeks.  He can go ahead and restart the phentermine now.    April 12, 2019--current weight is 196 pounds a 2 pound weight loss.  Patient  has lost only 11 pounds since August of last year with the phentermine.  Progress is very slow and is nearly to the point that we may not be able to justify the use of phentermine although I am not totally ready to withdrawal that at this time.  I discussed with patient the keto diet/Calderon diet and have strongly recommended this.  I think it would be reasonable for him to go on the induction phase of the Calderon diet and instead of staying on this for 2 weeks, he can stay on it much longer.  Muscle cramps can sometimes be an issue as can constipation but we have ways to deal with this.  I will not stop the phentermine at this time but we will reassess at his follow-up in a little more than 2 months towards the end of June.  If there has not been sufficient progress at that time then we will definitely have to consider discontinuation of the phentermine.    11/19/2018--current weight is 198 pounds representing a 2 pound weight loss.  Encouraged patient to work harder.    10/05/2018--patient seen in follow-up and current weight is down 7 pounds at 200 pounds.  Patient could not tolerate the Trokendi XR.  He seems to be tolerating the phentermine.  Continue current regimen and reassess in about 6 weeks.    08/17/2018--patient is a long history of problems with abnormal weight gain/obesity.  He has multiple comorbidities justify medical management.  Current weight is 207 pounds.  Phentermine one by mouth daily along with Trokendi XR initiated.    The history regarding problem with the ears:    September 6, 2019--patient with severe environmental allergies/allergic rhinitis presents with complaints of his ears popping like he is been on an airplane and a sensation of pressure at times.  At times his hearing is decreased.  On exam I do not see any definite serous otitis media.  I do think his environmental allergies are playing a role but I think it would be reasonable for ENT to evaluate this situation and see if  anything can be done.    Review of Systems   Constitution: Negative.   HENT: Negative.         Ears popping and fluctuating hearing   Eyes: Negative.    Cardiovascular: Negative.    Respiratory: Negative.    Endocrine: Negative.    Hematologic/Lymphatic: Negative.    Skin: Negative.    Musculoskeletal: Negative.    Gastrointestinal: Negative.    Genitourinary: Negative.    Neurological: Negative.    Psychiatric/Behavioral: Negative.    Allergic/Immunologic: Negative.        Active Problems:    Patient Active Problem List   Diagnosis   • Allergic rhinitis   • Benign essential hypertension   • Chronic neck pain   • Depression with anxiety   • Gastroesophageal reflux disease with esophagitis   • Hyperlipidemia   • Hypogonadism male, no TRT.   • Microalbuminuria   • Moderate persistent asthma   • Multiple environmental allergies   • Non morbid obesity   • Type 2 diabetes mellitus (CMS/[facility], located [facility])   • Vitamin D deficiency   • Routine physical examination   • Therapeutic drug monitoring   • Right bundle branch block   • Diabetic eye exam (CMS/[facility], located within [facility])   • Diabetic foot exam   • Chronic constipation   • Snoring   • Nocturnal hypoxemia   • History of colon polyps, 11/29/2017--tubular adenoma times one.  Hyperplastic ×1.  Repeat 5 years.   • Subclinical hypothyroidism   • Thyroid nodule   • Abnormal weight gain   • Dysfunction of both eustachian tubes         Past Medical History:   Diagnosis Date   • Allergic rhinitis 10/24/2013    10/24/2013--skin testing revealed impressive reactions to grass following, tree pollen, weed pollen, ragweed, dust mite, and cat. Recommend immunotherapy.   • Benign essential hypertension 2/22/2016   • Chronic constipation 11/20/2017 11/20/2017--patient reports approximately 8 month history of intermittent constipation that at times can last as much as a month.  He notes his blood sugar readings tend to increase when this happens.  He is scheduled for colonoscopy next week.  I recommend a generic  probiotic daily as well as MiraLAX and adjust as needed.   • Chronic neck pain 10/30/2015    12/19/2015--patient reports his left shoulder pain has resolved. He continues to have neck pain. X-ray of the cervical spine ordered. Physical therapy ordered.   11/10/2015--left shoulder injected with 80 mg of Depo-Medrol with 3 mL of Xylocaine.   10/30/2015--patient presents with at least a one-month history of intermittent left-sided neck pain that is associated with left shoulder pain as well. The pain is described as shooting and is 8 out of 10 intensity at its worst. The pain is worse with certain movements of his head and left shoulder. No trauma. No numbness or paresthesias.   • Depression with anxiety 2/22/2016   • Diabetic eye exam (CMS/[facility]) 5/27/2016    May 2016--patient reports a normal diabetic eye exam.   • Diabetic foot exam 4/27/2017 05/02/2017--routine diabetic foot exam reveals no evidence of diabetic foot ulcer or pre-ulcerative callus.  Pulses are palpable and there is no signs of ischemia.  Sensation subjectively intact.   • Gastroesophageal reflux disease with esophagitis 5/22/2015 08/12/2015--EGD revealed esophagitis and a distal esophageal stricture that was dilated. Small sliding hiatal hernia. Proximal gastric ulcer and gastritis present. Dysphagia resolved after dilatation. Pathology of the gastric antrum was benign with no significant histologic abnormality. Distal esophagus biopsy negative for intestinal metaplasia or dysplasia.   05/22/2015--patient presents with a several month history of progressively worsening intermittent dysphagia of solids but not liquids. He describes solid food sometimes sticking and points to his upper chest/lower throat. No other associated symptoms. Patient referred to Dr. Aime Parada for an EGD. This is negative, may need ENT evaluation.   • History of colon polyps, 11/29/2017--tubular adenoma times one.  Hyperplastic ×1.  Repeat 5 years. 12/18/2017     11/29/2017--colonoscopy revealed 2 small (3 mm) polyps in the sigmoid colon and cecum.  Removed.  Bowel prep.  Sigmoid diverticuli noted.  No hemorrhoids.  Pathology returned hyperplastic polyp of the sigmoid and the cecal polyp was a tubular adenoma.   • HIstory of eosinophilic gastroenteritis 1990s    1990s--patient had significant epigastric discomfort and workup including an EGD revealed eosinophilic gastroenteritis that was treated with prednisone and resulted in resolution.   • History of esophageal stricture 08/12/2015 08/12/2015--EGD revealed esophagitis and a distal esophageal stricture that was dilated. Small sliding hiatal hernia. Proximal gastric ulcer and gastritis present. Dysphagia resolved after dilatation. Pathology of the gastric antrum was benign with no significant histologic abnormality. Distal esophagus biopsy negative for intestinal metaplasia or dysplasia.   05/22/2015--patient presents with a s   • HIstory of Pulmonary hypertension, 04/23/2014--resolved after PFO/ASD closure. 04/23/2014 04/23/2014--echocardiogram reveals normal left ventricular systolic function with ejection fraction 55%. Left ventricular wall motion is normal. The right ventricle is moderately to severely dilated. Right ventricular systolic function is mildly reduced. The right atrium is moderately dilated. Saline contrast study revealed no evidence of PFO/ASD. Status post PFO closure. There is trace mitral reg   • History of Pulmonary nodule, 03/07/2016--5 mm indeterminate nodule right lower lobe.  09/13/2016--consistent with calcified granuloma. 3/7/2016    09/13/2016--CT scan of the chest, abdomen, and pelvis with contrast reveals no lymphadenopathy within the abdomen or pelvis.  Mild hepatic steatosis.  Previously noted right lower lobe pulmonary nodule is currently calcified and consistent with a calcified granuloma.  03/07/2016--CT scan of the abdomen performed for complaints of abdominal discomfort revealed a  5 mm nodular focus right lower lobe which is indeterminate.  Recommend repeat CT scan in 6 months.   • HIstory of repair of Congenital atrial septal defect 03/2012 March 2012--percutaneous closure of secundum ASD.   • Hyperlipidemia 2/22/2016   • Hypogonadism male, no TRT. 12/26/2012 12/26/2012--treatment for hypogonadism begun.   • Microalbuminuria 10/19/2014    10/19/2014--urine microalbumin mildly elevated at 27.8. Normal range 0.0--17.0.   • Moderate persistent asthma 2/22/2016    Seasonal, spring and fall.   • Multiple environmental allergies 10/24/2013    10/24/2013--skin testing revealed impressive reactions to grass following, tree pollen, weed pollen, ragweed, dust mite, and cat. Recommend immunotherapy.   • Non morbid obesity 2/22/2016   • Right bundle branch block     ECG reveals sinus rhythm, rate of 75, right bundle branch block, left anterior hemiblock   • Subclinical hypothyroidism 8/17/2018 08/27/2018--thyroid ultrasound reveals subcentimeter nodule in the right thyroid lobe.  Possibly cystic.  There is a question of an ill-defined 1 cm nodular lesion posteriorly in the mid left lateral thyroid.  Appearance could be technical in origin.  Recommend repeat thyroid ultrasound in 6 months.  08/17/2018--routine follow-up.  TSH mildly elevated at 4.49.  Free T3 and free T4 are normal.  Rep   • Thyroid nodule 10/5/2018    08/27/2018--thyroid ultrasound reveals subcentimeter nodule in the right thyroid lobe.  Possibly cystic.  There is a question of an ill-defined 1 cm nodular lesion posteriorly in the mid left lateral thyroid.  Appearance could be technical in origin.  Recommend repeat thyroid ultrasound in 6 months.  08/17/2018--routine follow-up.  TSH mildly elevated at 4.49.  Free T3 and free T4 are normal.  Repeat thyroid function tests ordered and returned normal.  Thyroid ultrasound ordered.   • Type 2 diabetes mellitus (CMS/HCC) 1/1/2004    2004--initial diagnosis of type 2 diabetes.   •  Vitamin D deficiency 2/22/2016         Past Surgical History:   Procedure Laterality Date   • ATRIAL SEPTAL DEFECT REPAIR  03/2012 March 2012--percutaneous closure of secundum ASD.  Dr. Mcpherson   • COLONOSCOPY N/A 11/29/2017 11/29/2017--colonoscopy revealed 2 small (3 mm) polyps in the sigmoid colon and cecum.  Removed.  Bowel prep.  Sigmoid diverticuli noted.  No hemorrhoids.  Pathology returned hyperplastic polyp of the sigmoid and the cecal polyp was a tubular adenoma.   • ESOPHAGEAL DILATATION  08/12/2015 08/12/2015--EGD revealed esophagitis and a distal esophageal stricture that was dilated. Small sliding hiatal hernia. Proximal gastric ulcer and gastritis present. Dysphagia resolved after dilatation. 05/22/2015--patient presents with a several month history of progressively worsening intermittent dysphagia of solids but not liquids. He describes solid food sometimes sticking and points to his upp   • ESOPHAGOSCOPY / EGD  08/12/2015 08/12/2015--EGD revealed esophagitis and a distal esophageal stricture that was dilated. Small sliding hiatal hernia. Proximal gastric ulcer and gastritis present. Dysphagia resolved after dilatation. 05/22/2015--patient presents with a several month history of progressively worsening intermittent dysphagia of solids but not liquids. He describes solid food sometimes sticking and points to his upp   • KNEE ACL RECONSTRUCTION Right 02/11/2013 02/11/2013--knee arthroscopy with anterior cruciate ligament repair   • TONSILLECTOMY  2009 2009--tonsillectomy as an adult.         Allergies   Allergen Reactions   • Latex Itching           Current Outpatient Medications:   •  amLODIPine-benazepril (LOTREL 5-20) 5-20 MG per capsule, TAKE 1 CAPSULE BY MOUTH ONE TIME A DAY , Disp: 30 capsule, Rfl: 4  •  Chlorcyclizine-Pseudoephed (STAHIST AD) 25-60 MG tablet, 1 by mouth every 6 hours as needed for congestion and allergies, not greater than 3 in 24 hours, Disp: 60 tablet, Rfl: 2  •   Cholecalciferol (VITAMIN D3) 5000 UNITS capsule capsule, Take 1 capsule by mouth daily., Disp: , Rfl:   •  esomeprazole (nexIUM) 40 MG capsule, TAKE 1 CAPSULE BY MOUTH ONE TIME A DAY , Disp: 30 capsule, Rfl: 8  •  ezetimibe (ZETIA) 10 MG tablet, TAKE 1 TABLET BY MOUTH ONE TIME A DAY , Disp: 30 tablet, Rfl: 2  •  glimepiride (AMARYL) 4 MG tablet, Take 4 mg by mouth 2 (Two) Times a Day., Disp: , Rfl:   •  Insulin Glargine (BASAGLAR KWIKPEN) 100 UNIT/ML injection pen, inject 80 units subcutaneously one time a day , Disp: 30 mL, Rfl: 4  •  INVOKANA 300 MG tablet, TAKE 1 TABLET BY MOUTH ONE TIME A DAY , Disp: 30 tablet, Rfl: 3  •  Loratadine (CLARITIN PO), Take 1 capsule by mouth daily as needed (when necessary for allergies.)., Disp: , Rfl:   •  metFORMIN (GLUCOPHAGE) 1000 MG tablet, TAKE 1 TABLET BY MOUTH TWO TIMES A DAY WITH MEALS, Disp: 180 tablet, Rfl: 0  •  montelukast (SINGULAIR) 10 MG tablet, TAKE 1 TABLET BY MOUTH ONE TIME A DAY , Disp: 30 tablet, Rfl: 3  •  naproxen (NAPROSYN) 500 MG tablet, Take 1 p.o. twice daily with food for pain/arthritis/inflammation., Disp: 30 tablet, Rfl: 0  •  phentermine (ADIPEX-P) 37.5 MG tablet, Take 1 p.o. every morning for appetite suppression, Disp: 30 tablet, Rfl: 1  •  polyethylene glycol (MIRALAX) packet, Take orally as directed daily for constipation, Disp: , Rfl:   •  Probiotic capsule, 1 by mouth daily, Disp: , Rfl:   •  rosuvastatin (CRESTOR) 40 MG tablet, TAKE 1 TABLET BY MOUTH AT BEDTIME , Disp: 30 tablet, Rfl: 5  •  sertraline (ZOLOFT) 50 MG tablet, TAKE 1 TABLET BY MOUTH ONE TIME A DAY , Disp: 30 tablet, Rfl: 3  •  TRULICITY 1.5 MG/0.5ML solution pen-injector, inject 1.5mg under skin once weekly as directed, Disp: 2 mL, Rfl: 9  •  umeclidinium-vilanterol (ANORO ELLIPTA) 62.5-25 MCG/INH aerosol powder  inhaler, Inhale once daily as directed, Disp: 60 each, Rfl: 6      Family History   Problem Relation Age of Onset   • Diabetes Mother    • Stomach cancer Mother    •  "Diabetes Maternal Grandmother          Social History     Socioeconomic History   • Marital status:      Spouse name: Luis    • Number of children: 2   • Years of education: 16   • Highest education level: Bachelor's degree (e.g., BA, AB, BS)   Occupational History   • Occupation:  for [employer] ([employer])   Social Needs   • Financial resource strain: Not hard at all   • Food insecurity:     Worry: Never true     Inability: Never true   • Transportation needs:     Medical: No     Non-medical: No   Tobacco Use   • Smoking status: Never Smoker   • Smokeless tobacco: Never Used   Substance and Sexual Activity   • Alcohol use: Yes     Frequency: Monthly or less     Drinks per session: 1 or 2     Binge frequency: Never   • Drug use: No   • Sexual activity: Yes     Partners: Female   Lifestyle   • Physical activity:     Days per week: 2 days     Minutes per session: 90 min   • Stress: To some extent   Relationships   • Social connections:     Talks on phone: More than three times a week     Gets together: More than three times a week     Attends Y[employer] service: More than 4 times per year     Active member of club or organization: Yes     Attends meetings of clubs or organizations: More than 4 times per year     Relationship status:          Vitals:    09/06/19 0742   BP: 134/68   BP Location: Right arm   Pulse: 74   SpO2: 98%   Weight: 87.3 kg (192 lb 6.4 oz)   Height: 167.4 cm (65.91\")          Physical Exam:    General: Alert and oriented x 3.  No acute distress.  Normal affect.  HEENT: Pupils equal, round, reactive to light; extraocular movements intact; sclerae nonicteric; pharynx, ear canals and TMs normal, except for a few old tympanic membrane scars.  No evidence of serous otitis media at the present time or otitis media..  Neck: Without JVD, thyromegaly, bruit, or adenopathy.  Lungs: Clear to auscultation in all fields.  Heart: Regular rate and rhythm without murmur, rub, " gallop, or click.  Abdomen: Soft, nontender, without hepatosplenomegaly or hernia.  Bowel sounds normal.  : Deferred.  Rectal: Deferred.  Extremities: Without clubbing, cyanosis, edema, or pulse deficit.  Neurologic: Intact without focal deficit.  Normal station and gait observed during ingress and egress from the examination room.  Skin: Without significant lesion.  Musculoskeletal: Unremarkable.    Lab/other results:      Assessment/Plan:     Diagnosis Plan   1. Abnormal weight gain     2. Non morbid obesity     3. Allergic rhinitis     4. Multiple environmental allergies     5. Dysfunction of both eustachian tubes       Patient with abnormal weight gain/nonmorbid obesity and multiple comorbidities including poorly controlled type 2 diabetes.  Patient would benefit from continued weight loss and this justifies the use of phentermine.  Unfortunately, patient cannot tolerate topiramate.  This will likely necessitate several drug holidays.  Of also strongly encourage patient to go on low carbohydrate diet such as Calderon.  Also patient has rather severe environmental allergies/allergic rhinitis and his symptoms regarding his ears are consistent with eustachian tube dysfunction.  ENT evaluation indicated.    Plan is as follows: ENT referral given.  No change in current medical regimen.  I will have patient follow-up in about 8 weeks to reassess.        Procedures

## 2019-09-17 DIAGNOSIS — B37.42 CANDIDAL BALANITIS: ICD-10-CM

## 2019-09-18 RX ORDER — FLUCONAZOLE 200 MG/1
TABLET ORAL
Qty: 7 TABLET | Refills: 0 | Status: SHIPPED | OUTPATIENT
Start: 2019-09-18 | End: 2019-11-22 | Stop reason: SDUPTHER

## 2019-10-21 NOTE — PROGRESS NOTES
"Right Shoulder MRI Follow Up      Patient: Marek Diego        YOB: 1965            Chief Complaints:Right Shoulder pain      History of Present Illness: The patient is here follow-up of an MRI of the shoulder MRI demonstrates a full-thickness tear of the rotator cuff also has a tear of the subscapularis and the biceps tendon is subluxed medially in terms are still the same quite limiting to his activity      Physical Exam: 53 y.o. male  General Appearance:    Alert, cooperative, in no acute distress                   Vitals:    07/03/18 1407   Temp: 97.6 °F (36.4 °C)   Weight: 96.1 kg (211 lb 12.8 oz)   Height: 167.6 cm (66\")        Patient is alert and read ×3 no acute distress appears her above-listed at height weight and age.  Affect is normal respiratory rate is normal unlabored. Heart rate regular rate rhythm, sclera, dentition and hearing are normal for the purpose of this exam.      Ortho Exam    Physical exam of the right shoulder reveals no overlying skin changes no lymphedema no lymphadenopathy.  Patient has active flexion 180 with mild symptoms abduction is similar external rotation is to 50 and internal rotation to the upper lumbar spine with mild symptoms.  Patient has good rotator cuff strength 4+ over 5 with isometric strength testing with pain.  Patient has a positive impingement and a positive Dykes sign.  Patient has good cervical range of motion which is full and asymptomatic no radicular symptoms.  Patient has a normal elbow exam.  Good distal pulses are present  Patient has pain with overhead activity and a positive Neer sign and a positive empty can sign , a positive drop arm and a definitive painful arc    Physical Exam: 53 y.o. male  General Appearance:    Alert, cooperative, in no acute distress                      Vitals:    07/03/18 1407   Temp: 97.6 °F (36.4 °C)   Weight: 96.1 kg (211 lb 12.8 oz)   Height: 167.6 cm (66\")        Head:    Normocephalic, without obvious " [facility] Dr. Diaz's office states recent EMG results will be faxed to office for review. Awaiting fax. Progress note scanned to chart.    abnormality, atraumatic   Eyes:            conjunctivae and sclerae normal, no pallor, corneas clear,    Ears:    Ears appear intact with no abnormalities noted   Throat:   No oral lesions, no thrush, oral mucosa moist   Neck:   No adenopathy, supple, trachea midline, no thyromegaly,    Back:     No kyphosis present, no scoliosis present, no skin lesions,      erythema or scars, no tenderness to percussion or                   palpation,   range of motion normal   Lungs:     Clear to auscultation,respirations regular, even and                  unlabored    Heart:    Regular rhythm and normal rate               Chest Wall:    No abnormalities observed       Rectal:     Deferred   Extremities:    Moves all extremities well, no edema,   no cyanosis, no redness   Pulses:   Pulses palpable and equal bilaterally   Skin:   No bleeding, bruising or rash   Lymph nodes:   No palpable adenopathy   Neurologic:   Appears neurologic intact     MRI Results: MRI is as above I have reviewed the films myself and agree with the findings  Procedures      Assessment/Plan:    Right shoulder rotator cuff tear plan is to proceed with repair we'll discuss in detail risks benefits and alternatives The patient voiced understanding of the risks, benefits, and alternative forms of treatment that were discussed and the patient consents to proceed with the above listed surgery.  All risks, benefits and alternatives were discussed.  Risks including to but not exclusive to anesthetic complications, including death, MI, CVA, infection, bleeding DVT, fracture, residual pain and need for future surgery.  He understands these and agrees to proceed plantar be to proceed with shoulder arthroscopy rotator cuff repair probably biceps tenotomy versus tenodesis

## 2019-11-07 DIAGNOSIS — E78.5 HYPERLIPIDEMIA, UNSPECIFIED HYPERLIPIDEMIA TYPE: ICD-10-CM

## 2019-11-08 RX ORDER — MONTELUKAST SODIUM 10 MG/1
TABLET ORAL
Qty: 30 TABLET | Refills: 2 | Status: SHIPPED | OUTPATIENT
Start: 2019-11-08 | End: 2020-02-05

## 2019-11-08 RX ORDER — CANAGLIFLOZIN 300 MG/1
TABLET, FILM COATED ORAL
Qty: 30 TABLET | Refills: 2 | Status: SHIPPED | OUTPATIENT
Start: 2019-11-08 | End: 2020-01-06

## 2019-11-08 RX ORDER — EZETIMIBE 10 MG/1
TABLET ORAL
Qty: 30 TABLET | Refills: 1 | Status: SHIPPED | OUTPATIENT
Start: 2019-11-08 | End: 2020-02-05

## 2019-11-12 ENCOUNTER — OFFICE VISIT (OUTPATIENT)
Dept: INTERNAL MEDICINE | Facility: CLINIC | Age: 54
End: 2019-11-12

## 2019-11-12 VITALS
BODY MASS INDEX: 30.7 KG/M2 | OXYGEN SATURATION: 99 % | WEIGHT: 191 LBS | HEIGHT: 66 IN | DIASTOLIC BLOOD PRESSURE: 80 MMHG | SYSTOLIC BLOOD PRESSURE: 138 MMHG | HEART RATE: 98 BPM

## 2019-11-12 DIAGNOSIS — E66.09 NON MORBID OBESITY DUE TO EXCESS CALORIES: Chronic | ICD-10-CM

## 2019-11-12 DIAGNOSIS — I10 BENIGN ESSENTIAL HYPERTENSION: Chronic | ICD-10-CM

## 2019-11-12 DIAGNOSIS — L30.9 HAND ECZEMA: ICD-10-CM

## 2019-11-12 DIAGNOSIS — E11.9 TYPE 2 DIABETES MELLITUS WITHOUT COMPLICATION, WITHOUT LONG-TERM CURRENT USE OF INSULIN (HCC): Chronic | ICD-10-CM

## 2019-11-12 DIAGNOSIS — Z23 NEED FOR INFLUENZA VACCINATION: ICD-10-CM

## 2019-11-12 DIAGNOSIS — R63.5 ABNORMAL WEIGHT GAIN: Primary | Chronic | ICD-10-CM

## 2019-11-12 DIAGNOSIS — E78.49 OTHER HYPERLIPIDEMIA: Chronic | ICD-10-CM

## 2019-11-12 PROCEDURE — 90471 IMMUNIZATION ADMIN: CPT | Performed by: INTERNAL MEDICINE

## 2019-11-12 PROCEDURE — 99213 OFFICE O/P EST LOW 20 MIN: CPT | Performed by: INTERNAL MEDICINE

## 2019-11-12 PROCEDURE — 90674 CCIIV4 VAC NO PRSV 0.5 ML IM: CPT | Performed by: INTERNAL MEDICINE

## 2019-11-12 NOTE — PROGRESS NOTES
11/12/2019    Patient Information  Marek Diego                                                                                          [id number] [facility] [address]      1965  [unfilled]  There is no work phone number on file.    Chief Complaint:     Follow-up medical management for weight loss.  Needs a refill of cream for hand eczema.    History of Present Illness:    Patient with a history of multiple comorbidities including type 2 diabetes, hyperlipidemia, hypertension.  He presents today to follow-up on medical management for weight loss as described below.  His past medical history reviewed and updated were necessary including health maintenance parameters.  This reveals he needs his influenza vaccine which we will give today and he also needs his diabetic eye exam which is scheduled for the near future.    The history regarding abnormal weight gain/nonmorbid obesity:    November 12, 2019--current weight is 191 pounds representing a 1 pound weight loss.  It appears the patient needs a drug holiday from the phentermine.  I will have him stay off of it for 1 month and then restarted back at the current dose and then we will reassess after the first the year when he comes in for his regular follow-up and physical.    September 6, 2019--current weight is 192 pounds representing a 6 pound weight loss.  I would like to add topiramate/Trokendi XR but patient reported he had side effects consisting of dry mouth.  Continue current regimen and reassess in about 6 to 8 weeks.    July 25, 2019--current weight is 198 pounds which represents about a 1 pound weight loss from June 7, 2019.  Patient reports she has not been taking the phentermine for at least a couple of weeks.  I think it is likely good that he has had a drug holiday.  Hopefully his medication will be more effective.  Patient has noticed increased appetite since being off of the medication.  I explained to him that it is  getting somewhat difficult to justify continuing use of phentermine unless he begins to have significant weight loss using this medication.  Patient understands.  We will reassess the situation in about 6 weeks.  He can go ahead and restart the phentermine now.    April 12, 2019--current weight is 196 pounds a 2 pound weight loss.  Patient has lost only 11 pounds since August of last year with the phentermine.  Progress is very slow and is nearly to the point that we may not be able to justify the use of phentermine although I am not totally ready to withdrawal that at this time.  I discussed with patient the keto diet/Calderon diet and have strongly recommended this.  I think it would be reasonable for him to go on the induction phase of the Calderon diet and instead of staying on this for 2 weeks, he can stay on it much longer.  Muscle cramps can sometimes be an issue as can constipation but we have ways to deal with this.  I will not stop the phentermine at this time but we will reassess at his follow-up in a little more than 2 months towards the end of June.  If there has not been sufficient progress at that time then we will definitely have to consider discontinuation of the phentermine.    11/19/2018--current weight is 198 pounds representing a 2 pound weight loss.  Encouraged patient to work harder.    10/05/2018--patient seen in follow-up and current weight is down 7 pounds at 200 pounds.  Patient could not tolerate the Trokendi XR.  He seems to be tolerating the phentermine.  Continue current regimen and reassess in about 6 weeks.    08/17/2018--patient is a long history of problems with abnormal weight gain/obesity.  He has multiple comorbidities justify medical management.  Current weight is 207 pounds.  Phentermine one by mouth daily along with Trokendi XR initiated.    Review of Systems   Constitution: Negative.   HENT: Negative.    Eyes: Negative.    Cardiovascular: Negative.    Respiratory: Negative.     Endocrine: Negative.    Hematologic/Lymphatic: Negative.    Skin: Negative.    Musculoskeletal: Negative.    Gastrointestinal: Negative.    Genitourinary: Negative.    Neurological: Negative.    Psychiatric/Behavioral: Negative.    Allergic/Immunologic: Negative.        Active Problems:    Patient Active Problem List   Diagnosis   • Allergic rhinitis   • Benign essential hypertension   • Chronic neck pain   • Depression with anxiety   • Gastroesophageal reflux disease with esophagitis   • Hyperlipidemia   • Hypogonadism male, no TRT.   • Microalbuminuria   • Moderate persistent asthma   • Multiple environmental allergies   • Non morbid obesity   • Type 2 diabetes mellitus (CMS/[facility])   • Vitamin D deficiency   • Routine physical examination   • Therapeutic drug monitoring   • Right bundle branch block   • Diabetic eye exam (CMS/HCC)   • Diabetic foot exam   • Chronic constipation   • Snoring   • Nocturnal hypoxemia   • History of colon polyps, 11/29/2017--tubular adenoma times one.  Hyperplastic ×1.  Repeat 5 years.   • Subclinical hypothyroidism   • Thyroid nodule   • Abnormal weight gain   • Dysfunction of both eustachian tubes         Past Medical History:   Diagnosis Date   • Allergic rhinitis 10/24/2013    10/24/2013--skin testing revealed impressive reactions to grass following, tree pollen, weed pollen, ragweed, dust mite, and cat. Recommend immunotherapy.   • Benign essential hypertension 2/22/2016   • Chronic constipation 11/20/2017 11/20/2017--patient reports approximately 8 month history of intermittent constipation that at times can last as much as a month.  He notes his blood sugar readings tend to increase when this happens.  He is scheduled for colonoscopy next week.  I recommend a generic probiotic daily as well as MiraLAX and adjust as needed.   • Chronic neck pain 10/30/2015    12/19/2015--patient reports his left shoulder pain has resolved. He continues to have neck pain. X-ray of the cervical  spine ordered. Physical therapy ordered.   11/10/2015--left shoulder injected with 80 mg of Depo-Medrol with 3 mL of Xylocaine.   10/30/2015--patient presents with at least a one-month history of intermittent left-sided neck pain that is associated with left shoulder pain as well. The pain is described as shooting and is 8 out of 10 intensity at its worst. The pain is worse with certain movements of his head and left shoulder. No trauma. No numbness or paresthesias.   • Depression with anxiety 2/22/2016   • Diabetic eye exam (CMS/[facility]) 5/27/2016    May 2016--patient reports a normal diabetic eye exam.   • Diabetic foot exam 4/27/2017 05/02/2017--routine diabetic foot exam reveals no evidence of diabetic foot ulcer or pre-ulcerative callus.  Pulses are palpable and there is no signs of ischemia.  Sensation subjectively intact.   • Gastroesophageal reflux disease with esophagitis 5/22/2015 08/12/2015--EGD revealed esophagitis and a distal esophageal stricture that was dilated. Small sliding hiatal hernia. Proximal gastric ulcer and gastritis present. Dysphagia resolved after dilatation. Pathology of the gastric antrum was benign with no significant histologic abnormality. Distal esophagus biopsy negative for intestinal metaplasia or dysplasia.   05/22/2015--patient presents with a several month history of progressively worsening intermittent dysphagia of solids but not liquids. He describes solid food sometimes sticking and points to his upper chest/lower throat. No other associated symptoms. Patient referred to Dr. Aime Parada for an EGD. This is negative, may need ENT evaluation.   • History of colon polyps, 11/29/2017--tubular adenoma times one.  Hyperplastic ×1.  Repeat 5 years. 12/18/2017 11/29/2017--colonoscopy revealed 2 small (3 mm) polyps in the sigmoid colon and cecum.  Removed.  Bowel prep.  Sigmoid diverticuli noted.  No hemorrhoids.  Pathology returned hyperplastic polyp of the sigmoid and the  cecal polyp was a tubular adenoma.   • HIstory of eosinophilic gastroenteritis 1990s    1990s--patient had significant epigastric discomfort and workup including an EGD revealed eosinophilic gastroenteritis that was treated with prednisone and resulted in resolution.   • History of esophageal stricture 08/12/2015 08/12/2015--EGD revealed esophagitis and a distal esophageal stricture that was dilated. Small sliding hiatal hernia. Proximal gastric ulcer and gastritis present. Dysphagia resolved after dilatation. Pathology of the gastric antrum was benign with no significant histologic abnormality. Distal esophagus biopsy negative for intestinal metaplasia or dysplasia.   05/22/2015--patient presents with a s   • HIstory of Pulmonary hypertension, 04/23/2014--resolved after PFO/ASD closure. 04/23/2014 04/23/2014--echocardiogram reveals normal left ventricular systolic function with ejection fraction 55%. Left ventricular wall motion is normal. The right ventricle is moderately to severely dilated. Right ventricular systolic function is mildly reduced. The right atrium is moderately dilated. Saline contrast study revealed no evidence of PFO/ASD. Status post PFO closure. There is trace mitral reg   • History of Pulmonary nodule, 03/07/2016--5 mm indeterminate nodule right lower lobe.  09/13/2016--consistent with calcified granuloma. 3/7/2016    09/13/2016--CT scan of the chest, abdomen, and pelvis with contrast reveals no lymphadenopathy within the abdomen or pelvis.  Mild hepatic steatosis.  Previously noted right lower lobe pulmonary nodule is currently calcified and consistent with a calcified granuloma.  03/07/2016--CT scan of the abdomen performed for complaints of abdominal discomfort revealed a 5 mm nodular focus right lower lobe which is indeterminate.  Recommend repeat CT scan in 6 months.   • HIstory of repair of Congenital atrial septal defect 03/2012 March 2012--percutaneous closure of secundum ASD.    • Hyperlipidemia 2/22/2016   • Hypogonadism male, no TRT. 12/26/2012 12/26/2012--treatment for hypogonadism begun.   • Microalbuminuria 10/19/2014    10/19/2014--urine microalbumin mildly elevated at 27.8. Normal range 0.0--17.0.   • Moderate persistent asthma 2/22/2016    Seasonal, spring and fall.   • Multiple environmental allergies 10/24/2013    10/24/2013--skin testing revealed impressive reactions to grass following, tree pollen, weed pollen, ragweed, dust mite, and cat. Recommend immunotherapy.   • Non morbid obesity 2/22/2016   • Right bundle branch block     ECG reveals sinus rhythm, rate of 75, right bundle branch block, left anterior hemiblock   • Subclinical hypothyroidism 8/17/2018 08/27/2018--thyroid ultrasound reveals subcentimeter nodule in the right thyroid lobe.  Possibly cystic.  There is a question of an ill-defined 1 cm nodular lesion posteriorly in the mid left lateral thyroid.  Appearance could be technical in origin.  Recommend repeat thyroid ultrasound in 6 months.  08/17/2018--routine follow-up.  TSH mildly elevated at 4.49.  Free T3 and free T4 are normal.  Rep   • Thyroid nodule 10/5/2018    08/27/2018--thyroid ultrasound reveals subcentimeter nodule in the right thyroid lobe.  Possibly cystic.  There is a question of an ill-defined 1 cm nodular lesion posteriorly in the mid left lateral thyroid.  Appearance could be technical in origin.  Recommend repeat thyroid ultrasound in 6 months.  08/17/2018--routine follow-up.  TSH mildly elevated at 4.49.  Free T3 and free T4 are normal.  Repeat thyroid function tests ordered and returned normal.  Thyroid ultrasound ordered.   • Type 2 diabetes mellitus (CMS/HCC) 1/1/2004    2004--initial diagnosis of type 2 diabetes.   • Vitamin D deficiency 2/22/2016         Past Surgical History:   Procedure Laterality Date   • ATRIAL SEPTAL DEFECT REPAIR  03/2012 March 2012--percutaneous closure of secundum ASD.  Dr. Mcpherson   • COLONOSCOPY N/A  11/29/2017 11/29/2017--colonoscopy revealed 2 small (3 mm) polyps in the sigmoid colon and cecum.  Removed.  Bowel prep.  Sigmoid diverticuli noted.  No hemorrhoids.  Pathology returned hyperplastic polyp of the sigmoid and the cecal polyp was a tubular adenoma.   • ESOPHAGEAL DILATATION  08/12/2015 08/12/2015--EGD revealed esophagitis and a distal esophageal stricture that was dilated. Small sliding hiatal hernia. Proximal gastric ulcer and gastritis present. Dysphagia resolved after dilatation. 05/22/2015--patient presents with a several month history of progressively worsening intermittent dysphagia of solids but not liquids. He describes solid food sometimes sticking and points to his upp   • ESOPHAGOSCOPY / EGD  08/12/2015 08/12/2015--EGD revealed esophagitis and a distal esophageal stricture that was dilated. Small sliding hiatal hernia. Proximal gastric ulcer and gastritis present. Dysphagia resolved after dilatation. 05/22/2015--patient presents with a several month history of progressively worsening intermittent dysphagia of solids but not liquids. He describes solid food sometimes sticking and points to his upp   • KNEE ACL RECONSTRUCTION Right 02/11/2013 02/11/2013--knee arthroscopy with anterior cruciate ligament repair   • TONSILLECTOMY  2009 2009--tonsillectomy as an adult.         Allergies   Allergen Reactions   • Latex Itching           Current Outpatient Medications:   •  amLODIPine-benazepril (LOTREL 5-20) 5-20 MG per capsule, TAKE 1 CAPSULE BY MOUTH ONE TIME A DAY , Disp: 30 capsule, Rfl: 4  •  Chlorcyclizine-Pseudoephed (STAHIST AD) 25-60 MG tablet, 1 by mouth every 6 hours as needed for congestion and allergies, not greater than 3 in 24 hours, Disp: 60 tablet, Rfl: 2  •  Cholecalciferol (VITAMIN D3) 5000 UNITS capsule capsule, Take 1 capsule by mouth daily., Disp: , Rfl:   •  esomeprazole (nexIUM) 40 MG capsule, TAKE 1 CAPSULE BY MOUTH ONE TIME A DAY , Disp: 30 capsule, Rfl: 8  •   ezetimibe (ZETIA) 10 MG tablet, TAKE 1 TABLET BY MOUTH ONE TIME A DAY , Disp: 30 tablet, Rfl: 1  •  fluconazole (DIFLUCAN) 200 MG tablet, TAKE 1 TABLET BY MOUTH ONE TIME A DAY UNTIL GONE, Disp: 7 tablet, Rfl: 0  •  glimepiride (AMARYL) 4 MG tablet, Take 4 mg by mouth 2 (Two) Times a Day., Disp: , Rfl:   •  Insulin Glargine (BASAGLAR KWIKPEN) 100 UNIT/ML injection pen, inject 80 units subcutaneously one time a day , Disp: 30 mL, Rfl: 4  •  INVOKANA 300 MG tablet, TAKE 1 TABLET BY MOUTH ONE TIME A DAY , Disp: 30 tablet, Rfl: 2  •  Loratadine (CLARITIN PO), Take 1 capsule by mouth daily as needed (when necessary for allergies.)., Disp: , Rfl:   •  metFORMIN (GLUCOPHAGE) 1000 MG tablet, TAKE 1 TABLET BY MOUTH TWO TIMES A DAY WITH MEALS, Disp: 180 tablet, Rfl: 0  •  montelukast (SINGULAIR) 10 MG tablet, TAKE 1 TABLET BY MOUTH ONE TIME A DAY , Disp: 30 tablet, Rfl: 2  •  naproxen (NAPROSYN) 500 MG tablet, Take 1 p.o. twice daily with food for pain/arthritis/inflammation., Disp: 30 tablet, Rfl: 0  •  phentermine (ADIPEX-P) 37.5 MG tablet, Take 1 p.o. every morning for appetite suppression, Disp: 30 tablet, Rfl: 1  •  polyethylene glycol (MIRALAX) packet, Take orally as directed daily for constipation, Disp: , Rfl:   •  Probiotic capsule, 1 by mouth daily, Disp: , Rfl:   •  rosuvastatin (CRESTOR) 40 MG tablet, TAKE 1 TABLET BY MOUTH AT BEDTIME , Disp: 30 tablet, Rfl: 5  •  sertraline (ZOLOFT) 50 MG tablet, TAKE 1 TABLET BY MOUTH ONE TIME A DAY , Disp: 30 tablet, Rfl: 2  •  TRULICITY 1.5 MG/0.5ML solution pen-injector, inject 1.5mg under skin once weekly as directed, Disp: 2 mL, Rfl: 9  •  umeclidinium-vilanterol (ANORO ELLIPTA) 62.5-25 MCG/INH aerosol powder  inhaler, Inhale once daily as directed, Disp: 60 each, Rfl: 6  •  fluocinonide-emollient (LIDEX-E) 0.05 % cream, Apply topically twice daily as needed hand eczema, Disp: 60 g, Rfl: 0      Family History   Problem Relation Age of Onset   • Diabetes Mother    • Stomach  "cancer Mother    • Diabetes Maternal Grandmother          Social History     Socioeconomic History   • Marital status:      Spouse name: Luis    • Number of children: 2   • Years of education: 16   • Highest education level: Bachelor's degree (e.g., BA, AB, BS)   Occupational History   • Occupation:  for [employer])   Social Needs   • Financial resource strain: Not hard at all   • Food insecurity:     Worry: Never true     Inability: Never true   • Transportation needs:     Medical: No     Non-medical: No   Tobacco Use   • Smoking status: Never Smoker   • Smokeless tobacco: Never Used   Substance and Sexual Activity   • Alcohol use: Yes     Frequency: Monthly or less     Drinks per session: 1 or 2     Binge frequency: Never   • Drug use: No   • Sexual activity: Yes     Partners: Female   Lifestyle   • Physical activity:     Days per week: 2 days     Minutes per session: 90 min   • Stress: Not at all   Relationships   • Social connections:     Talks on phone: More than three times a week     Gets together: More than three times a week     Attends Worship service: More than 4 times per year     Active member of club or organization: Yes     Attends meetings of clubs or organizations: More than 4 times per year     Relationship status:          Vitals:    11/12/19 0742   BP: 138/80   BP Location: Left arm   Pulse: 98   SpO2: 99%   Weight: 86.6 kg (191 lb)   Height: 167.4 cm (65.91\")          Physical Exam:    General: Alert and oriented x 3.  No acute distress.  Normal affect.  Obese.  HEENT: Pupils equal, round, reactive to light; extraocular movements intact; sclerae nonicteric; pharynx, ear canals and TMs normal.  Neck: Without JVD, thyromegaly, bruit, or adenopathy.  Lungs: Clear to auscultation in all fields.  Heart: Regular rate and rhythm without murmur, rub, gallop, or click.  Abdomen: Soft, nontender, without hepatosplenomegaly or hernia.  Bowel sounds normal. "  : Deferred.  Rectal: Deferred.  Extremities: Without clubbing, cyanosis, edema, or pulse deficit.  Neurologic: Intact without focal deficit.  Normal station and gait observed during ingress and egress from the examination room.  Skin: Without significant lesion.  Musculoskeletal: Unremarkable.    Lab/other results:      Assessment/Plan:     Diagnosis Plan   1. Abnormal weight gain     2. Non morbid obesity     3. Type 2 diabetes mellitus without complication, without long-term current use of insulin (CMS/[facility])     4. Hyperlipidemia     5. Benign essential hypertension     6. Hand eczema  fluocinonide-emollient (LIDEX-E) 0.05 % cream   7. Need for influenza vaccination  Flucelvax Quad=>4Years ([id number])     Patient with multiple medical comorbidities that would definitely benefit from weight loss presents for follow-up of medical management for the weight loss/abnormal weight gain.  It appears the patient has had a plateau which is to be expected with the phentermine and needs a drug holiday.  Patient also has a history of hand eczema and was given a topical steroid by dermatologist and he would like to have a refill on this.    Plan is as follows: Influenza vaccine given.  Prescription for fluocinonide cream given.  Patient instructed to go off of the phentermine for 1 month and then restart it.  Strongly encouraged him to work hard at at least maintaining his weight while off of the phentermine and preferably to actually lose some weight.  He has a physical exam with lab in January and we will reassess at that time.        Procedures

## 2019-11-22 DIAGNOSIS — B37.42 CANDIDAL BALANITIS: ICD-10-CM

## 2019-11-22 RX ORDER — FLUCONAZOLE 200 MG/1
TABLET ORAL
Qty: 7 TABLET | Refills: 0 | Status: SHIPPED | OUTPATIENT
Start: 2019-11-22 | End: 2020-01-09

## 2019-12-23 DIAGNOSIS — E11.9 TYPE 2 DIABETES MELLITUS WITHOUT COMPLICATION, WITHOUT LONG-TERM CURRENT USE OF INSULIN (HCC): Chronic | ICD-10-CM

## 2019-12-23 DIAGNOSIS — R80.9 MICROALBUMINURIA: Chronic | ICD-10-CM

## 2019-12-23 DIAGNOSIS — E78.49 OTHER HYPERLIPIDEMIA: Chronic | ICD-10-CM

## 2019-12-23 DIAGNOSIS — E03.8 SUBCLINICAL HYPOTHYROIDISM: Chronic | ICD-10-CM

## 2019-12-23 DIAGNOSIS — Z00.00 ROUTINE PHYSICAL EXAMINATION: ICD-10-CM

## 2019-12-26 LAB
25(OH)D3+25(OH)D2 SERPL-MCNC: 42.9 NG/ML (ref 30–100)
ALBUMIN SERPL-MCNC: 4.2 G/DL (ref 3.5–5.2)
ALBUMIN/CREAT UR: <4.5 MG/G CREAT (ref 0–30)
ALBUMIN/GLOB SERPL: 1.8 G/DL
ALP SERPL-CCNC: 85 U/L (ref 39–117)
ALT SERPL-CCNC: 24 U/L (ref 1–41)
APPEARANCE UR: CLEAR
AST SERPL-CCNC: 18 U/L (ref 1–40)
BILIRUB SERPL-MCNC: 0.2 MG/DL (ref 0.2–1.2)
BILIRUB UR QL STRIP: NEGATIVE
BUN SERPL-MCNC: 19 MG/DL (ref 6–20)
BUN/CREAT SERPL: 16.4 (ref 7–25)
CALCIUM SERPL-MCNC: 9.3 MG/DL (ref 8.6–10.5)
CHLORIDE SERPL-SCNC: 102 MMOL/L (ref 98–107)
CHOLEST SERPL-MCNC: 118 MG/DL (ref 100–199)
CK SERPL-CCNC: 211 U/L (ref 20–200)
CO2 SERPL-SCNC: 25.3 MMOL/L (ref 22–29)
COLOR UR: YELLOW
CREAT SERPL-MCNC: 1.16 MG/DL (ref 0.76–1.27)
CREAT UR-MCNC: 66.9 MG/DL
ERYTHROCYTE [DISTWIDTH] IN BLOOD BY AUTOMATED COUNT: 12.9 % (ref 12.3–15.4)
GLOBULIN SER CALC-MCNC: 2.3 GM/DL
GLUCOSE SERPL-MCNC: 207 MG/DL (ref 65–99)
GLUCOSE UR QL: ABNORMAL
HBA1C MFR BLD: 10.5 % (ref 4.8–5.6)
HCT VFR BLD AUTO: 41.2 % (ref 37.5–51)
HDL SERPL-SCNC: 26.5 UMOL/L
HDLC SERPL-MCNC: 30 MG/DL
HGB BLD-MCNC: 13.8 G/DL (ref 13–17.7)
HGB UR QL STRIP: NEGATIVE
KETONES UR QL STRIP: NEGATIVE
LDL SERPL QN: 19.6 NM
LDL SERPL-SCNC: 966 NMOL/L
LDL SMALL SERPL-SCNC: 817 NMOL/L
LDLC SERPL CALC-MCNC: 31 MG/DL (ref 0–99)
LEUKOCYTE ESTERASE UR QL STRIP: NEGATIVE
MCH RBC QN AUTO: 29 PG (ref 26.6–33)
MCHC RBC AUTO-ENTMCNC: 33.5 G/DL (ref 31.5–35.7)
MCV RBC AUTO: 86.6 FL (ref 79–97)
MICROALBUMIN UR-MCNC: <3 UG/ML
NITRITE UR QL STRIP: NEGATIVE
PH UR STRIP: 5.5 [PH] (ref 5–8)
PLATELET # BLD AUTO: 297 10*3/MM3 (ref 140–450)
POTASSIUM SERPL-SCNC: 4.6 MMOL/L (ref 3.5–5.2)
PROT SERPL-MCNC: 6.5 G/DL (ref 6–8.5)
PROT UR QL STRIP: NEGATIVE
PSA SERPL-MCNC: 0.26 NG/ML (ref 0–4)
RBC # BLD AUTO: 4.76 10*6/MM3 (ref 4.14–5.8)
SODIUM SERPL-SCNC: 141 MMOL/L (ref 136–145)
SP GR UR: ABNORMAL (ref 1–1.03)
T3FREE SERPL-MCNC: 3 PG/ML (ref 2–4.4)
T4 FREE SERPL-MCNC: 1.06 NG/DL (ref 0.93–1.7)
TRIGL SERPL-MCNC: 286 MG/DL (ref 0–149)
TSH SERPL DL<=0.005 MIU/L-ACNC: 2 UIU/ML (ref 0.27–4.2)
UROBILINOGEN UR STRIP-MCNC: ABNORMAL MG/DL
WBC # BLD AUTO: 12.17 10*3/MM3 (ref 3.4–10.8)

## 2020-01-06 RX ORDER — GLIMEPIRIDE 4 MG/1
TABLET ORAL
Qty: 60 TABLET | Refills: 3 | Status: SHIPPED | OUTPATIENT
Start: 2020-01-06 | End: 2020-03-23

## 2020-01-06 RX ORDER — CANAGLIFLOZIN 300 MG/1
TABLET, FILM COATED ORAL
Qty: 30 TABLET | Refills: 1 | Status: SHIPPED | OUTPATIENT
Start: 2020-01-06 | End: 2020-02-20

## 2020-01-09 ENCOUNTER — OFFICE VISIT (OUTPATIENT)
Dept: INTERNAL MEDICINE | Facility: CLINIC | Age: 55
End: 2020-01-09

## 2020-01-09 VITALS
OXYGEN SATURATION: 98 % | DIASTOLIC BLOOD PRESSURE: 68 MMHG | TEMPERATURE: 98.5 F | SYSTOLIC BLOOD PRESSURE: 124 MMHG | HEART RATE: 85 BPM | HEIGHT: 66 IN | BODY MASS INDEX: 31.5 KG/M2 | WEIGHT: 196 LBS

## 2020-01-09 DIAGNOSIS — Z79.4 TYPE 2 DIABETES MELLITUS WITH MICROALBUMINURIA, WITH LONG-TERM CURRENT USE OF INSULIN (HCC): ICD-10-CM

## 2020-01-09 DIAGNOSIS — Z00.00 ROUTINE PHYSICAL EXAMINATION: Primary | ICD-10-CM

## 2020-01-09 DIAGNOSIS — K21.00 GASTROESOPHAGEAL REFLUX DISEASE WITH ESOPHAGITIS: Chronic | ICD-10-CM

## 2020-01-09 DIAGNOSIS — E11.9 ENCOUNTER FOR DIABETIC FOOT EXAM (HCC): Chronic | ICD-10-CM

## 2020-01-09 DIAGNOSIS — R80.9 TYPE 2 DIABETES MELLITUS WITH MICROALBUMINURIA, WITH LONG-TERM CURRENT USE OF INSULIN (HCC): ICD-10-CM

## 2020-01-09 DIAGNOSIS — F41.8 DEPRESSION WITH ANXIETY: Chronic | ICD-10-CM

## 2020-01-09 DIAGNOSIS — Z91.09 MULTIPLE ENVIRONMENTAL ALLERGIES: Chronic | ICD-10-CM

## 2020-01-09 DIAGNOSIS — E04.1 THYROID NODULE: Chronic | ICD-10-CM

## 2020-01-09 DIAGNOSIS — J45.40 MODERATE PERSISTENT ASTHMA WITHOUT COMPLICATION: Chronic | ICD-10-CM

## 2020-01-09 DIAGNOSIS — Z86.010 HISTORY OF COLON POLYPS: Chronic | ICD-10-CM

## 2020-01-09 DIAGNOSIS — R80.9 MICROALBUMINURIA: Chronic | ICD-10-CM

## 2020-01-09 DIAGNOSIS — E11.29 TYPE 2 DIABETES MELLITUS WITH MICROALBUMINURIA, WITH LONG-TERM CURRENT USE OF INSULIN (HCC): ICD-10-CM

## 2020-01-09 DIAGNOSIS — E78.2 MIXED HYPERLIPIDEMIA: Chronic | ICD-10-CM

## 2020-01-09 DIAGNOSIS — Z51.81 THERAPEUTIC DRUG MONITORING: ICD-10-CM

## 2020-01-09 DIAGNOSIS — I10 BENIGN ESSENTIAL HYPERTENSION: Chronic | ICD-10-CM

## 2020-01-09 DIAGNOSIS — E55.9 VITAMIN D DEFICIENCY: Chronic | ICD-10-CM

## 2020-01-09 DIAGNOSIS — Z23 NEED FOR PNEUMOCOCCAL VACCINATION: ICD-10-CM

## 2020-01-09 DIAGNOSIS — E03.8 SUBCLINICAL HYPOTHYROIDISM: Chronic | ICD-10-CM

## 2020-01-09 DIAGNOSIS — J06.9 VIRAL URI WITH COUGH: ICD-10-CM

## 2020-01-09 DIAGNOSIS — E66.09 NON MORBID OBESITY DUE TO EXCESS CALORIES: Chronic | ICD-10-CM

## 2020-01-09 PROCEDURE — 99396 PREV VISIT EST AGE 40-64: CPT | Performed by: INTERNAL MEDICINE

## 2020-01-09 PROCEDURE — 90732 PPSV23 VACC 2 YRS+ SUBQ/IM: CPT | Performed by: INTERNAL MEDICINE

## 2020-01-09 PROCEDURE — 90471 IMMUNIZATION ADMIN: CPT | Performed by: INTERNAL MEDICINE

## 2020-01-09 NOTE — PROGRESS NOTES
01/09/2020    Patient Information  Marek Diego                                                                                          [id number] [facility] [address]      1965  [unfilled]  There is no work phone number on file.    Chief Complaint:     Routine annual physical exam and follow-up blood work to monitor several chronic medical problems as listed in the history of present illness.  Complaining of head congestion, drainage, and cough.    History of Present Illness:    Patient with history of type 2 diabetes, hyperlipidemia, microalbuminuria, esophageal reflux, hypertension, depression with anxiety, nonmorbid obesity, multiple environmental allergies and asthma, vitamin D deficiency, history of colon polyps, subclinical hypothyroidism and thyroid nodule.  He presents today for his routine annual physical exam and follow-up lab work.  His past medical history reviewed and updated were necessary including health maintenance parameters.  This reveals he is up-to-date or else accounted for after today's visit.    Review of Systems   Constitution: Negative.   HENT: Positive for congestion.    Eyes: Negative.    Cardiovascular: Negative.    Respiratory: Positive for cough and sputum production. Negative for shortness of breath and wheezing.    Endocrine: Negative.    Hematologic/Lymphatic: Negative.    Skin: Negative.    Musculoskeletal: Negative.    Gastrointestinal: Negative.    Genitourinary: Negative.    Neurological: Negative.    Psychiatric/Behavioral: Negative.    Allergic/Immunologic: Negative.        Active Problems:    Patient Active Problem List   Diagnosis   • Allergic rhinitis   • Benign essential hypertension   • Chronic neck pain   • Depression with anxiety   • Gastroesophageal reflux disease with esophagitis   • Hyperlipidemia   • Hypogonadism male, no TRT.   • Microalbuminuria   • Moderate persistent asthma without complication   • Multiple environmental allergies    • Non morbid obesity   • Type 2 diabetes mellitus with microalbuminuria, with long-term current use of insulin (CMS/[facility])   • Vitamin D deficiency   • Routine physical examination   • Therapeutic drug monitoring   • Right bundle branch block   • Diabetic eye exam (CMS/[facility])   • Diabetic foot exam   • Chronic constipation   • Snoring   • Nocturnal hypoxemia   • History of colon polyps, 11/29/2017--tubular adenoma times one.  Hyperplastic ×1.  Repeat 5 years.   • Subclinical hypothyroidism   • Thyroid nodule   • Abnormal weight gain   • Dysfunction of both eustachian tubes         Past Medical History:   Diagnosis Date   • Allergic rhinitis 10/24/2013    10/24/2013--skin testing revealed impressive reactions to grass following, tree pollen, weed pollen, ragweed, dust mite, and cat. Recommend immunotherapy.   • Benign essential hypertension 2/22/2016   • Chronic constipation 11/20/2017 11/20/2017--patient reports approximately 8 month history of intermittent constipation that at times can last as much as a month.  He notes his blood sugar readings tend to increase when this happens.  He is scheduled for colonoscopy next week.  I recommend a generic probiotic daily as well as MiraLAX and adjust as needed.   • Chronic neck pain 10/30/2015    12/19/2015--patient reports his left shoulder pain has resolved. He continues to have neck pain. X-ray of the cervical spine ordered. Physical therapy ordered.   11/10/2015--left shoulder injected with 80 mg of Depo-Medrol with 3 mL of Xylocaine.   10/30/2015--patient presents with at least a one-month history of intermittent left-sided neck pain that is associated with left shoulder pain as well. The pain is described as shooting and is 8 out of 10 intensity at its worst. The pain is worse with certain movements of his head and left shoulder. No trauma. No numbness or paresthesias.   • Depression with anxiety 2/22/2016   • Diabetic eye exam (CMS/[facility]) 5/27/2016    May  2016--patient reports a normal diabetic eye exam.   • Diabetic foot exam 4/27/2017 05/02/2017--routine diabetic foot exam reveals no evidence of diabetic foot ulcer or pre-ulcerative callus.  Pulses are palpable and there is no signs of ischemia.  Sensation subjectively intact.   • Gastroesophageal reflux disease with esophagitis 5/22/2015 08/12/2015--EGD revealed esophagitis and a distal esophageal stricture that was dilated. Small sliding hiatal hernia. Proximal gastric ulcer and gastritis present. Dysphagia resolved after dilatation. Pathology of the gastric antrum was benign with no significant histologic abnormality. Distal esophagus biopsy negative for intestinal metaplasia or dysplasia.   05/22/2015--patient presents with a several month history of progressively worsening intermittent dysphagia of solids but not liquids. He describes solid food sometimes sticking and points to his upper chest/lower throat. No other associated symptoms. Patient referred to Dr. Aime Parada for an EGD. This is negative, may need ENT evaluation.   • History of colon polyps, 11/29/2017--tubular adenoma times one.  Hyperplastic ×1.  Repeat 5 years. 12/18/2017 11/29/2017--colonoscopy revealed 2 small (3 mm) polyps in the sigmoid colon and cecum.  Removed.  Bowel prep.  Sigmoid diverticuli noted.  No hemorrhoids.  Pathology returned hyperplastic polyp of the sigmoid and the cecal polyp was a tubular adenoma.   • HIstory of eosinophilic gastroenteritis 1990s    1990s--patient had significant epigastric discomfort and workup including an EGD revealed eosinophilic gastroenteritis that was treated with prednisone and resulted in resolution.   • History of esophageal stricture 08/12/2015 08/12/2015--EGD revealed esophagitis and a distal esophageal stricture that was dilated. Small sliding hiatal hernia. Proximal gastric ulcer and gastritis present. Dysphagia resolved after dilatation. Pathology of the gastric antrum was benign  with no significant histologic abnormality. Distal esophagus biopsy negative for intestinal metaplasia or dysplasia.   05/22/2015--patient presents with a s   • HIstory of Pulmonary hypertension, 04/23/2014--resolved after PFO/ASD closure. 04/23/2014 04/23/2014--echocardiogram reveals normal left ventricular systolic function with ejection fraction 55%. Left ventricular wall motion is normal. The right ventricle is moderately to severely dilated. Right ventricular systolic function is mildly reduced. The right atrium is moderately dilated. Saline contrast study revealed no evidence of PFO/ASD. Status post PFO closure. There is trace mitral reg   • History of Pulmonary nodule, 03/07/2016--5 mm indeterminate nodule right lower lobe.  09/13/2016--consistent with calcified granuloma. 3/7/2016    09/13/2016--CT scan of the chest, abdomen, and pelvis with contrast reveals no lymphadenopathy within the abdomen or pelvis.  Mild hepatic steatosis.  Previously noted right lower lobe pulmonary nodule is currently calcified and consistent with a calcified granuloma.  03/07/2016--CT scan of the abdomen performed for complaints of abdominal discomfort revealed a 5 mm nodular focus right lower lobe which is indeterminate.  Recommend repeat CT scan in 6 months.   • HIstory of repair of Congenital atrial septal defect 03/2012 March 2012--percutaneous closure of secundum ASD.   • Hyperlipidemia 2/22/2016   • Hypogonadism male, no TRT. 12/26/2012 12/26/2012--treatment for hypogonadism begun.   • Microalbuminuria 10/19/2014    10/19/2014--urine microalbumin mildly elevated at 27.8. Normal range 0.0--17.0.   • Moderate persistent asthma without complication 2/22/2016    Seasonal, spring and fall.   • Multiple environmental allergies 10/24/2013    10/24/2013--skin testing revealed impressive reactions to grass following, tree pollen, weed pollen, ragweed, dust mite, and cat. Recommend immunotherapy.   • Non morbid obesity  2/22/2016   • Right bundle branch block     ECG reveals sinus rhythm, rate of 75, right bundle branch block, left anterior hemiblock   • Subclinical hypothyroidism 8/17/2018 08/27/2018--thyroid ultrasound reveals subcentimeter nodule in the right thyroid lobe.  Possibly cystic.  There is a question of an ill-defined 1 cm nodular lesion posteriorly in the mid left lateral thyroid.  Appearance could be technical in origin.  Recommend repeat thyroid ultrasound in 6 months.  08/17/2018--routine follow-up.  TSH mildly elevated at 4.49.  Free T3 and free T4 are normal.  Rep   • Thyroid nodule 10/5/2018    08/27/2018--thyroid ultrasound reveals subcentimeter nodule in the right thyroid lobe.  Possibly cystic.  There is a question of an ill-defined 1 cm nodular lesion posteriorly in the mid left lateral thyroid.  Appearance could be technical in origin.  Recommend repeat thyroid ultrasound in 6 months.  08/17/2018--routine follow-up.  TSH mildly elevated at 4.49.  Free T3 and free T4 are normal.  Repeat thyroid function tests ordered and returned normal.  Thyroid ultrasound ordered.   • Type 2 diabetes mellitus with microalbuminuria, with long-term current use of insulin (CMS/[facility]) 1/1/2004 2004--initial diagnosis of type 2 diabetes.   • Vitamin D deficiency 2/22/2016         Past Surgical History:   Procedure Laterality Date   • ATRIAL SEPTAL DEFECT REPAIR  03/2012 March 2012--percutaneous closure of secundum ASD.  Dr. Mcpherson   • COLONOSCOPY N/A 11/29/2017 11/29/2017--colonoscopy revealed 2 small (3 mm) polyps in the sigmoid colon and cecum.  Removed.  Bowel prep.  Sigmoid diverticuli noted.  No hemorrhoids.  Pathology returned hyperplastic polyp of the sigmoid and the cecal polyp was a tubular adenoma.   • ESOPHAGEAL DILATATION  08/12/2015 08/12/2015--EGD revealed esophagitis and a distal esophageal stricture that was dilated. Small sliding hiatal hernia. Proximal gastric ulcer and gastritis present. Dysphagia  resolved after dilatation. 05/22/2015--patient presents with a several month history of progressively worsening intermittent dysphagia of solids but not liquids. He describes solid food sometimes sticking and points to his upp   • ESOPHAGOSCOPY / EGD  08/12/2015 08/12/2015--EGD revealed esophagitis and a distal esophageal stricture that was dilated. Small sliding hiatal hernia. Proximal gastric ulcer and gastritis present. Dysphagia resolved after dilatation. 05/22/2015--patient presents with a several month history of progressively worsening intermittent dysphagia of solids but not liquids. He describes solid food sometimes sticking and points to his upp   • KNEE ACL RECONSTRUCTION Right 02/11/2013 02/11/2013--knee arthroscopy with anterior cruciate ligament repair   • TONSILLECTOMY  2009 2009--tonsillectomy as an adult.         Allergies   Allergen Reactions   • Latex Itching           Current Outpatient Medications:   •  amLODIPine-benazepril (LOTREL 5-20) 5-20 MG per capsule, TAKE 1 CAPSULE BY MOUTH ONE TIME A DAY , Disp: 30 capsule, Rfl: 4  •  Chlorcyclizine-Pseudoephed (STAHIST AD) 25-60 MG tablet, 1 by mouth every 6 hours as needed for congestion and allergies, not greater than 3 in 24 hours, Disp: 60 tablet, Rfl: 2  •  Cholecalciferol (VITAMIN D3) 5000 UNITS capsule capsule, Take 1 capsule by mouth daily., Disp: , Rfl:   •  esomeprazole (nexIUM) 40 MG capsule, TAKE 1 CAPSULE BY MOUTH ONE TIME A DAY , Disp: 30 capsule, Rfl: 8  •  ezetimibe (ZETIA) 10 MG tablet, TAKE 1 TABLET BY MOUTH ONE TIME A DAY , Disp: 30 tablet, Rfl: 1  •  fluocinonide-emollient (LIDEX-E) 0.05 % cream, Apply topically twice daily as needed hand eczema, Disp: 60 g, Rfl: 0  •  glimepiride (AMARYL) 4 MG tablet, take 1/2 tablet by mouth in the morning & 1&1/2 tablet in the evening, Disp: 60 tablet, Rfl: 3  •  Insulin Glargine (BASAGLAR KWIKPEN) 100 UNIT/ML injection pen, inject 80 units subcutaneously one time a day , Disp: 30 mL,  Rfl: 4  •  INVOKANA 300 MG tablet, TAKE 1 TABLET BY MOUTH ONE TIME A DAY , Disp: 30 tablet, Rfl: 1  •  Loratadine (CLARITIN PO), Take 1 capsule by mouth daily as needed (when necessary for allergies.)., Disp: , Rfl:   •  metFORMIN (GLUCOPHAGE) 1000 MG tablet, TAKE 1 TABLET BY MOUTH TWO TIMES A DAY WITH MEAL, Disp: 180 tablet, Rfl: 0  •  montelukast (SINGULAIR) 10 MG tablet, TAKE 1 TABLET BY MOUTH ONE TIME A DAY , Disp: 30 tablet, Rfl: 2  •  polyethylene glycol (MIRALAX) packet, Take orally as directed daily for constipation, Disp: , Rfl:   •  rosuvastatin (CRESTOR) 40 MG tablet, TAKE 1 TABLET BY MOUTH AT BEDTIME , Disp: 30 tablet, Rfl: 5  •  sertraline (ZOLOFT) 50 MG tablet, TAKE 1 TABLET BY MOUTH ONE TIME A DAY , Disp: 30 tablet, Rfl: 1  •  TRULICITY 1.5 MG/0.5ML solution pen-injector, inject 1.5mg under skin once weekly as directed, Disp: 2 mL, Rfl: 9  •  naproxen (NAPROSYN) 500 MG tablet, Take 1 p.o. twice daily with food for pain/arthritis/inflammation., Disp: 30 tablet, Rfl: 0  •  phentermine (ADIPEX-P) 37.5 MG tablet, Take 1 p.o. 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)   Social Needs   • Financial resource strain: Not hard at all   • Food insecurity:     Worry: Never true     Inability: Never true   • Transportation needs:     Medical: No     Non-medical: No   Tobacco Use   • Smoking status: Never Smoker   • Smokeless tobacco: Never Used   Substance and Sexual Activity   • Alcohol use: Yes      "Frequency: Monthly or less     Drinks per session: 1 or 2     Binge frequency: Never   • Drug use: No   • Sexual activity: Yes     Partners: Female   Lifestyle   • Physical activity:     Days per week: 0 days     Minutes per session: 0 min   • Stress: Only a little   Relationships   • Social connections:     Talks on phone: More than three times a week     Gets together: More than three times a week     Attends Buddhism service: More than 4 times per year     Active member of club or organization: Yes     Attends meetings of clubs or organizations: More than 4 times per year     Relationship status:          Vitals:    01/09/20 0735   BP: 124/68   BP Location: Right arm   Pulse: 85   Temp: 98.5 °F (36.9 °C)   SpO2: 98%   Weight: 88.9 kg (196 lb)   Height: 167.4 cm (65.91\")        Body mass index is 31.73 kg/m².      Physical Exam:    General: Alert and oriented x 3.  No acute distress.  Normal affect.  Obese.  HEENT: Pupils equal, round, reactive to light; extraocular movements intact; sclerae nonicteric; pharynx, ear canals and TMs normal.  Neck: Without JVD, thyromegaly, bruit, or adenopathy.  Lungs: Clear to auscultation in all fields.  Heart: Regular rate and rhythm without murmur, rub, gallop, or click.  Abdomen: Soft, nontender, without hepatosplenomegaly or hernia.  Bowel sounds normal.  : Deferred.  Rectal: Deferred.  Extremities: Without clubbing, cyanosis, edema, or pulse deficit.  Neurologic: Intact without focal deficit.  Normal station and gait observed during ingress and egress from the examination room.  Skin: Without significant lesion.  Musculoskeletal: Unremarkable.    January 9, 2020--routine diabetic foot exam reveals no evidence of diabetic foot ulcer or pre-ulcerative callus.  Distal pulses palpable and sensation intact.    Lab/other results:    NMR reveals a total cholesterol of 118.  Triglycerides 286.  LDL particle number excellent at 966.  Small LDL particle number elevated at " 817.  HDL particle number low at 26.5.  CMP normal except glucose 207.  Urinalysis reveals 3+ glucose.  CBC normal except white count elevated at 12.17.  Albumin/creatinine ratio is less than 4.5.  Hemoglobin A1c 10.5.  Thyroid function test normal.  PSA normal at 0.260.  CPK slightly elevated at 211.  Vitamin D normal at 42.9.    Assessment/Plan:     Diagnosis Plan   1. Routine physical examination     2. Type 2 diabetes mellitus with microalbuminuria, with long-term current use of insulin (CMS/[facility])     3. Hyperlipidemia     4. Microalbuminuria     5. Gastroesophageal reflux disease with esophagitis     6. Benign essential hypertension     7. Depression with anxiety     8. Non morbid obesity     9. Multiple environmental allergies     10. Moderate persistent asthma without complication     11. Vitamin D deficiency     12. Diabetic foot exam     13. History of colon polyps, 11/29/2017--tubular adenoma times one.  Hyperplastic ×1.  Repeat 5 years.     14. Subclinical hypothyroidism     15. Thyroid nodule     16. Therapeutic drug monitoring     17. Need for pneumococcal vaccination     18. Viral URI with cough       Patient presents with essentially normal annual physical except for the following issues: He has type 2 diabetes is under poor control.  Patient has hyperlipidemia which is under as good of control as we can get it as long as it is type 2 diabetes is under poor control.  Microalbuminuria is well controlled.  Esophageal reflux seems to be controlled as well.  His blood pressures under control.  Depression with anxiety being treated with the sertraline.  Patient has multiple environmental allergies and moderate persistent asthma and has had an upper respiratory infection since Christmas.  He is complaining of head congestion, drainage, and cough.  Cough was previously discolored but now is clear.  No fever, chills, or other systemic signs or symptoms.  Lungs are clear as noted above.  Patient has a history  of colon polyps and is up-to-date on his colonoscopy.  It appears that subclinical hypothyroidism was likely lab error but patient does have a thyroid nodule and is overdue for repeat thyroid ultrasound.  Patient also needs a pneumococcal vaccine.    Several preventative health issues discussed including review of vaccinations and recommendations, including dietary issues, exercise and weight loss.  Safe sex practices discussed.  Patient advised to wear seatbelt whenever driving and avoid texting and driving.  Also advised to look both ways before crossing the street.  Colon cancer prevention discussed and is up-to-date with colonoscopy.  Advised to avoid tobacco products and minimize alcohol consumption.    Plan is as follows: Endocrinology referral.  No change in chronic medical regimen at this time.  Strongly encourage exercise and weight loss.  Tussionex cough syrup for cough and congestion.  PPSV23 given.  Thyroid ultrasound ordered.  I will have patient follow-up in 6 months with lab prior or follow-up as needed.    Procedures

## 2020-01-17 ENCOUNTER — APPOINTMENT (OUTPATIENT)
Dept: ULTRASOUND IMAGING | Facility: HOSPITAL | Age: 55
End: 2020-01-17

## 2020-01-20 DIAGNOSIS — E11.9 TYPE 2 DIABETES MELLITUS WITHOUT COMPLICATION, WITHOUT LONG-TERM CURRENT USE OF INSULIN (HCC): Chronic | ICD-10-CM

## 2020-01-20 RX ORDER — ROSUVASTATIN CALCIUM 40 MG/1
TABLET, COATED ORAL
Qty: 30 TABLET | Refills: 4 | Status: SHIPPED | OUTPATIENT
Start: 2020-01-20 | End: 2020-06-03

## 2020-01-21 RX ORDER — INSULIN GLARGINE 100 [IU]/ML
INJECTION, SOLUTION SUBCUTANEOUS
Qty: 30 ML | Refills: 3 | Status: SHIPPED | OUTPATIENT
Start: 2020-01-21 | End: 2020-03-23

## 2020-01-27 DIAGNOSIS — E11.9 TYPE 2 DIABETES MELLITUS WITHOUT COMPLICATION, WITHOUT LONG-TERM CURRENT USE OF INSULIN (HCC): ICD-10-CM

## 2020-01-28 RX ORDER — DULAGLUTIDE 1.5 MG/.5ML
INJECTION, SOLUTION SUBCUTANEOUS
Qty: 2 ML | Refills: 8 | Status: SHIPPED | OUTPATIENT
Start: 2020-01-28 | End: 2021-03-31 | Stop reason: SDUPTHER

## 2020-01-28 NOTE — TELEPHONE ENCOUNTER
Pt's insurance denied coverage for invokana, the appeal was also denied.  He says they will cover farxiga 10mg

## 2020-02-05 DIAGNOSIS — E78.5 HYPERLIPIDEMIA, UNSPECIFIED HYPERLIPIDEMIA TYPE: ICD-10-CM

## 2020-02-05 RX ORDER — MONTELUKAST SODIUM 10 MG/1
TABLET ORAL
Qty: 30 TABLET | Refills: 0 | Status: SHIPPED | OUTPATIENT
Start: 2020-02-05 | End: 2020-03-09

## 2020-02-05 RX ORDER — EZETIMIBE 10 MG/1
TABLET ORAL
Qty: 30 TABLET | Refills: 0 | Status: SHIPPED | OUTPATIENT
Start: 2020-02-05 | End: 2021-03-31 | Stop reason: SDUPTHER

## 2020-02-20 ENCOUNTER — OFFICE VISIT (OUTPATIENT)
Dept: INTERNAL MEDICINE | Facility: CLINIC | Age: 55
End: 2020-02-20

## 2020-02-20 VITALS
SYSTOLIC BLOOD PRESSURE: 142 MMHG | HEIGHT: 66 IN | WEIGHT: 199 LBS | DIASTOLIC BLOOD PRESSURE: 90 MMHG | HEART RATE: 86 BPM | BODY MASS INDEX: 31.98 KG/M2 | OXYGEN SATURATION: 99 %

## 2020-02-20 DIAGNOSIS — R80.9 TYPE 2 DIABETES MELLITUS WITH MICROALBUMINURIA, WITH LONG-TERM CURRENT USE OF INSULIN (HCC): Primary | Chronic | ICD-10-CM

## 2020-02-20 DIAGNOSIS — E11.29 TYPE 2 DIABETES MELLITUS WITH MICROALBUMINURIA, WITH LONG-TERM CURRENT USE OF INSULIN (HCC): Primary | Chronic | ICD-10-CM

## 2020-02-20 DIAGNOSIS — E66.09 NON MORBID OBESITY DUE TO EXCESS CALORIES: Chronic | ICD-10-CM

## 2020-02-20 DIAGNOSIS — R63.5 ABNORMAL WEIGHT GAIN: Chronic | ICD-10-CM

## 2020-02-20 DIAGNOSIS — Z79.4 TYPE 2 DIABETES MELLITUS WITH MICROALBUMINURIA, WITH LONG-TERM CURRENT USE OF INSULIN (HCC): Primary | Chronic | ICD-10-CM

## 2020-02-20 PROCEDURE — 99213 OFFICE O/P EST LOW 20 MIN: CPT | Performed by: INTERNAL MEDICINE

## 2020-02-20 RX ORDER — AMLODIPINE BESYLATE AND BENAZEPRIL HYDROCHLORIDE 5; 20 MG/1; MG/1
CAPSULE ORAL
Qty: 30 CAPSULE | Refills: 0 | Status: SHIPPED | OUTPATIENT
Start: 2020-02-20 | End: 2020-03-09

## 2020-02-20 NOTE — PROGRESS NOTES
02/20/2020    Patient Information  Marek Diego                                                                                          [id number] [facility] [address]      1965  [unfilled]  There is no work phone number on file.    Chief Complaint:     Complaining of fluctuating blood sugar since starting Farxiga which was dictated by his insurance company.  Wants to discuss the use of phentermine again.    History of Present Illness:    Patient with a history of poorly controlled diabetes and problems with nonmorbid obesity/weight gain for many years presents today to inform me that since I switched him over to Farxiga that his blood sugar has been fluctuating and at times has been high.  I explained to the patient we just made that change not long ago and that it may take time for the Farxiga to start working optimally.  He was previously on Invokana but his insurance denied this.  Also patient wants to go back on phentermine for weight control and see below under the assessment and plan for discussion of this issue.    Review of Systems   Constitution: Positive for weight gain.   HENT: Negative.    Eyes: Negative.    Cardiovascular: Negative.    Respiratory: Negative.    Endocrine: Negative.         Fluctuating/high blood sugar   Hematologic/Lymphatic: Negative.    Skin: Negative.    Musculoskeletal: Negative.    Gastrointestinal: Negative.    Genitourinary: Negative.    Neurological: Negative.    Psychiatric/Behavioral: Negative.    Allergic/Immunologic: Negative.        Active Problems:    Patient Active Problem List   Diagnosis   • Allergic rhinitis   • Benign essential hypertension   • Chronic neck pain   • Depression with anxiety   • Gastroesophageal reflux disease with esophagitis   • Hyperlipidemia   • Hypogonadism male, no TRT.   • Microalbuminuria   • Moderate persistent asthma without complication   • Multiple environmental allergies   • Non morbid obesity   • Type 2  diabetes mellitus with microalbuminuria, with long-term current use of insulin (CMS/[facility])   • Vitamin D deficiency   • Routine physical examination   • Therapeutic drug monitoring   • Right bundle branch block   • Diabetic eye exam (CMS/[facility])   • Diabetic foot exam   • Chronic constipation   • Snoring   • Nocturnal hypoxemia   • History of colon polyps, 11/29/2017--tubular adenoma times one.  Hyperplastic ×1.  Repeat 5 years.   • Subclinical hypothyroidism   • Thyroid nodule   • Abnormal weight gain   • Dysfunction of both eustachian tubes         Past Medical History:   Diagnosis Date   • Allergic rhinitis 10/24/2013    10/24/2013--skin testing revealed impressive reactions to grass following, tree pollen, weed pollen, ragweed, dust mite, and cat. Recommend immunotherapy.   • Benign essential hypertension 2/22/2016   • Chronic constipation 11/20/2017 11/20/2017--patient reports approximately 8 month history of intermittent constipation that at times can last as much as a month.  He notes his blood sugar readings tend to increase when this happens.  He is scheduled for colonoscopy next week.  I recommend a generic probiotic daily as well as MiraLAX and adjust as needed.   • Chronic neck pain 10/30/2015    12/19/2015--patient reports his left shoulder pain has resolved. He continues to have neck pain. X-ray of the cervical spine ordered. Physical therapy ordered.   11/10/2015--left shoulder injected with 80 mg of Depo-Medrol with 3 mL of Xylocaine.   10/30/2015--patient presents with at least a one-month history of intermittent left-sided neck pain that is associated with left shoulder pain as well. The pain is described as shooting and is 8 out of 10 intensity at its worst. The pain is worse with certain movements of his head and left shoulder. No trauma. No numbness or paresthesias.   • Depression with anxiety 2/22/2016   • Diabetic eye exam (CMS/[facility]) 5/27/2016    May 2016--patient reports a normal diabetic eye  exam.   • Diabetic foot exam 4/27/2017 05/02/2017--routine diabetic foot exam reveals no evidence of diabetic foot ulcer or pre-ulcerative callus.  Pulses are palpable and there is no signs of ischemia.  Sensation subjectively intact.   • Gastroesophageal reflux disease with esophagitis 5/22/2015 08/12/2015--EGD revealed esophagitis and a distal esophageal stricture that was dilated. Small sliding hiatal hernia. Proximal gastric ulcer and gastritis present. Dysphagia resolved after dilatation. Pathology of the gastric antrum was benign with no significant histologic abnormality. Distal esophagus biopsy negative for intestinal metaplasia or dysplasia.   05/22/2015--patient presents with a several month history of progressively worsening intermittent dysphagia of solids but not liquids. He describes solid food sometimes sticking and points to his upper chest/lower throat. No other associated symptoms. Patient referred to Dr. Aime Parada for an EGD. This is negative, may need ENT evaluation.   • History of colon polyps, 11/29/2017--tubular adenoma times one.  Hyperplastic ×1.  Repeat 5 years. 12/18/2017 11/29/2017--colonoscopy revealed 2 small (3 mm) polyps in the sigmoid colon and cecum.  Removed.  Bowel prep.  Sigmoid diverticuli noted.  No hemorrhoids.  Pathology returned hyperplastic polyp of the sigmoid and the cecal polyp was a tubular adenoma.   • HIstory of eosinophilic gastroenteritis 1990s    1990s--patient had significant epigastric discomfort and workup including an EGD revealed eosinophilic gastroenteritis that was treated with prednisone and resulted in resolution.   • History of esophageal stricture 08/12/2015 08/12/2015--EGD revealed esophagitis and a distal esophageal stricture that was dilated. Small sliding hiatal hernia. Proximal gastric ulcer and gastritis present. Dysphagia resolved after dilatation. Pathology of the gastric antrum was benign with no significant histologic abnormality.  Distal esophagus biopsy negative for intestinal metaplasia or dysplasia.   05/22/2015--patient presents with a s   • History of Pulmonary nodule, 03/07/2016--5 mm indeterminate nodule right lower lobe.  09/13/2016--consistent with calcified granuloma. 3/7/2016    09/13/2016--CT scan of the chest, abdomen, and pelvis with contrast reveals no lymphadenopathy within the abdomen or pelvis.  Mild hepatic steatosis.  Previously noted right lower lobe pulmonary nodule is currently calcified and consistent with a calcified granuloma.  03/07/2016--CT scan of the abdomen performed for complaints of abdominal discomfort revealed a 5 mm nodular focus right lower lobe which is indeterminate.  Recommend repeat CT scan in 6 months.   • Hypogonadism male, no TRT. 12/26/2012 12/26/2012--treatment for hypogonadism begun.   • Microalbuminuria 10/19/2014    10/19/2014--urine microalbumin mildly elevated at 27.8. Normal range 0.0--17.0.   • Moderate persistent asthma without complication 2/22/2016    Seasonal, spring and fall.   • Multiple environmental allergies 10/24/2013    10/24/2013--skin testing revealed impressive reactions to grass following, tree pollen, weed pollen, ragweed, dust mite, and cat. Recommend immunotherapy.   • Non morbid obesity 2/22/2016   • Right bundle branch block     ECG reveals sinus rhythm, rate of 75, right bundle branch block, left anterior hemiblock   • Subclinical hypothyroidism 8/17/2018 08/27/2018--thyroid ultrasound reveals subcentimeter nodule in the right thyroid lobe.  Possibly cystic.  There is a question of an ill-defined 1 cm nodular lesion posteriorly in the mid left lateral thyroid.  Appearance could be technical in origin.  Recommend repeat thyroid ultrasound in 6 months.  08/17/2018--routine follow-up.  TSH mildly elevated at 4.49.  Free T3 and free T4 are normal.  Rep   • Thyroid nodule 10/5/2018    08/27/2018--thyroid ultrasound reveals subcentimeter nodule in the right thyroid  lobe.  Possibly cystic.  There is a question of an ill-defined 1 cm nodular lesion posteriorly in the mid left lateral thyroid.  Appearance could be technical in origin.  Recommend repeat thyroid ultrasound in 6 months.  08/17/2018--routine follow-up.  TSH mildly elevated at 4.49.  Free T3 and free T4 are normal.  Repeat thyroid function tests ordered and returned normal.  Thyroid ultrasound ordered.   • Type 2 diabetes mellitus with microalbuminuria, with long-term current use of insulin (CMS/[facility]) 1/1/2004 2004--initial diagnosis of type 2 diabetes.   • Vitamin D deficiency 2/22/2016         Past Surgical History:   Procedure Laterality Date   • ATRIAL SEPTAL DEFECT REPAIR  03/2012 March 2012--percutaneous closure of secundum ASD.  Dr. Mcpherson   • COLONOSCOPY N/A 11/29/2017 11/29/2017--colonoscopy revealed 2 small (3 mm) polyps in the sigmoid colon and cecum.  Removed.  Bowel prep.  Sigmoid diverticuli noted.  No hemorrhoids.  Pathology returned hyperplastic polyp of the sigmoid and the cecal polyp was a tubular adenoma.   • ESOPHAGEAL DILATATION  08/12/2015 08/12/2015--EGD revealed esophagitis and a distal esophageal stricture that was dilated. Small sliding hiatal hernia. Proximal gastric ulcer and gastritis present. Dysphagia resolved after dilatation. 05/22/2015--patient presents with a several month history of progressively worsening intermittent dysphagia of solids but not liquids. He describes solid food sometimes sticking and points to his upp   • ESOPHAGOSCOPY / EGD  08/12/2015 08/12/2015--EGD revealed esophagitis and a distal esophageal stricture that was dilated. Small sliding hiatal hernia. Proximal gastric ulcer and gastritis present. Dysphagia resolved after dilatation. 05/22/2015--patient presents with a several month history of progressively worsening intermittent dysphagia of solids but not liquids. He describes solid food sometimes sticking and points to his upp   • KNEE ACL  RECONSTRUCTION Right 02/11/2013 02/11/2013--knee arthroscopy with anterior cruciate ligament repair   • TONSILLECTOMY  2009 2009--tonsillectomy as an adult.         Allergies   Allergen Reactions   • Latex Itching           Current Outpatient Medications:   •  amLODIPine-benazepril (LOTREL 5-20) 5-20 MG per capsule, TAKE 1 CAPSULE BY MOUTH ONE TIME A DAY , Disp: 30 capsule, Rfl: 4  •  Chlorcyclizine-Pseudoephed (STAHIST AD) 25-60 MG tablet, 1 by mouth every 6 hours as needed for congestion and allergies, not greater than 3 in 24 hours, Disp: 60 tablet, Rfl: 2  •  Cholecalciferol (VITAMIN D3) 5000 UNITS capsule capsule, Take 1 capsule by mouth daily., Disp: , Rfl:   •  Dapagliflozin Propanediol (FARXIGA) 10 MG tablet, Take 10 mg by mouth Daily. Before Breakfast, Disp: 60 tablet, Rfl: 2  •  esomeprazole (nexIUM) 40 MG capsule, TAKE 1 CAPSULE BY MOUTH ONE TIME A DAY , Disp: 30 capsule, Rfl: 8  •  ezetimibe (ZETIA) 10 MG tablet, TAKE 1 TABLET BY MOUTH ONE TIME A DAY , Disp: 30 tablet, Rfl: 0  •  fluocinonide-emollient (LIDEX-E) 0.05 % cream, Apply topically twice daily as needed hand eczema, Disp: 60 g, Rfl: 0  •  glimepiride (AMARYL) 4 MG tablet, take 1/2 tablet by mouth in the morning & 1&1/2 tablet in the evening, Disp: 60 tablet, Rfl: 3  •  Insulin Glargine (BASAGLAR KWIKPEN) 100 UNIT/ML injection pen, INJECT 80 UNITS SUBCUTANEOUSLY ONE TIME A DAY, Disp: 30 mL, Rfl: 3  •  Loratadine (CLARITIN PO), Take 1 capsule by mouth daily as needed (when necessary for allergies.)., Disp: , Rfl:   •  metFORMIN (GLUCOPHAGE) 1000 MG tablet, TAKE 1 TABLET BY MOUTH TWO TIMES A DAY WITH MEAL, Disp: 180 tablet, Rfl: 0  •  montelukast (SINGULAIR) 10 MG tablet, TAKE 1 TABLET BY MOUTH ONE TIME A DAY , Disp: 30 tablet, Rfl: 0  •  polyethylene glycol (MIRALAX) packet, Take orally as directed daily for constipation, Disp: , Rfl:   •  Probiotic capsule, 1 by mouth daily, Disp: , Rfl:   •  rosuvastatin (CRESTOR) 40 MG tablet, TAKE 1  "TABLET BY MOUTH AT BEDTIME , Disp: 30 tablet, Rfl: 4  •  sertraline (ZOLOFT) 50 MG tablet, TAKE 1 TABLET BY MOUTH ONE TIME A DAY , Disp: 30 tablet, Rfl: 1  •  TRULICITY 1.5 MG/0.5ML solution pen-injector, inject 1.5 mg under the skin once weekly as directed, Disp: 2 mL, Rfl: 8      Family History   Problem Relation Age of Onset   • Diabetes Mother    • Stomach cancer Mother    • Diabetes Maternal Grandmother          Social History     Socioeconomic History   • Marital status:      Spouse name: Luis    • Number of children: 2   • Years of education: 16   • Highest education level: Bachelor's degree (e.g., BA, AB, BS)   Occupational History   • 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)   Social Needs   • Financial resource strain: Not hard at all   • Food insecurity:     Worry: Never true     Inability: Never true   • Transportation needs:     Medical: No     Non-medical: No   Tobacco Use   • Smoking status: Never Smoker   • Smokeless tobacco: Never Used   Substance and Sexual Activity   • Alcohol use: Yes     Frequency: Monthly or less     Drinks per session: 1 or 2     Binge frequency: Never   • Drug use: No   • Sexual activity: Yes     Partners: Female   Lifestyle   • Physical activity:     Days per week: 0 days     Minutes per session: 0 min   • Stress: Only a little   Relationships   • Social connections:     Talks on phone: More than three times a week     Gets together: More than three times a week     Attends Mosque service: More than 4 times per year     Active member of club or organization: Yes     Attends meetings of clubs or organizations: More than 4 times per year     Relationship status:          Vitals:    02/20/20 0759   BP: 142/90   BP Location: Left arm   Pulse: 86   SpO2: 99%   Weight: 90.3 kg (199 lb)   Height: 167.4 cm (65.91\")        Body mass index is 32.21 kg/m².      Physical Exam:    Brief physical examination remained unchanged from just a few weeks " ago.      Lab/other results:      Assessment/Plan:     Diagnosis Plan   1. Type 2 diabetes mellitus with microalbuminuria, with long-term current use of insulin (CMS/[facility])     2. Non morbid obesity     3. Abnormal weight gain       Patient has poorly controlled type 2 diabetes and indicates that his blood sugars have been fluctuating and also quite high at times after switching him to Farxiga from Invokana which was dictated by his insurance company.  As noted above, I explained to patient that it may take time for this medication to reach its full effectiveness.  We just made this change not long ago.  Also I explained to patient that I have him referred to an endocrinologist which she will be seeing next month and I do not want to make any changes because of this.  Also explained to him that he has had poorly controlled diabetes for many years since I started seeing him once again is a patient and I indicated to him that I feel that his contribution in regards to compliance of diet and exercise has been somewhat lacking.  He indicates that he works 80 hours a week and this makes it difficult for him to follow a proper diet.  And I explained to him that no medication is available that will work properly if he continues with the current lifestyle.  I explained to him that maybe the endocrinologist will be able to come up with a better game plan for his diabetes and get him under better control.  Patient also wanted to go back on phentermine and I explained to him that even though this worked as far as weight loss is concerned it was only temporary and I explained to him that patient cannot take this medication indefinitely.  Once again, I explained that his health is up to him and he needs to become more dedicated regarding diet and exercise.    Plan is as follows: No change in current medical regimen.  Note that patient seemed to be somewhat dissatisfied after this visit today but I feel that my decisions are in his  best interest in the long run.        Procedures

## 2020-03-09 RX ORDER — AMLODIPINE BESYLATE AND BENAZEPRIL HYDROCHLORIDE 5; 20 MG/1; MG/1
CAPSULE ORAL
Qty: 30 CAPSULE | Refills: 0 | Status: SHIPPED | OUTPATIENT
Start: 2020-03-09 | End: 2020-04-06

## 2020-03-09 RX ORDER — MONTELUKAST SODIUM 10 MG/1
TABLET ORAL
Qty: 30 TABLET | Refills: 0 | Status: SHIPPED | OUTPATIENT
Start: 2020-03-09 | End: 2020-03-10

## 2020-03-10 RX ORDER — MONTELUKAST SODIUM 10 MG/1
TABLET ORAL
Qty: 30 TABLET | Refills: 0 | Status: SHIPPED | OUTPATIENT
Start: 2020-03-10 | End: 2020-05-06

## 2020-03-20 ENCOUNTER — TELEPHONE (OUTPATIENT)
Dept: ENDOCRINOLOGY | Age: 55
End: 2020-03-20

## 2020-03-20 NOTE — TELEPHONE ENCOUNTER
S/W PT TO COMPLETE TRAVEL PRESCREEN. PT DID NOT HAVE ANY SYMPTOMS RELATED TO COVID-19 AND WILL BE AT APPT ON 3/23/2020.

## 2020-03-23 ENCOUNTER — OFFICE VISIT (OUTPATIENT)
Dept: ENDOCRINOLOGY | Age: 55
End: 2020-03-23

## 2020-03-23 VITALS
HEIGHT: 66 IN | HEART RATE: 81 BPM | DIASTOLIC BLOOD PRESSURE: 80 MMHG | BODY MASS INDEX: 32.56 KG/M2 | SYSTOLIC BLOOD PRESSURE: 118 MMHG | OXYGEN SATURATION: 98 % | WEIGHT: 202.6 LBS

## 2020-03-23 DIAGNOSIS — E78.2 HYPERLIPEMIA, MIXED: ICD-10-CM

## 2020-03-23 DIAGNOSIS — E11.9 TYPE 2 DIABETES MELLITUS WITHOUT COMPLICATION, WITHOUT LONG-TERM CURRENT USE OF INSULIN (HCC): Chronic | ICD-10-CM

## 2020-03-23 DIAGNOSIS — R80.9 TYPE 2 DIABETES MELLITUS WITH MICROALBUMINURIA, WITH LONG-TERM CURRENT USE OF INSULIN (HCC): Primary | ICD-10-CM

## 2020-03-23 DIAGNOSIS — E11.29 TYPE 2 DIABETES MELLITUS WITH MICROALBUMINURIA, WITH LONG-TERM CURRENT USE OF INSULIN (HCC): Primary | ICD-10-CM

## 2020-03-23 DIAGNOSIS — Z79.4 TYPE 2 DIABETES MELLITUS WITH MICROALBUMINURIA, WITH LONG-TERM CURRENT USE OF INSULIN (HCC): Primary | ICD-10-CM

## 2020-03-23 PROCEDURE — 99204 OFFICE O/P NEW MOD 45 MIN: CPT | Performed by: INTERNAL MEDICINE

## 2020-03-23 RX ORDER — INSULIN GLARGINE 100 [IU]/ML
INJECTION, SOLUTION SUBCUTANEOUS
Qty: 30 ML | Refills: 3 | Status: SHIPPED | OUTPATIENT
Start: 2020-03-23 | End: 2020-05-28

## 2020-03-24 LAB
BUN SERPL-MCNC: 22 MG/DL (ref 6–20)
BUN/CREAT SERPL: 22.7 (ref 7–25)
CALCIUM SERPL-MCNC: 9.3 MG/DL (ref 8.6–10.5)
CHLORIDE SERPL-SCNC: 100 MMOL/L (ref 98–107)
CO2 SERPL-SCNC: 27.5 MMOL/L (ref 22–29)
CREAT SERPL-MCNC: 0.97 MG/DL (ref 0.76–1.27)
GLUCOSE SERPL-MCNC: 156 MG/DL (ref 65–99)
HBA1C MFR BLD: 11.7 % (ref 4.8–5.6)
POTASSIUM SERPL-SCNC: 4.7 MMOL/L (ref 3.5–5.2)
SODIUM SERPL-SCNC: 141 MMOL/L (ref 136–145)
T4 FREE SERPL-MCNC: 1.15 NG/DL (ref 0.93–1.7)
TSH SERPL DL<=0.005 MIU/L-ACNC: 1.65 UIU/ML (ref 0.27–4.2)

## 2020-04-06 RX ORDER — AMLODIPINE BESYLATE AND BENAZEPRIL HYDROCHLORIDE 5; 20 MG/1; MG/1
CAPSULE ORAL
Qty: 30 CAPSULE | Refills: 0 | Status: SHIPPED | OUTPATIENT
Start: 2020-04-06 | End: 2020-05-06

## 2020-04-13 ENCOUNTER — TELEPHONE (OUTPATIENT)
Dept: ENDOCRINOLOGY | Age: 55
End: 2020-04-13

## 2020-04-13 NOTE — PROGRESS NOTES
Chief Complaint   Patient presents with   • Diabetes   NEW PATIENT APPOINTMENT/ TYPE 2 DIABETES MELLITUS.      Marek Diego 54 y.o. presents with Type 2 dm as a new patient. Consulted by Dr. Quezada    Type 2 dm - Diagnosed about 10 - 12 years ago.   Today in clinic pt reports being on Basaglar 100 units at bedtime, Farxiga 10 mg oral daily, metformin thousand milligrams twice daily, glimepiride 4 mg 1 tablet twice daily, Trulicity 1.5 mg subcutaneous once a week.  FBG -  Pre meals -200-250  Checks BG -3-4 times  Sensor -none  Dm retinopathy -no known history,Last eye exam -in the last 1 year  Dm nephropathy -none  Dm neuropathy -no complaints of tingling or numbness,Dm neuropathy meds -not on any meds  CAD -no  CVA -no  Episodes of hypoglycemia -lowest blood sugar was 56, happened this morning.  Does have symptoms of low blood sugars.  Pt is physically active. weight has been stable.   Pt tries to follow DM diet for most part.   On Ace inb.    Hyperlipidemia  On Crestor 40 mg oral daily  Reviewed primary care physician's/consulting physician documentation and lab results :     I have reviewed the patient's allergies, medicines, past medical hx, family hx and social hx in detail.    Past Medical History:   Diagnosis Date   • Allergic rhinitis 10/24/2013    10/24/2013--skin testing revealed impressive reactions to grass following, tree pollen, weed pollen, ragweed, dust mite, and cat. Recommend immunotherapy.   • Benign essential hypertension 2/22/2016   • Chronic constipation 11/20/2017 11/20/2017--patient reports approximately 8 month history of intermittent constipation that at times can last as much as a month.  He notes his blood sugar readings tend to increase when this happens.  He is scheduled for colonoscopy next week.  I recommend a generic probiotic daily as well as MiraLAX and adjust as needed.   • Chronic neck pain 10/30/2015    12/19/2015--patient reports his left shoulder pain has resolved.  Patient is progressing as expected   He continues to have neck pain. X-ray of the cervical spine ordered. Physical therapy ordered.   11/10/2015--left shoulder injected with 80 mg of Depo-Medrol with 3 mL of Xylocaine.   10/30/2015--patient presents with at least a one-month history of intermittent left-sided neck pain that is associated with left shoulder pain as well. The pain is described as shooting and is 8 out of 10 intensity at its worst. The pain is worse with certain movements of his head and left shoulder. No trauma. No numbness or paresthesias.   • Depression with anxiety 2/22/2016   • Diabetic eye exam (CMS/[facility]) 5/27/2016    May 2016--patient reports a normal diabetic eye exam.   • Diabetic foot exam 4/27/2017 05/02/2017--routine diabetic foot exam reveals no evidence of diabetic foot ulcer or pre-ulcerative callus.  Pulses are palpable and there is no signs of ischemia.  Sensation subjectively intact.   • Gastroesophageal reflux disease with esophagitis 5/22/2015 08/12/2015--EGD revealed esophagitis and a distal esophageal stricture that was dilated. Small sliding hiatal hernia. Proximal gastric ulcer and gastritis present. Dysphagia resolved after dilatation. Pathology of the gastric antrum was benign with no significant histologic abnormality. Distal esophagus biopsy negative for intestinal metaplasia or dysplasia.   05/22/2015--patient presents with a several month history of progressively worsening intermittent dysphagia of solids but not liquids. He describes solid food sometimes sticking and points to his upper chest/lower throat. No other associated symptoms. Patient referred to Dr. Aime Parada for an EGD. This is negative, may need ENT evaluation.   • History of colon polyps, 11/29/2017--tubular adenoma times one.  Hyperplastic ×1.  Repeat 5 years. 12/18/2017 11/29/2017--colonoscopy revealed 2 small (3 mm) polyps in the sigmoid colon and cecum.  Removed.  Bowel prep.  Sigmoid diverticuli noted.  No hemorrhoids.  Pathology  returned hyperplastic polyp of the sigmoid and the cecal polyp was a tubular adenoma.   • HIstory of eosinophilic gastroenteritis 1990s    1990s--patient had significant epigastric discomfort and workup including an EGD revealed eosinophilic gastroenteritis that was treated with prednisone and resulted in resolution.   • History of esophageal stricture 08/12/2015 08/12/2015--EGD revealed esophagitis and a distal esophageal stricture that was dilated. Small sliding hiatal hernia. Proximal gastric ulcer and gastritis present. Dysphagia resolved after dilatation. Pathology of the gastric antrum was benign with no significant histologic abnormality. Distal esophagus biopsy negative for intestinal metaplasia or dysplasia.   05/22/2015--patient presents with a s   • History of Pulmonary nodule, 03/07/2016--5 mm indeterminate nodule right lower lobe.  09/13/2016--consistent with calcified granuloma. 3/7/2016    09/13/2016--CT scan of the chest, abdomen, and pelvis with contrast reveals no lymphadenopathy within the abdomen or pelvis.  Mild hepatic steatosis.  Previously noted right lower lobe pulmonary nodule is currently calcified and consistent with a calcified granuloma.  03/07/2016--CT scan of the abdomen performed for complaints of abdominal discomfort revealed a 5 mm nodular focus right lower lobe which is indeterminate.  Recommend repeat CT scan in 6 months.   • Hypogonadism male, no TRT. 12/26/2012 12/26/2012--treatment for hypogonadism begun.   • Microalbuminuria 10/19/2014    10/19/2014--urine microalbumin mildly elevated at 27.8. Normal range 0.0--17.0.   • Moderate persistent asthma without complication 2/22/2016    Seasonal, spring and fall.   • Multiple environmental allergies 10/24/2013    10/24/2013--skin testing revealed impressive reactions to grass following, tree pollen, weed pollen, ragweed, dust mite, and cat. Recommend immunotherapy.   • Non morbid obesity 2/22/2016   • Right bundle branch  block     ECG reveals sinus rhythm, rate of 75, right bundle branch block, left anterior hemiblock   • Subclinical hypothyroidism 8/17/2018 08/27/2018--thyroid ultrasound reveals subcentimeter nodule in the right thyroid lobe.  Possibly cystic.  There is a question of an ill-defined 1 cm nodular lesion posteriorly in the mid left lateral thyroid.  Appearance could be technical in origin.  Recommend repeat thyroid ultrasound in 6 months.  08/17/2018--routine follow-up.  TSH mildly elevated at 4.49.  Free T3 and free T4 are normal.  Rep   • Thyroid nodule 10/5/2018    08/27/2018--thyroid ultrasound reveals subcentimeter nodule in the right thyroid lobe.  Possibly cystic.  There is a question of an ill-defined 1 cm nodular lesion posteriorly in the mid left lateral thyroid.  Appearance could be technical in origin.  Recommend repeat thyroid ultrasound in 6 months.  08/17/2018--routine follow-up.  TSH mildly elevated at 4.49.  Free T3 and free T4 are normal.  Repeat thyroid function tests ordered and returned normal.  Thyroid ultrasound ordered.   • Type 2 diabetes mellitus with microalbuminuria, with long-term current use of insulin (CMS/[facility]) 1/1/2004    2004--initial diagnosis of type 2 diabetes.   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)   Social Needs   • Financial resource strain: Not hard at all   • Food insecurity:     Worry: Never true     Inability: Never true   • Transportation needs:     Medical: No     Non-medical: No   Tobacco Use   • Smoking status: Never Smoker   • Smokeless  tobacco: Never Used   Substance and Sexual Activity   • Alcohol use: Yes     Frequency: Monthly or less     Drinks per session: 1 or 2     Binge frequency: Never   • Drug use: No   • Sexual activity: Yes     Partners: Female   Lifestyle   • Physical activity:     Days per week: 0 days     Minutes per session: 0 min   • Stress: Only a little   Relationships   • Social connections:     Talks on phone: More than three times a week     Gets together: More than three times a week     Attends Taoist service: More than 4 times per year     Active member of club or organization: Yes     Attends meetings of clubs or organizations: More than 4 times per year     Relationship status:        Allergies   Allergen Reactions   • Latex Itching         Current Outpatient Medications:   •  amLODIPine-benazepril (LOTREL 5-20) 5-20 MG per capsule, TAKE 1 CAPSULE BY MOUTH ONE TIME A DAY , Disp: 30 capsule, Rfl: 0  •  Chlorcyclizine-Pseudoephed (STAHIST AD) 25-60 MG tablet, 1 by mouth every 6 hours as needed for congestion and allergies, not greater than 3 in 24 hours, Disp: 60 tablet, Rfl: 2  •  Cholecalciferol (VITAMIN D3) 5000 UNITS capsule capsule, Take 1 capsule by mouth daily., Disp: , Rfl:   •  Dapagliflozin Propanediol (FARXIGA) 10 MG tablet, Take 10 mg by mouth Daily. Before Breakfast, Disp: 60 tablet, Rfl: 2  •  esomeprazole (nexIUM) 40 MG capsule, TAKE 1 CAPSULE BY MOUTH ONE TIME A DAY , Disp: 30 capsule, Rfl: 8  •  ezetimibe (ZETIA) 10 MG tablet, TAKE 1 TABLET BY MOUTH ONE TIME A DAY , Disp: 30 tablet, Rfl: 0  •  fluocinonide-emollient (LIDEX-E) 0.05 % cream, Apply topically twice daily as needed hand eczema, Disp: 60 g, Rfl: 0  •  Insulin Glargine (BASAGLAR KWIKPEN) 100 UNIT/ML injection pen, Inject 50 units bid, Disp: 30 mL, Rfl: 3  •  Loratadine (CLARITIN PO), Take 1 capsule by mouth daily as needed (when necessary for allergies.)., Disp: , Rfl:   •  metFORMIN (GLUCOPHAGE) 1000 MG tablet, TAKE 1 TABLET BY MOUTH  "TWO TIMES A DAY WITH MEAL, Disp: 180 tablet, Rfl: 0  •  montelukast (SINGULAIR) 10 MG tablet, TAKE 1 TABLET BY MOUTH ONE TIME A DAY , Disp: 30 tablet, Rfl: 0  •  rosuvastatin (CRESTOR) 40 MG tablet, TAKE 1 TABLET BY MOUTH AT BEDTIME , Disp: 30 tablet, Rfl: 4  •  sertraline (ZOLOFT) 50 MG tablet, TAKE 1 TABLET BY MOUTH ONE TIME A DAY , Disp: 30 tablet, Rfl: 1  •  TRULICITY 1.5 MG/0.5ML solution pen-injector, inject 1.5 mg under the skin once weekly as directed, Disp: 2 mL, Rfl: 8  •  insulin aspart (NovoLOG FlexPen) 100 UNIT/ML solution pen-injector sc pen, 8 units with each meal and max of 50 units, Disp: 5 pen, Rfl: 3  •  linaclotide (Linzess) 72 MCG capsule capsule, Take 1 capsule by mouth Every Morning Before Breakfast., Disp: 30 capsule, Rfl: 11    Review of Systems   Constitutional: Positive for fatigue. Negative for appetite change and fever.   Eyes: Negative for visual disturbance.   Respiratory: Negative for shortness of breath.    Cardiovascular: Positive for palpitations. Negative for leg swelling.   Gastrointestinal: Negative for abdominal pain and vomiting.   Endocrine: Positive for polydipsia. Negative for polyuria.   Musculoskeletal: Positive for arthralgias. Negative for joint swelling and neck pain.   Skin: Negative for rash.   Neurological: Negative for weakness and numbness.   Psychiatric/Behavioral: Negative for behavioral problems.     I have reviewed the ROS as documented by the MA; Mana Scott MD.      Objective:     /80   Pulse 81   Ht 167.6 cm (66\")   Wt 91.9 kg (202 lb 9.6 oz)   SpO2 98%   BMI 32.70 kg/m²     Physical Exam   Constitutional: He is oriented to person, place, and time. He appears well-nourished.   obese   HENT:   Head: Normocephalic and atraumatic.   Wide neck   Eyes: Conjunctivae and EOM are normal.   Neck: Normal range of motion. Neck supple. Carotid bruit is not present. No thyromegaly present.   Acanthosis nigricans   Cardiovascular: Normal rate and normal " heart sounds.   Pulmonary/Chest: Effort normal and breath sounds normal. No stridor. No respiratory distress.   Abdominal: Soft. Bowel sounds are normal. He exhibits no distension. There is no tenderness.   Central obesity   Musculoskeletal: He exhibits no edema or tenderness.   Neurological: He is alert and oriented to person, place, and time.   Skin: Skin is warm and dry.   Psychiatric: He has a normal mood and affect. His behavior is normal.   Vitals reviewed.         Results Review:    I reviewed the patient's new clinical results.    Orders Only on 12/23/2019   Component Date Value Ref Range Status   • PSA 12/24/2019 0.260  0.000 - 4.000 ng/mL Final   • Specific Gravity, UA 12/24/2019 Comment  1.005 - 1.030 Final    >=1.030   • pH, UA 12/24/2019 5.5  5.0 - 8.0 Final   • Color, UA 12/24/2019 Yellow   Final    Comment: Urine microscopic not indicated.  REFERENCE RANGE: Yellow, Straw     • Appearance, UA 12/24/2019 Clear  Clear Final   • Leukocytes, UA 12/24/2019 Negative  Negative Final   • Protein 12/24/2019 Negative  Negative Final   • Glucose, UA 12/24/2019 See below:* Negative Final    >=1000 mg/dL (3+)   • Ketones 12/24/2019 Negative  Negative Final   • Blood, UA 12/24/2019 Negative  Negative Final   • Bilirubin, UA 12/24/2019 Negative  Negative Final   • Urobilinogen, UA 12/24/2019 Comment   Final    Comment: 0.2 E.U./dL  REFERENCE RANGE: 0.2 - 1.0 E.U./dL     • Nitrite, UA 12/24/2019 Negative  Negative Final   • T3, Free 12/24/2019 3.0  2.0 - 4.4 pg/mL Final   • Free T4 12/24/2019 1.06  0.93 - 1.70 ng/dL Final   • TSH 12/24/2019 2.000  0.270 - 4.200 uIU/mL Final   • 25 Hydroxy, Vitamin D 12/24/2019 42.9  30.0 - 100.0 ng/ml Final    Comment: Reference Range for Total Vitamin D 25(OH)  Deficiency <20.0 ng/mL  Insufficiency 21-29 ng/mL  Sufficiency  ng/mL  Toxicity >100 ng/ml     • Creatinine, Urine 12/24/2019 66.9  Not Estab. mg/dL Final   • Microalbumin, Urine 12/24/2019 <3.0  Not Estab. ug/mL Final     **Verified by repeat analysis**   • Microalbumin/Creatinine Ratio 12/24/2019 <4.5  0.0 - 30.0 mg/g creat Final    Comment:                      Normal:                0.0 -  30.0                       Albuminuria:          31.0 - 300.0                       Clinical albuminuria:       >300.0     • LDL-P 12/24/2019 966  <1,000 nmol/L Final    Comment:                           Low                   < 1000                            Moderate         1000 - 1299                            Borderline-High  1300 - 1599                            High             1600 - 2000                            Very High             > 2000     • LDL-C 12/24/2019 31  0 - 99 mg/dL Final    Comment:                           Optimal               <  100                            Above optimal     100 -  129                            Borderline        130 -  159                            High              160 -  189                            Very high             >  189  LDL-C is inaccurate if patient is non-fasting.     • HDL-C 12/24/2019 30* >39 mg/dL Final   • Triglycerides 12/24/2019 286* 0 - 149 mg/dL Final   • Total Cholesterol 12/24/2019 118  100 - 199 mg/dL Final   • HDL-P (Total) 12/24/2019 26.5* >=30.5 umol/L Final   • Small LDL-P 12/24/2019 817* <=527 nmol/L Final   • LDL Size 12/24/2019 19.6* >20.5 nm Final    Comment:  ----------------------------------------------------------                   ** INTERPRETATIVE INFORMATION**                   PARTICLE CONCENTRATION AND SIZE                      <--Lower CVD Risk   Higher CVD Risk-->    LDL AND HDL PARTICLES   Percentile in Reference Population    HDL-P (total)        High     75th    50th    25th   Low                         >34.9    34.9    30.5    26.7   <26.7    Small LDL-P          Low      25th    50th    75th   High                         <117     117     527     839    >839    LDL Size   <-Large (Pattern A)->    <-Small (Pattern B)->                       23.0    20.6           20.5      19.0   ----------------------------------------------------------  Small LDL-P and LDL Size are associated with CVD risk, but not after  LDL-P is taken into account.     • Hemoglobin A1C 12/24/2019 10.50* 4.80 - 5.60 % Final    Comment: Hemoglobin A1C Ranges:  Increased Risk for Diabetes  5.7% to 6.4%  Diabetes                     >= 6.5%  Diabetic Goal                < 7.0%     • Glucose 12/24/2019 207* 65 - 99 mg/dL Final   • BUN 12/24/2019 19  6 - 20 mg/dL Final   • Creatinine 12/24/2019 1.16  0.76 - 1.27 mg/dL Final   • eGFR Non  Am 12/24/2019 66  >60 mL/min/1.73 Final   • eGFR African Am 12/24/2019 80  >60 mL/min/1.73 Final   • BUN/Creatinine Ratio 12/24/2019 16.4  7.0 - 25.0 Final   • Sodium 12/24/2019 141  136 - 145 mmol/L Final   • Potassium 12/24/2019 4.6  3.5 - 5.2 mmol/L Final   • Chloride 12/24/2019 102  98 - 107 mmol/L Final   • Total CO2 12/24/2019 25.3  22.0 - 29.0 mmol/L Final   • Calcium 12/24/2019 9.3  8.6 - 10.5 mg/dL Final   • Total Protein 12/24/2019 6.5  6.0 - 8.5 g/dL Final   • Albumin 12/24/2019 4.20  3.50 - 5.20 g/dL Final   • Globulin 12/24/2019 2.3  gm/dL Final   • A/G Ratio 12/24/2019 1.8  g/dL Final   • Total Bilirubin 12/24/2019 0.2  0.2 - 1.2 mg/dL Final   • Alkaline Phosphatase 12/24/2019 85  39 - 117 U/L Final   • AST (SGOT) 12/24/2019 18  1 - 40 U/L Final   • ALT (SGPT) 12/24/2019 24  1 - 41 U/L Final   • Creatine Kinase 12/24/2019 211* 20 - 200 U/L Final   • WBC 12/24/2019 12.17* 3.40 - 10.80 10*3/mm3 Final   • RBC 12/24/2019 4.76  4.14 - 5.80 10*6/mm3 Final   • Hemoglobin 12/24/2019 13.8  13.0 - 17.7 g/dL Final   • Hematocrit 12/24/2019 41.2  37.5 - 51.0 % Final   • MCV 12/24/2019 86.6  79.0 - 97.0 fL Final   • MCH 12/24/2019 29.0  26.6 - 33.0 pg Final   • MCHC 12/24/2019 33.5  31.5 - 35.7 g/dL Final   • RDW 12/24/2019 12.9  12.3 - 15.4 % Final   • Platelets 12/24/2019 297  140 - 450 10*3/mm3 Final       Marek was seen today for  diabetes.    Diagnoses and all orders for this visit:    Type 2 diabetes mellitus with microalbuminuria, with long-term current use of insulin (CMS/[facility])  -     insulin aspart (NovoLOG FlexPen) 100 UNIT/ML solution pen-injector sc pen; 8 units with each meal and max of 50 units  -     TSH  -     T4, Free  -     Hemoglobin A1c  -     Basic Metabolic Panel  -     Hemoglobin A1c; Future  -     Basic Metabolic Panel; Future  -     Lipid Panel; Future  -     Microalbumin / Creatinine Urine Ratio - Urine, Clean Catch; Future  -     TSH; Future  -     T4, Free; Future    Hyperlipemia, mixed  -     insulin aspart (NovoLOG FlexPen) 100 UNIT/ML solution pen-injector sc pen; 8 units with each meal and max of 50 units  -     TSH  -     T4, Free  -     Hemoglobin A1c  -     Basic Metabolic Panel  -     Hemoglobin A1c; Future  -     Basic Metabolic Panel; Future  -     Lipid Panel; Future  -     Microalbumin / Creatinine Urine Ratio - Urine, Clean Catch; Future  -     TSH; Future  -     T4, Free; Future    Type 2 diabetes mellitus without complication, without long-term current use of insulin (CMS/[facility])  -     Insulin Glargine (BASAGLAR KWIKPEN) 100 UNIT/ML injection pen; Inject 50 units bid  -     TSH  -     T4, Free  -     Hemoglobin A1c  -     Basic Metabolic Panel  -     Hemoglobin A1c; Future  -     Basic Metabolic Panel; Future  -     Lipid Panel; Future  -     Microalbumin / Creatinine Urine Ratio - Urine, Clean Catch; Future  -     TSH; Future  -     T4, Free; Future    Other orders  -     linaclotide (Linzess) 72 MCG capsule capsule; Take 1 capsule by mouth Every Morning Before Breakfast.      Type 2 diabetes mellitus-uncontrolled  HbA1c is worse  Change Basaglar to 50 units twice daily  Start NovoLog 8 units with each meal  Cover with NovoLog moderate dose sliding scale 3 times daily before meals and at bedtime  Hold NovoLog if blood sugars less than 100.    Hyperlipidemia  Continue Crestor 40 mg oral daily    Concern for  "constipation  Start Linzess    Obesity  Discussed calorie counting - limit to 1500 stephanie per day and exercising 2-3 times a week.  Thank you for asking me to see your patient, Marek Diego in consultation.        Mana Scott MD  03/23/20    EMR Dragon / transcription disclaimer:     \"Dictated utilizing Dragon dictation\".   "

## 2020-04-13 NOTE — TELEPHONE ENCOUNTER
Spoke with patient patient will have a video visit with Dr Pelaez         ----- Message from Mana Scott MD sent at 4/13/2020 11:53 AM EDT -----  Add him as a video visit for tomorrow at 1 PM.  ----- Message -----  From: Rupal Golden MA  Sent: 4/13/2020  11:29 AM EDT  To: Mana Scott MD        ----- Message -----  From: Theresa Ybarra MA  Sent: 4/13/2020  11:21 AM EDT  To: Rupal Golden MA    Patient states that his medications were changed during last office visit but his BG has not changed. His BG has not gone down and averaging 200's every day. He has waited two weeks since medication change and it is not doing anything and wants to know what Dr. Scott would like to do.    He is currently taking Farxiga 10 MG once daily, Metformin 1000MG twice a day, and Basaglar 40 units once in the AM and 40 units at night.     [phone]

## 2020-04-14 ENCOUNTER — TELEMEDICINE (OUTPATIENT)
Dept: ENDOCRINOLOGY | Age: 55
End: 2020-04-14

## 2020-04-14 DIAGNOSIS — R80.9 TYPE 2 DIABETES MELLITUS WITH MICROALBUMINURIA, WITH LONG-TERM CURRENT USE OF INSULIN (HCC): ICD-10-CM

## 2020-04-14 DIAGNOSIS — Z79.4 TYPE 2 DIABETES MELLITUS WITH MICROALBUMINURIA, WITH LONG-TERM CURRENT USE OF INSULIN (HCC): ICD-10-CM

## 2020-04-14 DIAGNOSIS — E78.2 HYPERLIPEMIA, MIXED: ICD-10-CM

## 2020-04-14 DIAGNOSIS — E11.29 TYPE 2 DIABETES MELLITUS WITH MICROALBUMINURIA, WITH LONG-TERM CURRENT USE OF INSULIN (HCC): ICD-10-CM

## 2020-04-14 PROCEDURE — 99214 OFFICE O/P EST MOD 30 MIN: CPT | Performed by: INTERNAL MEDICINE

## 2020-04-14 RX ORDER — GLIMEPIRIDE 4 MG/1
TABLET ORAL
COMMUNITY
Start: 2020-04-10 | End: 2020-04-14

## 2020-04-14 NOTE — PROGRESS NOTES
54 y.o.    Patient Care Team:  Demario Quezada MD as PCP - General (Internal Medicine)  Wellington Chang MD as Surgeon (General Surgery)    Chief Complaint:    Follow up type 2 diabetes mellitus  Subjective     HPI  54-year-old male is here as a follow-up for the management of type 2 diabetes mellitus.  This is a video visit during the covid pandemic.    Type 2 dm - Diagnosed about 10-12 years ago.   Today in clinic pt reports being on Basaglar 40 units twice daily, Farxiga 10 mg oral daily, metformin thousand milligrams twice daily, Trulicity 1.5 mg subcutaneous once a week.  NovoLog 8 units with each meal plus the sliding scale  FBG -200s-250s  Pre meals -160-200s  Checks BG -4 times  Sensor -none  Dm retinopathy -no known history,Last eye exam -in the last 1 year  Dm nephropathy -none  Dm neuropathy -occasional tingling and numbness,Dm neuropathy meds -not on meds  CAD -no  CVA -no  Episodes of hypoglycemia -none since the last visit  Pt is physically active. weight has been stable.   Pt tries to follow DM diet for most part.   On Ace inb.    Hyperlipidemia  On Crestor 40 mg oral daily  You have chosen to receive care through a video visit today. Do you consent to use a video visit for your medical care today? Yes      Reviewed primary care physician's/consulting physician documentation and lab results :     Interval History      The following portions of the patient's history were reviewed and updated as appropriate: allergies, current medications, past family history, past medical history, past social history, past surgical history and problem list.    Past Medical History:   Diagnosis Date   • Allergic rhinitis 10/24/2013    10/24/2013--skin testing revealed impressive reactions to grass following, tree pollen, weed pollen, ragweed, dust mite, and cat. Recommend immunotherapy.   • Benign essential hypertension 2/22/2016   • Chronic constipation 11/20/2017 11/20/2017--patient reports approximately 8  month history of intermittent constipation that at times can last as much as a month.  He notes his blood sugar readings tend to increase when this happens.  He is scheduled for colonoscopy next week.  I recommend a generic probiotic daily as well as MiraLAX and adjust as needed.   • Chronic neck pain 10/30/2015    12/19/2015--patient reports his left shoulder pain has resolved. He continues to have neck pain. X-ray of the cervical spine ordered. Physical therapy ordered.   11/10/2015--left shoulder injected with 80 mg of Depo-Medrol with 3 mL of Xylocaine.   10/30/2015--patient presents with at least a one-month history of intermittent left-sided neck pain that is associated with left shoulder pain as well. The pain is described as shooting and is 8 out of 10 intensity at its worst. The pain is worse with certain movements of his head and left shoulder. No trauma. No numbness or paresthesias.   • Depression with anxiety 2/22/2016   • Diabetic eye exam (CMS/[facility]) 5/27/2016    May 2016--patient reports a normal diabetic eye exam.   • Diabetic foot exam 4/27/2017 05/02/2017--routine diabetic foot exam reveals no evidence of diabetic foot ulcer or pre-ulcerative callus.  Pulses are palpable and there is no signs of ischemia.  Sensation subjectively intact.   • Gastroesophageal reflux disease with esophagitis 5/22/2015 08/12/2015--EGD revealed esophagitis and a distal esophageal stricture that was dilated. Small sliding hiatal hernia. Proximal gastric ulcer and gastritis present. Dysphagia resolved after dilatation. Pathology of the gastric antrum was benign with no significant histologic abnormality. Distal esophagus biopsy negative for intestinal metaplasia or dysplasia.   05/22/2015--patient presents with a several month history of progressively worsening intermittent dysphagia of solids but not liquids. He describes solid food sometimes sticking and points to his upper chest/lower throat. No other associated  symptoms. Patient referred to Dr. Aime Parada for an EGD. This is negative, may need ENT evaluation.   • History of colon polyps, 11/29/2017--tubular adenoma times one.  Hyperplastic ×1.  Repeat 5 years. 12/18/2017 11/29/2017--colonoscopy revealed 2 small (3 mm) polyps in the sigmoid colon and cecum.  Removed.  Bowel prep.  Sigmoid diverticuli noted.  No hemorrhoids.  Pathology returned hyperplastic polyp of the sigmoid and the cecal polyp was a tubular adenoma.   • HIstory of eosinophilic gastroenteritis 1990s 1990s--patient had significant epigastric discomfort and workup including an EGD revealed eosinophilic gastroenteritis that was treated with prednisone and resulted in resolution.   • History of esophageal stricture 08/12/2015 08/12/2015--EGD revealed esophagitis and a distal esophageal stricture that was dilated. Small sliding hiatal hernia. Proximal gastric ulcer and gastritis present. Dysphagia resolved after dilatation. Pathology of the gastric antrum was benign with no significant histologic abnormality. Distal esophagus biopsy negative for intestinal metaplasia or dysplasia.   05/22/2015--patient presents with a s   • History of Pulmonary nodule, 03/07/2016--5 mm indeterminate nodule right lower lobe.  09/13/2016--consistent with calcified granuloma. 3/7/2016    09/13/2016--CT scan of the chest, abdomen, and pelvis with contrast reveals no lymphadenopathy within the abdomen or pelvis.  Mild hepatic steatosis.  Previously noted right lower lobe pulmonary nodule is currently calcified and consistent with a calcified granuloma.  03/07/2016--CT scan of the abdomen performed for complaints of abdominal discomfort revealed a 5 mm nodular focus right lower lobe which is indeterminate.  Recommend repeat CT scan in 6 months.   • Hypogonadism male, no TRT. 12/26/2012 12/26/2012--treatment for hypogonadism begun.   • Microalbuminuria 10/19/2014    10/19/2014--urine microalbumin mildly elevated at 27.8.  Normal range 0.0--17.0.   • Moderate persistent asthma without complication 2/22/2016    Seasonal, spring and fall.   • Multiple environmental allergies 10/24/2013    10/24/2013--skin testing revealed impressive reactions to grass following, tree pollen, weed pollen, ragweed, dust mite, and cat. Recommend immunotherapy.   • Non morbid obesity 2/22/2016   • Right bundle branch block     ECG reveals sinus rhythm, rate of 75, right bundle branch block, left anterior hemiblock   • Subclinical hypothyroidism 8/17/2018 08/27/2018--thyroid ultrasound reveals subcentimeter nodule in the right thyroid lobe.  Possibly cystic.  There is a question of an ill-defined 1 cm nodular lesion posteriorly in the mid left lateral thyroid.  Appearance could be technical in origin.  Recommend repeat thyroid ultrasound in 6 months.  08/17/2018--routine follow-up.  TSH mildly elevated at 4.49.  Free T3 and free T4 are normal.  Rep   • Thyroid nodule 10/5/2018    08/27/2018--thyroid ultrasound reveals subcentimeter nodule in the right thyroid lobe.  Possibly cystic.  There is a question of an ill-defined 1 cm nodular lesion posteriorly in the mid left lateral thyroid.  Appearance could be technical in origin.  Recommend repeat thyroid ultrasound in 6 months.  08/17/2018--routine follow-up.  TSH mildly elevated at 4.49.  Free T3 and free T4 are normal.  Repeat thyroid function tests ordered and returned normal.  Thyroid ultrasound ordered.   • Type 2 diabetes mellitus with microalbuminuria, with long-term current use of insulin (CMS/[facility]) 1/1/2004    2004--initial diagnosis of type 2 diabetes.   • Vitamin D deficiency 2/22/2016     Family History   Problem Relation Age of Onset   • Diabetes Mother    • Stomach cancer Mother    • Diabetes Maternal Grandmother      Social History     Socioeconomic History   • Marital status:      Spouse name: Luis    • Number of children: 2   • Years of education: 16   • Highest education level:  Bachelor's degree (e.g., BA, AB, BS)   Occupational History   • Occupation:  for [employer])   Social Needs   • Financial resource strain: Not hard at all   • Food insecurity:     Worry: Never true     Inability: Never true   • Transportation needs:     Medical: No     Non-medical: No   Tobacco Use   • Smoking status: Never Smoker   • Smokeless tobacco: Never Used   Substance and Sexual Activity   • Alcohol use: Yes     Frequency: Monthly or less     Drinks per session: 1 or 2     Binge frequency: Never   • Drug use: No   • Sexual activity: Yes     Partners: Female   Lifestyle   • Physical activity:     Days per week: 0 days     Minutes per session: 0 min   • Stress: Only a little   Relationships   • Social connections:     Talks on phone: More than three times a week     Gets together: More than three times a week     Attends [employer] service: More than 4 times per year     Active member of club or organization: Yes     Attends meetings of clubs or organizations: More than 4 times per year     Relationship status:      Allergies   Allergen Reactions   • Latex Itching       Current Outpatient Medications:   •  amLODIPine-benazepril (LOTREL 5-20) 5-20 MG per capsule, TAKE 1 CAPSULE BY MOUTH ONE TIME A DAY , Disp: 30 capsule, Rfl: 0  •  Chlorcyclizine-Pseudoephed (STAHIST AD) 25-60 MG tablet, 1 by mouth every 6 hours as needed for congestion and allergies, not greater than 3 in 24 hours, Disp: 60 tablet, Rfl: 2  •  Cholecalciferol (VITAMIN D3) 5000 UNITS capsule capsule, Take 1 capsule by mouth daily., Disp: , Rfl:   •  Dapagliflozin Propanediol (FARXIGA) 10 MG tablet, Take 10 mg by mouth Daily. Before Breakfast, Disp: 60 tablet, Rfl: 2  •  esomeprazole (nexIUM) 40 MG capsule, TAKE 1 CAPSULE BY MOUTH ONE TIME A DAY , Disp: 30 capsule, Rfl: 8  •  ezetimibe (ZETIA) 10 MG tablet, TAKE 1 TABLET BY MOUTH ONE TIME A DAY , Disp: 30 tablet, Rfl: 0  •  fluocinonide-emollient (LIDEX-E)  0.05 % cream, Apply topically twice daily as needed hand eczema, Disp: 60 g, Rfl: 0  •  insulin aspart (NovoLOG FlexPen) 100 UNIT/ML solution pen-injector sc pen, 8 units with each meal and max of 50 units, Disp: 5 pen, Rfl: 3  •  Insulin Glargine (BASAGLAR KWIKPEN) 100 UNIT/ML injection pen, Inject 50 units bid, Disp: 30 mL, Rfl: 3  •  linaclotide (Linzess) 72 MCG capsule capsule, Take 1 capsule by mouth Every Morning Before Breakfast., Disp: 30 capsule, Rfl: 11  •  Loratadine (CLARITIN PO), Take 1 capsule by mouth daily as needed (when necessary for allergies.)., Disp: , Rfl:   •  metFORMIN (GLUCOPHAGE) 1000 MG tablet, TAKE 1 TABLET BY MOUTH TWO TIMES A DAY WITH MEAL, Disp: 180 tablet, Rfl: 0  •  montelukast (SINGULAIR) 10 MG tablet, TAKE 1 TABLET BY MOUTH ONE TIME A DAY , Disp: 30 tablet, Rfl: 0  •  rosuvastatin (CRESTOR) 40 MG tablet, TAKE 1 TABLET BY MOUTH AT BEDTIME , Disp: 30 tablet, Rfl: 4  •  sertraline (ZOLOFT) 50 MG tablet, TAKE 1 TABLET BY MOUTH ONE TIME A DAY , Disp: 30 tablet, Rfl: 1  •  TRULICITY 1.5 MG/0.5ML solution pen-injector, inject 1.5 mg under the skin once weekly as directed, Disp: 2 mL, Rfl: 8        Review of Systems   Constitutional: Negative for appetite change, fatigue and fever.   Eyes: Negative for visual disturbance.   Respiratory: Negative for shortness of breath.    Cardiovascular: Negative for palpitations and leg swelling.   Gastrointestinal: Negative for abdominal pain and vomiting.   Endocrine: Negative for polydipsia and polyuria.   Musculoskeletal: Negative for joint swelling and neck pain.   Skin: Negative for rash.   Neurological: Negative for weakness and numbness.   Psychiatric/Behavioral: Negative for behavioral problems.     I have reviewed the ROS as documented by the MA; Mana Scott MD.      Objective       There were no vitals filed for this visit.  There is no height or weight on file to calculate BMI.      Physical Exam  General appearance - no  distress  Eyes-PERRLA, anicteric sclera  Ear nose and throat-external ears and nose normal.  Noted midline trachea.  Respiratory-normal chest on inspection.  No respiratory distress noted.  Musculoskeletal-no edema.  Hammer toes noted.  Skin-no rashes.  Neuro-alert and oriented x3      Results Review:     I reviewed the patient's new clinical results and mentioned them above in HPI and in plan as well.    Medical records reviewed  Summary:Done      Office Visit on 03/23/2020   Component Date Value Ref Range Status   • TSH 03/23/2020 1.650  0.270 - 4.200 uIU/mL Final   • Free T4 03/23/2020 1.15  0.93 - 1.70 ng/dL Final    Results may be falsely increased if patient taking Biotin.   • Hemoglobin A1C 03/23/2020 11.70* 4.80 - 5.60 % Final    Comment: Hemoglobin A1C Ranges:  Increased Risk for Diabetes  5.7% to 6.4%  Diabetes                     >= 6.5%  Diabetic Goal                < 7.0%     • Glucose 03/23/2020 156* 65 - 99 mg/dL Final   • BUN 03/23/2020 22* 6 - 20 mg/dL Final   • Creatinine 03/23/2020 0.97  0.76 - 1.27 mg/dL Final   • eGFR Non  Am 03/23/2020 81  >60 mL/min/1.73 Final   • eGFR African Am 03/23/2020 98  >60 mL/min/1.73 Final   • BUN/Creatinine Ratio 03/23/2020 22.7  7.0 - 25.0 Final   • Sodium 03/23/2020 141  136 - 145 mmol/L Final   • Potassium 03/23/2020 4.7  3.5 - 5.2 mmol/L Final   • Chloride 03/23/2020 100  98 - 107 mmol/L Final   • Total CO2 03/23/2020 27.5  22.0 - 29.0 mmol/L Final   • Calcium 03/23/2020 9.3  8.6 - 10.5 mg/dL Final     Lab Results   Component Value Date    HGBA1C 11.70 (H) 03/23/2020    HGBA1C 10.50 (H) 12/24/2019    HGBA1C 9.20 (H) 06/20/2019     Lab Results   Component Value Date    MICROALBUR <3.0 12/24/2019    CREATININE 0.97 03/23/2020     Imaging Results (Most Recent)     None                Assessment and Plan:    Marek was seen today for diabetes.    Diagnoses and all orders for this visit:    Type 2 diabetes mellitus with microalbuminuria, with long-term  "current use of insulin (CMS/[facility])    Hyperlipemia, mixed      Type 2 diabetes mellitus-uncontrolled  Change Basaglar to 50 units in the morning and 45 units at bedtime  Patient has been instructed to increase the Basaglar to 50 units in 2 weeks from now if his fasting blood sugars are not below 150.  Change NovoLog to 12 units with each meal  Continue the sliding scale  Give 6 units of NovoLog for blood sugars less than 100.    Hyperlipidemia  Continue Crestor    Reviewed Lab results with the patient.             13   ( greater than 50% of the time) out of  25 minutes video time spent counseling the patient on insulin changes, blood sugar goals and changing the exercise regimen    Mana Scott MD  04/14/20    EMR Dragon / transcription disclaimer:     \"Dictated utilizing Dragon dictation\".      "

## 2020-05-06 RX ORDER — MONTELUKAST SODIUM 10 MG/1
TABLET ORAL
Qty: 30 TABLET | Refills: 0 | Status: SHIPPED | OUTPATIENT
Start: 2020-05-06 | End: 2020-05-08

## 2020-05-06 RX ORDER — GLIMEPIRIDE 4 MG/1
TABLET ORAL
Qty: 60 TABLET | Refills: 0 | OUTPATIENT
Start: 2020-05-06

## 2020-05-06 RX ORDER — ESOMEPRAZOLE MAGNESIUM 40 MG/1
CAPSULE, DELAYED RELEASE ORAL
Qty: 30 CAPSULE | Refills: 0 | Status: SHIPPED | OUTPATIENT
Start: 2020-05-06 | End: 2020-06-03

## 2020-05-06 RX ORDER — AMLODIPINE BESYLATE AND BENAZEPRIL HYDROCHLORIDE 5; 20 MG/1; MG/1
CAPSULE ORAL
Qty: 30 CAPSULE | Refills: 0 | Status: SHIPPED | OUTPATIENT
Start: 2020-05-06 | End: 2020-05-08

## 2020-05-06 NOTE — TELEPHONE ENCOUNTER
Ok to refill with additional refills? Last seen 09/21/2016, office note below.       CHIEF COMPLAINT:     Esophageal stricture follow-up     HISTORY OF PRESENT ILLNESS:     Marek Dieog is a 51 y.o. male who underwent EGD and dilation of an esophageal stricture 8/12/2015.  The area was biopsied, and was negative for metaplasia or dysplasia.  Since the procedure, he's had no problems with dysphagia.  He's had very few episodes of heartburn or reflux.  He is taking a proton pump inhibitor once daily (Nexium 40 mg).  There is no nausea.  There is no vomiting.     EXAM:     Alert. Oriented.  Lungs: Clear.  Heart: Regular.  Abdomen: Soft.  Nondistended.  Bowel sounds present..  Extremities: Warm.     ASSESSMENT:     Gastroesophageal reflux  Small hiatal hernia  Benign esophageal stricture status post dilation 8/12/2015     PLAN:     Continue proton pump inhibitor.  He is going to follow-up with me in the office if he develops more recalcitrant reflux, another episode of dysphagia, or other swallowing symptoms.

## 2020-05-08 RX ORDER — ESOMEPRAZOLE MAGNESIUM 40 MG/1
CAPSULE, DELAYED RELEASE ORAL
Qty: 30 CAPSULE | Refills: 0 | OUTPATIENT
Start: 2020-05-08

## 2020-05-08 RX ORDER — GLIMEPIRIDE 4 MG/1
TABLET ORAL
Qty: 60 TABLET | Refills: 0 | OUTPATIENT
Start: 2020-05-08

## 2020-05-08 RX ORDER — MONTELUKAST SODIUM 10 MG/1
TABLET ORAL
Qty: 30 TABLET | Refills: 0 | Status: SHIPPED | OUTPATIENT
Start: 2020-05-08 | End: 2020-07-16

## 2020-05-08 RX ORDER — AMLODIPINE BESYLATE AND BENAZEPRIL HYDROCHLORIDE 5; 20 MG/1; MG/1
CAPSULE ORAL
Qty: 30 CAPSULE | Refills: 0 | Status: SHIPPED | OUTPATIENT
Start: 2020-05-08 | End: 2020-07-29

## 2020-05-08 NOTE — TELEPHONE ENCOUNTER
RX has already been e-scribed to [facility] on 05/06/2020.

## 2020-05-28 DIAGNOSIS — E11.9 TYPE 2 DIABETES MELLITUS WITHOUT COMPLICATION, WITHOUT LONG-TERM CURRENT USE OF INSULIN (HCC): Chronic | ICD-10-CM

## 2020-05-28 RX ORDER — INSULIN GLARGINE 100 [IU]/ML
INJECTION, SOLUTION SUBCUTANEOUS
Qty: 30 ML | Refills: 0 | Status: SHIPPED | OUTPATIENT
Start: 2020-05-28 | End: 2020-06-30

## 2020-06-03 RX ORDER — ROSUVASTATIN CALCIUM 40 MG/1
TABLET, COATED ORAL
Qty: 90 TABLET | Refills: 0 | Status: SHIPPED | OUTPATIENT
Start: 2020-06-03

## 2020-06-03 RX ORDER — GLIMEPIRIDE 4 MG/1
TABLET ORAL
Qty: 60 TABLET | Refills: 0 | OUTPATIENT
Start: 2020-06-03

## 2020-06-03 RX ORDER — ESOMEPRAZOLE MAGNESIUM 40 MG/1
CAPSULE, DELAYED RELEASE ORAL
Qty: 30 CAPSULE | Refills: 0 | Status: SHIPPED | OUTPATIENT
Start: 2020-06-03 | End: 2020-07-17

## 2020-06-21 ENCOUNTER — RESULTS ENCOUNTER (OUTPATIENT)
Dept: ENDOCRINOLOGY | Age: 55
End: 2020-06-21

## 2020-06-21 DIAGNOSIS — E11.29 TYPE 2 DIABETES MELLITUS WITH MICROALBUMINURIA, WITH LONG-TERM CURRENT USE OF INSULIN (HCC): ICD-10-CM

## 2020-06-21 DIAGNOSIS — Z79.4 TYPE 2 DIABETES MELLITUS WITH MICROALBUMINURIA, WITH LONG-TERM CURRENT USE OF INSULIN (HCC): ICD-10-CM

## 2020-06-21 DIAGNOSIS — E11.9 TYPE 2 DIABETES MELLITUS WITHOUT COMPLICATION, WITHOUT LONG-TERM CURRENT USE OF INSULIN (HCC): Chronic | ICD-10-CM

## 2020-06-21 DIAGNOSIS — R80.9 TYPE 2 DIABETES MELLITUS WITH MICROALBUMINURIA, WITH LONG-TERM CURRENT USE OF INSULIN (HCC): ICD-10-CM

## 2020-06-21 DIAGNOSIS — E78.2 HYPERLIPEMIA, MIXED: ICD-10-CM

## 2020-06-30 DIAGNOSIS — E11.9 TYPE 2 DIABETES MELLITUS WITHOUT COMPLICATION, WITHOUT LONG-TERM CURRENT USE OF INSULIN (HCC): Chronic | ICD-10-CM

## 2020-06-30 RX ORDER — INSULIN GLARGINE 100 [IU]/ML
INJECTION, SOLUTION SUBCUTANEOUS
Qty: 30 ML | Refills: 3 | Status: SHIPPED | OUTPATIENT
Start: 2020-06-30 | End: 2020-08-04 | Stop reason: SDUPTHER

## 2020-07-06 RX ORDER — FLUCONAZOLE 200 MG/1
TABLET ORAL
Qty: 7 TABLET | Refills: 2 | Status: SHIPPED | OUTPATIENT
Start: 2020-07-06 | End: 2021-01-13

## 2020-07-16 RX ORDER — MONTELUKAST SODIUM 10 MG/1
TABLET ORAL
Qty: 30 TABLET | Refills: 0 | Status: SHIPPED | OUTPATIENT
Start: 2020-07-16 | End: 2020-07-29

## 2020-07-17 LAB
ALBUMIN/CREAT UR: 9 MG/G CREAT (ref 0–29)
BUN SERPL-MCNC: 18 MG/DL (ref 6–20)
BUN/CREAT SERPL: 19.6 (ref 7–25)
CALCIUM SERPL-MCNC: 9.2 MG/DL (ref 8.6–10.5)
CHLORIDE SERPL-SCNC: 106 MMOL/L (ref 98–107)
CHOLEST SERPL-MCNC: 120 MG/DL (ref 0–200)
CO2 SERPL-SCNC: 26.2 MMOL/L (ref 22–29)
CREAT SERPL-MCNC: 0.92 MG/DL (ref 0.76–1.27)
CREAT UR-MCNC: 52.2 MG/DL
GLUCOSE SERPL-MCNC: 157 MG/DL (ref 65–99)
HBA1C MFR BLD: 9.8 % (ref 4.8–5.6)
HDLC SERPL-MCNC: 32 MG/DL (ref 40–60)
INTERPRETATION: NORMAL
LDLC SERPL CALC-MCNC: 53 MG/DL (ref 0–100)
Lab: NORMAL
MICROALBUMIN UR-MCNC: 4.7 UG/ML
POTASSIUM SERPL-SCNC: 4.6 MMOL/L (ref 3.5–5.2)
SODIUM SERPL-SCNC: 141 MMOL/L (ref 136–145)
T4 FREE SERPL-MCNC: 0.95 NG/DL (ref 0.93–1.7)
TRIGL SERPL-MCNC: 175 MG/DL (ref 0–150)
TSH SERPL DL<=0.005 MIU/L-ACNC: 1.38 UIU/ML (ref 0.27–4.2)
VLDLC SERPL CALC-MCNC: 35 MG/DL

## 2020-07-17 RX ORDER — ESOMEPRAZOLE MAGNESIUM 40 MG/1
CAPSULE, DELAYED RELEASE ORAL
Qty: 30 CAPSULE | Refills: 0 | Status: SHIPPED | OUTPATIENT
Start: 2020-07-17 | End: 2020-07-29

## 2020-07-17 NOTE — TELEPHONE ENCOUNTER
Ok to refill?     08/12/2015--EGD revealed esophagitis and a distal esophageal stricture that was dilated. Small sliding hiatal hernia. Proximal gastric ulcer and gastritis present. Dysphagia resolved after dilatation.

## 2020-07-29 ENCOUNTER — TELEPHONE (OUTPATIENT)
Dept: INTERNAL MEDICINE | Facility: CLINIC | Age: 55
End: 2020-07-29

## 2020-07-29 RX ORDER — MONTELUKAST SODIUM 10 MG/1
TABLET ORAL
Qty: 30 TABLET | Refills: 5 | Status: SHIPPED | OUTPATIENT
Start: 2020-07-29 | End: 2021-01-28

## 2020-07-29 RX ORDER — ESOMEPRAZOLE MAGNESIUM 40 MG/1
CAPSULE, DELAYED RELEASE ORAL
Qty: 90 CAPSULE | Refills: 3 | Status: SHIPPED | OUTPATIENT
Start: 2020-07-29 | End: 2021-07-30

## 2020-07-29 RX ORDER — AMLODIPINE BESYLATE AND BENAZEPRIL HYDROCHLORIDE 5; 20 MG/1; MG/1
CAPSULE ORAL
Qty: 30 CAPSULE | Refills: 5 | Status: SHIPPED | OUTPATIENT
Start: 2020-07-29

## 2020-07-30 ENCOUNTER — OFFICE VISIT (OUTPATIENT)
Dept: ENDOCRINOLOGY | Age: 55
End: 2020-07-30

## 2020-07-30 VITALS
HEIGHT: 66 IN | WEIGHT: 219.2 LBS | BODY MASS INDEX: 35.23 KG/M2 | SYSTOLIC BLOOD PRESSURE: 130 MMHG | DIASTOLIC BLOOD PRESSURE: 78 MMHG

## 2020-07-30 DIAGNOSIS — R80.9 TYPE 2 DIABETES MELLITUS WITH MICROALBUMINURIA, WITH LONG-TERM CURRENT USE OF INSULIN (HCC): Primary | ICD-10-CM

## 2020-07-30 DIAGNOSIS — E66.09 CLASS 2 OBESITY DUE TO EXCESS CALORIES WITHOUT SERIOUS COMORBIDITY WITH BODY MASS INDEX (BMI) OF 36.0 TO 36.9 IN ADULT: ICD-10-CM

## 2020-07-30 DIAGNOSIS — E11.29 TYPE 2 DIABETES MELLITUS WITH MICROALBUMINURIA, WITH LONG-TERM CURRENT USE OF INSULIN (HCC): Primary | ICD-10-CM

## 2020-07-30 DIAGNOSIS — E78.2 HYPERLIPEMIA, MIXED: ICD-10-CM

## 2020-07-30 DIAGNOSIS — Z79.4 TYPE 2 DIABETES MELLITUS WITH MICROALBUMINURIA, WITH LONG-TERM CURRENT USE OF INSULIN (HCC): Primary | ICD-10-CM

## 2020-07-30 PROCEDURE — 95250 CONT GLUC MNTR PHYS/QHP EQP: CPT | Performed by: INTERNAL MEDICINE

## 2020-07-30 PROCEDURE — 99214 OFFICE O/P EST MOD 30 MIN: CPT | Performed by: INTERNAL MEDICINE

## 2020-07-30 NOTE — PROGRESS NOTES
Freestyle robby pro cgm placed on pt lt arm pt given instructions  and to bring back in 14 days good sensor pt gave verbal ok on teaching

## 2020-07-30 NOTE — PATIENT INSTRUCTIONS
Common side effects of Contrave include: - Initial (1 tablet) orally once daily in the morning for week 1; then 1 tablet twice daily, morning and evening, for week 2; then 2 tablets in the morning and 1 tablet in the evening for week 3, (week 4 and thereafter), 2 tablets orally twice daily.    nausea,   headache,   vomiting,   constipation,   diarrhea,   dizziness,   trouble sleeping (insomnia),   dry mouth,   anxiety,   hot flashes,   Fatigue,   tremor,   abdominal pain,   flu symptoms,   ringing in the ears,   urinary tract infection,   high blood pressure,   increased sweating,   changes in taste,   rash,   muscle strain,   palpitations,  problems with attention,   lightheadedness, or  fainting.     Common side effects of Saxenda include: Initial, 0.6 mg subQ once daily; increase weekly in increments of 0.6 mg/day until maintenance dosage of 3 mg once daily is reached    nausea,   diarrhea,   constipation,   vomiting,   low blood sugar (hypoglycemia),   decreased appetite,   headache,   dizziness,   fatigue,   abdominal or stomach pain or upset,   indigestion,   bloating,   gas,   urinary tract infection,   dry mouth,   changes in taste,   gastroesophageal reflux disease (GERD),   belching,   injection site reactions or redness,   lack of energy or weakness,   gastroenteritis,   anxiety, or   Insomnia.      Side effects of Qsymia -   ? Initial, phentermine hydrochloride 3.75 mg/topiramate 23 mg orally once daily for 14 days, then increase to maintenance, phentermine hydrochloride 7.5 mg/topiramate 46 mg once daily.    mood changes such as depression,   trouble sleeping (insomnia),   confusion,   dizziness,   anxiety,   tiredness,   irritability,   constipation,   numbness or tingly feeling,   altered sense of taste,   dry mouth,   unpleasant taste in your mouth,   headache,   upper respiratory tract infection,   runny or stuffy nose,   sinus infection,   back pain,   flu symptoms,   bronchitis,   fatigue,    nausea,   diarrhea,   cough,   urinary tract infection,   blurred vision,   pain in extremities,   depression,   rash,   stomach pain or upset,   muscle spasms,   indigestion,   thirst,   eye pain,   dry eyes,   palpitations,   hair loss,   loss of appetite,   changes in menstrual periods,   muscle pain,   neck pain, or   problems with concentration, attention, memory or speech.

## 2020-07-30 NOTE — PROGRESS NOTES
55 y.o.    Patient Care Team:  Demario Quezada MD as PCP - General (Internal Medicine)  Wellington Chang MD as Surgeon (General Surgery)    Chief Complaint:    FOLLOW UP/ TYPE 2 DIABETES MELLITUS  Subjective     HPI    55-year-old male is here as a follow-up for the management of type 2 diabetes mellitus.  This is a video visit during the covid pandemic.     Type 2 dm - Diagnosed about 10-12 years ago.   Today in clinic patient reports that he is on Basaglar 30 units in the morning and 60 units at bedtime, NovoLog 12 units with each meal plus the sliding scale, also on Farxiga, metformin and Trulicity.  Average blood sugars are still in 200s-250s.  Has been checking his blood sugars 4 times a day.  Dm retinopathy -no known history,Last eye exam -in the last 1 year  Dm nephropathy -none  Dm neuropathy -occasional tingling and numbness,Dm neuropathy meds -not on meds  CAD -no  CVA -no  Episodes of hypoglycemia -none since the last visit  Pt is physically active. weight has been stable.   Pt tries to follow DM diet for most part.   On Ace inb.     Hyperlipidemia  On Crestor 40 mg oral daily      Reviewed primary care physician's/consulting physician documentation and lab results :     Interval History      The following portions of the patient's history were reviewed and updated as appropriate: allergies, current medications, past family history, past medical history, past social history, past surgical history and problem list.    Past Medical History:   Diagnosis Date   • Allergic rhinitis 10/24/2013    10/24/2013--skin testing revealed impressive reactions to grass following, tree pollen, weed pollen, ragweed, dust mite, and cat. Recommend immunotherapy.   • Benign essential hypertension 2/22/2016   • Chronic constipation 11/20/2017 11/20/2017--patient reports approximately 8 month history of intermittent constipation that at times can last as much as a month.  He notes his blood sugar readings tend to  increase when this happens.  He is scheduled for colonoscopy next week.  I recommend a generic probiotic daily as well as MiraLAX and adjust as needed.   • Chronic neck pain 10/30/2015    12/19/2015--patient reports his left shoulder pain has resolved. He continues to have neck pain. X-ray of the cervical spine ordered. Physical therapy ordered.   11/10/2015--left shoulder injected with 80 mg of Depo-Medrol with 3 mL of Xylocaine.   10/30/2015--patient presents with at least a one-month history of intermittent left-sided neck pain that is associated with left shoulder pain as well. The pain is described as shooting and is 8 out of 10 intensity at its worst. The pain is worse with certain movements of his head and left shoulder. No trauma. No numbness or paresthesias.   • Depression with anxiety 2/22/2016   • Diabetic eye exam (CMS/[facility]) 5/27/2016    May 2016--patient reports a normal diabetic eye exam.   • Diabetic foot exam 4/27/2017 05/02/2017--routine diabetic foot exam reveals no evidence of diabetic foot ulcer or pre-ulcerative callus.  Pulses are palpable and there is no signs of ischemia.  Sensation subjectively intact.   • Gastroesophageal reflux disease with esophagitis 5/22/2015 08/12/2015--EGD revealed esophagitis and a distal esophageal stricture that was dilated. Small sliding hiatal hernia. Proximal gastric ulcer and gastritis present. Dysphagia resolved after dilatation. Pathology of the gastric antrum was benign with no significant histologic abnormality. Distal esophagus biopsy negative for intestinal metaplasia or dysplasia.   05/22/2015--patient presents with a several month history of progressively worsening intermittent dysphagia of solids but not liquids. He describes solid food sometimes sticking and points to his upper chest/lower throat. No other associated symptoms. Patient referred to Dr. Aime Parada for an EGD. This is negative, may need ENT evaluation.   • History of colon polyps,  11/29/2017--tubular adenoma times one.  Hyperplastic ×1.  Repeat 5 years. 12/18/2017 11/29/2017--colonoscopy revealed 2 small (3 mm) polyps in the sigmoid colon and cecum.  Removed.  Bowel prep.  Sigmoid diverticuli noted.  No hemorrhoids.  Pathology returned hyperplastic polyp of the sigmoid and the cecal polyp was a tubular adenoma.   • HIstory of eosinophilic gastroenteritis 1990s    1990s--patient had significant epigastric discomfort and workup including an EGD revealed eosinophilic gastroenteritis that was treated with prednisone and resulted in resolution.   • History of esophageal stricture 08/12/2015 08/12/2015--EGD revealed esophagitis and a distal esophageal stricture that was dilated. Small sliding hiatal hernia. Proximal gastric ulcer and gastritis present. Dysphagia resolved after dilatation. Pathology of the gastric antrum was benign with no significant histologic abnormality. Distal esophagus biopsy negative for intestinal metaplasia or dysplasia.   05/22/2015--patient presents with a s   • History of Pulmonary nodule, 03/07/2016--5 mm indeterminate nodule right lower lobe.  09/13/2016--consistent with calcified granuloma. 3/7/2016    09/13/2016--CT scan of the chest, abdomen, and pelvis with contrast reveals no lymphadenopathy within the abdomen or pelvis.  Mild hepatic steatosis.  Previously noted right lower lobe pulmonary nodule is currently calcified and consistent with a calcified granuloma.  03/07/2016--CT scan of the abdomen performed for complaints of abdominal discomfort revealed a 5 mm nodular focus right lower lobe which is indeterminate.  Recommend repeat CT scan in 6 months.   • Hypogonadism male, no TRT. 12/26/2012 12/26/2012--treatment for hypogonadism begun.   • Microalbuminuria 10/19/2014    10/19/2014--urine microalbumin mildly elevated at 27.8. Normal range 0.0--17.0.   • Moderate persistent asthma without complication 2/22/2016    Seasonal, spring and fall.   • Multiple  environmental allergies 10/24/2013    10/24/2013--skin testing revealed impressive reactions to grass following, tree pollen, weed pollen, ragweed, dust mite, and cat. Recommend immunotherapy.   • Non morbid obesity 2/22/2016   • Right bundle branch block     ECG reveals sinus rhythm, rate of 75, right bundle branch block, left anterior hemiblock   • Subclinical hypothyroidism 8/17/2018 08/27/2018--thyroid ultrasound reveals subcentimeter nodule in the right thyroid lobe.  Possibly cystic.  There is a question of an ill-defined 1 cm nodular lesion posteriorly in the mid left lateral thyroid.  Appearance could be technical in origin.  Recommend repeat thyroid ultrasound in 6 months.  08/17/2018--routine follow-up.  TSH mildly elevated at 4.49.  Free T3 and free T4 are normal.  Rep   • Thyroid nodule 10/5/2018    08/27/2018--thyroid ultrasound reveals subcentimeter nodule in the right thyroid lobe.  Possibly cystic.  There is a question of an ill-defined 1 cm nodular lesion posteriorly in the mid left lateral thyroid.  Appearance could be technical in origin.  Recommend repeat thyroid ultrasound in 6 months.  08/17/2018--routine follow-up.  TSH mildly elevated at 4.49.  Free T3 and free T4 are normal.  Repeat thyroid function tests ordered and returned normal.  Thyroid ultrasound ordered.   • Type 2 diabetes mellitus with microalbuminuria, with long-term current use of insulin (CMS/[facility]) 1/1/2004    2004--initial diagnosis of type 2 diabetes.   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)    Social Needs   • Financial resource strain: Not hard at all   • Food insecurity:     Worry: Never true     Inability: Never true   • Transportation needs:     Medical: No     Non-medical: No   Tobacco Use   • Smoking status: Never Smoker   • Smokeless tobacco: Never Used   Substance and Sexual Activity   • Alcohol use: Yes     Frequency: Monthly or less     Drinks per session: 1 or 2     Binge frequency: Never   • Drug use: No   • Sexual activity: Yes     Partners: Female   Lifestyle   • Physical activity:     Days per week: 0 days     Minutes per session: 0 min   • Stress: Only a little   Relationships   • Social connections:     Talks on phone: More than three times a week     Gets together: More than three times a week     Attends [employer] orthodox service: More than 4 times per year     Active member of club or organization: Yes     Attends meetings of clubs or organizations: More than 4 times per year     Relationship status:      Allergies   Allergen Reactions   • Latex Itching       Current Outpatient Medications:   •  amLODIPine-benazepril (LOTREL 5-20) 5-20 MG per capsule, TAKE 1 CAPSULE BY MOUTH ONE TIME A DAY , Disp: 30 capsule, Rfl: 5  •  Chlorcyclizine-Pseudoephed (STAHIST AD) 25-60 MG tablet, 1 by mouth every 6 hours as needed for congestion and allergies, not greater than 3 in 24 hours, Disp: 60 tablet, Rfl: 2  •  Cholecalciferol (VITAMIN D3) 5000 UNITS capsule capsule, Take 1 capsule by mouth daily., Disp: , Rfl:   •  Dapagliflozin Propanediol (FARXIGA) 10 MG tablet, Take 10 mg by mouth Daily. Before Breakfast, Disp: 60 tablet, Rfl: 2  •  esomeprazole (nexIUM) 40 MG capsule, TAKE 1 CAPSULE BY MOUTH ONE TIME A DAY , Disp: 90 capsule, Rfl: 3  •  ezetimibe (ZETIA) 10 MG tablet, TAKE 1 TABLET BY MOUTH ONE TIME A DAY , Disp: 30 tablet, Rfl: 0  •  fluconazole (DIFLUCAN) 200 MG tablet, Take 1 p.o. daily as directed for recurrent fungal infections, Disp: 7 tablet, Rfl: 2  •  fluocinonide-emollient (LIDEX-E)  "0.05 % cream, Apply topically twice daily as needed hand eczema, Disp: 60 g, Rfl: 0  •  insulin aspart (NovoLOG FlexPen) 100 UNIT/ML solution pen-injector sc pen, 12 units with each meal and max of 100 units, Disp: 5 pen, Rfl: 3  •  Insulin Glargine (BASAGLAR KWIKPEN) 100 UNIT/ML injection pen, inject 80 units under the skin once daily, Disp: 30 mL, Rfl: 3  •  linaclotide (Linzess) 72 MCG capsule capsule, Take 1 capsule by mouth Every Morning Before Breakfast., Disp: 30 capsule, Rfl: 11  •  Loratadine (CLARITIN PO), Take 1 capsule by mouth daily as needed (when necessary for allergies.)., Disp: , Rfl:   •  metFORMIN (GLUCOPHAGE) 1000 MG tablet, TAKE 1 TABLET BY MOUTH TWO TIMES A DAY WITH MEALS, Disp: 180 tablet, Rfl: 0  •  montelukast (SINGULAIR) 10 MG tablet, TAKE 1 TABLET BY MOUTH ONE TIME A DAY , Disp: 30 tablet, Rfl: 5  •  rosuvastatin (CRESTOR) 40 MG tablet, TAKE 1 TABLET BY MOUTH AT BEDTIME , Disp: 90 tablet, Rfl: 0  •  sertraline (ZOLOFT) 50 MG tablet, TAKE 1 TABLET BY MOUTH ONE TIME A DAY , Disp: 30 tablet, Rfl: 2  •  TRULICITY 1.5 MG/0.5ML solution pen-injector, inject 1.5 mg under the skin once weekly as directed, Disp: 2 mL, Rfl: 8        Review of Systems   Constitutional: Positive for fatigue. Negative for appetite change and fever.   Eyes: Negative for visual disturbance.   Respiratory: Negative for shortness of breath.    Cardiovascular: Negative for palpitations and leg swelling.   Gastrointestinal: Negative for abdominal pain and vomiting.   Endocrine: Negative for polydipsia and polyuria.   Musculoskeletal: Negative for joint swelling and neck pain.   Skin: Negative for rash.   Neurological: Negative for weakness and numbness.   Psychiatric/Behavioral: Negative for behavioral problems.     I have reviewed the ROS as documented by the MA; Mana Scott MD.      Objective       Vitals:    07/30/20 1444   BP: 130/78   Weight: 99.4 kg (219 lb 3.2 oz)   Height: 167.6 cm (66\")     Body mass index is " 35.38 kg/m².      Physical Exam   Constitutional: He is oriented to person, place, and time. He appears well-nourished.   obese   HENT:   Head: Normocephalic and atraumatic.   Wide neck   Eyes: Conjunctivae and EOM are normal.   Neck: Normal range of motion. Neck supple. Carotid bruit is not present. No thyromegaly present.   Acanthosis nigricans   Cardiovascular: Normal rate and normal heart sounds.   Pulmonary/Chest: Effort normal and breath sounds normal. No stridor. No respiratory distress.   Abdominal: Soft. Bowel sounds are normal. He exhibits no distension. There is no tenderness.   Central obesity   Musculoskeletal: He exhibits no edema or tenderness.   Neurological: He is alert and oriented to person, place, and time.   Skin: Skin is warm and dry.   Psychiatric: He has a normal mood and affect. His behavior is normal.   Vitals reviewed.      Results Review:     I reviewed the patient's new clinical results and mentioned them above in HPI and in plan as well.    Medical records reviewed  Summary:Done      Results Encounter on 06/21/2020   Component Date Value Ref Range Status   • Hemoglobin A1C 07/16/2020 9.80* 4.80 - 5.60 % Final    Comment: Hemoglobin A1C Ranges:  Increased Risk for Diabetes  5.7% to 6.4%  Diabetes                     >= 6.5%  Diabetic Goal                < 7.0%     • Glucose 07/16/2020 157* 65 - 99 mg/dL Final   • BUN 07/16/2020 18  6 - 20 mg/dL Final   • Creatinine 07/16/2020 0.92  0.76 - 1.27 mg/dL Final   • eGFR Non African Am 07/16/2020 86  >60 mL/min/1.73 Final   • eGFR African Am 07/16/2020 104  >60 mL/min/1.73 Final   • BUN/Creatinine Ratio 07/16/2020 19.6  7.0 - 25.0 Final   • Sodium 07/16/2020 141  136 - 145 mmol/L Final   • Potassium 07/16/2020 4.6  3.5 - 5.2 mmol/L Final   • Chloride 07/16/2020 106  98 - 107 mmol/L Final   • Total CO2 07/16/2020 26.2  22.0 - 29.0 mmol/L Final   • Calcium 07/16/2020 9.2  8.6 - 10.5 mg/dL Final   • Total Cholesterol 07/16/2020 120  0 - 200 mg/dL  Final   • Triglycerides 07/16/2020 175* 0 - 150 mg/dL Final   • HDL Cholesterol 07/16/2020 32* 40 - 60 mg/dL Final   • VLDL Cholesterol 07/16/2020 35  mg/dL Final   • LDL Cholesterol  07/16/2020 53  0 - 100 mg/dL Final   • Creatinine, Urine 07/16/2020 52.2  Not Estab. mg/dL Final   • Microalbumin, Urine 07/16/2020 4.7  Not Estab. ug/mL Final   • Microalbumin/Creatinine Ratio 07/16/2020 9  0 - 29 mg/g creat Final    Comment:                        Normal:                0 -  29                         Moderately increased: 30 - 300                         Severely increased:       >300                **Please note reference interval change**     • TSH 07/16/2020 1.380  0.270 - 4.200 uIU/mL Final   • Free T4 07/16/2020 0.95  0.93 - 1.70 ng/dL Final    Results may be falsely increased if patient taking Biotin.   • Interpretation 07/16/2020 Note   Final    Supplemental report is available.   • PDF Image 07/16/2020 Not applicable   Final     Lab Results   Component Value Date    HGBA1C 9.80 (H) 07/16/2020    HGBA1C 11.70 (H) 03/23/2020    HGBA1C 10.50 (H) 12/24/2019     Lab Results   Component Value Date    MICROALBUR 4.7 07/16/2020    CREATININE 0.92 07/16/2020     Imaging Results (Most Recent)     None                Assessment and Plan:    Diagnoses and all orders for this visit:    Type 2 diabetes mellitus with microalbuminuria, with long-term current use of insulin (CMS/[facility])  -     Hemoglobin A1c; Future  -     Creatinine, Serum; Future  -     eGFR-Glomerular Filtration; Future  -     Lipid Panel; Future  -     Microalbumin / Creatinine Urine Ratio - Urine, Clean Catch; Future  -     TSH; Future  -     T4, Free; Future  -     Vitamin D 25 Hydroxy; Future  -     Vitamin B12 & Folate; Future    Hyperlipemia, mixed  -     Hemoglobin A1c; Future  -     Creatinine, Serum; Future  -     eGFR-Glomerular Filtration; Future  -     Lipid Panel; Future  -     Microalbumin / Creatinine Urine Ratio - Urine, Clean Catch;  "Future  -     TSH; Future  -     T4, Free; Future  -     Vitamin D 25 Hydroxy; Future  -     Vitamin B12 & Folate; Future    Class 2 obesity due to excess calories without serious comorbidity with body mass index (BMI) of 36.0 to 36.9 in adult  -     Hemoglobin A1c; Future  -     Creatinine, Serum; Future  -     eGFR-Glomerular Filtration; Future  -     Lipid Panel; Future  -     Microalbumin / Creatinine Urine Ratio - Urine, Clean Catch; Future  -     TSH; Future  -     T4, Free; Future  -     Vitamin D 25 Hydroxy; Future  -     Vitamin B12 & Folate; Future      Type 2 diabetes mellitus-uncontrolled  HbA1c still high  Increase Basaglar to 50 units in the morning and 60 units at bedtime  Change NovoLog to 15 units with each meal  Cover with NovoLog sliding scale  Continue the oral hypoglycemic agents  Continue Trulicity    Obesity  Discussed calorie counting - limit to 1500 stephanie per day and exercising 2-3 times a week.  I discussed about weight loss medications and gave the handouts to the patient  Patient is interested in weight loss medications.    Hyperlipidemia  Continue Crestor      Reviewed Lab results with the patient.       Mana Scott MD  07/30/20    EMR Dragon / transcription disclaimer:     \"Dictated utilizing Dragon dictation\".      "

## 2020-08-04 DIAGNOSIS — R80.9 TYPE 2 DIABETES MELLITUS WITH MICROALBUMINURIA, WITH LONG-TERM CURRENT USE OF INSULIN (HCC): ICD-10-CM

## 2020-08-04 DIAGNOSIS — Z79.4 TYPE 2 DIABETES MELLITUS WITH MICROALBUMINURIA, WITH LONG-TERM CURRENT USE OF INSULIN (HCC): ICD-10-CM

## 2020-08-04 DIAGNOSIS — E11.29 TYPE 2 DIABETES MELLITUS WITH MICROALBUMINURIA, WITH LONG-TERM CURRENT USE OF INSULIN (HCC): ICD-10-CM

## 2020-08-04 DIAGNOSIS — E78.2 HYPERLIPEMIA, MIXED: ICD-10-CM

## 2020-08-04 DIAGNOSIS — E11.9 TYPE 2 DIABETES MELLITUS WITHOUT COMPLICATION, WITHOUT LONG-TERM CURRENT USE OF INSULIN (HCC): Chronic | ICD-10-CM

## 2020-08-04 RX ORDER — INSULIN GLARGINE 100 [IU]/ML
INJECTION, SOLUTION SUBCUTANEOUS
Qty: 30 PEN | Refills: 3 | Status: SHIPPED | OUTPATIENT
Start: 2020-08-04 | End: 2020-12-09 | Stop reason: SDUPTHER

## 2020-08-10 DIAGNOSIS — E11.9 TYPE 2 DIABETES MELLITUS WITHOUT COMPLICATION, WITHOUT LONG-TERM CURRENT USE OF INSULIN (HCC): ICD-10-CM

## 2020-08-12 ENCOUNTER — TREATMENT (OUTPATIENT)
Dept: ENDOCRINOLOGY | Age: 55
End: 2020-08-12

## 2020-08-12 DIAGNOSIS — IMO0002 DM (DIABETES MELLITUS), TYPE 2, UNCONTROLLED W/NEUROLOGIC COMPLICATION: ICD-10-CM

## 2020-08-12 PROCEDURE — 95251 CONT GLUC MNTR ANALYSIS I&R: CPT | Performed by: INTERNAL MEDICINE

## 2020-08-12 NOTE — PROGRESS NOTES
A continuous glucose monitor (CGM) was on 7/30/20. The device was activated, and the patient was educated on proper use of the device. The patient will return in 14 days for removal of the device and interpretation of the results.    Continues glucose monitoring data      Patient wore continuous glucose monitoring-free style robby Pro from  July 30th - Aug 9th   Average blood glucose reading was 190 mg/dl.   Estimated HbA1c 8 - 9%   Likelihood of low blood glucose levels was low  Likelihood of high blood glucose levels was high   Blood glucose trends showed high bg    Current treatment regimen  Basaglar to 50 units in the morning and 60 units at bedtime  Change NovoLog to 15 units with each meal  Cover with NovoLog sliding scale  Continue the oral hypoglycemic agents  Continue Trulicity    Changes to the treatment regimen  Increase Basaglar to 55 units in the morning and continue 60 units at bedtime  Change NovoLog to 18 units with breakfast, 20 units with lunch and 24 units with dinner  Cover with high-dose NovoLog sliding scale  BG less than 150 - zero  151 - 200 - 3 unit  201 - 250 - 6 units  251 - 300 - 9 units  301 - 350 - 12 units  Above 350 mg/dl - 15 units.   Continue with oral hypoglycemic agents and Trulicity.

## 2020-08-17 NOTE — PROGRESS NOTES
Called and spoke with patient about the freestyle robby pro cgm results and medication change  Patient voice understanding

## 2020-09-24 RX ORDER — FLURBIPROFEN SODIUM 0.3 MG/ML
SOLUTION/ DROPS OPHTHALMIC
Qty: 180 EACH | Refills: 11 | Status: SHIPPED | OUTPATIENT
Start: 2020-09-24 | End: 2021-10-13

## 2020-09-30 ENCOUNTER — OFFICE VISIT (OUTPATIENT)
Dept: INTERNAL MEDICINE | Facility: CLINIC | Age: 55
End: 2020-09-30

## 2020-09-30 DIAGNOSIS — Z53.21 PATIENT LEFT WITHOUT BEING SEEN: ICD-10-CM

## 2020-10-28 ENCOUNTER — RESULTS ENCOUNTER (OUTPATIENT)
Dept: ENDOCRINOLOGY | Age: 55
End: 2020-10-28

## 2020-10-28 DIAGNOSIS — Z79.4 TYPE 2 DIABETES MELLITUS WITH MICROALBUMINURIA, WITH LONG-TERM CURRENT USE OF INSULIN (HCC): ICD-10-CM

## 2020-10-28 DIAGNOSIS — E11.29 TYPE 2 DIABETES MELLITUS WITH MICROALBUMINURIA, WITH LONG-TERM CURRENT USE OF INSULIN (HCC): ICD-10-CM

## 2020-10-28 DIAGNOSIS — E78.2 HYPERLIPEMIA, MIXED: ICD-10-CM

## 2020-10-28 DIAGNOSIS — E66.09 CLASS 2 OBESITY DUE TO EXCESS CALORIES WITHOUT SERIOUS COMORBIDITY WITH BODY MASS INDEX (BMI) OF 36.0 TO 36.9 IN ADULT: ICD-10-CM

## 2020-10-28 DIAGNOSIS — R80.9 TYPE 2 DIABETES MELLITUS WITH MICROALBUMINURIA, WITH LONG-TERM CURRENT USE OF INSULIN (HCC): ICD-10-CM

## 2020-11-02 ENCOUNTER — TELEPHONE (OUTPATIENT)
Dept: INTERNAL MEDICINE | Facility: CLINIC | Age: 55
End: 2020-11-02

## 2020-11-02 DIAGNOSIS — J45.21 MILD INTERMITTENT ASTHMA WITH EXACERBATION: ICD-10-CM

## 2020-11-06 ENCOUNTER — TELEPHONE (OUTPATIENT)
Dept: INTERNAL MEDICINE | Facility: CLINIC | Age: 55
End: 2020-11-06

## 2020-11-06 LAB
25(OH)D3+25(OH)D2 SERPL-MCNC: 45.8 NG/ML (ref 30–100)
ALBUMIN/CREAT UR: 6 MG/G CREAT (ref 0–29)
CHOLEST SERPL-MCNC: 98 MG/DL (ref 100–199)
CREAT SERPL-MCNC: 1.12 MG/DL (ref 0.76–1.27)
CREAT UR-MCNC: 75.2 MG/DL
FOLATE SERPL-MCNC: 12.4 NG/ML
HBA1C MFR BLD: 8.1 % (ref 4.8–5.6)
HDLC SERPL-MCNC: 30 MG/DL
INTERPRETATION: NORMAL
LDLC SERPL CALC-MCNC: 46 MG/DL (ref 0–99)
Lab: NORMAL
MICROALBUMIN UR-MCNC: 4.4 UG/ML
T4 FREE SERPL-MCNC: 0.98 NG/DL (ref 0.82–1.77)
TRIGL SERPL-MCNC: 118 MG/DL (ref 0–149)
TSH SERPL DL<=0.005 MIU/L-ACNC: 2.1 UIU/ML (ref 0.45–4.5)
VIT B12 SERPL-MCNC: 424 PG/ML (ref 232–1245)
VLDLC SERPL CALC-MCNC: 22 MG/DL (ref 5–40)

## 2020-11-06 NOTE — TELEPHONE ENCOUNTER
Pharmacy called and wants to know if PCP will fill a script originally written from Urgent Care for patient for advair diskus inhaler 250/50 -1 puff by mouth twice daily. Please call back and advise or send script to pharmacy she advised. Best call back [phone].    Verified pharmacy-[id number] - [place], [place] - [address] [place] [address] - [phone]  - [phone]   [phone]

## 2020-11-06 NOTE — TELEPHONE ENCOUNTER
Informed pt his inhaler was sent to his pharmacy.       Left message for pt to let him know Dr. Quezada is out of the office and will not be back until Monday.

## 2020-11-09 DIAGNOSIS — J45.21 MILD INTERMITTENT ASTHMA WITH EXACERBATION: ICD-10-CM

## 2020-11-19 ENCOUNTER — OFFICE VISIT (OUTPATIENT)
Dept: ENDOCRINOLOGY | Age: 55
End: 2020-11-19

## 2020-11-19 VITALS
OXYGEN SATURATION: 99 % | SYSTOLIC BLOOD PRESSURE: 136 MMHG | RESPIRATION RATE: 16 BRPM | HEIGHT: 66 IN | WEIGHT: 218 LBS | DIASTOLIC BLOOD PRESSURE: 70 MMHG | BODY MASS INDEX: 35.03 KG/M2 | HEART RATE: 96 BPM

## 2020-11-19 DIAGNOSIS — E11.29 TYPE 2 DIABETES MELLITUS WITH MICROALBUMINURIA, WITH LONG-TERM CURRENT USE OF INSULIN (HCC): Primary | ICD-10-CM

## 2020-11-19 DIAGNOSIS — R80.9 TYPE 2 DIABETES MELLITUS WITH MICROALBUMINURIA, WITH LONG-TERM CURRENT USE OF INSULIN (HCC): Primary | ICD-10-CM

## 2020-11-19 DIAGNOSIS — E78.2 HYPERLIPEMIA, MIXED: ICD-10-CM

## 2020-11-19 DIAGNOSIS — E66.09 CLASS 2 OBESITY DUE TO EXCESS CALORIES WITHOUT SERIOUS COMORBIDITY WITH BODY MASS INDEX (BMI) OF 36.0 TO 36.9 IN ADULT: ICD-10-CM

## 2020-11-19 DIAGNOSIS — Z79.4 TYPE 2 DIABETES MELLITUS WITH MICROALBUMINURIA, WITH LONG-TERM CURRENT USE OF INSULIN (HCC): Primary | ICD-10-CM

## 2020-11-19 PROCEDURE — 99214 OFFICE O/P EST MOD 30 MIN: CPT | Performed by: INTERNAL MEDICINE

## 2020-11-19 NOTE — PATIENT INSTRUCTIONS
Increase Basaglar to 60 units in the morning and continue 60 units at bedtime   NovoLog 18 units with breakfast, 20 units with lunch and 24 units with dinner  Cover with high-dose NovoLog sliding scale  BG less than 150 - zero  151 - 200 - 3 unit  201 - 250 - 6 units  251 - 300 - 9 units  301 - 350 - 12 units  Above 350 mg/dl - 15 units.   Continue metformin, farxiga and trulicity.

## 2020-11-19 NOTE — PROGRESS NOTES
55 y.o.    Patient Care Team:  Demario Quezada MD as PCP - General (Internal Medicine)  Wellington Chang MD as Surgeon (General Surgery)    Chief Complaint:      Subjective     HPI   55-year-old white male is here for the follow-up of type 2 diabetes mellitus.      Type 2 dm - Diagnosed about 10-12 years ago.   Today in clinic pt reports being on Basaglar 55 units in the morning and 60 units at bedtime, NovoLog 18/20/24 with each meal plus the high-dose NovoLog sliding scale, also on Farxiga, Metformin and Trulicity  FBG -100s-200s  Pre meals -low 200s  Checks BG -3-4 times a day  Dm retinopathy -no history,Last eye exam -in the last 1 year   Dm nephropathy -none  Dm neuropathy -some,Dm neuropathy meds -not on any meds  CAD -no  CVA -no  Episodes of hypoglycemia -none since her last visit  Pt is physically active. weight has been stable.   Pt tries to follow DM diet for most part.   On Ace inb.    Hyperlipidemia  On Crestor 40 mg oral daily      Insert review insert ROS    Interval History      The following portions of the patient's history were reviewed and updated as appropriate: allergies, current medications, past family history, past medical history, past social history, past surgical history and problem list.    Past Medical History:   Diagnosis Date   • Allergic rhinitis 10/24/2013    10/24/2013--skin testing revealed impressive reactions to grass following, tree pollen, weed pollen, ragweed, dust mite, and cat. Recommend immunotherapy.   • Benign essential hypertension 2/22/2016   • Chronic constipation 11/20/2017 11/20/2017--patient reports approximately 8 month history of intermittent constipation that at times can last as much as a month.  He notes his blood sugar readings tend to increase when this happens.  He is scheduled for colonoscopy next week.  I recommend a generic probiotic daily as well as MiraLAX and adjust as needed.   • Chronic neck pain 10/30/2015    12/19/2015--patient reports  his left shoulder pain has resolved. He continues to have neck pain. X-ray of the cervical spine ordered. Physical therapy ordered.   11/10/2015--left shoulder injected with 80 mg of Depo-Medrol with 3 mL of Xylocaine.   10/30/2015--patient presents with at least a one-month history of intermittent left-sided neck pain that is associated with left shoulder pain as well. The pain is described as shooting and is 8 out of 10 intensity at its worst. The pain is worse with certain movements of his head and left shoulder. No trauma. No numbness or paresthesias.   • Depression with anxiety 2/22/2016   • Diabetic eye exam (CMS/[facility]) 5/27/2016    May 2016--patient reports a normal diabetic eye exam.   • Diabetic foot exam 4/27/2017 05/02/2017--routine diabetic foot exam reveals no evidence of diabetic foot ulcer or pre-ulcerative callus.  Pulses are palpable and there is no signs of ischemia.  Sensation subjectively intact.   • Gastroesophageal reflux disease with esophagitis 5/22/2015 08/12/2015--EGD revealed esophagitis and a distal esophageal stricture that was dilated. Small sliding hiatal hernia. Proximal gastric ulcer and gastritis present. Dysphagia resolved after dilatation. Pathology of the gastric antrum was benign with no significant histologic abnormality. Distal esophagus biopsy negative for intestinal metaplasia or dysplasia.   05/22/2015--patient presents with a several month history of progressively worsening intermittent dysphagia of solids but not liquids. He describes solid food sometimes sticking and points to his upper chest/lower throat. No other associated symptoms. Patient referred to Dr. Aime Parada for an EGD. This is negative, may need ENT evaluation.   • History of colon polyps, 11/29/2017--tubular adenoma times one.  Hyperplastic ×1.  Repeat 5 years. 12/18/2017 11/29/2017--colonoscopy revealed 2 small (3 mm) polyps in the sigmoid colon and cecum.  Removed.  Bowel prep.  Sigmoid  diverticuli noted.  No hemorrhoids.  Pathology returned hyperplastic polyp of the sigmoid and the cecal polyp was a tubular adenoma.   • HIstory of eosinophilic gastroenteritis 1990s    1990s--patient had significant epigastric discomfort and workup including an EGD revealed eosinophilic gastroenteritis that was treated with prednisone and resulted in resolution.   • History of esophageal stricture 08/12/2015 08/12/2015--EGD revealed esophagitis and a distal esophageal stricture that was dilated. Small sliding hiatal hernia. Proximal gastric ulcer and gastritis present. Dysphagia resolved after dilatation. Pathology of the gastric antrum was benign with no significant histologic abnormality. Distal esophagus biopsy negative for intestinal metaplasia or dysplasia.   05/22/2015--patient presents with a s   • History of Pulmonary nodule, 03/07/2016--5 mm indeterminate nodule right lower lobe.  09/13/2016--consistent with calcified granuloma. 3/7/2016    09/13/2016--CT scan of the chest, abdomen, and pelvis with contrast reveals no lymphadenopathy within the abdomen or pelvis.  Mild hepatic steatosis.  Previously noted right lower lobe pulmonary nodule is currently calcified and consistent with a calcified granuloma.  03/07/2016--CT scan of the abdomen performed for complaints of abdominal discomfort revealed a 5 mm nodular focus right lower lobe which is indeterminate.  Recommend repeat CT scan in 6 months.   • Hypogonadism male, no TRT. 12/26/2012 12/26/2012--treatment for hypogonadism begun.   • Microalbuminuria 10/19/2014    10/19/2014--urine microalbumin mildly elevated at 27.8. Normal range 0.0--17.0.   • Moderate persistent asthma without complication 2/22/2016    Seasonal, spring and fall.   • Multiple environmental allergies 10/24/2013    10/24/2013--skin testing revealed impressive reactions to grass following, tree pollen, weed pollen, ragweed, dust mite, and cat. Recommend immunotherapy.   • Non morbid  obesity 2/22/2016   • Right bundle branch block     ECG reveals sinus rhythm, rate of 75, right bundle branch block, left anterior hemiblock   • Subclinical hypothyroidism 8/17/2018 08/27/2018--thyroid ultrasound reveals subcentimeter nodule in the right thyroid lobe.  Possibly cystic.  There is a question of an ill-defined 1 cm nodular lesion posteriorly in the mid left lateral thyroid.  Appearance could be technical in origin.  Recommend repeat thyroid ultrasound in 6 months.  08/17/2018--routine follow-up.  TSH mildly elevated at 4.49.  Free T3 and free T4 are normal.  Rep   • Thyroid nodule 10/5/2018    08/27/2018--thyroid ultrasound reveals subcentimeter nodule in the right thyroid lobe.  Possibly cystic.  There is a question of an ill-defined 1 cm nodular lesion posteriorly in the mid left lateral thyroid.  Appearance could be technical in origin.  Recommend repeat thyroid ultrasound in 6 months.  08/17/2018--routine follow-up.  TSH mildly elevated at 4.49.  Free T3 and free T4 are normal.  Repeat thyroid function tests ordered and returned normal.  Thyroid ultrasound ordered.   • Type 2 diabetes mellitus with microalbuminuria, with long-term current use of insulin (CMS/[facility]) 1/1/2004    2004--initial diagnosis of type 2 diabetes.   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)   Social Needs   • Financial resource strain: Not hard at all   • Food insecurity     Worry: Never true     Inability: Never true   • Transportation needs     Medical: No     Non-medical: No   Tobacco Use   •  Smoking status: Never Smoker   • Smokeless tobacco: Never Used   Substance and Sexual Activity   • Alcohol use: Yes     Frequency: Monthly or less     Drinks per session: 1 or 2     Binge frequency: Never   • Drug use: No   • Sexual activity: Yes     Partners: Female   Lifestyle   • Physical activity     Days per week: 0 days     Minutes per session: 0 min   • Stress: Only a little   Relationships   • Social connections     Talks on phone: More than three times a week     Gets together: More than three times a week     Attends [employer] service: More than 4 times per year     Active member of club or organization: Yes     Attends meetings of clubs or organizations: More than 4 times per year     Relationship status:      Allergies   Allergen Reactions   • Latex Itching       Current Outpatient Medications:   •  albuterol sulfate  (90 Base) MCG/ACT inhaler, Inhale 2 puffs Every 4 (Four) Hours As Needed for Wheezing or Shortness of Air., Disp: 18 g, Rfl: 0  •  amLODIPine-benazepril (LOTREL 5-20) 5-20 MG per capsule, TAKE 1 CAPSULE BY MOUTH ONE TIME A DAY , Disp: 30 capsule, Rfl: 5  •  Chlorcyclizine-Pseudoephed (STAHIST AD) 25-60 MG tablet, 1 by mouth every 6 hours as needed for congestion and allergies, not greater than 3 in 24 hours, Disp: 60 tablet, Rfl: 2  •  Cholecalciferol (VITAMIN D3) 5000 UNITS capsule capsule, Take 1 capsule by mouth daily., Disp: , Rfl:   •  Dapagliflozin Propanediol (Farxiga) 10 MG tablet, Take 10 mg by mouth Daily. Before Breakfast, Disp: 60 tablet, Rfl: 2  •  esomeprazole (nexIUM) 40 MG capsule, TAKE 1 CAPSULE BY MOUTH ONE TIME A DAY , Disp: 90 capsule, Rfl: 3  •  ezetimibe (ZETIA) 10 MG tablet, TAKE 1 TABLET BY MOUTH ONE TIME A DAY , Disp: 30 tablet, Rfl: 0  •  fluconazole (DIFLUCAN) 200 MG tablet, Take 1 p.o. daily as directed for recurrent fungal infections, Disp: 7 tablet, Rfl: 2  •  fluocinonide-emollient (LIDEX-E) 0.05 % cream, Apply topically twice daily as needed  hand eczema, Disp: 60 g, Rfl: 0  •  fluticasone-salmeterol (Advair Diskus) 250-50 MCG/DOSE DISKUS, Inhale 1 puff 2 (Two) Times a Day., Disp: 60 each, Rfl: 11  •  insulin aspart (NovoLOG FlexPen) 100 UNIT/ML solution pen-injector sc pen, 15 units with each meal and max of 100 units, Disp: 30 pen, Rfl: 3  •  Insulin Glargine (BASAGLAR KWIKPEN) 100 UNIT/ML injection pen, 50 units in the morning and 60 units at bedtime, Disp: 30 pen, Rfl: 3  •  Insulin Pen Needle (B-D UF III MINI PEN NEEDLES) 31G X 5 MM misc, Use to inject insulin 6 times daily E11.8, Disp: 180 each, Rfl: 11  •  Insulin Pen Needle 32G X 5 MM misc, Use to inject insulin, Disp: 100 each, Rfl: 3  •  linaclotide (Linzess) 72 MCG capsule capsule, Take 1 capsule by mouth Every Morning Before Breakfast., Disp: 30 capsule, Rfl: 11  •  Loratadine (CLARITIN PO), Take 1 capsule by mouth daily as needed (when necessary for allergies.)., Disp: , Rfl:   •  metFORMIN (GLUCOPHAGE) 1000 MG tablet, TAKE 1 TABLET BY MOUTH TWO TIMES A DAY WITH MEALS, Disp: 60 tablet, Rfl: 6  •  montelukast (SINGULAIR) 10 MG tablet, TAKE 1 TABLET BY MOUTH ONE TIME A DAY , Disp: 30 tablet, Rfl: 5  •  rosuvastatin (CRESTOR) 40 MG tablet, TAKE 1 TABLET BY MOUTH AT BEDTIME , Disp: 90 tablet, Rfl: 0  •  sertraline (ZOLOFT) 50 MG tablet, TAKE 1 TABLET BY MOUTH ONE TIME A DAY , Disp: 30 tablet, Rfl: 6  •  TRULICITY 1.5 MG/0.5ML solution pen-injector, inject 1.5 mg under the skin once weekly as directed, Disp: 2 mL, Rfl: 8  •  naltrexone-bupropion ER (CONTRAVE) 8-90 MG tablet, Wk 1: 1 tab daily, Wk 2: 1 tab twice a day, Wk 3: 2 tabs in AM, 1 tab in PM, Wk 4: 2 tabs twice a day, Maintenance dose: 2 tabs twice daily., Disp: 120 tablet, Rfl: 1        Review of Systems   Constitutional: Positive for appetite change and fatigue. Negative for fever.   Eyes: Negative for visual disturbance.   Respiratory: Negative for shortness of breath.    Cardiovascular: Negative for palpitations and leg swelling.  "  Gastrointestinal: Negative for abdominal pain and vomiting.   Endocrine: Negative for polydipsia and polyuria.   Musculoskeletal: Negative for joint swelling and neck pain.   Skin: Negative for rash.   Neurological: Negative for weakness and numbness.   Psychiatric/Behavioral: Negative for behavioral problems.     I have reviewed and confirmed the accuracy of the ROS as documented by the MA/LPN/RN Mana Scott MD      Objective       Vitals:    11/19/20 1523   BP: 136/70   Pulse: 96   Resp: 16   SpO2: 99%   Weight: 98.9 kg (218 lb)   Height: 167.6 cm (66\")     Body mass index is 35.19 kg/m².      Physical Exam  Vitals signs reviewed.   Constitutional:       Appearance: Normal appearance. He is not diaphoretic.   HENT:      Head: Normocephalic and atraumatic.   Eyes:      General: No scleral icterus.     Conjunctiva/sclera: Conjunctivae normal.   Neck:      Musculoskeletal: Normal range of motion and neck supple.      Thyroid: No thyromegaly.      Comments: Wide neck  Cardiovascular:      Rate and Rhythm: Normal rate.   Pulmonary:      Effort: Pulmonary effort is normal. No respiratory distress.   Abdominal:      General: There is no distension.      Palpations: Abdomen is soft.      Tenderness: There is no abdominal tenderness.      Comments: Central obesity   Musculoskeletal:         General: No tenderness or deformity.   Skin:     General: Skin is warm and dry.   Neurological:      Mental Status: He is alert and oriented to person, place, and time. Mental status is at baseline.      Gait: Gait normal.      Comments: Decreased sensations   Psychiatric:         Mood and Affect: Mood normal.         Behavior: Behavior normal.         Results Review:     I reviewed the patient's new clinical results and mentioned them above in HPI and in plan as well.    Medical records reviewed  Summary:Done      Orders Only on 11/05/2020   Component Date Value Ref Range Status   • Total Cholesterol 11/05/2020 98* 100 - 199 " mg/dL Final   • Triglycerides 11/05/2020 118  0 - 149 mg/dL Final   • HDL Cholesterol 11/05/2020 30* >39 mg/dL Final   • VLDL Cholesterol Chris 11/05/2020 22  5 - 40 mg/dL Final   • LDL Chol Calc ([facility]) 11/05/2020 46  0 - 99 mg/dL Final   • Creatinine 11/05/2020 1.12  0.76 - 1.27 mg/dL Final   • eGFR Non  Am 11/05/2020 74  >59 mL/min/1.73 Final   • eGFR African Am 11/05/2020 86  >59 mL/min/1.73 Final   • Creatinine, Urine 11/05/2020 75.2  Not Estab. mg/dL Final   • Microalbumin, Urine 11/05/2020 4.4  Not Estab. ug/mL Final   • Microalbumin/Creatinine Ratio 11/05/2020 6  0 - 29 mg/g creat Final    Comment:                        Normal:                0 -  29                         Moderately increased: 30 - 300                         Severely increased:       >300     • Vitamin B-12 11/05/2020 424  232 - 1,245 pg/mL Final   • Folate 11/05/2020 12.4  >3.0 ng/mL Final    Comment: A serum folate concentration of less than 3.1 ng/mL is  considered to represent clinical deficiency.     • Hemoglobin A1C 11/05/2020 8.1* 4.8 - 5.6 % Final    Comment:          Prediabetes: 5.7 - 6.4           Diabetes: >6.4           Glycemic control for adults with diabetes: <7.0     • Free T4 11/05/2020 0.98  0.82 - 1.77 ng/dL Final   • TSH 11/05/2020 2.100  0.450 - 4.500 uIU/mL Final   • 25 Hydroxy, Vitamin D 11/05/2020 45.8  30.0 - 100.0 ng/mL Final    Comment: Vitamin D deficiency has been defined by the [employer] and an Endocrine Society practice guideline as a  level of serum 25-OH vitamin D less than 20 ng/mL (1,2).  The Endocrine Society went on to further define vitamin D  insufficiency as a level between 21 and 29 ng/mL (2).  1. IOM ([employer]). 2010. Dietary reference     intakes for calcium and D. [place]: The     National Academies Press.  2. Pawan MF, Sandy CAPPS, Santos ALCANTARA, et al.     Evaluation, treatment, and prevention of vitamin D     deficiency: an Endocrine Society clinical  "practice     guideline. JCEM. 2011 Jul; 96(7):1911-30.     • Interpretation 11/05/2020 Note   Final    Supplemental report is available.   • PDF Image 11/05/2020 Not applicable   Final     Lab Results   Component Value Date    HGBA1C 8.1 (H) 11/05/2020    HGBA1C 9.80 (H) 07/16/2020    HGBA1C 11.70 (H) 03/23/2020     Lab Results   Component Value Date    MICROALBUR 4.4 11/05/2020    CREATININE 1.12 11/05/2020     Imaging Results (Most Recent)     None                Assessment and Plan:    Diagnoses and all orders for this visit:    1. Type 2 diabetes mellitus with microalbuminuria, with long-term current use of insulin (CMS/[facility]) (Primary)    2. Hyperlipemia, mixed    3. Class 2 obesity due to excess calories without serious comorbidity with body mass index (BMI) of 36.0 to 36.9 in adult      Type 2 diabetes mellitus-uncontrolled  HbA1c still high  Change Basaglar to 60 units in the morning and 60 units at bedtime  Continue NovoLog as the above dosage  Continue metformin, Farxiga, Trulicity    Patient has been complaining about constipation, it could be related to Trulicity  Advised the patient to report to us if that happens another time    Hyperlipidemia  Continue statin    Obesity  Discussed about calorie restriction, restricting to 1200 stephanie/day and exercising for 3-4 times a week.    Reviewed Lab results with the patient.               Mana Scott MD  11/19/20    EM[facility] Dragon / transcription disclaimer:     \"Dictated utilizing Dragon dictation\".      "

## 2020-12-09 DIAGNOSIS — Z79.4 TYPE 2 DIABETES MELLITUS WITH MICROALBUMINURIA, WITH LONG-TERM CURRENT USE OF INSULIN (HCC): ICD-10-CM

## 2020-12-09 DIAGNOSIS — E78.2 HYPERLIPEMIA, MIXED: ICD-10-CM

## 2020-12-09 DIAGNOSIS — R80.9 TYPE 2 DIABETES MELLITUS WITH MICROALBUMINURIA, WITH LONG-TERM CURRENT USE OF INSULIN (HCC): ICD-10-CM

## 2020-12-09 DIAGNOSIS — E11.9 TYPE 2 DIABETES MELLITUS WITHOUT COMPLICATION, WITHOUT LONG-TERM CURRENT USE OF INSULIN (HCC): Chronic | ICD-10-CM

## 2020-12-09 DIAGNOSIS — E11.29 TYPE 2 DIABETES MELLITUS WITH MICROALBUMINURIA, WITH LONG-TERM CURRENT USE OF INSULIN (HCC): ICD-10-CM

## 2020-12-09 RX ORDER — INSULIN GLARGINE 100 [IU]/ML
INJECTION, SOLUTION SUBCUTANEOUS
Qty: 30 PEN | Refills: 0 | Status: SHIPPED | OUTPATIENT
Start: 2020-12-09 | End: 2020-12-28 | Stop reason: SDUPTHER

## 2020-12-09 RX ORDER — INSULIN ASPART 100 [IU]/ML
INJECTION, SOLUTION INTRAVENOUS; SUBCUTANEOUS
Qty: 30 PEN | Refills: 0 | Status: SHIPPED | OUTPATIENT
Start: 2020-12-09 | End: 2020-12-28

## 2020-12-09 NOTE — TELEPHONE ENCOUNTER
PHARMACY REQUESTED REFILLS FOR insulin aspart (NovoLOG FlexPen) 100 UNIT/ML solution pen-injector sc pen [[id number]] (Order [id number])    AND Insulin Glargine (BASAGLAR KWIKPEN) 100 UNIT/ML injection pen [[id number]] (Order [id number])

## 2020-12-28 DIAGNOSIS — Z79.4 TYPE 2 DIABETES MELLITUS WITH MICROALBUMINURIA, WITH LONG-TERM CURRENT USE OF INSULIN (HCC): ICD-10-CM

## 2020-12-28 DIAGNOSIS — R80.9 TYPE 2 DIABETES MELLITUS WITH MICROALBUMINURIA, WITH LONG-TERM CURRENT USE OF INSULIN (HCC): ICD-10-CM

## 2020-12-28 DIAGNOSIS — E11.9 TYPE 2 DIABETES MELLITUS WITHOUT COMPLICATION, WITHOUT LONG-TERM CURRENT USE OF INSULIN (HCC): Chronic | ICD-10-CM

## 2020-12-28 DIAGNOSIS — E11.29 TYPE 2 DIABETES MELLITUS WITH MICROALBUMINURIA, WITH LONG-TERM CURRENT USE OF INSULIN (HCC): ICD-10-CM

## 2020-12-28 DIAGNOSIS — E78.2 HYPERLIPEMIA, MIXED: ICD-10-CM

## 2020-12-28 RX ORDER — INSULIN GLARGINE 100 [IU]/ML
INJECTION, SOLUTION SUBCUTANEOUS
Qty: 30 PEN | Refills: 0 | Status: SHIPPED | OUTPATIENT
Start: 2020-12-28 | End: 2021-01-18 | Stop reason: SDUPTHER

## 2020-12-28 RX ORDER — INSULIN ASPART 100 [IU]/ML
INJECTION, SOLUTION INTRAVENOUS; SUBCUTANEOUS
Qty: 30 ML | Refills: 0 | Status: SHIPPED | OUTPATIENT
Start: 2020-12-28 | End: 2021-03-04

## 2020-12-31 DIAGNOSIS — E11.9 TYPE 2 DIABETES MELLITUS WITHOUT COMPLICATION, WITHOUT LONG-TERM CURRENT USE OF INSULIN (HCC): Chronic | ICD-10-CM

## 2020-12-31 RX ORDER — INSULIN GLARGINE 100 [IU]/ML
INJECTION, SOLUTION SUBCUTANEOUS
Qty: 30 PEN | Refills: 0 | OUTPATIENT
Start: 2020-12-31

## 2021-01-13 RX ORDER — FLUCONAZOLE 200 MG/1
TABLET ORAL
Qty: 7 TABLET | Refills: 0 | Status: SHIPPED | OUTPATIENT
Start: 2021-01-13 | End: 2021-03-01

## 2021-01-18 DIAGNOSIS — E11.9 TYPE 2 DIABETES MELLITUS WITHOUT COMPLICATION, WITHOUT LONG-TERM CURRENT USE OF INSULIN (HCC): Chronic | ICD-10-CM

## 2021-01-18 RX ORDER — INSULIN GLARGINE 100 [IU]/ML
INJECTION, SOLUTION SUBCUTANEOUS
Qty: 35 PEN | Refills: 1 | Status: SHIPPED | OUTPATIENT
Start: 2021-01-18 | End: 2021-03-09

## 2021-01-20 RX ORDER — INSULIN LISPRO 100 [IU]/ML
INJECTION, SOLUTION INTRAVENOUS; SUBCUTANEOUS
Qty: 90 ML | Refills: 2 | Status: SHIPPED | OUTPATIENT
Start: 2021-01-20 | End: 2021-05-10

## 2021-01-28 RX ORDER — MONTELUKAST SODIUM 10 MG/1
TABLET ORAL
Qty: 30 TABLET | Refills: 0 | Status: SHIPPED | OUTPATIENT
Start: 2021-01-28 | End: 2021-03-01

## 2021-02-03 ENCOUNTER — PRIOR AUTHORIZATION (OUTPATIENT)
Dept: ENDOCRINOLOGY | Age: 56
End: 2021-02-03

## 2021-02-08 ENCOUNTER — OFFICE VISIT (OUTPATIENT)
Dept: INTERNAL MEDICINE | Facility: CLINIC | Age: 56
End: 2021-02-08

## 2021-02-08 VITALS
OXYGEN SATURATION: 98 % | BODY MASS INDEX: 36.16 KG/M2 | SYSTOLIC BLOOD PRESSURE: 126 MMHG | WEIGHT: 225 LBS | HEIGHT: 66 IN | HEART RATE: 77 BPM | DIASTOLIC BLOOD PRESSURE: 68 MMHG

## 2021-02-08 DIAGNOSIS — E55.9 VITAMIN D DEFICIENCY: Chronic | ICD-10-CM

## 2021-02-08 DIAGNOSIS — Z11.59 NEED FOR HEPATITIS C SCREENING TEST: ICD-10-CM

## 2021-02-08 DIAGNOSIS — J45.40 MODERATE PERSISTENT ASTHMA WITHOUT COMPLICATION: Chronic | ICD-10-CM

## 2021-02-08 DIAGNOSIS — J30.1 CHRONIC SEASONAL ALLERGIC RHINITIS DUE TO POLLEN: Chronic | ICD-10-CM

## 2021-02-08 DIAGNOSIS — G47.10 HYPERSOMNOLENCE: ICD-10-CM

## 2021-02-08 DIAGNOSIS — Z91.09 MULTIPLE ENVIRONMENTAL ALLERGIES: Chronic | ICD-10-CM

## 2021-02-08 DIAGNOSIS — E03.8 SUBCLINICAL HYPOTHYROIDISM: Chronic | ICD-10-CM

## 2021-02-08 DIAGNOSIS — Z00.00 ROUTINE PHYSICAL EXAMINATION: Primary | ICD-10-CM

## 2021-02-08 DIAGNOSIS — Z86.010 HISTORY OF COLON POLYPS: Chronic | ICD-10-CM

## 2021-02-08 DIAGNOSIS — E78.2 MIXED HYPERLIPIDEMIA: Chronic | ICD-10-CM

## 2021-02-08 DIAGNOSIS — R80.9 MICROALBUMINURIA: Chronic | ICD-10-CM

## 2021-02-08 DIAGNOSIS — K21.00 GASTROESOPHAGEAL REFLUX DISEASE WITH ESOPHAGITIS WITHOUT HEMORRHAGE: Chronic | ICD-10-CM

## 2021-02-08 DIAGNOSIS — E29.1 HYPOGONADISM MALE: Chronic | ICD-10-CM

## 2021-02-08 DIAGNOSIS — R06.83 SNORING: Chronic | ICD-10-CM

## 2021-02-08 DIAGNOSIS — E04.1 THYROID NODULE: Chronic | ICD-10-CM

## 2021-02-08 DIAGNOSIS — I10 BENIGN ESSENTIAL HYPERTENSION: Chronic | ICD-10-CM

## 2021-02-08 DIAGNOSIS — K59.09 CHRONIC CONSTIPATION: Chronic | ICD-10-CM

## 2021-02-08 DIAGNOSIS — E66.01 MORBID OBESITY (HCC): ICD-10-CM

## 2021-02-08 DIAGNOSIS — F41.8 DEPRESSION WITH ANXIETY: Chronic | ICD-10-CM

## 2021-02-08 DIAGNOSIS — R80.9 TYPE 2 DIABETES MELLITUS WITH MICROALBUMINURIA, WITH LONG-TERM CURRENT USE OF INSULIN (HCC): Chronic | ICD-10-CM

## 2021-02-08 DIAGNOSIS — Z79.4 TYPE 2 DIABETES MELLITUS WITH MICROALBUMINURIA, WITH LONG-TERM CURRENT USE OF INSULIN (HCC): Chronic | ICD-10-CM

## 2021-02-08 DIAGNOSIS — E11.29 TYPE 2 DIABETES MELLITUS WITH MICROALBUMINURIA, WITH LONG-TERM CURRENT USE OF INSULIN (HCC): Chronic | ICD-10-CM

## 2021-02-08 DIAGNOSIS — Z51.81 THERAPEUTIC DRUG MONITORING: ICD-10-CM

## 2021-02-08 PROBLEM — H69.93 DYSFUNCTION OF BOTH EUSTACHIAN TUBES: Chronic | Status: ACTIVE | Noted: 2019-09-06

## 2021-02-08 PROBLEM — F10.20 ALCOHOLISM (HCC): Status: ACTIVE | Noted: 2021-02-08

## 2021-02-08 PROBLEM — H69.83 DYSFUNCTION OF BOTH EUSTACHIAN TUBES: Chronic | Status: ACTIVE | Noted: 2019-09-06

## 2021-02-08 PROBLEM — F10.20 ALCOHOLISM: Chronic | Status: RESOLVED | Noted: 2021-02-08 | Resolved: 2021-02-08

## 2021-02-08 PROBLEM — F10.20 ALCOHOLISM: Chronic | Status: ACTIVE | Noted: 2021-02-08

## 2021-02-08 PROBLEM — R63.5 ABNORMAL WEIGHT GAIN: Chronic | Status: RESOLVED | Noted: 2018-10-05 | Resolved: 2021-02-08

## 2021-02-08 PROCEDURE — 99396 PREV VISIT EST AGE 40-64: CPT | Performed by: INTERNAL MEDICINE

## 2021-02-08 RX ORDER — INSULIN GLARGINE 100 [IU]/ML
INJECTION, SOLUTION SUBCUTANEOUS
COMMUNITY
Start: 2020-12-29 | End: 2021-02-08

## 2021-02-08 NOTE — PROGRESS NOTES
02/08/2021    Patient Information  Marek Diego                                                                                          [address] [facility] [address]      1965  [unfilled]  There is no work phone number on file.    Chief Complaint:     Routine physical examination and follow-up blood work.  No new acute complaints.    History of Present Illness:    Patient with history of type 2 diabetes, microalbuminuria, hypertension, hyperlipidemia, hypogonadism, esophageal reflux, moderate persistent asthma and environmental allergies/allergic rhinitis, depression with anxiety in remission on sertraline, nonmorbid obesity, vitamin D deficiency, chronic constipation, history of colon polyps, subclinical hypothyroidism, alcoholism.  He presents today for his routine annual physical exam and follow-up lab work.  His past medical history reviewed and updated were necessary including health maintenance parameters.  This reveals he will be up-to-date or else accounted for after today's visit.    Review of Systems   Constitution: Negative.   HENT: Negative.    Eyes: Negative.    Cardiovascular: Negative.    Respiratory: Negative.    Endocrine: Negative.    Hematologic/Lymphatic: Negative.    Skin: Negative.    Musculoskeletal: Positive for arthritis, joint pain and neck pain.   Gastrointestinal: Negative.    Genitourinary: Negative.    Neurological: Negative.    Psychiatric/Behavioral: Negative.    Allergic/Immunologic: Negative.        Active Problems:    Patient Active Problem List   Diagnosis   • Allergic rhinitis   • Benign essential hypertension   • Chronic neck pain   • Depression with anxiety   • Gastroesophageal reflux disease with esophagitis   • Hyperlipidemia   • Hypogonadism male, no TRT.   • Microalbuminuria   • Moderate persistent asthma without complication   • Multiple environmental allergies   • Morbid obesity (CMS/HCC)   • Type 2 diabetes mellitus with microalbuminuria,  with long-term current use of insulin (CMS/[facility])   • Vitamin D deficiency   • Routine physical examination   • Therapeutic drug monitoring   • Right bundle branch block   • Diabetic eye exam (CMS/[facility])   • Diabetic foot exam   • Chronic constipation   • Snoring   • Nocturnal hypoxemia   • History of colon polyps, 11/29/2017--tubular adenoma times one.  Hyperplastic ×1.  Repeat 5 years.   • Subclinical hypothyroidism   • Thyroid nodule   • Dysfunction of both eustachian tubes         Past Medical History:   Diagnosis Date   • Allergic rhinitis 10/24/2013    10/24/2013--skin testing revealed impressive reactions to grass following, tree pollen, weed pollen, ragweed, dust mite, and cat. Recommend immunotherapy.   • Benign essential hypertension 2/22/2016   • Chronic constipation 11/20/2017 11/20/2017--patient reports approximately 8 month history of intermittent constipation that at times can last as much as a month.  He notes his blood sugar readings tend to increase when this happens.  He is scheduled for colonoscopy next week.  I recommend a generic probiotic daily as well as MiraLAX and adjust as needed.   • Chronic neck pain 10/30/2015    12/19/2015--patient reports his left shoulder pain has resolved. He continues to have neck pain. X-ray of the cervical spine ordered. Physical therapy ordered.   11/10/2015--left shoulder injected with 80 mg of Depo-Medrol with 3 mL of Xylocaine.   10/30/2015--patient presents with at least a one-month history of intermittent left-sided neck pain that is associated with left shoulder pain as well. The pain is described as shooting and is 8 out of 10 intensity at its worst. The pain is worse with certain movements of his head and left shoulder. No trauma. No numbness or paresthesias.   • Depression with anxiety 2/22/2016   • Diabetic eye exam (CMS/[facility]) 5/27/2016    May 2016--patient reports a normal diabetic eye exam.   • Diabetic foot exam 4/27/2017 05/02/2017--routine  diabetic foot exam reveals no evidence of diabetic foot ulcer or pre-ulcerative callus.  Pulses are palpable and there is no signs of ischemia.  Sensation subjectively intact.   • Dysfunction of both eustachian tubes 9/6/2019 September 6, 2019--patient with severe environmental allergies/allergic rhinitis presents with complaints of his ears popping like he is been on an airplane and a sensation of pressure at times.  At times his hearing is decreased.  On exam I do not see any definite serous otitis media.  I do think his environmental allergies are playing a role but I think it would be reasonable for ENT to evaluate   • Gastroesophageal reflux disease with esophagitis 5/22/2015 08/12/2015--EGD revealed esophagitis and a distal esophageal stricture that was dilated. Small sliding hiatal hernia. Proximal gastric ulcer and gastritis present. Dysphagia resolved after dilatation. Pathology of the gastric antrum was benign with no significant histologic abnormality. Distal esophagus biopsy negative for intestinal metaplasia or dysplasia.   05/22/2015--patient presents with a several month history of progressively worsening intermittent dysphagia of solids but not liquids. He describes solid food sometimes sticking and points to his upper chest/lower throat. No other associated symptoms. Patient referred to Dr. Aime Parada for an EGD. This is negative, may need ENT evaluation.   • History of colon polyps, 11/29/2017--tubular adenoma times one.  Hyperplastic ×1.  Repeat 5 years. 12/18/2017 11/29/2017--colonoscopy revealed 2 small (3 mm) polyps in the sigmoid colon and cecum.  Removed.  Bowel prep.  Sigmoid diverticuli noted.  No hemorrhoids.  Pathology returned hyperplastic polyp of the sigmoid and the cecal polyp was a tubular adenoma.   • HIstory of eosinophilic gastroenteritis 1990s 1990s--patient had significant epigastric discomfort and workup including an EGD revealed eosinophilic gastroenteritis that  was treated with prednisone and resulted in resolution.   • History of esophageal stricture 08/12/2015 08/12/2015--EGD revealed esophagitis and a distal esophageal stricture that was dilated. Small sliding hiatal hernia. Proximal gastric ulcer and gastritis present. Dysphagia resolved after dilatation. Pathology of the gastric antrum was benign with no significant histologic abnormality. Distal esophagus biopsy negative for intestinal metaplasia or dysplasia.   05/22/2015--patient presents with a s   • History of Pulmonary nodule, 03/07/2016--5 mm indeterminate nodule right lower lobe.  09/13/2016--consistent with calcified granuloma. 3/7/2016    09/13/2016--CT scan of the chest, abdomen, and pelvis with contrast reveals no lymphadenopathy within the abdomen or pelvis.  Mild hepatic steatosis.  Previously noted right lower lobe pulmonary nodule is currently calcified and consistent with a calcified granuloma.  03/07/2016--CT scan of the abdomen performed for complaints of abdominal discomfort revealed a 5 mm nodular focus right lower lobe which is indeterminate.  Recommend repeat CT scan in 6 months.   • Hypogonadism male, no TRT. 12/26/2012 12/26/2012--treatment for hypogonadism begun.   • Microalbuminuria 10/19/2014    10/19/2014--urine microalbumin mildly elevated at 27.8. Normal range 0.0--17.0.   • Moderate persistent asthma without complication 2/22/2016    Seasonal, spring and fall.   • Morbid obesity (CMS/HCC) 2/22/2016 November 12, 2019--current weight is 191 pounds representing a 1 pound weight loss.  It appears the patient needs a drug holiday from the phentermine.  I will have him stay off of it for 1 month and then restarted back at the current dose and then we will reassess after the first the year when he comes in for his regular follow-up and physical.  September 6, 2019--current weight is 192 pounds repr   • Multiple environmental allergies 10/24/2013    10/24/2013--skin testing revealed  impressive reactions to grass following, tree pollen, weed pollen, ragweed, dust mite, and cat. Recommend immunotherapy.   • Non morbid obesity 2/22/2016   • Right bundle branch block     ECG reveals sinus rhythm, rate of 75, right bundle branch block, left anterior hemiblock   • Subclinical hypothyroidism 8/17/2018 08/27/2018--thyroid ultrasound reveals subcentimeter nodule in the right thyroid lobe.  Possibly cystic.  There is a question of an ill-defined 1 cm nodular lesion posteriorly in the mid left lateral thyroid.  Appearance could be technical in origin.  Recommend repeat thyroid ultrasound in 6 months.  08/17/2018--routine follow-up.  TSH mildly elevated at 4.49.  Free T3 and free T4 are normal.  Rep   • Thyroid nodule 10/5/2018    08/27/2018--thyroid ultrasound reveals subcentimeter nodule in the right thyroid lobe.  Possibly cystic.  There is a question of an ill-defined 1 cm nodular lesion posteriorly in the mid left lateral thyroid.  Appearance could be technical in origin.  Recommend repeat thyroid ultrasound in 6 months.  08/17/2018--routine follow-up.  TSH mildly elevated at 4.49.  Free T3 and free T4 are normal.  Repeat thyroid function tests ordered and returned normal.  Thyroid ultrasound ordered.   • Type 2 diabetes mellitus with microalbuminuria, with long-term current use of insulin (CMS/[facility]) 1/1/2004    2004--initial diagnosis of type 2 diabetes.   • Vitamin D deficiency 2/22/2016         Past Surgical History:   Procedure Laterality Date   • ATRIAL SEPTAL DEFECT REPAIR  03/2012 March 2012--percutaneous closure of secundum ASD.  Dr. Mcpherson   • COLONOSCOPY N/A 11/29/2017 11/29/2017--colonoscopy revealed 2 small (3 mm) polyps in the sigmoid colon and cecum.  Removed.  Bowel prep.  Sigmoid diverticuli noted.  No hemorrhoids.  Pathology returned hyperplastic polyp of the sigmoid and the cecal polyp was a tubular adenoma.   • ESOPHAGEAL DILATATION  08/12/2015 08/12/2015--EGD revealed  esophagitis and a distal esophageal stricture that was dilated. Small sliding hiatal hernia. Proximal gastric ulcer and gastritis present. Dysphagia resolved after dilatation. 05/22/2015--patient presents with a several month history of progressively worsening intermittent dysphagia of solids but not liquids. He describes solid food sometimes sticking and points to his upp   • ESOPHAGOSCOPY / EGD  08/12/2015 08/12/2015--EGD revealed esophagitis and a distal esophageal stricture that was dilated. Small sliding hiatal hernia. Proximal gastric ulcer and gastritis present. Dysphagia resolved after dilatation. 05/22/2015--patient presents with a several month history of progressively worsening intermittent dysphagia of solids but not liquids. He describes solid food sometimes sticking and points to his upp   • KNEE ACL RECONSTRUCTION Right 02/11/2013 02/11/2013--knee arthroscopy with anterior cruciate ligament repair   • TONSILLECTOMY  2009 2009--tonsillectomy as an adult.         Allergies   Allergen Reactions   • Latex Itching           Current Outpatient Medications:   •  albuterol sulfate  (90 Base) MCG/ACT inhaler, Inhale 2 puffs Every 4 (Four) Hours As Needed for Wheezing or Shortness of Air., Disp: 18 g, Rfl: 0  •  amLODIPine-benazepril (LOTREL 5-20) 5-20 MG per capsule, TAKE 1 CAPSULE BY MOUTH ONE TIME A DAY , Disp: 30 capsule, Rfl: 5  •  Chlorcyclizine-Pseudoephed (STAHIST AD) 25-60 MG tablet, 1 by mouth every 6 hours as needed for congestion and allergies, not greater than 3 in 24 hours, Disp: 60 tablet, Rfl: 2  •  Cholecalciferol (VITAMIN D3) 5000 UNITS capsule capsule, Take 1 capsule by mouth daily., Disp: , Rfl:   •  Dapagliflozin Propanediol (Farxiga) 10 MG tablet, Take 10 mg by mouth Daily. Before Breakfast, Disp: 60 tablet, Rfl: 2  •  esomeprazole (nexIUM) 40 MG capsule, TAKE 1 CAPSULE BY MOUTH ONE TIME A DAY , Disp: 90 capsule, Rfl: 3  •  ezetimibe (ZETIA) 10 MG tablet, TAKE 1 TABLET BY  MOUTH ONE TIME A DAY , Disp: 30 tablet, Rfl: 0  •  fluocinonide-emollient (LIDEX-E) 0.05 % cream, Apply topically twice daily as needed hand eczema, Disp: 60 g, Rfl: 0  •  fluticasone-salmeterol (Advair Diskus) 250-50 MCG/DOSE DISKUS, Inhale 1 puff 2 (Two) Times a Day., Disp: 60 each, Rfl: 11  •  HumaLOG KwikPen 100 UNIT/ML solution pen-injector, INJECT 15 UNITS INTO THE SKIN WITH EACH MEAL AND MAX  UNITS, Disp: 90 mL, Rfl: 2  •  Insulin Glargine (BASAGLAR KWIKPEN) 100 UNIT/ML injection pen, 60 units in the morning and 60 units at bedtime, Disp: 35 pen, Rfl: 1  •  Insulin Pen Needle (B-D UF III MINI PEN NEEDLES) 31G X 5 MM misc, Use to inject insulin 6 times daily E11.8, Disp: 180 each, Rfl: 11  •  Insulin Pen Needle 32G X 5 MM misc, Use to inject insulin, Disp: 100 each, Rfl: 3  •  linaclotide (Linzess) 72 MCG capsule capsule, Take 1 capsule by mouth Every Morning Before Breakfast., Disp: 30 capsule, Rfl: 11  •  Loratadine (CLARITIN PO), Take 1 capsule by mouth daily as needed (when necessary for allergies.)., Disp: , Rfl:   •  metFORMIN (GLUCOPHAGE) 1000 MG tablet, TAKE 1 TABLET BY MOUTH TWO TIMES A DAY WITH MEALS, Disp: 60 tablet, Rfl: 6  •  montelukast (SINGULAIR) 10 MG tablet, TAKE 1 TABLET BY MOUTH ONE TIME A DAY , Disp: 30 tablet, Rfl: 0  •  NovoLOG FlexPen 100 UNIT/ML solution pen-injector sc pen, INJECT 15 UNITS INTO THE SKIN WITH EACH MEAL AND MAX  UNITS, Disp: 30 mL, Rfl: 0  •  rosuvastatin (CRESTOR) 40 MG tablet, TAKE 1 TABLET BY MOUTH AT BEDTIME , Disp: 90 tablet, Rfl: 0  •  sertraline (ZOLOFT) 50 MG tablet, TAKE 1 TABLET BY MOUTH ONE TIME A DAY , Disp: 30 tablet, Rfl: 6  •  TRULICITY 1.5 MG/0.5ML solution pen-injector, inject 1.5 mg under the skin once weekly as directed, Disp: 2 mL, Rfl: 8  •  fluconazole (DIFLUCAN) 200 MG tablet, TAKE 1 TABLET BY MOUTH ONE TIME A DAY as directed for recurrent fungal infection, Disp: 7 tablet, Rfl: 0      Family History   Problem Relation Age of Onset   •  "Diabetes Mother    • Stomach cancer Mother    • Diabetes Maternal Grandmother          Social History     Socioeconomic History   • Marital status:      Spouse name: Luis    • Number of children: 2   • Years of education: 16   • Highest education level: Bachelor's degree (e.g., BA, AB, BS)   Occupational History   • 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)   Social Needs   • Financial resource strain: Not hard at all   • Food insecurity     Worry: Never true     Inability: Never true   • Transportation needs     Medical: No     Non-medical: No   Tobacco Use   • Smoking status: Never Smoker   • Smokeless tobacco: Never Used   Substance and Sexual Activity   • Alcohol use: Yes     Frequency: Monthly or less     Drinks per session: 1 or 2     Binge frequency: Never   • Drug use: No   • Sexual activity: Yes     Partners: Female   Lifestyle   • Physical activity     Days per week: 0 days     Minutes per session: 0 min   • Stress: Only a little   Relationships   • Social connections     Talks on phone: More than three times a week     Gets together: More than three times a week     Attends [employer] service: More than 4 times per year     Active member of club or organization: Yes     Attends meetings of clubs or organizations: More than 4 times per year     Relationship status:          Vitals:    02/08/21 1505   BP: 126/68   BP Location: Left arm   Pulse: 77   SpO2: 98%   Weight: 102 kg (225 lb)   Height: 167.6 cm (65.98\")        Body mass index is 36.33 kg/m².      Physical Exam:    General: Alert and oriented x 3.  No acute distress.  Normal affect.  Morbidly obese.  HEENT: Pupils equal, round, reactive to light; extraocular movements intact; sclerae nonicteric; pharynx, ear canals and TMs normal.  Neck: Without JVD, thyromegaly, bruit, or adenopathy.  Lungs: Clear to auscultation in all fields.  Heart: Regular rate and rhythm without murmur, rub, gallop, or click.  Abdomen: Soft, " nontender, without hepatosplenomegaly or hernia.  Bowel sounds normal.  : Deferred.  Rectal: Deferred.  Extremities: Without clubbing, cyanosis, edema, or pulse deficit.  Neurologic: Intact without focal deficit.  Normal station and gait observed during ingress and egress from the examination room.  Skin: Without significant lesion.  Musculoskeletal: Unremarkable.    Lab/other results:    Patient did not have his lab work done.    Assessment/Plan:     Diagnosis Plan   1. Routine physical examination  CBC (No Diff)    CK    Comprehensive Metabolic Panel    Hemoglobin A1c    NMR LipoProfile    Microalbumin / Creatinine Urine Ratio - Urine, Clean Catch    Vitamin D 25 Hydroxy    Urinalysis With Microscopic If Indicated (No Culture) - Urine, Clean Catch    TSH    T4, Free    T3, Free    PSA DIAGNOSTIC   2. Type 2 diabetes mellitus with microalbuminuria, with long-term current use of insulin (CMS/[facility])  Comprehensive Metabolic Panel    Hemoglobin A1c    Urinalysis With Microscopic If Indicated (No Culture) - Urine, Clean Catch   3. Microalbuminuria  Microalbumin / Creatinine Urine Ratio - Urine, Clean Catch   4. Benign essential hypertension     5. Hyperlipidemia  CK    NMR LipoProfile   6. Hypogonadism male, no TRT.  Testosterone,Free+Weakly Bound    Testosterone,Free+Weakly Bound   7. Gastroesophageal reflux disease with esophagitis without hemorrhage     8. Moderate persistent asthma without complication     9. Multiple environmental allergies     10. Allergic rhinitis     11. Depression with anxiety     12. Morbid obesity (CMS/HCC)     13. Vitamin D deficiency  Vitamin D 25 Hydroxy   14. Chronic constipation     15. History of colon polyps, 11/29/2017--tubular adenoma times one.  Hyperplastic ×1.  Repeat 5 years.     16. Subclinical hypothyroidism  TSH    T4, Free    T3, Free   17. Thyroid nodule     18. Therapeutic drug monitoring  CBC (No Diff)    PSA DIAGNOSTIC   19. Need for hepatitis C screening test  Hepatitis  C Antibody    Hepatitis C Antibody   20. Snoring  Ambulatory Referral to Sleep Medicine   21. Hypersomnolence  Ambulatory Referral to Sleep Medicine     Patient presents with essentially normal annual physical as far as we can tell given the fact that he did not have his blood work done.  He has multiple medical problems as noted above.  His diabetes is being managed by the endocrinologist.  His blood pressure seems to be controlled.  He has significant hyperlipidemia which I am managing with Crestor.  We are not treating hypergonadism.  Esophageal reflux symptoms seem to be controlled.  His asthma and allergies seem to be controlled.  His depression with anxiety seems to be controlled.  Patient was taking Contrave which was prescribed by the endocrinologist to try to help him lose weight.  This was not for alcoholism.  Patient does not drink.  Apparently he stopped taking this medication.  Vitamin D needs to be assessed.  Patient has a history of colon polyps and is up-to-date on his colonoscopy.  Subclinical hypothyroidism also needs labs to be further evaluated.  Also patient had a problems with snoring and hypersomnolence.  I ordered a sleep study but this was never done due to personal reasons.  We will schedule referral to sleep medicine.    Several preventative health issues discussed including review of vaccinations and recommendations, including dietary issues, exercise and weight loss.  Safe sex practices discussed.  Patient advised to wear seatbelt whenever driving and avoid texting and driving.  Also advised to look both ways before crossing the street.  Colon cancer prevention discussed and is up-to-date with colonoscopy.  Advised to avoid tobacco products and minimize alcohol consumption.    Plan is as follows: Strongly recommend low carbohydrate diet, exercise, and weight loss.  Patient will obtain his fasting lab work and he can follow-up on the phone for the results and possible further  instructions.  Otherwise, I will see him in 1 year since I am not managing the majority of his medical issues.  Also we will schedule sleep medicine appointment.  Patient would like to have his testosterone levels checked and I think that is reasonable thing to do although if he has severe underlying sleep apnea and weight gain diet alone will cause low testosterone levels.  Patient is agreeable to see sleep medicine for sleep study.    Procedures

## 2021-03-01 RX ORDER — MONTELUKAST SODIUM 10 MG/1
TABLET ORAL
Qty: 30 TABLET | Refills: 5 | Status: SHIPPED | OUTPATIENT
Start: 2021-03-01

## 2021-03-01 RX ORDER — DAPAGLIFLOZIN 10 MG/1
TABLET, FILM COATED ORAL
Qty: 60 TABLET | Refills: 5 | Status: SHIPPED | OUTPATIENT
Start: 2021-03-01

## 2021-03-01 RX ORDER — FLUCONAZOLE 200 MG/1
TABLET ORAL
Qty: 7 TABLET | Refills: 2 | Status: SHIPPED | OUTPATIENT
Start: 2021-03-01

## 2021-03-03 ENCOUNTER — APPOINTMENT (OUTPATIENT)
Dept: SLEEP MEDICINE | Facility: HOSPITAL | Age: 56
End: 2021-03-03

## 2021-03-04 DIAGNOSIS — Z79.4 TYPE 2 DIABETES MELLITUS WITH MICROALBUMINURIA, WITH LONG-TERM CURRENT USE OF INSULIN (HCC): ICD-10-CM

## 2021-03-04 DIAGNOSIS — R80.9 TYPE 2 DIABETES MELLITUS WITH MICROALBUMINURIA, WITH LONG-TERM CURRENT USE OF INSULIN (HCC): ICD-10-CM

## 2021-03-04 DIAGNOSIS — E11.29 TYPE 2 DIABETES MELLITUS WITH MICROALBUMINURIA, WITH LONG-TERM CURRENT USE OF INSULIN (HCC): ICD-10-CM

## 2021-03-04 DIAGNOSIS — E78.2 HYPERLIPEMIA, MIXED: ICD-10-CM

## 2021-03-04 RX ORDER — INSULIN ASPART 100 [IU]/ML
INJECTION, SOLUTION INTRAVENOUS; SUBCUTANEOUS
Qty: 30 ML | Refills: 0 | Status: SHIPPED | OUTPATIENT
Start: 2021-03-04 | End: 2021-06-30

## 2021-03-06 DIAGNOSIS — E11.9 TYPE 2 DIABETES MELLITUS WITHOUT COMPLICATION, WITHOUT LONG-TERM CURRENT USE OF INSULIN (HCC): Chronic | ICD-10-CM

## 2021-03-09 RX ORDER — INSULIN GLARGINE 100 [IU]/ML
INJECTION, SOLUTION SUBCUTANEOUS
Qty: 30 ML | Refills: 0 | Status: SHIPPED | OUTPATIENT
Start: 2021-03-09 | End: 2021-04-02

## 2021-03-26 ENCOUNTER — BULK ORDERING (OUTPATIENT)
Dept: CASE MANAGEMENT | Facility: OTHER | Age: 56
End: 2021-03-26

## 2021-03-26 DIAGNOSIS — Z23 IMMUNIZATION DUE: ICD-10-CM

## 2021-03-31 ENCOUNTER — OFFICE VISIT (OUTPATIENT)
Dept: SLEEP MEDICINE | Facility: HOSPITAL | Age: 56
End: 2021-03-31

## 2021-03-31 VITALS
HEART RATE: 106 BPM | HEIGHT: 66 IN | OXYGEN SATURATION: 96 % | DIASTOLIC BLOOD PRESSURE: 86 MMHG | WEIGHT: 231 LBS | BODY MASS INDEX: 37.12 KG/M2 | SYSTOLIC BLOOD PRESSURE: 145 MMHG

## 2021-03-31 DIAGNOSIS — G47.10 HYPERSOMNIA: ICD-10-CM

## 2021-03-31 DIAGNOSIS — E78.5 HYPERLIPIDEMIA, UNSPECIFIED HYPERLIPIDEMIA TYPE: ICD-10-CM

## 2021-03-31 DIAGNOSIS — G47.30 SLEEP APNEA, UNSPECIFIED TYPE: Primary | ICD-10-CM

## 2021-03-31 DIAGNOSIS — G47.8 NON-RESTORATIVE SLEEP: ICD-10-CM

## 2021-03-31 DIAGNOSIS — E11.9 TYPE 2 DIABETES MELLITUS WITHOUT COMPLICATION, WITHOUT LONG-TERM CURRENT USE OF INSULIN (HCC): ICD-10-CM

## 2021-03-31 DIAGNOSIS — E66.9 OBESITY (BMI 30-39.9): ICD-10-CM

## 2021-03-31 PROCEDURE — 99203 OFFICE O/P NEW LOW 30 MIN: CPT | Performed by: FAMILY MEDICINE

## 2021-03-31 RX ORDER — DULAGLUTIDE 1.5 MG/.5ML
1.5 INJECTION, SOLUTION SUBCUTANEOUS WEEKLY
Qty: 2 ML | Refills: 9 | Status: SHIPPED | OUTPATIENT
Start: 2021-03-31 | End: 2021-05-14

## 2021-03-31 RX ORDER — EZETIMIBE 10 MG/1
10 TABLET ORAL DAILY
Qty: 30 TABLET | Refills: 9 | Status: SHIPPED | OUTPATIENT
Start: 2021-03-31 | End: 2022-01-31

## 2021-03-31 NOTE — PROGRESS NOTES
[facility] New Patient[facility]Consultation       Reason for Consultation: Snoring and hypersomnolence      Patient Care Team:  Demario Quezada MD as PCP - General (Internal Medicine)  Wellington Chang MD as Surgeon (General Surgery)  Saranya Burton MD as Consulting Physician (Sleep Medicine)      History of present illness:  Thank you for asking me to see your patient.  The patient is a 55 y.o. male with past medical history of allergic rhinitis hypertension hyperlipidemia type 2 diabetes mellitus hypothyroidism GERD depression with anxiety asthma presents today with concern for sleep disorder.  Patient reports hypersomnia nonrestorative sleep snoring witnessed apneas and excessive sweating during sleep bruxism nocturia up to 2 times a night and weight gain over the past 5 years of around 20 pounds.  Had a sleep study in 2016.  Was prescribed Pap machine however was between jobs and could not get set up with.  Tonsillectomy around 2009.    Sleep Schedule:  Bed time: 11:30 PM to 12 AM  Sleep latency: 5 minutes  Wake time: 7:30 AM on weekdays 8:30 AM to 9 AM on weekends  Average hours slept: 7 to 8 hours  Non-restorative sleep: Yes  Number of naps per day: 0  Rotating shifts?:  No  Nocturia: Up to 2 times a night  Electronics in bedroom: No    Excessive daytime sleepiness or drowsiness:Y  Any accidents at work due to sleepiness in the last 5 years:N  Any difficulty driving due to sleepiness or being drowsy: N  Weight changed in the last 5 years:Y - GAINED 20 LBS    Snoring:Y  Witnessed apneas:Y  Have you ever awakened gasping for breath, coughing, choking or respiratory discomfort: Y  Morning headaches: Y  Awaken with a sore throat or dry mouth: Y    Any reports of leg jerking at night: Y  Urge sensations: N  Does pain disrupt sleep: N  Sweating during sleep: Y  Teeth grinding: Y    Any sudden episodes of sleep during the day: N  Sleep paralysis/hallucinations: N  Muscle weakness with laughing/anger:  N  Nightmares: N  Sleep walking: N    Are you sleepy when you increase your sleep time: Y  Do you sleep better away from your own bed: N    ESS: 12    Social History: Consultant; 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    Review of Systems:    A complete review of systems was done and all were negative with the exception of frequent urination nasal congestion postnasal drip shortness of breath rash excessive thirst always to warm    Allergies:  Latex    Family History: STERLING no       Current Outpatient Medications:   •  albuterol sulfate  (90 Base) MCG/ACT inhaler, Inhale 2 puffs Every 4 (Four) Hours As Needed for Wheezing or Shortness of Air., Disp: 18 g, Rfl: 0  •  amLODIPine-benazepril (LOTREL 5-20) 5-20 MG per capsule, TAKE 1 CAPSULE BY MOUTH ONE TIME A DAY , Disp: 30 capsule, Rfl: 5  •  Chlorcyclizine-Pseudoephed (STAHIST AD) 25-60 MG tablet, 1 by mouth every 6 hours as needed for congestion and allergies, not greater than 3 in 24 hours, Disp: 60 tablet, Rfl: 2  •  Cholecalciferol (VITAMIN D3) 5000 UNITS capsule capsule, Take 1 capsule by mouth daily., Disp: , Rfl:   •  Dulaglutide (Trulicity) 1.5 MG/0.5ML solution pen-injector, 1.5 mg by Other route 1 (One) Time Per Week., Disp: 2 mL, Rfl: 9  •  esomeprazole (nexIUM) 40 MG capsule, TAKE 1 CAPSULE BY MOUTH ONE TIME A DAY , Disp: 90 capsule, Rfl: 3  •  ezetimibe (ZETIA) 10 MG tablet, Take 1 tablet by mouth Daily. 200001, Disp: 30 tablet, Rfl: 9  •  Farxiga 10 MG tablet, TAKE 1 TABLET BY MOUTH ONE TIME A DAY before breakfast , Disp: 60 tablet, Rfl: 5  •  fluconazole (DIFLUCAN) 200 MG tablet, TAKE 1 TABLET BY MOUTH EVERY DAY AS DIRECTED FOR RECURRENT FUNGAL INFECTION., Disp: 7 tablet, Rfl: 2  •  fluocinonide-emollient (LIDEX-E) 0.05 % cream, Apply topically twice daily as needed hand eczema, Disp: 60 g, Rfl: 0  •  fluticasone-salmeterol (Advair Diskus) 250-50 MCG/DOSE DISKUS, Inhale 1 puff 2 (Two) Times a Day., Disp: 60 each, Rfl: 11  •  HumaLOG  "KwikPen 100 UNIT/ML solution pen-injector, INJECT 15 UNITS INTO THE SKIN WITH EACH MEAL AND MAX  UNITS, Disp: 90 mL, Rfl: 2  •  Insulin Glargine (BASAGLAR KWIKPEN) 100 UNIT/ML injection pen, INJECT 60 UNITS IN THE MORNING AND 60 UNITS AT BEDTIME, Disp: 30 mL, Rfl: 0  •  Insulin Pen Needle (B-D UF III MINI PEN NEEDLES) 31G X 5 MM misc, Use to inject insulin 6 times daily E11.8, Disp: 180 each, Rfl: 11  •  Insulin Pen Needle 32G X 5 MM misc, Use to inject insulin, Disp: 100 each, Rfl: 3  •  linaclotide (Linzess) 72 MCG capsule capsule, Take 1 capsule by mouth Every Morning Before Breakfast., Disp: 30 capsule, Rfl: 11  •  Loratadine (CLARITIN PO), Take 1 capsule by mouth daily as needed (when necessary for allergies.)., Disp: , Rfl:   •  metFORMIN (GLUCOPHAGE) 1000 MG tablet, TAKE 1 TABLET BY MOUTH TWO TIMES A DAY WITH MEALS, Disp: 60 tablet, Rfl: 6  •  montelukast (SINGULAIR) 10 MG tablet, TAKE 1 TABLET BY MOUTH EVERY DAY , Disp: 30 tablet, Rfl: 5  •  NovoLOG FlexPen 100 UNIT/ML solution pen-injector sc pen, INJECT 15 UNITS INTO THE SKIN WITH EACH MEAL, MAX  UNITS PER DAY, Disp: 30 mL, Rfl: 0  •  rosuvastatin (CRESTOR) 40 MG tablet, TAKE 1 TABLET BY MOUTH AT BEDTIME , Disp: 90 tablet, Rfl: 0  •  sertraline (ZOLOFT) 50 MG tablet, TAKE 1 TABLET BY MOUTH ONE TIME A DAY , Disp: 30 tablet, Rfl: 6    Vital Signs:    Vitals:    03/31/21 1500   BP: 145/86   Pulse: 106   SpO2: 96%   Weight: 105 kg (231 lb)   Height: 167.6 cm (66\")      Body mass index is 37.28 kg/m².  Neck Circumference: 18.5 inches      Physical Exam:   General Alert and oriented. No acute distress noted   Pharynx/Throat Class III Mallampati airway, large tongue, no evidence of redundant lateral pharyngeal tissue. No oral lesions. No thrush. Moist mucous membranes.   Head Normocephalic. Symmetrical. Atraumatic.    Nose No septal deviation. No drainage   Chest Wall Normal shape. Symmetric expansion with respiration. No tenderness.   Neck Trachea " midline, no thyromegaly or adenopathy    Lungs Clear to auscultation bilaterally. No wheezes. No rhonchi. No rales. Respirations regular, even and unlabored.   Heart Regular rhythm and normal rate. Normal S1 and S2. No murmur   Abdomen Soft, non-tender and non-distended. Normal bowel sounds. No masses.   Extremities Moves all extremities well. No edema   Psychiatric Normal mood and affect.       Impression:  1. Sleep apnea, unspecified type    2. Hypersomnia    3. Non-restorative sleep    4. Obesity (BMI 30-39.9)        Plan:    Good sleep hygiene measures should be maintained.  Weight loss would be beneficial in this patient who is obese with BMI 37.3.    I discussed the pathophysiology of obstructive sleep apnea with the patient.  We discussed the adverse outcomes associated with untreated sleep-disordered breathing.  We discussed treatment modalities of obstructive sleep apnea including CPAP device as well as oral mandibular advancement device. Sleep study will be scheduled to establish definitive diagnosis of sleep disorder breathing.  Weight loss will be strongly beneficial in order to reduce the severity of sleep-disordered breathing.  Patient has narrow oropharyngeal structure.  Caution during activities that require prolonged concentration is strongly advised.  Patient will be notified of sleep study results after sleep study is completed.  If sleep apnea is only mild,  oral mandibular advancement device may be one of the treatment options.  However if sleep apnea is moderately severe, CPAP treatment will be strongly encouraged.  The patient is not opposed to treatment with CPAP device if we confirm significant obstructive sleep apnea on polysomnography.     Thank you for allowing me to participate in your patient's care.    Saranya Burton MD  Sleep Medicine  03/31/21  15:56 EDT

## 2021-04-01 DIAGNOSIS — E11.9 TYPE 2 DIABETES MELLITUS WITHOUT COMPLICATION, WITHOUT LONG-TERM CURRENT USE OF INSULIN (HCC): Chronic | ICD-10-CM

## 2021-04-05 RX ORDER — INSULIN GLARGINE 100 [IU]/ML
INJECTION, SOLUTION SUBCUTANEOUS
Qty: 30 ML | Refills: 0 | Status: SHIPPED | OUTPATIENT
Start: 2021-04-05 | End: 2021-05-11

## 2021-04-05 RX ORDER — LINACLOTIDE 72 UG/1
CAPSULE, GELATIN COATED ORAL
Qty: 30 CAPSULE | Refills: 2 | OUTPATIENT
Start: 2021-04-05

## 2021-04-09 ENCOUNTER — HOSPITAL ENCOUNTER (OUTPATIENT)
Dept: SLEEP MEDICINE | Facility: HOSPITAL | Age: 56
Discharge: HOME | End: 2021-04-09
Admitting: FAMILY MEDICINE

## 2021-04-09 DIAGNOSIS — G47.8 NON-RESTORATIVE SLEEP: ICD-10-CM

## 2021-04-09 DIAGNOSIS — G47.10 HYPERSOMNIA: ICD-10-CM

## 2021-04-09 DIAGNOSIS — G47.30 SLEEP APNEA, UNSPECIFIED TYPE: ICD-10-CM

## 2021-04-09 DIAGNOSIS — E66.9 OBESITY (BMI 30-39.9): ICD-10-CM

## 2021-04-09 PROCEDURE — 95806 SLEEP STUDY UNATT&RESP EFFT: CPT

## 2021-04-09 PROCEDURE — 95806 SLEEP STUDY UNATT&RESP EFFT: CPT | Performed by: FAMILY MEDICINE

## 2021-04-21 ENCOUNTER — TELEPHONE (OUTPATIENT)
Dept: SLEEP MEDICINE | Facility: HOSPITAL | Age: 56
End: 2021-04-21

## 2021-04-21 NOTE — TELEPHONE ENCOUNTER
LV for patient to call back if he has questions about sleep study results, has a preferred DME , sending [facility] unless patient requests otherwise . Needs follow up

## 2021-05-09 DIAGNOSIS — E11.9 TYPE 2 DIABETES MELLITUS WITHOUT COMPLICATION, WITHOUT LONG-TERM CURRENT USE OF INSULIN (HCC): Chronic | ICD-10-CM

## 2021-05-10 ENCOUNTER — OFFICE VISIT (OUTPATIENT)
Dept: ENDOCRINOLOGY | Age: 56
End: 2021-05-10

## 2021-05-10 VITALS
OXYGEN SATURATION: 98 % | DIASTOLIC BLOOD PRESSURE: 72 MMHG | BODY MASS INDEX: 38.15 KG/M2 | HEIGHT: 66 IN | HEART RATE: 83 BPM | SYSTOLIC BLOOD PRESSURE: 136 MMHG | WEIGHT: 237.4 LBS

## 2021-05-10 DIAGNOSIS — E11.65 TYPE 2 DIABETES MELLITUS WITH HYPERGLYCEMIA, WITH LONG-TERM CURRENT USE OF INSULIN (HCC): Primary | ICD-10-CM

## 2021-05-10 DIAGNOSIS — Z79.4 TYPE 2 DIABETES MELLITUS WITH HYPERGLYCEMIA, WITH LONG-TERM CURRENT USE OF INSULIN (HCC): Primary | ICD-10-CM

## 2021-05-10 DIAGNOSIS — E66.9 OBESITY (BMI 35.0-39.9 WITHOUT COMORBIDITY): ICD-10-CM

## 2021-05-10 DIAGNOSIS — E78.2 HYPERLIPEMIA, MIXED: ICD-10-CM

## 2021-05-10 DIAGNOSIS — E29.1 HYPOGONADISM MALE: ICD-10-CM

## 2021-05-10 PROCEDURE — 99214 OFFICE O/P EST MOD 30 MIN: CPT | Performed by: INTERNAL MEDICINE

## 2021-05-10 NOTE — PROGRESS NOTES
"Chief Complaint  Chief Complaint   Patient presents with   • Hypertension   • Hyperlipidemia   • Hypogonadism   • Diabetes     testing bs 3 x day / last dm eye exam feb 2021   • Vitamin D Deficiency   • Hypothyroidism   • Thyroid Problem       Subjective          History of Present Illness    Marek Diego 55 y.o. presents with Type 2 dm as a F/u patient.     Type 2 dm - Diagnosed about 10 years ago.   Today in clinic pt reports being on basaglar 60 units Qam and 65 units in Q pm, novolog 18/20/30 units plus ssi, also on farxiga, metformin and truicity.   Avg BG - 120 -  150  Checks BG - 3 times a day  Sensor - x  Dm retinopathy - no hx ,Last eye exam - Feb 2021  Dm nephropathy - x  Dm neuropathy - no ,Dm neuropathy meds - not on meds  CAD -x, history of cardiac surgery secondary to congenital abnormality  CVA -x  Episodes of hypoglycemia - lowest Bg was 60 mg/dl.   Pt is physically active. weight has been stable.   Pt tries to follow DM diet for most part.        Hyperlipidemia  On Crestor 40 mg oral daily     Hypogonadism-patient was on testosterone gel back in 2019, however the medication was discontinued, patient currently established care with his new primary care who wanted us to monitor his testosterone levels and see if we can put the patient back on the AndroGel.    Reviewed primary care physician's/consulting physician documentation and lab results         I have reviewed the patient's allergies, medicines, past medical hx, family hx and social hx in detail.    Objective   Vital Signs:   /72 (BP Location: Left arm, Patient Position: Sitting, Cuff Size: Large Adult)   Pulse 83   Ht 167.6 cm (65.98\")   Wt 108 kg (237 lb 6.4 oz)   SpO2 98%   BMI 38.34 kg/m²   Physical Exam   General appearance - no distress  Eyes- anicteric sclera  Ear nose and throat-external ears and nose normal.    Respiratory-normal chest on inspection.  No respiratory distress noted.  Skin-no rashes.  Neuro-alert and " oriented x3            Result Review :   The following data was reviewed by: Mana Scott MD on 05/10/2021:  Results Encounter on 02/19/2021   Component Date Value Ref Range Status   • Creatinine 05/04/2021 1.18  0.76 - 1.27 mg/dL Final   • eGFR Non African Am 05/04/2021 70  >59 mL/min/1.73 Final   • eGFR African Am 05/04/2021 80  >59 mL/min/1.73 Final    Comment: **[facility] currently reports eGFR in compliance with the current**    recommendations of the [employer]. [facility] will    update reporting as new guidelines are published from the NKF-ASN    Task force.     • Total Cholesterol 05/04/2021 89* 100 - 199 mg/dL Final   • Triglycerides 05/04/2021 155* 0 - 149 mg/dL Final   • HDL Cholesterol 05/04/2021 25* >39 mg/dL Final   • VLDL Cholesterol Chris 05/04/2021 27  5 - 40 mg/dL Final   • LDL Chol Calc ([facility]) 05/04/2021 37  0 - 99 mg/dL Final   • Vitamin B-12 05/04/2021 323  232 - 1,245 pg/mL Final   • Folate 05/04/2021 12.3  >3.0 ng/mL Final    Comment: A serum folate concentration of less than 3.1 ng/mL is  considered to represent clinical deficiency.     • Interpretation 05/04/2021 Note   Final    Supplemental report is available.     Data reviewed: PCP documentation       Results Review:    I reviewed the patient's new clinical results.     Assessment and Plan    Problem List Items Addressed This Visit        Other    Hypogonadism male, no TRT. (Chronic)    Overview     12/26/2012--treatment for hypogonadism begun.         Relevant Orders    Hemoglobin A1c    TestT+TestF+SHBG    PSA DIAGNOSTIC    Hemoglobin & Hematocrit, Blood    FSH & LH    Iron and TIBC    Ferritin      Other Visit Diagnoses     Type 2 diabetes mellitus with hyperglycemia, with long-term current use of insulin (CMS/[facility])    -  Primary    Relevant Orders    Hemoglobin A1c    TestT+TestF+SHBG    PSA DIAGNOSTIC    Hemoglobin & Hematocrit, Blood    FSH & LH    Iron and TIBC    Ferritin    Hyperlipemia, mixed        Relevant Orders     "Hemoglobin A1c    TestT+TestF+SHBG    PSA DIAGNOSTIC    Hemoglobin & Hematocrit, Blood    FSH & LH    Iron and TIBC    Ferritin    Obesity (BMI 35.0-39.9 without comorbidity)        Relevant Orders    Hemoglobin A1c    TestT+TestF+SHBG    PSA DIAGNOSTIC    Hemoglobin & Hematocrit, Blood    FSH & LH    Iron and TIBC    Ferritin        Type 2 diabetes mellitus-uncontrolled with hyperglycemia  Continue insulin at about dosages  Might consider increasing the Trulicity dosage based on the A1c and also to help with weight loss.  Continue the above oral hypoglycemic agents.    Hypogonadotrophic hypogonadism-could be obesity related  Emphasized the patient to lose at least 10 to 15 pounds.  Up-to-date with his sleep study, uses  CPAP.    Hyperlipidemia  Continue the statin and Zetia.        Interpreted the blood work-up/imaging results performed by the primary care/consulting physician -    Refills sent to pharmacy    Follow Up     Patient was given instructions and counseling regarding her condition or for health maintenance advice. Please see specific information pulled into the AVS if appropriate.       Thank you for asking me to see your patient, Marek Diego in consultation.         Mana Scott MD  05/10/21      EMR Dragon / transcription disclaimer:     \"Dictated utilizing Dragon dictation\".         "

## 2021-05-11 RX ORDER — INSULIN GLARGINE 100 [IU]/ML
INJECTION, SOLUTION SUBCUTANEOUS
Qty: 30 ML | Refills: 0 | Status: SHIPPED | OUTPATIENT
Start: 2021-05-11 | End: 2021-06-04

## 2021-05-11 RX ORDER — ROSUVASTATIN CALCIUM 40 MG/1
TABLET, COATED ORAL
Qty: 90 TABLET | Refills: 0 | OUTPATIENT
Start: 2021-05-11

## 2021-05-13 LAB
FERRITIN SERPL-MCNC: 15.5 NG/ML (ref 30–400)
FSH SERPL-ACNC: 9.6 MIU/ML (ref 1.5–12.4)
HBA1C MFR BLD: 8 % (ref 4.8–5.6)
HCT VFR BLD AUTO: 38.8 % (ref 37.5–51)
HGB BLD-MCNC: 12.8 G/DL (ref 13–17.7)
IRON SATN MFR SERPL: 9 % (ref 20–50)
IRON SERPL-MCNC: 50 MCG/DL (ref 59–158)
LH SERPL-ACNC: 6.8 MIU/ML (ref 1.7–8.6)
PSA SERPL-MCNC: 0.25 NG/ML (ref 0–4)
SHBG SERPL-SCNC: 15.2 NMOL/L (ref 19.3–76.4)
TESTOST FREE SERPL-MCNC: 4.9 PG/ML (ref 7.2–24)
TESTOST SERPL-MCNC: 153 NG/DL (ref 264–916)
TIBC SERPL-MCNC: 570 MCG/DL
UIBC SERPL-MCNC: 570 MCG/DL (ref 112–346)

## 2021-05-14 RX ORDER — DULAGLUTIDE 4.5 MG/.5ML
4.5 INJECTION, SOLUTION SUBCUTANEOUS WEEKLY
Qty: 4 PEN | Refills: 11 | Status: SHIPPED | OUTPATIENT
Start: 2021-05-14 | End: 2022-05-31

## 2021-05-26 RX ORDER — ROSUVASTATIN CALCIUM 40 MG/1
TABLET, COATED ORAL
Qty: 90 TABLET | Refills: 0 | OUTPATIENT
Start: 2021-05-26

## 2021-06-03 DIAGNOSIS — E11.9 TYPE 2 DIABETES MELLITUS WITHOUT COMPLICATION, WITHOUT LONG-TERM CURRENT USE OF INSULIN (HCC): Chronic | ICD-10-CM

## 2021-06-04 RX ORDER — INSULIN GLARGINE 100 [IU]/ML
INJECTION, SOLUTION SUBCUTANEOUS
Qty: 30 ML | Refills: 2 | Status: SHIPPED | OUTPATIENT
Start: 2021-06-04 | End: 2021-08-30

## 2021-06-29 DIAGNOSIS — E11.29 TYPE 2 DIABETES MELLITUS WITH MICROALBUMINURIA, WITH LONG-TERM CURRENT USE OF INSULIN (HCC): ICD-10-CM

## 2021-06-29 DIAGNOSIS — E78.2 HYPERLIPEMIA, MIXED: ICD-10-CM

## 2021-06-29 DIAGNOSIS — Z79.4 TYPE 2 DIABETES MELLITUS WITH MICROALBUMINURIA, WITH LONG-TERM CURRENT USE OF INSULIN (HCC): ICD-10-CM

## 2021-06-29 DIAGNOSIS — R80.9 TYPE 2 DIABETES MELLITUS WITH MICROALBUMINURIA, WITH LONG-TERM CURRENT USE OF INSULIN (HCC): ICD-10-CM

## 2021-07-01 RX ORDER — INSULIN ASPART 100 [IU]/ML
INJECTION, SOLUTION INTRAVENOUS; SUBCUTANEOUS
Qty: 90 ML | Refills: 1 | Status: SHIPPED | OUTPATIENT
Start: 2021-07-01 | End: 2021-12-27

## 2021-07-30 RX ORDER — ESOMEPRAZOLE MAGNESIUM 40 MG/1
CAPSULE, DELAYED RELEASE ORAL
Qty: 90 CAPSULE | Refills: 0 | Status: SHIPPED | OUTPATIENT
Start: 2021-07-30 | End: 2021-10-28

## 2021-08-28 DIAGNOSIS — E11.9 TYPE 2 DIABETES MELLITUS WITHOUT COMPLICATION, WITHOUT LONG-TERM CURRENT USE OF INSULIN (HCC): Chronic | ICD-10-CM

## 2021-08-30 RX ORDER — INSULIN GLARGINE 100 [IU]/ML
INJECTION, SOLUTION SUBCUTANEOUS
Qty: 30 ML | Refills: 0 | Status: SHIPPED | OUTPATIENT
Start: 2021-08-30 | End: 2021-09-01

## 2021-09-01 DIAGNOSIS — E11.9 TYPE 2 DIABETES MELLITUS WITHOUT COMPLICATION, WITHOUT LONG-TERM CURRENT USE OF INSULIN (HCC): Chronic | ICD-10-CM

## 2021-09-01 RX ORDER — INSULIN GLARGINE 100 [IU]/ML
INJECTION, SOLUTION SUBCUTANEOUS
Qty: 30 ML | Refills: 0 | Status: SHIPPED | OUTPATIENT
Start: 2021-09-01 | End: 2021-10-21

## 2021-09-03 RX ORDER — MONTELUKAST SODIUM 10 MG/1
TABLET ORAL
Qty: 30 TABLET | Refills: 0 | OUTPATIENT
Start: 2021-09-03

## 2021-09-25 LAB
CHOLEST SERPL-MCNC: 75 MG/DL (ref 100–199)
CREAT SERPL-MCNC: 1.13 MG/DL (ref 0.76–1.27)
FOLATE SERPL-MCNC: 14.4 NG/ML
HDLC SERPL-MCNC: 23 MG/DL
IMP & REVIEW OF LAB RESULTS: NORMAL
LDLC SERPL CALC-MCNC: 25 MG/DL (ref 0–99)
TRIGL SERPL-MCNC: 164 MG/DL (ref 0–149)
VIT B12 SERPL-MCNC: 514 PG/ML (ref 232–1245)
VLDLC SERPL CALC-MCNC: 27 MG/DL (ref 5–40)

## 2021-10-08 ENCOUNTER — OFFICE VISIT (OUTPATIENT)
Dept: ENDOCRINOLOGY | Age: 56
End: 2021-10-08

## 2021-10-08 VITALS
OXYGEN SATURATION: 98 % | SYSTOLIC BLOOD PRESSURE: 124 MMHG | RESPIRATION RATE: 20 BRPM | WEIGHT: 226.8 LBS | DIASTOLIC BLOOD PRESSURE: 80 MMHG | BODY MASS INDEX: 36.45 KG/M2 | HEART RATE: 83 BPM | HEIGHT: 66 IN

## 2021-10-08 DIAGNOSIS — E11.9 TYPE 2 DIABETES MELLITUS WITHOUT COMPLICATION, WITHOUT LONG-TERM CURRENT USE OF INSULIN (HCC): Primary | ICD-10-CM

## 2021-10-08 DIAGNOSIS — E66.9 OBESITY (BMI 35.0-39.9 WITHOUT COMORBIDITY): ICD-10-CM

## 2021-10-08 DIAGNOSIS — E29.1 HYPOGONADISM MALE: ICD-10-CM

## 2021-10-08 DIAGNOSIS — E78.2 HYPERLIPEMIA, MIXED: ICD-10-CM

## 2021-10-08 PROCEDURE — 99214 OFFICE O/P EST MOD 30 MIN: CPT | Performed by: INTERNAL MEDICINE

## 2021-10-08 NOTE — PATIENT INSTRUCTIONS
Basaglar 60 units in the morning   Basaglar 50 - 55 units at bed time    NovoLog 18/20/30 with each meal plus a sliding scale, Farxiga, Metformin and Trulicity 4.5 mg subcutaneous once a week.

## 2021-10-08 NOTE — PROGRESS NOTES
"Chief Complaint  Chief Complaint   Patient presents with   • Diabetes     fup diabetes 2 checks bs tid avg bs  dm eye exam 09/21       Subjective          History of Present Illness    Marek Diego 56 y.o. presents with Type 2 dm as a follow-up patient    Type 2 dm - Diagnosed about 10 years ago.   Today in clinic pt reports being on  Basaglar 60 units in the morning and 65 units at bedtime, NovoLog 18/20/30 with each meal plus a sliding scale, Farxiga, Metformin and Trulicity 4.5 mg subcutaneous once a week.   Average blood sugars- 100 - 150   Checks BG -3 times a day  Sensor -no   dm retinopathy -no history,Last eye exam -5/20/2021  Dm nephropathy -no  Dm neuropathy -no,Dm neuropathy meds -not on meds   CAD -history of cardiac surgery from congenital abnormality  CVA -no  Episodes of hypoglycemia -  x  Pt is physically active. weight has been stable.   Pt tries to follow DM diet for most part.     Hyperlipidemia  On Crestor 40 mg oral daily    Hypogonadism -patient was on testosterone replacement through his primary care at one time, after his primary care changed weekly lost his testosterone prescription.  However he reports feeling tired and wants to go back on the testosterone replacement if possible.  History of sleep apnea, on CPAP machine and is compliant with it.  Has lost about 10 pounds of weight with the Trulicity.    Reviewed primary care physician's/consulting physician documentation and lab results         I have reviewed the patient's allergies, medicines, past medical hx, family hx and social hx in detail.    Objective   Vital Signs:   /80 (BP Location: Left arm, Patient Position: Sitting, Cuff Size: Adult)   Pulse 83   Resp 20   Ht 167.6 cm (66\")   Wt 103 kg (226 lb 12.8 oz)   SpO2 98%   BMI 36.61 kg/m²   Physical Exam   General appearance - no distress  Eyes- anicteric sclera  Ear nose and throat-external ears and nose normal.    Respiratory-normal chest on inspection.  No " respiratory distress noted.  Skin-no rashes.  Neuro-alert and oriented x3            Result Review :   The following data was reviewed by: Mana Scott MD on 10/08/2021:  Orders Only on 09/24/2021   Component Date Value Ref Range Status   • Total Cholesterol 09/24/2021 75* 100 - 199 mg/dL Final   • Triglycerides 09/24/2021 164* 0 - 149 mg/dL Final   • HDL Cholesterol 09/24/2021 23* >39 mg/dL Final   • VLDL Cholesterol Chris 09/24/2021 27  5 - 40 mg/dL Final   • LDL Chol Calc ([facility]) 09/24/2021 25  0 - 99 mg/dL Final   • Creatinine 09/24/2021 1.13  0.76 - 1.27 mg/dL Final   • eGFR Non  Am 09/24/2021 73  >59 mL/min/1.73 Final   • eGFR African Am 09/24/2021 84  >59 mL/min/1.73 Final    Comment: **[facility] currently reports eGFR in compliance with the current**    recommendations of the National Kidney Foundation. [facility] will    update reporting as new guidelines are published from the NKF-ASN    Task force.     • Vitamin B-12 09/24/2021 514  232 - 1,245 pg/mL Final   • Folate 09/24/2021 14.4  >3.0 ng/mL Final    Comment: A serum folate concentration of less than 3.1 ng/mL is  considered to represent clinical deficiency.     • Interpretation 09/24/2021 Note   Final    Supplemental report is available.     Data reviewed: pcp documentation        Results Review:    I reviewed the patient's new clinical results.     Assessment and Plan    Problem List Items Addressed This Visit        Other    Hypogonadism male, no TRT. (Chronic)    Overview     12/26/2012--treatment for hypogonadism begun.         Relevant Orders    TSH    T4, Free    Basic Metabolic Panel    Hemoglobin A1c    TestT+TestF+SHBG      Other Visit Diagnoses     Type 2 diabetes mellitus without complication, without long-term current use of insulin (HCC)    -  Primary    Relevant Orders    TSH    T4, Free    Basic Metabolic Panel    Hemoglobin A1c    TestT+TestF+SHBG    Hyperlipemia, mixed        Relevant Orders    TSH    T4, Free    Basic Metabolic  "Panel    Hemoglobin A1c    TestT+TestF+SHBG    Obesity (BMI 35.0-39.9 without comorbidity)        Relevant Orders    TSH    T4, Free    Basic Metabolic Panel    Hemoglobin A1c    TestT+TestF+SHBG        Type 2 diabetes mellitus-uncontrolled with hyper and hypoglycemia  Check HbA1c  Patient reported some low blood sugars in the middle of the night.  Make the below changes to the insulin regimen after the blood work-up results  Basaglar 60 units in the morning   Basaglar 50 - 55 units at bed time    NovoLog 18/20/30 with each meal plus a sliding scale, Farxiga, Metformin and Trulicity 4.5 mg subcutaneous once a week.       Hypogonadotrophic hypogonadism-suspect secondary to obesity  Check testosterone panel  Might consider AndroGel.  Patient understands the risk of cardiac risk, prostate cancer risk, blood clot risk on the testosterone replacement.  Compliant with CPAP.  Patient knows that he needs to get a prostate examination yearly.    Hyperlipidemia  Continue the statin.    Interpreted the blood work-up/imaging results performed by the primary care/consulting physician -    Refills sent to pharmacy    Follow Up     Patient was given instructions and counseling regarding her condition or for health maintenance advice. Please see specific information pulled into the AVS if appropriate.       Thank you for asking me to see your patient, Marek Diego in consultation.         Mana Scott MD  10/08/21      EMR Dragon / transcription disclaimer:     \"Dictated utilizing Dragon dictation\".         "

## 2021-10-13 RX ORDER — FLURBIPROFEN SODIUM 0.3 MG/ML
SOLUTION/ DROPS OPHTHALMIC
Qty: 200 EACH | Refills: 0 | Status: SHIPPED | OUTPATIENT
Start: 2021-10-13 | End: 2022-04-28

## 2021-10-17 DIAGNOSIS — E29.1 HYPOGONADISM MALE: Primary | ICD-10-CM

## 2021-10-17 RX ORDER — TESTOSTERONE 16.2 MG/G
GEL TRANSDERMAL
Qty: 75 G | Refills: 1 | Status: SHIPPED | OUTPATIENT
Start: 2021-10-17 | End: 2022-01-13

## 2021-10-20 DIAGNOSIS — E11.9 TYPE 2 DIABETES MELLITUS WITHOUT COMPLICATION, WITHOUT LONG-TERM CURRENT USE OF INSULIN (HCC): Chronic | ICD-10-CM

## 2021-10-21 RX ORDER — INSULIN GLARGINE 100 [IU]/ML
INJECTION, SOLUTION SUBCUTANEOUS
Qty: 30 ML | Refills: 0 | Status: SHIPPED | OUTPATIENT
Start: 2021-10-21 | End: 2021-11-16

## 2021-10-28 RX ORDER — ESOMEPRAZOLE MAGNESIUM 40 MG/1
CAPSULE, DELAYED RELEASE ORAL
Qty: 90 CAPSULE | Refills: 0 | Status: SHIPPED | OUTPATIENT
Start: 2021-10-28

## 2021-10-28 NOTE — TELEPHONE ENCOUNTER
Do you want to continue to refill for now or defer to patient's PCP?    LAST EGD 8/12/2015  PROCEDURES PERFORMED:   1. Esophagogastroduodenoscopy and biopsies of the esophagus and gastric antrum.   2. Esophageal dilation with a 52-[place] Benson dilator.    POSTOPERATIVE DIAGNOSES:  1. Esophagitis.   2. Distal esophageal stricture.   3. Small sliding hiatal hernia.   4. Proximal gastric ulcer.   5. Gastritis.

## 2021-11-10 ENCOUNTER — OFFICE VISIT (OUTPATIENT)
Dept: SLEEP MEDICINE | Facility: HOSPITAL | Age: 56
End: 2021-11-10

## 2021-11-10 VITALS
OXYGEN SATURATION: 97 % | BODY MASS INDEX: 36 KG/M2 | WEIGHT: 224 LBS | HEART RATE: 81 BPM | DIASTOLIC BLOOD PRESSURE: 70 MMHG | SYSTOLIC BLOOD PRESSURE: 138 MMHG | HEIGHT: 66 IN

## 2021-11-10 DIAGNOSIS — E66.9 OBESITY WITH SERIOUS COMORBIDITY, UNSPECIFIED CLASSIFICATION, UNSPECIFIED OBESITY TYPE: ICD-10-CM

## 2021-11-10 DIAGNOSIS — G47.33 OBSTRUCTIVE SLEEP APNEA: Primary | ICD-10-CM

## 2021-11-10 PROCEDURE — G0463 HOSPITAL OUTPT CLINIC VISIT: HCPCS

## 2021-11-10 PROCEDURE — 99214 OFFICE O/P EST MOD 30 MIN: CPT | Performed by: FAMILY MEDICINE

## 2021-11-10 NOTE — PROGRESS NOTES
Follow Up [facility] Note     Chief Complaint:  STERLING     Primary Care Physician: Neel Patel MD    Marek Diego is a 56 y.o.male  was last seen at [facility] sleep lab: 4/9/2021 for home sleep study which showed severe obstructive sleep apnea with overall AHI of 49.8 events per hour. Recommended auto CPAP 8-20 cm H2O and plans for nocturnal oximetry test 2 weeks later. Patient presents today for first follow-up visit. No problems with mask or machine hypersomnia nonrestorative sleep.  Full data download not available today however chart shows average AHI around 5.  Had eye surgery 2 weeks ago; since then AHI has sporadically jumped up a little bit; also has had some neck pain.  Feels like he needs a firmer pillow.    Results Review:  DME is CPAP central.  Downloads between 10/11/2021-11/9/2021.  Average usage is 7 hours 14 minutes.  Average AHI is around 5.      Current Medications:    Current Outpatient Medications:   •  albuterol sulfate  (90 Base) MCG/ACT inhaler, Inhale 2 puffs Every 4 (Four) Hours As Needed for Wheezing or Shortness of Air., Disp: 18 g, Rfl: 0  •  amLODIPine-benazepril (LOTREL 5-20) 5-20 MG per capsule, TAKE 1 CAPSULE BY MOUTH ONE TIME A DAY , Disp: 30 capsule, Rfl: 5  •  Chlorcyclizine-Pseudoephed (STAHIST AD) 25-60 MG tablet, 1 by mouth every 6 hours as needed for congestion and allergies, not greater than 3 in 24 hours, Disp: 60 tablet, Rfl: 2  •  Cholecalciferol (VITAMIN D3) 5000 UNITS capsule capsule, Take 1 capsule by mouth daily., Disp: , Rfl:   •  Dulaglutide (Trulicity) 4.5 MG/0.5ML solution pen-injector, Inject 4.5 mg under the skin into the appropriate area as directed 1 (One) Time Per Week., Disp: 4 pen, Rfl: 11  •  esomeprazole (nexIUM) 40 MG capsule, TAKE 1 CAPSULE BY MOUTH EVERY DAY, Disp: 90 capsule, Rfl: 0  •  ezetimibe (ZETIA) 10 MG tablet, Take 1 tablet by mouth Daily. 200001, Disp: 30 tablet, Rfl: 9  •  Farxiga 10 MG tablet, TAKE 1 TABLET BY MOUTH ONE TIME  A DAY before breakfast , Disp: 60 tablet, Rfl: 5  •  fluconazole (DIFLUCAN) 200 MG tablet, TAKE 1 TABLET BY MOUTH EVERY DAY AS DIRECTED FOR RECURRENT FUNGAL INFECTION., Disp: 7 tablet, Rfl: 2  •  fluocinonide-emollient (LIDEX-E) 0.05 % cream, Apply topically twice daily as needed hand eczema, Disp: 60 g, Rfl: 0  •  fluticasone-salmeterol (Advair Diskus) 250-50 MCG/DOSE DISKUS, Inhale 1 puff 2 (Two) Times a Day., Disp: 60 each, Rfl: 11  •  Insulin Glargine (BASAGLAR KWIKPEN) 100 UNIT/ML injection pen, INJECT 60 UNITS INTO THE SKIN IN THE MORNING AND 60 UNITS AT BEDTIME, Disp: 30 mL, Rfl: 0  •  Insulin Pen Needle (B-D UF III MINI PEN NEEDLES) 31G X 5 MM misc, use to inject insulin 6 times daily, Disp: 200 each, Rfl: 0  •  Loratadine (CLARITIN PO), Take 1 capsule by mouth daily as needed (when necessary for allergies.)., Disp: , Rfl:   •  metFORMIN (GLUCOPHAGE) 1000 MG tablet, Take 1 tablet by mouth 2 (Two) Times a Day With Meals., Disp: 180 tablet, Rfl: 1  •  montelukast (SINGULAIR) 10 MG tablet, TAKE 1 TABLET BY MOUTH EVERY DAY , Disp: 30 tablet, Rfl: 5  •  NovoLOG FlexPen 100 UNIT/ML solution pen-injector sc pen, Inject 15 units into the skin with each meal, max of 100 units per day, Disp: 90 mL, Rfl: 1  •  rosuvastatin (CRESTOR) 40 MG tablet, TAKE 1 TABLET BY MOUTH AT BEDTIME , Disp: 90 tablet, Rfl: 0  •  sertraline (ZOLOFT) 50 MG tablet, TAKE 1 TABLET BY MOUTH ONE TIME A DAY , Disp: 30 tablet, Rfl: 6  •  Testosterone 20.25 MG/ACT (1.62%) gel, Apply 2 pumps 1 time a day., Disp: 75 g, Rfl: 1   also entered in Sleep Questionnaire    Patient  has a past medical history of Allergic rhinitis (10/24/2013), Benign essential hypertension (2/22/2016), Chronic constipation (11/20/2017), Chronic neck pain (10/30/2015), Depression with anxiety (2/22/2016), Diabetic eye exam (HCC) (5/27/2016), Diabetic foot exam (4/27/2017), Dysfunction of both eustachian tubes (9/6/2019), Gastroesophageal reflux disease with esophagitis  "(5/22/2015), History of colon polyps, 11/29/2017--tubular adenoma times one.  Hyperplastic ×1.  Repeat 5 years. 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  09/13/2016--consistent with calcified granuloma. (3/7/2016), Hypogonadism male, no TRT. (12/26/2012), Microalbuminuria (10/19/2014), Moderate persistent asthma without complication (2/22/2016), Morbid obesity (HCC) (2/22/2016), Multiple environmental allergies (10/24/2013), Non morbid obesity (2/22/2016), Right bundle branch block, Subclinical hypothyroidism (8/17/2018), Thyroid nodule (10/5/2018), Type 2 diabetes mellitus with microalbuminuria, with long-term current use of insulin (HCC) (1/1/2004), and Vitamin D deficiency (2/22/2016).    Social History:    Social History     Socioeconomic History   • Marital status:      Spouse name: Luis    • Number of children: 2   • Years of education: 16   • Highest education level: Bachelor's degree (e.g., BA, AB, BS)   Tobacco Use   • Smoking status: Never Smoker   • Smokeless tobacco: Never Used   Vaping Use   • Vaping Use: Never used   Substance and Sexual Activity   • Alcohol use: Yes   • Drug use: No   • Sexual activity: Yes     Partners: Female       Allergies:  Latex    Review of Systems:    A complete review of systems was done and all were negative with the exception of nasal congestion    Vital Signs:    Vitals:    11/10/21 0900   BP: 138/70   Pulse: 81   SpO2: 97%   Weight: 102 kg (224 lb)   Height: 167.6 cm (66\")     Body mass index is 36.15 kg/m².    Vital Signs /70   Pulse 81   Ht 167.6 cm (66\")   Wt 102 kg (224 lb)   SpO2 97%   BMI 36.15 kg/m²  Body mass index is 36.15 kg/m².    General Alert and oriented. No acute distress noted   Pharynx/Throat Class III Mallampati airway, large tongue, no evidence of redundant lateral pharyngeal tissue. " No oral lesions. No thrush. Moist mucous membranes.   Head Normocephalic. Symmetrical. Atraumatic.    Nose No septal deviation. No drainage   Chest Wall Normal shape. Symmetric expansion with respiration. No tenderness.   Neck Trachea midline, no thyromegaly or adenopathy    Lungs Clear to auscultation bilaterally. No wheezes. No rhonchi. No rales. Respirations regular, even and unlabored.   Heart Regular rhythm and normal rate. Normal S1 and S2. No murmur   Abdomen Soft, non-tender and non-distended. Normal bowel sounds. No masses.   Extremities Moves all extremities well. No edema   Psychiatric Normal mood and affect.     Impression:  1. Obstructive sleep apnea    2. Obesity with serious comorbidity, unspecified classification, unspecified obesity type        Obstructive sleep apnea adequately treated with auto CPAP 8-20 cm H2O with good compliance and usage and some complaints of hypersomnolence.  Patient will use firmer pillow over the next few weeks.  Discussed recovery from eye surgery could also be complicating his sleep comfort.  Return to clinic in 6 weeks for follow-up or sooner if needed.  If in 6 weeks AHI is not under control we will consider Pap titration study as he may need higher pressures with BiPAP.    Patient uses the CPAP device and benefits from its use in terms of reduction of hypersomnia and snoring.Weight loss will be strongly beneficial to reduce the severity of sleep-disordered breathing.  Caution during activities that require prolonged concentration is strongly advised if sleepiness returns. Changing of PAP supplies regularly is important for effective use. Patient needs to change cushion on the mask or plugs on nasal pillows along with disposable filters once every month and change mask frame, tubing, headgear and Velcro straps every 6 months at the minimum.    Time spent during visit: 30 minutes of which at least 50% of the time was spent counseling patient.    Saranya Burton MD  Sleep  Medicine  11/10/21  10:05 EST    Addendum: 11/10/2021 11:11 AM    Received full download of data.  Average AHI 3.7.  Mean pressure 15.1 cm H2O.  Average time in large leak per day 12 minutes 6 seconds.    Continue with plan of care in terms of using a firmer pillow and continuing recovery from eye surgery with plans for Pap titration study in the future if needed.

## 2021-11-15 DIAGNOSIS — E11.9 TYPE 2 DIABETES MELLITUS WITHOUT COMPLICATION, WITHOUT LONG-TERM CURRENT USE OF INSULIN (HCC): Chronic | ICD-10-CM

## 2021-11-16 RX ORDER — INSULIN GLARGINE 100 [IU]/ML
INJECTION, SOLUTION SUBCUTANEOUS
Qty: 45 ML | Refills: 2 | Status: SHIPPED | OUTPATIENT
Start: 2021-11-16 | End: 2022-03-03

## 2021-11-29 RX ORDER — ESOMEPRAZOLE MAGNESIUM 40 MG/1
CAPSULE, DELAYED RELEASE ORAL
Qty: 90 CAPSULE | Refills: 0 | OUTPATIENT
Start: 2021-11-29

## 2021-11-30 RX ORDER — ESOMEPRAZOLE MAGNESIUM 40 MG/1
CAPSULE, DELAYED RELEASE ORAL
Qty: 90 CAPSULE | Refills: 0 | OUTPATIENT
Start: 2021-11-30

## 2021-12-26 DIAGNOSIS — E11.29 TYPE 2 DIABETES MELLITUS WITH MICROALBUMINURIA, WITH LONG-TERM CURRENT USE OF INSULIN (HCC): ICD-10-CM

## 2021-12-26 DIAGNOSIS — Z79.4 TYPE 2 DIABETES MELLITUS WITH MICROALBUMINURIA, WITH LONG-TERM CURRENT USE OF INSULIN (HCC): ICD-10-CM

## 2021-12-26 DIAGNOSIS — E78.2 HYPERLIPEMIA, MIXED: ICD-10-CM

## 2021-12-26 DIAGNOSIS — R80.9 TYPE 2 DIABETES MELLITUS WITH MICROALBUMINURIA, WITH LONG-TERM CURRENT USE OF INSULIN (HCC): ICD-10-CM

## 2021-12-27 RX ORDER — INSULIN ASPART 100 [IU]/ML
INJECTION, SOLUTION INTRAVENOUS; SUBCUTANEOUS
Qty: 90 ML | Refills: 0 | Status: SHIPPED | OUTPATIENT
Start: 2021-12-27 | End: 2022-03-28

## 2022-01-05 ENCOUNTER — OFFICE VISIT (OUTPATIENT)
Dept: SLEEP MEDICINE | Facility: HOSPITAL | Age: 57
End: 2022-01-05

## 2022-01-05 VITALS
HEART RATE: 91 BPM | OXYGEN SATURATION: 96 % | DIASTOLIC BLOOD PRESSURE: 65 MMHG | BODY MASS INDEX: 36.16 KG/M2 | WEIGHT: 225 LBS | SYSTOLIC BLOOD PRESSURE: 130 MMHG | HEIGHT: 66 IN

## 2022-01-05 DIAGNOSIS — E66.9 OBESITY WITH SERIOUS COMORBIDITY, UNSPECIFIED CLASSIFICATION, UNSPECIFIED OBESITY TYPE: ICD-10-CM

## 2022-01-05 DIAGNOSIS — G47.33 OBSTRUCTIVE SLEEP APNEA: Primary | ICD-10-CM

## 2022-01-05 PROCEDURE — G0463 HOSPITAL OUTPT CLINIC VISIT: HCPCS

## 2022-01-05 PROCEDURE — 99213 OFFICE O/P EST LOW 20 MIN: CPT | Performed by: FAMILY MEDICINE

## 2022-01-05 NOTE — PROGRESS NOTES
Follow Up Sleep Disorders Center Note     Chief Complaint:  STERLING     Primary Care Physician: Neel Patel MD    Marek Diego is a 56 y.o.male  was last seen at [facility] sleep lab: 11/10/2021.  Home sleep study April showed severe STERLING with average AHI 49.8.  Last visit was first follow-up visit since starting auto CPAP 8-20 cm H2O.  Last visit he was status post eye surgery x2 weeks.  Felt like he needed a firmer pillow to help with comfort.    We discussed recovery from eye surgery could also be complicating his sleep comfort.  He decided he would try a firmer pillow over the next few weeks as well.  He was advised to return to clinic in 6 weeks for follow-up and if AHI was still not under control we will consider Pap titration study as he may need higher pressures with BiPAP.    Doing better today.  No problems with with mask machine hypersomnia or nonrestorative sleep.    Results Review:  DME is cpap central.  Downloads between 12/6/21-1/4/22.  Average usage is 7h 15m.  Average AHI is 3.2.  Average AutoCPAP pressure is 14.2 cm H2O.    Current Medications:    Current Outpatient Medications:   •  albuterol sulfate  (90 Base) MCG/ACT inhaler, Inhale 2 puffs Every 4 (Four) Hours As Needed for Wheezing or Shortness of Air., Disp: 18 g, Rfl: 0  •  amLODIPine-benazepril (LOTREL 5-20) 5-20 MG per capsule, TAKE 1 CAPSULE BY MOUTH ONE TIME A DAY , Disp: 30 capsule, Rfl: 5  •  Chlorcyclizine-Pseudoephed (STAHIST AD) 25-60 MG tablet, 1 by mouth every 6 hours as needed for congestion and allergies, not greater than 3 in 24 hours, Disp: 60 tablet, Rfl: 2  •  Cholecalciferol (VITAMIN D3) 5000 UNITS capsule capsule, Take 1 capsule by mouth daily., Disp: , Rfl:   •  Dulaglutide (Trulicity) 4.5 MG/0.5ML solution pen-injector, Inject 4.5 mg under the skin into the appropriate area as directed 1 (One) Time Per Week., Disp: 4 pen, Rfl: 11  •  esomeprazole (nexIUM) 40 MG capsule, TAKE 1 CAPSULE BY MOUTH EVERY DAY, Disp: 90  capsule, Rfl: 0  •  ezetimibe (ZETIA) 10 MG tablet, Take 1 tablet by mouth Daily. 200001, Disp: 30 tablet, Rfl: 9  •  Farxiga 10 MG tablet, TAKE 1 TABLET BY MOUTH ONE TIME A DAY before breakfast , Disp: 60 tablet, Rfl: 5  •  fluconazole (DIFLUCAN) 200 MG tablet, TAKE 1 TABLET BY MOUTH EVERY DAY AS DIRECTED FOR RECURRENT FUNGAL INFECTION., Disp: 7 tablet, Rfl: 2  •  fluocinonide-emollient (LIDEX-E) 0.05 % cream, Apply topically twice daily as needed hand eczema, Disp: 60 g, Rfl: 0  •  fluticasone-salmeterol (Advair Diskus) 250-50 MCG/DOSE DISKUS, Inhale 1 puff 2 (Two) Times a Day., Disp: 60 each, Rfl: 11  •  Insulin Glargine (BASAGLAR KWIKPEN) 100 UNIT/ML injection pen, INJECT 60 UNITS INTO THE SKIN IN THE MORNING AND 60 UNITS AT BEDTIME, Disp: 45 mL, Rfl: 2  •  Insulin Pen Needle (B-D UF III MINI PEN NEEDLES) 31G X 5 MM misc, use to inject insulin 6 times daily, Disp: 200 each, Rfl: 0  •  Loratadine (CLARITIN PO), Take 1 capsule by mouth daily as needed (when necessary for allergies.)., Disp: , Rfl:   •  metFORMIN (GLUCOPHAGE) 1000 MG tablet, Take 1 tablet by mouth 2 (Two) Times a Day With Meals., Disp: 180 tablet, Rfl: 1  •  montelukast (SINGULAIR) 10 MG tablet, TAKE 1 TABLET BY MOUTH EVERY DAY , Disp: 30 tablet, Rfl: 5  •  NovoLOG FlexPen 100 UNIT/ML solution pen-injector sc pen, INJECT 15 UNITS INTO THE SKIN WITH EACH MEAL, MAX  UNITS PER DAY, Disp: 90 mL, Rfl: 0  •  rosuvastatin (CRESTOR) 40 MG tablet, TAKE 1 TABLET BY MOUTH AT BEDTIME , Disp: 90 tablet, Rfl: 0  •  sertraline (ZOLOFT) 50 MG tablet, TAKE 1 TABLET BY MOUTH ONE TIME A DAY , Disp: 30 tablet, Rfl: 6  •  Testosterone 20.25 MG/ACT (1.62%) gel, Apply 2 pumps 1 time a day., Disp: 75 g, Rfl: 1   also entered in Sleep Questionnaire    Patient  has a past medical history of Allergic rhinitis (10/24/2013), Benign essential hypertension (2/22/2016), Chronic constipation (11/20/2017), Chronic neck pain (10/30/2015), Depression with anxiety (2/22/2016),  "Diabetic eye exam (HCC) (5/27/2016), Diabetic foot exam (4/27/2017), Dysfunction of both eustachian tubes (9/6/2019), Gastroesophageal reflux disease with esophagitis (5/22/2015), History of colon polyps, 11/29/2017--tubular adenoma times one.  Hyperplastic ×1.  Repeat 5 years. 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  09/13/2016--consistent with calcified granuloma. (3/7/2016), Hypogonadism male, no TRT. (12/26/2012), Microalbuminuria (10/19/2014), Moderate persistent asthma without complication (2/22/2016), Morbid obesity ([facility]) (2/22/2016), Multiple environmental allergies (10/24/2013), Non morbid obesity (2/22/2016), Right bundle branch block, Subclinical hypothyroidism (8/17/2018), Thyroid nodule (10/5/2018), Type 2 diabetes mellitus with microalbuminuria, with long-term current use of insulin ([facility]) (1/1/2004), and Vitamin D deficiency (2/22/2016).    Social History:    Social History     Socioeconomic History   • Marital status:      Spouse name: Luis    • Number of children: 2   • Years of education: 16   • Highest education level: Bachelor's degree (e.g., BA, AB, BS)   Tobacco Use   • Smoking status: Never Smoker   • Smokeless tobacco: Never Used   Vaping Use   • Vaping Use: Never used   Substance and Sexual Activity   • Alcohol use: Yes   • Drug use: No   • Sexual activity: Yes     Partners: Female       Allergies:  Latex    Review of Systems:    A complete review of systems was done and all were negative with the exception of all negative    Vital Signs:    Vitals:    01/05/22 1630   BP: 130/65   Pulse: 91   SpO2: 96%   Weight: 102 kg (225 lb)   Height: 167.6 cm (66\")     Body mass index is 36.32 kg/m².    Vital Signs /65   Pulse 91   Ht 167.6 cm (66\")   Wt 102 kg (225 lb)   SpO2 96%   BMI 36.32 kg/m²  Body mass index is 36.32 kg/m².  "   General Alert and oriented. No acute distress noted   Pharynx/Throat Class III Mallampati airway, large tongue, no evidence of redundant lateral pharyngeal tissue. No oral lesions. No thrush. Moist mucous membranes.   Head Normocephalic. Symmetrical. Atraumatic.    Nose No septal deviation. No drainage   Chest Wall Normal shape. Symmetric expansion with respiration. No tenderness.   Neck Trachea midline, no thyromegaly or adenopathy    Lungs Clear to auscultation bilaterally. No wheezes. No rhonchi. No rales. Respirations regular, even and unlabored.   Heart Regular rhythm and normal rate. Normal S1 and S2. No murmur   Abdomen Soft, non-tender and non-distended. Normal bowel sounds. No masses.   Extremities Moves all extremities well. No edema   Psychiatric Normal mood and affect.     Impression:  1. Obstructive sleep apnea    2. Obesity with serious comorbidity, unspecified classification, unspecified obesity type        Obstructive sleep apnea adequately treated with APAP 8-20 cm H2O with good compliance and usage and no complaints of hypersomnolence.  Return to clinic in 6 months for follow-up or sooner if needed.    Patient uses the CPAP device and benefits from its use in terms of reduction of hypersomnia and snoring.Weight loss will be strongly beneficial to reduce the severity of sleep-disordered breathing.  Caution during activities that require prolonged concentration is strongly advised if sleepiness returns. Changing of PAP supplies regularly is important for effective use. Patient needs to change cushion on the mask or plugs on nasal pillows along with disposable filters once every month and change mask frame, tubing, headgear and Velcro straps every 6 months at the minimum.    Time spent during visit: 20 minutes of which at least 50% of the time was spent counseling patient.    Saranya Burton MD  Sleep Medicine  01/05/22  16:55 EST

## 2022-01-10 DIAGNOSIS — E29.1 HYPOGONADISM MALE: ICD-10-CM

## 2022-01-13 DIAGNOSIS — E29.1 HYPOGONADISM MALE: ICD-10-CM

## 2022-01-13 RX ORDER — TESTOSTERONE 20.25 MG/1.25G
GEL TOPICAL
Qty: 75 G | Refills: 0 | Status: SHIPPED | OUTPATIENT
Start: 2022-01-13 | End: 2022-02-25

## 2022-01-13 RX ORDER — TESTOSTERONE 20.25 MG/1.25G
GEL TOPICAL
Qty: 75 G | Refills: 0 | OUTPATIENT
Start: 2022-01-13

## 2022-01-25 DIAGNOSIS — E78.5 HYPERLIPIDEMIA, UNSPECIFIED HYPERLIPIDEMIA TYPE: ICD-10-CM

## 2022-01-26 RX ORDER — EZETIMIBE 10 MG/1
TABLET ORAL
Qty: 30 TABLET | Refills: 0 | OUTPATIENT
Start: 2022-01-26

## 2022-01-29 DIAGNOSIS — E78.5 HYPERLIPIDEMIA, UNSPECIFIED HYPERLIPIDEMIA TYPE: ICD-10-CM

## 2022-01-31 RX ORDER — EZETIMIBE 10 MG/1
TABLET ORAL
Qty: 30 TABLET | Refills: 0 | Status: SHIPPED | OUTPATIENT
Start: 2022-01-31

## 2022-02-18 ENCOUNTER — DOCUMENTATION (OUTPATIENT)
Dept: ENDOCRINOLOGY | Age: 57
End: 2022-02-18

## 2022-02-18 NOTE — PROGRESS NOTES
Benefits Investigation Summary    Prescription: Renewal     Dispensing pharmacy: [facility]    Copay amount: FARXIGA 2/20, TRULICITY $30    PLAN: [facility]  BIN: [id number]  PCN:  RX GROUP: [id number]    Prior Auth and Med Assistance notes:

## 2022-02-21 ENCOUNTER — TELEPHONE (OUTPATIENT)
Dept: SURGERY | Facility: CLINIC | Age: 57
End: 2022-02-21

## 2022-02-21 NOTE — TELEPHONE ENCOUNTER
I left the patient a voice mail that he will need to either contact his PCP or schedule an appointment with an alternate provider in our office if he wishes to continue having his Esomeprazole prescribed. Dr. Chang has retired and he originally began prescribing it in 2015.      I asked the patient to call the office if he wants to schedule an appointment with the next available provider or if he has a gastroenterologist contact them or his PCP.    Date of Operation:  08/12/2015  PREOPERATIVE DIAGNOSES:    1. Heartburn.   2. Dysphagia.      POSTOPERATIVE DIAGNOSES:  1. Esophagitis.   2. Distal esophageal stricture.   3. Small sliding hiatal hernia.   4. Proximal gastric ulcer.   5. Gastritis.      PROCEDURES PERFORMED:   1. Esophagogastroduodenoscopy and biopsies of the esophagus and gastric antrum.     2. Esophageal dilation with a 52-[place] Benson dilator.     SURGEON: Wellington Chang MD

## 2022-02-22 DIAGNOSIS — E29.1 HYPOGONADISM MALE: ICD-10-CM

## 2022-02-24 DIAGNOSIS — E78.5 HYPERLIPIDEMIA, UNSPECIFIED HYPERLIPIDEMIA TYPE: ICD-10-CM

## 2022-02-24 RX ORDER — EZETIMIBE 10 MG/1
TABLET ORAL
Qty: 30 TABLET | Refills: 0 | OUTPATIENT
Start: 2022-02-24

## 2022-02-24 RX ORDER — DAPAGLIFLOZIN 10 MG/1
TABLET, FILM COATED ORAL
Qty: 60 TABLET | Refills: 0 | OUTPATIENT
Start: 2022-02-24

## 2022-02-25 ENCOUNTER — OFFICE VISIT (OUTPATIENT)
Dept: ENDOCRINOLOGY | Age: 57
End: 2022-02-25

## 2022-02-25 ENCOUNTER — SPECIALTY PHARMACY (OUTPATIENT)
Dept: ENDOCRINOLOGY | Age: 57
End: 2022-02-25

## 2022-02-25 VITALS
BODY MASS INDEX: 35.47 KG/M2 | SYSTOLIC BLOOD PRESSURE: 136 MMHG | OXYGEN SATURATION: 97 % | WEIGHT: 226 LBS | HEART RATE: 79 BPM | HEIGHT: 67 IN | DIASTOLIC BLOOD PRESSURE: 71 MMHG

## 2022-02-25 DIAGNOSIS — E78.2 HYPERLIPEMIA, MIXED: ICD-10-CM

## 2022-02-25 DIAGNOSIS — E11.29 TYPE 2 DIABETES MELLITUS WITH MICROALBUMINURIA, WITH LONG-TERM CURRENT USE OF INSULIN: Primary | ICD-10-CM

## 2022-02-25 DIAGNOSIS — R80.9 TYPE 2 DIABETES MELLITUS WITH MICROALBUMINURIA, WITH LONG-TERM CURRENT USE OF INSULIN: Primary | ICD-10-CM

## 2022-02-25 DIAGNOSIS — E29.1 HYPOGONADISM MALE: ICD-10-CM

## 2022-02-25 DIAGNOSIS — Z79.4 TYPE 2 DIABETES MELLITUS WITH MICROALBUMINURIA, WITH LONG-TERM CURRENT USE OF INSULIN: Primary | ICD-10-CM

## 2022-02-25 PROCEDURE — 99214 OFFICE O/P EST MOD 30 MIN: CPT | Performed by: NURSE PRACTITIONER

## 2022-02-25 RX ORDER — KETOCONAZOLE 20 MG/G
CREAM TOPICAL TAKE AS DIRECTED
COMMUNITY
Start: 2021-12-28

## 2022-02-25 RX ORDER — TESTOSTERONE 20.25 MG/1.25G
GEL TOPICAL
Qty: 75 G | Refills: 0 | Status: SHIPPED | OUTPATIENT
Start: 2022-02-25 | End: 2022-03-04 | Stop reason: SDUPTHER

## 2022-02-25 NOTE — PROGRESS NOTES
"Chief Complaint  Diabetes Mellitus (follow up)    Subjective          Marek Diego presents to [facility] ENDOCRINOLOGY  History of Present Illness     hypogonad  Last testosterone dose 1 week ago   Has not had prostate exam   Using testosterone gel 1 pump each arm  Denies chest pain or leg swelling    dm2  Patient was diagnosed 25 years ago  Checking blood sugar 3-4 times a day, average blood sugar 1   Last eye exam was 3 weeks ago  Denies history of retinopathy  Patient does report diabetic neuropathy with feelings of numbness in his feet  No history of heart disease, kidney disease or stroke    Patient is currently taking:   Basaglar 60 units BID  NovoLog 18 units with each meal plus a sliding scale-this is written down on his fridge, he does not have it memorized  Also on Farxiga, Metformin and Trulicity 4.5 mg subcutaneous once a week.  On statin    Objective   Vital Signs:   /71   Pulse 79   Ht 170.2 cm (67\")   Wt 103 kg (226 lb)   SpO2 97%   BMI 35.40 kg/m²     Physical Exam  Vitals reviewed.   Constitutional:       General: He is not in acute distress.  HENT:      Head: Normocephalic and atraumatic.   Cardiovascular:      Rate and Rhythm: Normal rate and regular rhythm.   Pulmonary:      Effort: Pulmonary effort is normal. No respiratory distress.   Musculoskeletal:         General: No signs of injury. Normal range of motion.      Cervical back: Normal range of motion and neck supple.   Skin:     General: Skin is warm and dry.   Neurological:      Mental Status: He is alert and oriented to person, place, and time. Mental status is at baseline.   Psychiatric:         Mood and Affect: Mood normal.         Behavior: Behavior normal.         Thought Content: Thought content normal.         Judgment: Judgment normal.          Result Review :   The following data was reviewed by: JEREMY Bennett on 02/25/2022:  Common labs    Common Labsle 5/10/21 5/10/21 5/10/21 9/24/21 " 9/24/21 10/11/21 10/11/21    0921 0921 0921 0823 0823 0819 0819   Glucose      115 (A)    BUN      23 (A)    Creatinine     1.13 1.05    eGFR Non  Am     73 73    eGFR African Am     84 89    Sodium      141    Potassium      5.2    Chloride      104    Calcium      9.5    Hemoglobin   12.8 (A)       Hematocrit   38.8       Total Cholesterol    75 (A)      Triglycerides    164 (A)      HDL Cholesterol    23 (A)      LDL Cholesterol     25      Hemoglobin A1C 8.00 (A)      7.30 (A)   PSA  0.246        (A) Abnormal value       Comments are available for some flowsheets but are not being displayed.                     Assessment and Plan    Diagnoses and all orders for this visit:    1. Type 2 diabetes mellitus with microalbuminuria, with long-term current use of insulin (HCC) (Primary)  -     Hemoglobin A1c; Future  -     Comprehensive Metabolic Panel; Future  -     Lipid Panel; Future  -     Microalbumin / Creatinine Urine Ratio - Urine, Clean Catch; Future  -     TSH; Future  -     T4, Free; Future  -     TestT+TestF+SHBG; Future  -     PSA DIAGNOSTIC; Future  -     Hemoglobin & Hematocrit, Blood; Future  -     Hemoglobin A1c  -     Comprehensive Metabolic Panel  -     Lipid Panel  -     Microalbumin / Creatinine Urine Ratio - Urine, Clean Catch  -     TSH  -     T4, Free  -     TestT+TestF+SHBG  -     PSA DIAGNOSTIC  -     Hemoglobin & Hematocrit, Blood    2. Hypogonadism male  -     TestT+TestF+SHBG; Future  -     PSA DIAGNOSTIC; Future  -     Hemoglobin & Hematocrit, Blood; Future  -     TestT+TestF+SHBG  -     PSA DIAGNOSTIC  -     Hemoglobin & Hematocrit, Blood    3. Hyperlipemia, mixed        Follow Up   No follow-ups on file.     Increase night basaglar to 65u daily   Continue Farxiga, Metformin and Trulicity  Labs today  Continue statin  Patient must get prostate exam before next visit    Patient was given instructions and counseling regarding his condition or for health maintenance advice. Please see  specific information pulled into the AVS if appropriate.     JEREMY Bennett

## 2022-02-27 LAB
ALBUMIN SERPL-MCNC: 4.3 G/DL (ref 3.8–4.9)
ALBUMIN/CREAT UR: <6 MG/G CREAT (ref 0–29)
ALBUMIN/GLOB SERPL: 2 {RATIO} (ref 1.2–2.2)
ALP SERPL-CCNC: 78 IU/L (ref 44–121)
ALT SERPL-CCNC: 34 IU/L (ref 0–44)
AST SERPL-CCNC: 26 IU/L (ref 0–40)
BILIRUB SERPL-MCNC: 0.3 MG/DL (ref 0–1.2)
BUN SERPL-MCNC: 25 MG/DL (ref 6–24)
BUN/CREAT SERPL: 20 (ref 9–20)
CALCIUM SERPL-MCNC: 9.3 MG/DL (ref 8.7–10.2)
CHLORIDE SERPL-SCNC: 101 MMOL/L (ref 96–106)
CHOLEST SERPL-MCNC: 99 MG/DL (ref 100–199)
CO2 SERPL-SCNC: 18 MMOL/L (ref 20–29)
CREAT SERPL-MCNC: 1.23 MG/DL (ref 0.76–1.27)
CREAT UR-MCNC: 52.9 MG/DL
GLOBULIN SER CALC-MCNC: 2.2 G/DL (ref 1.5–4.5)
GLUCOSE SERPL-MCNC: 123 MG/DL (ref 65–99)
HBA1C MFR BLD: 7.5 % (ref 4.8–5.6)
HCT VFR BLD AUTO: 38.2 % (ref 37.5–51)
HDLC SERPL-MCNC: 28 MG/DL
HGB BLD-MCNC: 12.7 G/DL (ref 13–17.7)
IMP & REVIEW OF LAB RESULTS: NORMAL
LDLC SERPL CALC-MCNC: 46 MG/DL (ref 0–99)
MICROALBUMIN UR-MCNC: <3 UG/ML
POTASSIUM SERPL-SCNC: 5.4 MMOL/L (ref 3.5–5.2)
PROT SERPL-MCNC: 6.5 G/DL (ref 6–8.5)
PSA SERPL-MCNC: 0.2 NG/ML (ref 0–4)
SHBG SERPL-SCNC: 13.8 NMOL/L (ref 19.3–76.4)
SODIUM SERPL-SCNC: 139 MMOL/L (ref 134–144)
T4 FREE SERPL-MCNC: 0.92 NG/DL (ref 0.82–1.77)
TESTOST FREE SERPL-MCNC: 1.6 PG/ML (ref 7.2–24)
TESTOST SERPL-MCNC: 61 NG/DL (ref 264–916)
TRIGL SERPL-MCNC: 140 MG/DL (ref 0–149)
TSH SERPL DL<=0.005 MIU/L-ACNC: 1.54 UIU/ML (ref 0.45–4.5)
VLDLC SERPL CALC-MCNC: 25 MG/DL (ref 5–40)

## 2022-02-28 DIAGNOSIS — E78.5 HYPERLIPIDEMIA, UNSPECIFIED HYPERLIPIDEMIA TYPE: ICD-10-CM

## 2022-02-28 DIAGNOSIS — E29.1 HYPOGONADISM MALE: ICD-10-CM

## 2022-02-28 RX ORDER — DAPAGLIFLOZIN 10 MG/1
TABLET, FILM COATED ORAL
Qty: 60 TABLET | Refills: 0 | OUTPATIENT
Start: 2022-02-28

## 2022-02-28 RX ORDER — EZETIMIBE 10 MG/1
TABLET ORAL
Qty: 30 TABLET | Refills: 0 | OUTPATIENT
Start: 2022-02-28

## 2022-02-28 NOTE — PROGRESS NOTES
Specialty Pharmacy Patient Management Program  Endocrinology Introduction to Services Outreach     Marek Diego is a 56 y.o. male with Type 2 Diabetes seen by an Endocrinology provider. This was an initial visit to introduce Endocrinology Patient Management Program and Specialty Pharmacy services offered by [facility], as the patient is taking a specialty medication.  Allergies, PMH, and medications were reviewed during this visit.      Relevant Past Medical History and Comorbidities  Past Medical History:   Diagnosis Date   • Allergic rhinitis 10/24/2013    10/24/2013--skin testing revealed impressive reactions to grass following, tree pollen, weed pollen, ragweed, dust mite, and cat. Recommend immunotherapy.   • Benign essential hypertension 2/22/2016   • Chronic constipation 11/20/2017 11/20/2017--patient reports approximately 8 month history of intermittent constipation that at times can last as much as a month.  He notes his blood sugar readings tend to increase when this happens.  He is scheduled for colonoscopy next week.  I recommend a generic probiotic daily as well as MiraLAX and adjust as needed.   • Chronic neck pain 10/30/2015    12/19/2015--patient reports his left shoulder pain has resolved. He continues to have neck pain. X-ray of the cervical spine ordered. Physical therapy ordered.   11/10/2015--left shoulder injected with 80 mg of Depo-Medrol with 3 mL of Xylocaine.   10/30/2015--patient presents with at least a one-month history of intermittent left-sided neck pain that is associated with left shoulder pain as well. The pain is described as shooting and is 8 out of 10 intensity at its worst. The pain is worse with certain movements of his head and left shoulder. No trauma. No numbness or paresthesias.   • Depression with anxiety 2/22/2016   • Diabetic eye exam (HCC) 5/27/2016    May 2016--patient reports a normal diabetic eye exam.   • Diabetic foot exam 4/27/2017     05/02/2017--routine diabetic foot exam reveals no evidence of diabetic foot ulcer or pre-ulcerative callus.  Pulses are palpable and there is no signs of ischemia.  Sensation subjectively intact.   • Dysfunction of both eustachian tubes 9/6/2019 September 6, 2019--patient with severe environmental allergies/allergic rhinitis presents with complaints of his ears popping like he is been on an airplane and a sensation of pressure at times.  At times his hearing is decreased.  On exam I do not see any definite serous otitis media.  I do think his environmental allergies are playing a role but I think it would be reasonable for ENT to evaluate   • Gastroesophageal reflux disease with esophagitis 5/22/2015 08/12/2015--EGD revealed esophagitis and a distal esophageal stricture that was dilated. Small sliding hiatal hernia. Proximal gastric ulcer and gastritis present. Dysphagia resolved after dilatation. Pathology of the gastric antrum was benign with no significant histologic abnormality. Distal esophagus biopsy negative for intestinal metaplasia or dysplasia.   05/22/2015--patient presents with a several month history of progressively worsening intermittent dysphagia of solids but not liquids. He describes solid food sometimes sticking and points to his upper chest/lower throat. No other associated symptoms. Patient referred to Dr. Aime Parada for an EGD. This is negative, may need ENT evaluation.   • History of colon polyps, 11/29/2017--tubular adenoma times one.  Hyperplastic ×1.  Repeat 5 years. 12/18/2017 11/29/2017--colonoscopy revealed 2 small (3 mm) polyps in the sigmoid colon and cecum.  Removed.  Bowel prep.  Sigmoid diverticuli noted.  No hemorrhoids.  Pathology returned hyperplastic polyp of the sigmoid and the cecal polyp was a tubular adenoma.   • HIstory of eosinophilic gastroenteritis 1990s 1990s--patient had significant epigastric discomfort and workup including an EGD revealed eosinophilic  gastroenteritis that was treated with prednisone and resulted in resolution.   • History of esophageal stricture 08/12/2015 08/12/2015--EGD revealed esophagitis and a distal esophageal stricture that was dilated. Small sliding hiatal hernia. Proximal gastric ulcer and gastritis present. Dysphagia resolved after dilatation. Pathology of the gastric antrum was benign with no significant histologic abnormality. Distal esophagus biopsy negative for intestinal metaplasia or dysplasia.   05/22/2015--patient presents with a s   • History of Pulmonary nodule, 03/07/2016--5 mm indeterminate nodule right lower lobe.  09/13/2016--consistent with calcified granuloma. 3/7/2016    09/13/2016--CT scan of the chest, abdomen, and pelvis with contrast reveals no lymphadenopathy within the abdomen or pelvis.  Mild hepatic steatosis.  Previously noted right lower lobe pulmonary nodule is currently calcified and consistent with a calcified granuloma.  03/07/2016--CT scan of the abdomen performed for complaints of abdominal discomfort revealed a 5 mm nodular focus right lower lobe which is indeterminate.  Recommend repeat CT scan in 6 months.   • Hypogonadism male, no TRT. 12/26/2012 12/26/2012--treatment for hypogonadism begun.   • Microalbuminuria 10/19/2014    10/19/2014--urine microalbumin mildly elevated at 27.8. Normal range 0.0--17.0.   • Moderate persistent asthma without complication 2/22/2016    Seasonal, spring and fall.   • Morbid obesity (HCC) 2/22/2016 November 12, 2019--current weight is 191 pounds representing a 1 pound weight loss.  It appears the patient needs a drug holiday from the phentermine.  I will have him stay off of it for 1 month and then restarted back at the current dose and then we will reassess after the first the year when he comes in for his regular follow-up and physical.  September 6, 2019--current weight is 192 pounds repr   • Multiple environmental allergies 10/24/2013    10/24/2013--skin  testing revealed impressive reactions to grass following, tree pollen, weed pollen, ragweed, dust mite, and cat. Recommend immunotherapy.   • Non morbid obesity 2/22/2016   • Right bundle branch block     ECG reveals sinus rhythm, rate of 75, right bundle branch block, left anterior hemiblock   • Subclinical hypothyroidism 8/17/2018 08/27/2018--thyroid ultrasound reveals subcentimeter nodule in the right thyroid lobe.  Possibly cystic.  There is a question of an ill-defined 1 cm nodular lesion posteriorly in the mid left lateral thyroid.  Appearance could be technical in origin.  Recommend repeat thyroid ultrasound in 6 months.  08/17/2018--routine follow-up.  TSH mildly elevated at 4.49.  Free T3 and free T4 are normal.  Rep   • Thyroid nodule 10/5/2018    08/27/2018--thyroid ultrasound reveals subcentimeter nodule in the right thyroid lobe.  Possibly cystic.  There is a question of an ill-defined 1 cm nodular lesion posteriorly in the mid left lateral thyroid.  Appearance could be technical in origin.  Recommend repeat thyroid ultrasound in 6 months.  08/17/2018--routine follow-up.  TSH mildly elevated at 4.49.  Free T3 and free T4 are normal.  Repeat thyroid function tests ordered and returned normal.  Thyroid ultrasound ordered.   • Type 2 diabetes mellitus with microalbuminuria, with long-term current use of insulin ([facility]) 1/1/2004    2004--initial diagnosis of type 2 diabetes.   • Vitamin D deficiency 2/22/2016     Social History     Socioeconomic History   • Marital status:      Spouse name: Luis    • Number of children: 2   • Years of education: 16   • Highest education level: Bachelor's degree (e.g., BA, AB, BS)   Tobacco Use   • Smoking status: Never Smoker   • Smokeless tobacco: Never Used   Vaping Use   • Vaping Use: Never used   Substance and Sexual Activity   • Alcohol use: Yes   • Drug use: No   • Sexual activity: Yes     Partners: Female            Allergies  Latex         Current Medication  List    Current Outpatient Medications:   •  albuterol sulfate  (90 Base) MCG/ACT inhaler, Inhale 2 puffs Every 4 (Four) Hours As Needed for Wheezing or Shortness of Air., Disp: 18 g, Rfl: 0  •  amLODIPine-benazepril (LOTREL 5-20) 5-20 MG per capsule, TAKE 1 CAPSULE BY MOUTH ONE TIME A DAY , Disp: 30 capsule, Rfl: 5  •  Chlorcyclizine-Pseudoephed (STAHIST AD) 25-60 MG tablet, 1 by mouth every 6 hours as needed for congestion and allergies, not greater than 3 in 24 hours, Disp: 60 tablet, Rfl: 2  •  Cholecalciferol (VITAMIN D3) 5000 UNITS capsule capsule, Take 1 capsule by mouth daily., Disp: , Rfl:   •  Dulaglutide (Trulicity) 4.5 MG/0.5ML solution pen-injector, Inject 4.5 mg under the skin into the appropriate area as directed 1 (One) Time Per Week., Disp: 4 pen, Rfl: 11  •  esomeprazole (nexIUM) 40 MG capsule, TAKE 1 CAPSULE BY MOUTH EVERY DAY, Disp: 90 capsule, Rfl: 0  •  ezetimibe (ZETIA) 10 MG tablet, TAKE 1 TABLET BY MOUTH EVERY DAY, Disp: 30 tablet, Rfl: 0  •  Farxiga 10 MG tablet, TAKE 1 TABLET BY MOUTH ONE TIME A DAY before breakfast , Disp: 60 tablet, Rfl: 5  •  fluconazole (DIFLUCAN) 200 MG tablet, TAKE 1 TABLET BY MOUTH EVERY DAY AS DIRECTED FOR RECURRENT FUNGAL INFECTION., Disp: 7 tablet, Rfl: 2  •  fluocinonide-emollient (LIDEX-E) 0.05 % cream, Apply topically twice daily as needed hand eczema, Disp: 60 g, Rfl: 0  •  fluticasone-salmeterol (Advair Diskus) 250-50 MCG/DOSE DISKUS, Inhale 1 puff 2 (Two) Times a Day., Disp: 60 each, Rfl: 11  •  Insulin Glargine (BASAGLAR KWIKPEN) 100 UNIT/ML injection pen, INJECT 60 UNITS INTO THE SKIN IN THE MORNING AND 60 UNITS AT BEDTIME, Disp: 45 mL, Rfl: 2  •  Insulin Pen Needle (B-D UF III MINI PEN NEEDLES) 31G X 5 MM misc, use to inject insulin 6 times daily, Disp: 200 each, Rfl: 0  •  ketoconazole (NIZORAL) 2 % cream, Apply  topically to the appropriate area as directed Take As Directed., Disp: , Rfl:   •  loratadine (Claritin) 10 MG tablet, Take 10 mg by  mouth Daily., Disp: , Rfl:   •  metFORMIN (GLUCOPHAGE) 1000 MG tablet, TAKE 1 TABLET BY MOUTH TWO TIMES A DAY WITH MEALS, Disp: 180 tablet, Rfl: 0  •  montelukast (SINGULAIR) 10 MG tablet, TAKE 1 TABLET BY MOUTH EVERY DAY , Disp: 30 tablet, Rfl: 5  •  NovoLOG FlexPen 100 UNIT/ML solution pen-injector sc pen, INJECT 15 UNITS INTO THE SKIN WITH EACH MEAL, MAX  UNITS PER DAY, Disp: 90 mL, Rfl: 0  •  rosuvastatin (CRESTOR) 40 MG tablet, TAKE 1 TABLET BY MOUTH AT BEDTIME , Disp: 90 tablet, Rfl: 0  •  sertraline (ZOLOFT) 50 MG tablet, TAKE 1 TABLET BY MOUTH ONE TIME A DAY , Disp: 30 tablet, Rfl: 6  •  Testosterone 1.62 % gel, APPLY 2 PUMPS TOPICALLY ONCE DAILY, Disp: 75 g, Rfl: 0         Recommended Medications Assessment  • Aspirin - Not Indicated  • Statin - Currently Taking  • ACEi/ARB - Not Indicated    Initial Education Provided for Specialty Medication  The patient has been provided with the following education and any applicable administration techniques (i.e. self-injection) have been demonstrated for the therapies indicated. All questions and concerns have been addressed prior to the patient receiving the medication, and the patient has verbalized understanding of the education and any materials provided.  Additional patient education shall be provided and documented upon request by the patient, provider or payer.      FARXIGA® (dapagliflozin)  Medication Expectations   Why am I taking this medication? You are taking Farxiga to lower blood sugar because you have type 2 diabetes. Diabetes is not curable but with proper medication and treatment, we can keep your blood sugar within your personalized target range. This medication also helps reduce the risk of progression of kidney disease, reduce the risk of death from heart attack or stroke, and reduce the risk of heart failure hospitalization.    What should I expect while on this medication? You should expect to see your blood sugar and A1c decrease over  time. You may also see a decrease in your blood pressure and it can help some people lose weight.     How does the medication work? Farxiga works by helping to remove some sugar that the body doesn't need through urination.    How long will I be on this medication for? The amount of time you will be on this medication will be determined by your doctor based on blood sugar and A1c control. You will most likely be on this medication or another diabetes medication throughout your lifetime. Do not abruptly stop this medication without talking to your doctor first.    How do I take this medication? Take as directed on your prescription label. This medication is usually taken in the morning and can be given with or without food.    What are some possible side effects? You may notice increased urination, especially when you first start Farxiga. Stuffy or runny nose can also occur. The most common side effects are urinary tract infections and yeast infections and are more commonly seen in females. Talk with your doctor if you notice white or yellow vaginal discharge, vaginal itching or odor of if you notice redness, itching, pain, or swelling of the penis and/or bad-smelling discharge from the penis.    What happens if I miss a dose? If you miss a dose, take it as soon as you remember. If it is close to your next dose, skip it (do not take 2 doses at once)     Medication Safety   What are things I should warn my doctor immediately about? Tell your doctor if you have kidney disease, liver disease, heart failure, pancreas problems, or history of frequent genital yeast or urinary tract infections. Tell your doctor if you are on a low-salt diet, if you drink alcohol, or if you are having surgery. Talk to your doctor if you are pregnant, planning to become pregnant, or breastfeeding. Also tell your doctor if you notice any signs/symptoms of an allergic reaction (rash, hives, difficulty breathing, etc.).   What are things that I  should be cautious of? Be cautious of any side effects from this medication. Talk to your doctor if any new ones develop or aren't getting better.   What are some medications that can interact with this one? Some medications that interact include diuretics (water pills) and other medications that may also lower your blood sugar such as insulins and glipizide/glimepiride/glyburide. Your doctor may reduce the dose of these medications when you start Farxiga to minimize low blood sugars. Always tell your doctor or pharmacist immediately if you start taking any new medications, including over-the-counter medications, vitamins, and herbal supplements.      Medication Storage/Handling   How should I handle this medication? Keep this medication out of reach of pets/children in tightly sealed container   How does this medication need to be stored? Store at room temperature and keep dry (don't keep in bathroom or other room with moisture)   How should I dispose of this medication? There should not be a need to dispose of this medication unless your provider decides to change the dose or therapy. If that is the case, take to your local police station for proper disposal. Some pharmacies also have take-back bins for medication drop-off.      Resources/Support   How can I remind myself to take this medication? You can download reminder apps to help you manage your refills. You may also set an alarm on your phone to remind you. The pharmacy carries pill boxes that you can place next to an area you pass everyday (such as where you place your car keys or where you charge your phone)   Is financial support available?  [employer] can provide co-pay cards if you have commercial insurance or patient assistance if you have Medicare or no insurance.    Which vaccines are recommended for me? Talk to your doctor about these vaccines: Flu, Coronavirus (COVID-19), Pneumococcal (pneumonia), Tdap, Hepatitis B, Zoster (shingles)       TRULICITY® (dulaglutide)  Medication Expectations   Why am I taking this medication? You are taking Trulicity, along with diet and exercise, to lower blood sugar because you have type 2 diabetes. Diabetes is not curable but with proper medication and treatment, you can keep your blood sugar within your personalized target range. This medication may also help you lose some weight, and it helps reduce the risk of death from heart attack, and stroke in adults with type 2 diabetes and known heart disease.   What should I expect while on this medication? You should expect to see your blood sugar and A1c decrease over time and you may also lose some weight.   How does the medication work? Trulicity is a non-insulin injection that works with your body's own ability to lower blood sugar and A1c and helps your body release its own insulin in response to your blood sugar rising.  This medication also slows down food from leaving your stomach, making you feel joiner for longer.   How long will I be on this medication for? The amount of time you will be on this medication will be determined by your doctor based on blood sugar and A1c control. You will most likely be on this medication or another diabetes medication throughout your lifetime. Do not abruptly stop this medication without talking to your doctor first.    How do I take this medication? Take as directed on your prescription label. Trulicity is supplied in a single-use pen for each dose and you will use a new pre-filled pen each week.  It is injected under the skin (subcutaneously) of your stomach, thigh or upper arm.  You may inject in the same body area each week, but make sure to use a different spot each time.  Use this medication once weekly, on the same day each week, with or without food.      First, choose your injection site and clean the area, allowing it to dry completely.  Make sure the pen is in the locked position and remove the cap by pulling it  straight off.    • Turn the pen to the unlocked position and place the clear base firmly against your skin at your injection site.  Press and hold the green button; you will hear a click once the injection starts.    • You will hear a second click when the needle starts retracting.  The injection will take about 5-10 seconds.    • Continue pressing against your skin until the gray plunger is visible, and then you will slowly lift the pen from your skin and dispose of the pen (detailed below in “Medication Storage / Handling”).    What are some possible side effects? You may notice you don't feel as hungry, especially when you first start using Trulicity.  In addition to decreased appetite, the most common side effects are nausea, diarrhea, vomiting, stomach pain, indigestion, and fatigue.  Redness, itching, and/or swelling can occur where the shot was given. You should also monitor for low blood sugar (hypoglycemia), especially if you are taking Trulicity with other medications that cause low blood sugar.    What happens if I miss a dose? If you miss a dose, take it as soon as you remember as long as there are at least 3 days until the next scheduled dose.  If there are less than 3 days, skip the missed dose and resume Trulicity on the regularly scheduled day.  Do not take 2 doses at the same time or extra doses.     Medication Safety   What are things I should warn my doctor immediately about? Do not use Trulicity if you or a family member have ever had medullary thyroid cancer (MTC) or Multiple Endocrine Neoplasia syndrome type 2 (MEN 2).    • Tell your doctor if you get a lump or swelling in your neck, hoarseness, difficulty swallowing, or feel short of breath (these may be symptoms of thyroid cancer).    Tell your doctor if you have or have had problems with your kidneys or pancreas.   • Stop using Trulicity and get medical help right away if you have severe pain in your stomach area that will not go away as  this could be a sign of pancreatitis (inflammation of your pancreas)    Tell your doctor if you have problem digesting food or slowed emptying of your stomach (gastroparesis).      Let your doctor know if you have changes in vision while taking Trulicity or have been diagnosed with diabetic retinopathy.    Talk to your doctor if you are pregnant, planning to become pregnant, or breastfeeding.     Get medical help right away if you notice any signs/symptoms of an allergic reaction (rash, hives, difficulty breathing, etc.).   What are things that I should be cautious of? Be cautious of any side effects from this medication. Talk to your doctor if any new ones develop or aren't getting better.   What are some medications that can interact with this one? Taking Trulicity with other medications that also lower your blood sugar such as insulin and glipizide/glimepiride/glyburide may increase the risk of low blood sugar.  • Your doctor may reduce the dose of these medications when you start Trulicity to minimize low blood sugars    It should not be taken with other medicines called GLP-1 receptor agonists, because these work the same way as Trulicity.      Because Trulicity slows stomach emptying, it can affect the way some medicines work.    Always tell your doctor or pharmacist immediately if you start taking any new medications, including over-the-counter medications, vitamins, and herbal supplements.      Medication Storage/Handling   How should I handle this medication? Keep this medication out of reach of pets/children and keep the pen capped when not in use.  Do not share your medicine pens with others.   How does this medication need to be stored? Store Trulicity in the refrigerator [2°C to 8°C (36°F to 46°F)]; do not freeze and do not use if Trulicity has been frozen.  You may store your Trulicity pens at room temperature [8°C to 30°C (46°F to 86°F)] for up to 14 days.  Protect from excessive heat and sunlight.   Keep in the original carton until time of administration.   How should I dispose of this medication? Used Trulicity pens should discarded after each use (for single use only). Place your used Trulicity pen in an approved sharps container after use.  If you do not have a sharps container, you may use a household container made of heavy-duty plastic with a tight-fitting lid that is leak resistant (e.g., heavy-duty plastic laundry detergent bottle).      If your doctor decides to stop this medication, take to your local police station for proper disposal. Some pharmacies also have take-back bins for medication drop-off.     Resources/Support   How can I remind myself to take this medication? You can download reminder apps to help you manage your refills. You may also set an alarm on your phone to remind you.    Is financial support available?  [employer] can provide co-pay cards if you have commercial insurance or patient assistance if you have Medicare or no insurance.    Which vaccines are recommended for me? Talk to your doctor about these vaccines:  • Flu   • Coronavirus (COVID-19)   • Pneumococcal (pneumonia)   • Tdap   • Hepatitis B   • Zoster (shingles)        Reassessment Plan & Follow-Up  1. Medication Therapy Changes: Increase nightly Basaglar to 65 units   2. Additional Plans, Therapy Recommendations, or Therapy Problems to Be Addressed: N/A   3. Patient declined filling their specialty medication(s) at [facility] and/or enrollment in the Endocrine Disorders Patient Management Program at this time.       Marlin Hurtado, [place], BCCP, BCPS   2/28/2022  13:50 EST

## 2022-03-02 DIAGNOSIS — E11.9 TYPE 2 DIABETES MELLITUS WITHOUT COMPLICATION, WITHOUT LONG-TERM CURRENT USE OF INSULIN: Chronic | ICD-10-CM

## 2022-03-03 RX ORDER — INSULIN GLARGINE 100 [IU]/ML
INJECTION, SOLUTION SUBCUTANEOUS
Qty: 120 ML | Refills: 1 | Status: SHIPPED | OUTPATIENT
Start: 2022-03-03 | End: 2022-09-22

## 2022-03-04 DIAGNOSIS — E29.1 HYPOGONADISM MALE: ICD-10-CM

## 2022-03-07 RX ORDER — TESTOSTERONE 20.25 MG/1.25G
75 GEL TOPICAL ONCE
Qty: 75 G | Refills: 0 | Status: SHIPPED | OUTPATIENT
Start: 2022-03-07 | End: 2022-05-16

## 2022-03-27 DIAGNOSIS — R80.9 TYPE 2 DIABETES MELLITUS WITH MICROALBUMINURIA, WITH LONG-TERM CURRENT USE OF INSULIN: ICD-10-CM

## 2022-03-27 DIAGNOSIS — Z79.4 TYPE 2 DIABETES MELLITUS WITH MICROALBUMINURIA, WITH LONG-TERM CURRENT USE OF INSULIN: ICD-10-CM

## 2022-03-27 DIAGNOSIS — E11.29 TYPE 2 DIABETES MELLITUS WITH MICROALBUMINURIA, WITH LONG-TERM CURRENT USE OF INSULIN: ICD-10-CM

## 2022-03-27 DIAGNOSIS — E78.2 HYPERLIPEMIA, MIXED: ICD-10-CM

## 2022-03-28 RX ORDER — INSULIN ASPART 100 [IU]/ML
INJECTION, SOLUTION INTRAVENOUS; SUBCUTANEOUS
Qty: 90 ML | Refills: 0 | Status: SHIPPED | OUTPATIENT
Start: 2022-03-28 | End: 2022-06-26

## 2022-04-28 RX ORDER — FLURBIPROFEN SODIUM 0.3 MG/ML
SOLUTION/ DROPS OPHTHALMIC
Qty: 200 EACH | Refills: 0 | Status: SHIPPED | OUTPATIENT
Start: 2022-04-28 | End: 2022-09-06

## 2022-05-11 DIAGNOSIS — E29.1 HYPOGONADISM MALE: ICD-10-CM

## 2022-05-16 RX ORDER — TESTOSTERONE 20.25 MG/1.25G
GEL TOPICAL
Qty: 75 G | Refills: 0 | Status: SHIPPED | OUTPATIENT
Start: 2022-05-16 | End: 2022-06-27

## 2022-05-24 ENCOUNTER — OFFICE VISIT (OUTPATIENT)
Dept: ENDOCRINOLOGY | Age: 57
End: 2022-05-24

## 2022-05-24 VITALS
HEIGHT: 67 IN | SYSTOLIC BLOOD PRESSURE: 125 MMHG | OXYGEN SATURATION: 98 % | BODY MASS INDEX: 35.88 KG/M2 | HEART RATE: 80 BPM | WEIGHT: 228.6 LBS | DIASTOLIC BLOOD PRESSURE: 82 MMHG | TEMPERATURE: 97.5 F

## 2022-05-24 DIAGNOSIS — R80.9 TYPE 2 DIABETES MELLITUS WITH MICROALBUMINURIA, WITH LONG-TERM CURRENT USE OF INSULIN: Primary | ICD-10-CM

## 2022-05-24 DIAGNOSIS — E11.69 TYPE 2 DIABETES MELLITUS WITH OBESITY: ICD-10-CM

## 2022-05-24 DIAGNOSIS — Z79.4 LONG-TERM INSULIN USE: ICD-10-CM

## 2022-05-24 DIAGNOSIS — E29.1 HYPOGONADISM MALE: ICD-10-CM

## 2022-05-24 DIAGNOSIS — E78.5 HYPERLIPIDEMIA ASSOCIATED WITH TYPE 2 DIABETES MELLITUS: ICD-10-CM

## 2022-05-24 DIAGNOSIS — E11.69 HYPERLIPIDEMIA ASSOCIATED WITH TYPE 2 DIABETES MELLITUS: ICD-10-CM

## 2022-05-24 DIAGNOSIS — E11.29 TYPE 2 DIABETES MELLITUS WITH MICROALBUMINURIA, WITH LONG-TERM CURRENT USE OF INSULIN: Primary | ICD-10-CM

## 2022-05-24 DIAGNOSIS — Z79.4 TYPE 2 DIABETES MELLITUS WITH MICROALBUMINURIA, WITH LONG-TERM CURRENT USE OF INSULIN: Primary | ICD-10-CM

## 2022-05-24 DIAGNOSIS — E66.9 TYPE 2 DIABETES MELLITUS WITH OBESITY: ICD-10-CM

## 2022-05-24 PROCEDURE — 99214 OFFICE O/P EST MOD 30 MIN: CPT | Performed by: INTERNAL MEDICINE

## 2022-05-24 RX ORDER — BENZONATATE 200 MG/1
200 CAPSULE ORAL 3 TIMES DAILY PRN
COMMUNITY
Start: 2022-05-17

## 2022-05-24 RX ORDER — SILDENAFIL 100 MG/1
100 TABLET, FILM COATED ORAL DAILY
COMMUNITY
Start: 2022-04-21

## 2022-05-24 RX ORDER — DOXYCYCLINE HYCLATE 100 MG
100 TABLET ORAL 2 TIMES DAILY
COMMUNITY
Start: 2022-05-18

## 2022-05-24 NOTE — PROGRESS NOTES
"Chief Complaint  Chief Complaint   Patient presents with   • Diabetes     Type 2       Subjective          History of Present Illness    Marek Diego 57 y.o. presents with Type 2 dm as a F/u pt    Type 2 dm - Diagnosed about 10 + years ago.   Today in clinic pt reports being on farxiga 10 mg po daily, metformin 1000 mg po bid, novolog - 18 units plus ssi, basaglar 60 units bid, trulicity - 4.5 mg subq once a week   Avg bg - 100 - 200  Checks BG - 3 - 5 times /day  Sensor - x  Dm retinopathy - x,Last eye exam -   Dm nephropathy - x  Dm neuropathy -x ,Dm neuropathy meds -   CAD -x, history of cardiac surgery from congenital abnormality  CVA -x  Episodes of hypoglycemia - x  Pt is physically active. weight has been stable.   Pt tries to follow DM diet for most part.     Hyperlipidemia  On Crestor 40 mg oral daily     Hypogonadism -patient was on testosterone replacement through his primary care at one time, after his primary care changed he lost his testosterone prescription. However he reports feeling tired and wants to go back on the testosterone replacement if possible.  History of sleep apnea, on CPAP machine and is compliant with it.  No hx of blood clots and no blood in urine.   On androgel - 1 pump per shoulder      Reviewed primary care physician's/consulting physician documentation and lab results         I have reviewed the patient's allergies, medicines, past medical hx, family hx and social hx in detail.    Objective   Vital Signs:   /82   Pulse 80   Temp 97.5 °F (36.4 °C)   Ht 170.2 cm (67.01\")   Wt 104 kg (228 lb 9.6 oz)   SpO2 98%   BMI 35.80 kg/m²   Physical Exam   General appearance - no distress  Eyes- anicteric sclera  Ear nose and throat-external ears and nose normal.    Respiratory-normal chest on inspection.  No respiratory distress noted.  Skin-no rashes.  Neuro-alert and oriented x3            Result Review :   The following data was reviewed by: Mana Scott MD on " 05/24/2022:  Office Visit on 02/25/2022   Component Date Value Ref Range Status   • Hemoglobin A1C 02/25/2022 7.5 (A) 4.8 - 5.6 % Final    Comment:          Prediabetes: 5.7 - 6.4           Diabetes: >6.4           Glycemic control for adults with diabetes: <7.0     • Glucose 02/25/2022 123 (A) 65 - 99 mg/dL Final   • BUN 02/25/2022 25 (A) 6 - 24 mg/dL Final   • Creatinine 02/25/2022 1.23  0.76 - 1.27 mg/dL Final    Comment:                **Effective February 28, 2022 [facility] will begin**                   reporting the 2021 CKD-EPI creatinine equation that                   estimates kidney function without a race variable.     • eGFR Non  Am 02/25/2022 66  >59 mL/min/1.73 Final   • eGFR African Am 02/25/2022 76  >59 mL/min/1.73 Final    Comment: **In accordance with recommendations from the NKF-ASN Task force,**    [facility] is in the process of updating its eGFR calculation to the    2021 CKD-EPI creatinine equation that estimates kidney function    without a race variable.     • BUN/Creatinine Ratio 02/25/2022 20  9 - 20 Final   • Sodium 02/25/2022 139  134 - 144 mmol/L Final   • Potassium 02/25/2022 5.4 (A) 3.5 - 5.2 mmol/L Final   • Chloride 02/25/2022 101  96 - 106 mmol/L Final   • Total CO2 02/25/2022 18 (A) 20 - 29 mmol/L Final   • Calcium 02/25/2022 9.3  8.7 - 10.2 mg/dL Final   • Total Protein 02/25/2022 6.5  6.0 - 8.5 g/dL Final   • Albumin 02/25/2022 4.3  3.8 - 4.9 g/dL Final   • Globulin 02/25/2022 2.2  1.5 - 4.5 g/dL Final   • A/G Ratio 02/25/2022 2.0  1.2 - 2.2 Final   • Total Bilirubin 02/25/2022 0.3  0.0 - 1.2 mg/dL Final   • Alkaline Phosphatase 02/25/2022 78  44 - 121 IU/L Final   • AST (SGOT) 02/25/2022 26  0 - 40 IU/L Final   • ALT (SGPT) 02/25/2022 34  0 - 44 IU/L Final   • Total Cholesterol 02/25/2022 99 (A) 100 - 199 mg/dL Final   • Triglycerides 02/25/2022 140  0 - 149 mg/dL Final   • HDL Cholesterol 02/25/2022 28 (A) >39 mg/dL Final   • VLDL Cholesterol Chris 02/25/2022 25  5 - 40  mg/dL Final   • LDL Chol Calc ([facility]) 02/25/2022 46  0 - 99 mg/dL Final   • Creatinine, Urine 02/25/2022 52.9  Not Estab. mg/dL Final   • Microalbumin, Urine 02/25/2022 <3.0  Not Estab. ug/mL Final    **Verified by repeat analysis**   • Microalbumin/Creatinine Ratio 02/25/2022 <6  0 - 29 mg/g creat Final    Comment:                        Normal:                0 -  29                         Moderately increased: 30 - 300                         Severely increased:       >300     • TSH 02/25/2022 1.540  0.450 - 4.500 uIU/mL Final   • Free T4 02/25/2022 0.92  0.82 - 1.77 ng/dL Final   • Testosterone, Total 02/25/2022 61 (A) 264 - 916 ng/dL Final    Comment: Adult male reference interval is based on a population of  healthy nonobese males (BMI <30) between 19 and 39 years old.  Efren et.al. JCEM 2017,102;2534-9896. PMID: 76640236.     • Testosterone, Free 02/25/2022 1.6 (A) 7.2 - 24.0 pg/mL Final   • Sex Hormone Binding Globulin 02/25/2022 13.8 (A) 19.3 - 76.4 nmol/L Final   • PSA 02/25/2022 0.2  0.0 - 4.0 ng/mL Final    Comment: Roche ECLIA methodology.  According to the American Urological Association, Serum PSA should  decrease and remain at undetectable levels after radical  prostatectomy. The AUA defines biochemical recurrence as an initial  PSA value 0.2 ng/mL or greater followed by a subsequent confirmatory  PSA value 0.2 ng/mL or greater.  Values obtained with different assay methods or kits cannot be used  interchangeably. Results cannot be interpreted as absolute evidence  of the presence or absence of malignant disease.     • Hemoglobin 02/25/2022 12.7 (A) 13.0 - 17.7 g/dL Final   • Hematocrit 02/25/2022 38.2  37.5 - 51.0 % Final   • Interpretation 02/25/2022 Note   Final    Supplemental report is available.     Data reviewed: PCP notes       Results Review:    I reviewed the patient's new clinical results.     Assessment and Plan    Problem List Items Addressed This Visit        Other    Hypogonadism  male, no TRT. (Chronic)    Overview     12/26/2012--treatment for hypogonadism begun.           Relevant Orders    TSH    T4, Free    Basic Metabolic Panel    Hemoglobin A1c    Vitamin B12 & Folate    Vitamin D 25 Hydroxy    Hemoglobin & Hematocrit, Blood    PSA DIAGNOSTIC    TestT+TestF+SHBG    Type 2 diabetes mellitus with microalbuminuria, with long-term current use of insulin (HCC) - Primary (Chronic)    Overview     2004--initial diagnosis of type 2 diabetes.           Relevant Orders    TSH    T4, Free    Basic Metabolic Panel    Hemoglobin A1c    Vitamin B12 & Folate    Vitamin D 25 Hydroxy    Hemoglobin & Hematocrit, Blood    PSA DIAGNOSTIC    TestT+TestF+SHBG      Other Visit Diagnoses     Long-term insulin use (HCC)        Relevant Orders    TSH    T4, Free    Basic Metabolic Panel    Hemoglobin A1c    Vitamin B12 & Folate    Vitamin D 25 Hydroxy    Hemoglobin & Hematocrit, Blood    PSA DIAGNOSTIC    TestT+TestF+SHBG    Hyperlipidemia associated with type 2 diabetes mellitus (HCC)        Relevant Orders    TSH    T4, Free    Basic Metabolic Panel    Hemoglobin A1c    Vitamin B12 & Folate    Vitamin D 25 Hydroxy    Hemoglobin & Hematocrit, Blood    PSA DIAGNOSTIC    TestT+TestF+SHBG    Type 2 diabetes mellitus with obesity (HCC)        Relevant Orders    TSH    T4, Free    Basic Metabolic Panel    Hemoglobin A1c    Vitamin B12 & Folate    Vitamin D 25 Hydroxy    Hemoglobin & Hematocrit, Blood    PSA DIAGNOSTIC    TestT+TestF+SHBG        Type 2 diabetes mellitus-uncontrolled with hyperglycemia  Check HbA1c  Continue Farxiga, metformin, Trulicity  Continue NovoLog 18 units plus a sliding scale  Continue Basaglar 60 units twice daily    Hyperlipidemia  Continue Lipitor 40 mg oral daily    Hypogonadotrophic hypogonadism  Patient understands the risk of testosterone replacement  Check PSA, hemoglobin and hematocrit levels.    Interpreted the blood work-up/imaging results performed by the primary care/consulting  "physician -    Refills sent to pharmacy    Follow Up     Patient was given instructions and counseling regarding her condition or for health maintenance advice. Please see specific information pulled into the AVS if appropriate.       Thank you for asking me to see your patient, Marek Diego in consultation.         Mana Scott MD  05/24/22      EMR Dragon / transcription disclaimer:     \"Dictated utilizing Dragon dictation\".         "

## 2022-05-26 LAB
25(OH)D3+25(OH)D2 SERPL-MCNC: 57.1 NG/ML (ref 30–100)
BUN SERPL-MCNC: 23 MG/DL (ref 6–24)
BUN/CREAT SERPL: 19 (ref 9–20)
CALCIUM SERPL-MCNC: 9.7 MG/DL (ref 8.7–10.2)
CHLORIDE SERPL-SCNC: 104 MMOL/L (ref 96–106)
CO2 SERPL-SCNC: 21 MMOL/L (ref 20–29)
CREAT SERPL-MCNC: 1.19 MG/DL (ref 0.76–1.27)
EGFRCR SERPLBLD CKD-EPI 2021: 72 ML/MIN/1.73
FOLATE SERPL-MCNC: 10.6 NG/ML
GLUCOSE SERPL-MCNC: 103 MG/DL (ref 65–99)
HBA1C MFR BLD: 7.4 % (ref 4.8–5.6)
HCT VFR BLD AUTO: 39.9 % (ref 37.5–51)
HGB BLD-MCNC: 13.4 G/DL (ref 13–17.7)
POTASSIUM SERPL-SCNC: 5.2 MMOL/L (ref 3.5–5.2)
PSA SERPL-MCNC: 0.3 NG/ML (ref 0–4)
SHBG SERPL-SCNC: 13.6 NMOL/L (ref 19.3–76.4)
SODIUM SERPL-SCNC: 140 MMOL/L (ref 134–144)
T4 FREE SERPL-MCNC: 1.06 NG/DL (ref 0.82–1.77)
TESTOST FREE SERPL-MCNC: 8.9 PG/ML (ref 7.2–24)
TESTOST SERPL-MCNC: 362 NG/DL (ref 264–916)
TSH SERPL DL<=0.005 MIU/L-ACNC: 2.75 UIU/ML (ref 0.45–4.5)
VIT B12 SERPL-MCNC: 392 PG/ML (ref 232–1245)

## 2022-05-31 RX ORDER — DULAGLUTIDE 4.5 MG/.5ML
INJECTION, SOLUTION SUBCUTANEOUS
Qty: 2 ML | Refills: 0 | Status: SHIPPED | OUTPATIENT
Start: 2022-05-31 | End: 2022-06-30

## 2022-06-25 DIAGNOSIS — R80.9 TYPE 2 DIABETES MELLITUS WITH MICROALBUMINURIA, WITH LONG-TERM CURRENT USE OF INSULIN: ICD-10-CM

## 2022-06-25 DIAGNOSIS — Z79.4 TYPE 2 DIABETES MELLITUS WITH MICROALBUMINURIA, WITH LONG-TERM CURRENT USE OF INSULIN: ICD-10-CM

## 2022-06-25 DIAGNOSIS — E78.2 HYPERLIPEMIA, MIXED: ICD-10-CM

## 2022-06-25 DIAGNOSIS — E11.29 TYPE 2 DIABETES MELLITUS WITH MICROALBUMINURIA, WITH LONG-TERM CURRENT USE OF INSULIN: ICD-10-CM

## 2022-06-25 DIAGNOSIS — E29.1 HYPOGONADISM MALE: ICD-10-CM

## 2022-06-26 RX ORDER — INSULIN ASPART 100 [IU]/ML
INJECTION, SOLUTION INTRAVENOUS; SUBCUTANEOUS
Qty: 90 ML | Refills: 0 | Status: SHIPPED | OUTPATIENT
Start: 2022-06-26 | End: 2022-09-22

## 2022-06-26 NOTE — TELEPHONE ENCOUNTER
Rx Refill Note  Requested Prescriptions     Pending Prescriptions Disp Refills    Testosterone 1.62 % gel [Pharmacy Med Name: Testosterone Transdermal Gel 1.62 %] 75 g 0     Sig: Apply 2 pumps topically to the appropriate area daily as directed      Last office visit with prescribing clinician: 5/24/2022      Next office visit with prescribing clinician: 2/13/2023            Marj Pedraza  06/26/22, 13:06 EDT

## 2022-06-27 RX ORDER — TESTOSTERONE 20.25 MG/1.25G
GEL TOPICAL
Qty: 75 G | Refills: 0 | Status: SHIPPED | OUTPATIENT
Start: 2022-06-27 | End: 2022-07-28

## 2022-06-30 RX ORDER — DULAGLUTIDE 4.5 MG/.5ML
INJECTION, SOLUTION SUBCUTANEOUS
Qty: 2 ML | Refills: 0 | Status: SHIPPED | OUTPATIENT
Start: 2022-06-30 | End: 2022-07-27 | Stop reason: SDUPTHER

## 2022-07-06 ENCOUNTER — OFFICE VISIT (OUTPATIENT)
Dept: SLEEP MEDICINE | Facility: HOSPITAL | Age: 57
End: 2022-07-06

## 2022-07-06 VITALS
HEIGHT: 67 IN | OXYGEN SATURATION: 97 % | HEART RATE: 84 BPM | DIASTOLIC BLOOD PRESSURE: 68 MMHG | BODY MASS INDEX: 34.69 KG/M2 | WEIGHT: 221 LBS | SYSTOLIC BLOOD PRESSURE: 104 MMHG

## 2022-07-06 DIAGNOSIS — G47.33 OBSTRUCTIVE SLEEP APNEA: Primary | ICD-10-CM

## 2022-07-06 DIAGNOSIS — E66.9 OBESITY WITH SERIOUS COMORBIDITY, UNSPECIFIED CLASSIFICATION, UNSPECIFIED OBESITY TYPE: ICD-10-CM

## 2022-07-06 PROCEDURE — 99213 OFFICE O/P EST LOW 20 MIN: CPT | Performed by: FAMILY MEDICINE

## 2022-07-06 PROCEDURE — G0463 HOSPITAL OUTPT CLINIC VISIT: HCPCS

## 2022-07-06 NOTE — PROGRESS NOTES
Follow Up [facility] Note     Chief Complaint:  STERLING     Primary Care Physician: Neel Patel MD    Marek Diego is a 57 y.o.male  was last seen at [facility] sleep lab: 1/5/2022.  HST April 2021 shows severe STERLING average AHI 49.8.  Started on auto CPAP 8-20 cm H2O.  Last visit was doing better with no problems with mask machine hypersomnia nonrestorative sleep.  Today presents for 6-month follow-up and reports the same.  Bedtime 11:30 PM wake time 7:30 AM ESS of 2.  Nasal mask fits well does not leak air some dry mouth.    Results Review:  DME is [facility].  Downloads between 4/2/2022 - 6/30/2022.  Average usage is 7 hours 24 minutes.  Average AHI is 3.0.  Average AutoCPAP pressure is 14.1 cm H2O.    Current Medications:    Current Outpatient Medications:   •  albuterol sulfate  (90 Base) MCG/ACT inhaler, Inhale 2 puffs Every 4 (Four) Hours As Needed for Wheezing or Shortness of Air., Disp: 18 g, Rfl: 0  •  amLODIPine-benazepril (LOTREL 5-20) 5-20 MG per capsule, TAKE 1 CAPSULE BY MOUTH ONE TIME A DAY , Disp: 30 capsule, Rfl: 5  •  B-D UF III MINI PEN NEEDLES 31G X 5 MM misc, Use to inject insulin 6 times daily, Disp: 200 each, Rfl: 0  •  benzonatate (TESSALON) 200 MG capsule, Take 200 mg by mouth 3 (Three) Times a Day As Needed., Disp: , Rfl:   •  Chlorcyclizine-Pseudoephed (STAHIST AD) 25-60 MG tablet, 1 by mouth every 6 hours as needed for congestion and allergies, not greater than 3 in 24 hours, Disp: 60 tablet, Rfl: 2  •  Cholecalciferol (VITAMIN D3) 5000 UNITS capsule capsule, Take 1 capsule by mouth daily., Disp: , Rfl:   •  doxycycline (VIBRAMYICN) 100 MG tablet, Take 100 mg by mouth 2 (Two) Times a Day., Disp: , Rfl:   •  esomeprazole (nexIUM) 40 MG capsule, TAKE 1 CAPSULE BY MOUTH EVERY DAY, Disp: 90 capsule, Rfl: 0  •  ezetimibe (ZETIA) 10 MG tablet, TAKE 1 TABLET BY MOUTH EVERY DAY, Disp: 30 tablet, Rfl: 0  •  Farxiga 10 MG tablet, TAKE 1 TABLET BY MOUTH ONE TIME A DAY before breakfast ,  Disp: 60 tablet, Rfl: 5  •  fluconazole (DIFLUCAN) 200 MG tablet, TAKE 1 TABLET BY MOUTH EVERY DAY AS DIRECTED FOR RECURRENT FUNGAL INFECTION., Disp: 7 tablet, Rfl: 2  •  fluocinonide-emollient (LIDEX-E) 0.05 % cream, Apply topically twice daily as needed hand eczema, Disp: 60 g, Rfl: 0  •  fluticasone-salmeterol (Advair Diskus) 250-50 MCG/DOSE DISKUS, Inhale 1 puff 2 (Two) Times a Day., Disp: 60 each, Rfl: 11  •  Insulin Glargine (BASAGLAR KWIKPEN) 100 UNIT/ML injection pen, inject 60 units into the skin in the morning and 60 units at bedtime, Disp: 120 mL, Rfl: 1  •  ketoconazole (NIZORAL) 2 % cream, Apply  topically to the appropriate area as directed Take As Directed., Disp: , Rfl:   •  loratadine (CLARITIN) 10 MG tablet, Take 10 mg by mouth Daily., Disp: , Rfl:   •  metFORMIN (GLUCOPHAGE) 1000 MG tablet, TAKE 1 TABLET BY MOUTH TWO TIMES A DAY WITH MEALS, Disp: 180 tablet, Rfl: 0  •  montelukast (SINGULAIR) 10 MG tablet, TAKE 1 TABLET BY MOUTH EVERY DAY , Disp: 30 tablet, Rfl: 5  •  NovoLOG FlexPen 100 UNIT/ML solution pen-injector sc pen, INJECT 15 UNITS INTO THE SKIN WITH EACH MEAL.  UNITS PER DAY, Disp: 90 mL, Rfl: 0  •  rosuvastatin (CRESTOR) 40 MG tablet, TAKE 1 TABLET BY MOUTH AT BEDTIME , Disp: 90 tablet, Rfl: 0  •  sertraline (ZOLOFT) 50 MG tablet, TAKE 1 TABLET BY MOUTH ONE TIME A DAY , Disp: 30 tablet, Rfl: 6  •  sildenafil (VIAGRA) 100 MG tablet, Take 100 mg by mouth Daily. Take 1/2 to 1 tablets by mouth as needed, Disp: , Rfl:   •  Testosterone 1.62 % gel, Apply 2 pumps topically to the appropriate area daily as directed, Disp: 75 g, Rfl: 0  •  Trulicity 4.5 MG/0.5ML solution pen-injector, INJECT 4.5 MG UNDER THE SKIN INTO THE APPROPRIATE AREA 1 TIME PER WEEK AS DIRECTED, Disp: 2 mL, Rfl: 0   also entered in Sleep Questionnaire    Patient  has a past medical history of Allergic rhinitis (10/24/2013), Benign essential hypertension (2/22/2016), Chronic constipation (11/20/2017), Chronic neck  "pain (10/30/2015), Depression with anxiety (2/22/2016), Diabetic eye exam (HCC) (5/27/2016), Diabetic foot exam (4/27/2017), Dysfunction of both eustachian tubes (9/6/2019), Gastroesophageal reflux disease with esophagitis (5/22/2015), History of colon polyps, 11/29/2017--tubular adenoma times one.  Hyperplastic ×1.  Repeat 5 years. 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  09/13/2016--consistent with calcified granuloma. (3/7/2016), Hypogonadism male, no TRT. (12/26/2012), Microalbuminuria (10/19/2014), Moderate persistent asthma without complication (2/22/2016), Morbid obesity ([facility]) (2/22/2016), Multiple environmental allergies (10/24/2013), Non morbid obesity (2/22/2016), Right bundle branch block, Subclinical hypothyroidism (8/17/2018), Thyroid nodule (10/5/2018), Type 2 diabetes mellitus with microalbuminuria, with long-term current use of insulin ([facility]) (1/1/2004), and Vitamin D deficiency (2/22/2016).    Social History:    Social History     Socioeconomic History   • Marital status:      Spouse name: Luis    • Number of children: 2   • Years of education: 16   • Highest education level: Bachelor's degree (e.g., BA, AB, BS)   Tobacco Use   • Smoking status: Never Smoker   • Smokeless tobacco: Never Used   Vaping Use   • Vaping Use: Never used   Substance and Sexual Activity   • Alcohol use: Yes   • Drug use: No   • Sexual activity: Yes     Partners: Female       Allergies:  Latex    Vital Signs:    Vitals:    07/06/22 1603   BP: 104/68   Pulse: 84   SpO2: 97%   Weight: 100 kg (221 lb)   Height: 170.2 cm (67.01\")     Body mass index is 34.6 kg/m².    REVIEW OF SYSTEMS.  Full review of systems available on the intake form which is scanned in the media tab.  The relevant positive are noted below  1. Daytime excessive sleepiness with Evangeline Sleepiness Scale " ":Total score: 2   2. Snoring  3. All negative      Physical exam:  Vitals:    07/06/22 1603   BP: 104/68   Pulse: 84   SpO2: 97%   Weight: 100 kg (221 lb)   Height: 170.2 cm (67.01\")    Body mass index is 34.6 kg/m².    HEENT: Head is atraumatic, normocephalic  Eyes: pupils are round equal and reacting to light and accommodation, conjunctiva normal  Throat: tongue normal, oral airway Mallampati class 3  RESPIRATORY SYSTEM: Regular respirations  CARDIOVASULAR SYSTEM: Regular rate  EXTREMITES: No cyanosis, clubbing  NEUROLOGICAL SYSTEM: Oriented x 3, no gross motor defects, gait normal    Impression:  1. Obstructive sleep apnea    2. Obesity with serious comorbidity, unspecified classification, unspecified obesity type        Obstructive sleep apnea adequately treated with auto CPAP 8-20 cm H2O with good compliance and usage and no complaints of hypersomnolence.  Return to clinic in 1 year for follow-up or sooner if needed.    Patient uses the CPAP device and benefits from its use in terms of reduction of hypersomnia and snoring.Weight loss will be strongly beneficial to reduce the severity of sleep-disordered breathing.  Caution during activities that require prolonged concentration is strongly advised if sleepiness returns. Changing of PAP supplies regularly is important for effective use. Patient needs to change cushion on the mask or plugs on nasal pillows along with disposable filters once every month and change mask frame, tubing, headgear and Velcro straps every 6 months at the minimum.    Saranya Burton MD  Sleep Medicine  07/06/22  16:05 EDT      "

## 2022-07-27 DIAGNOSIS — E29.1 HYPOGONADISM MALE: ICD-10-CM

## 2022-07-27 RX ORDER — DULAGLUTIDE 4.5 MG/.5ML
4.5 INJECTION, SOLUTION SUBCUTANEOUS WEEKLY
Qty: 2 ML | Refills: 0 | Status: SHIPPED | OUTPATIENT
Start: 2022-07-27 | End: 2022-09-22

## 2022-07-28 RX ORDER — TESTOSTERONE 20.25 MG/1.25G
GEL TOPICAL
Qty: 75 G | Refills: 0 | Status: SHIPPED | OUTPATIENT
Start: 2022-07-28 | End: 2022-09-08

## 2022-07-28 NOTE — TELEPHONE ENCOUNTER
Conjuntivae and eyelids appear normal,  Sclerae : White without injection Last visit 9/30/20 - no follow up

## 2022-09-01 ENCOUNTER — OFFICE VISIT (OUTPATIENT)
Dept: ORTHOPEDIC SURGERY | Facility: CLINIC | Age: 57
End: 2022-09-01

## 2022-09-01 VITALS — TEMPERATURE: 97.8 F | WEIGHT: 223.4 LBS | BODY MASS INDEX: 35.9 KG/M2 | HEIGHT: 66 IN

## 2022-09-01 DIAGNOSIS — M75.100 TEAR OF ROTATOR CUFF, UNSPECIFIED LATERALITY, UNSPECIFIED TEAR EXTENT, UNSPECIFIED WHETHER TRAUMATIC: ICD-10-CM

## 2022-09-01 DIAGNOSIS — M25.511 BILATERAL SHOULDER PAIN, UNSPECIFIED CHRONICITY: Primary | ICD-10-CM

## 2022-09-01 DIAGNOSIS — M25.512 BILATERAL SHOULDER PAIN, UNSPECIFIED CHRONICITY: Primary | ICD-10-CM

## 2022-09-01 PROCEDURE — 99204 OFFICE O/P NEW MOD 45 MIN: CPT | Performed by: ORTHOPAEDIC SURGERY

## 2022-09-01 PROCEDURE — 20610 DRAIN/INJ JOINT/BURSA W/O US: CPT | Performed by: ORTHOPAEDIC SURGERY

## 2022-09-01 PROCEDURE — 73030 X-RAY EXAM OF SHOULDER: CPT | Performed by: ORTHOPAEDIC SURGERY

## 2022-09-01 RX ADMIN — METHYLPREDNISOLONE ACETATE 80 MG: 40 INJECTION, SUSPENSION INTRA-ARTICULAR; INTRALESIONAL; INTRAMUSCULAR; SOFT TISSUE at 08:48

## 2022-09-01 RX ADMIN — LIDOCAINE HYDROCHLORIDE 2 ML: 10 INJECTION, SOLUTION EPIDURAL; INFILTRATION; INTRACAUDAL; PERINEURAL at 08:48

## 2022-09-01 NOTE — PROGRESS NOTES
New Bilateral Shoulder      Patient: Marek Diego        YOB: 1965    Medical Record Number: [id number]        Chief Complaints: bilateral shoulder pain      History of Present Illness: This is a 57-year-old male who presents complaining of bilateral shoulder pain I have seen him for these back in 2019.  He had a right rotator cuff tear in 2018 was diagnosed with a left partial tear in 2016 his symptoms are 3 out of 10 they are worse with activity no definitive history of injury that he can recall symptoms are moderate intermittent aching better with rest worse with activity past medical history is marked for obesity is a BMI of 36 hypertension constipation bundle branch block thyroid nodule type 2 diabetes      Allergies:   Allergies   Allergen Reactions   • Latex Itching       Medications:   Home Medications:  Current Outpatient Medications on File Prior to Visit   Medication Sig   • amLODIPine-benazepril (LOTREL 5-20) 5-20 MG per capsule TAKE 1 CAPSULE BY MOUTH ONE TIME A DAY    • Cholecalciferol (VITAMIN D3) 5000 UNITS capsule capsule Take 1 capsule by mouth daily.   • Dulaglutide (Trulicity) 4.5 MG/0.5ML solution pen-injector Inject 0.5 mL under the skin into the appropriate area as directed 1 (One) Time Per Week.   • esomeprazole (nexIUM) 40 MG capsule TAKE 1 CAPSULE BY MOUTH EVERY DAY   • ezetimibe (ZETIA) 10 MG tablet TAKE 1 TABLET BY MOUTH EVERY DAY   • Farxiga 10 MG tablet TAKE 1 TABLET BY MOUTH ONE TIME A DAY before breakfast    • Insulin Glargine (BASAGLAR KWIKPEN) 100 UNIT/ML injection pen inject 60 units into the skin in the morning and 60 units at bedtime   • loratadine (CLARITIN) 10 MG tablet Take 10 mg by mouth Daily.   • metFORMIN (GLUCOPHAGE) 1000 MG tablet TAKE 1 TABLET BY MOUTH TWO TIMES A DAY WITH MEALS   • montelukast (SINGULAIR) 10 MG tablet TAKE 1 TABLET BY MOUTH EVERY DAY    • NovoLOG FlexPen 100 UNIT/ML solution pen-injector sc pen INJECT 15 UNITS INTO THE SKIN WITH  EACH MEAL.  UNITS PER DAY   • rosuvastatin (CRESTOR) 40 MG tablet TAKE 1 TABLET BY MOUTH AT BEDTIME    • sertraline (ZOLOFT) 50 MG tablet TAKE 1 TABLET BY MOUTH ONE TIME A DAY    • sildenafil (VIAGRA) 100 MG tablet Take 100 mg by mouth Daily. Take 1/2 to 1 tablets by mouth as needed   • Testosterone 1.62 % gel Apply 2 pumps topically to the appropriate area daily as directed   • albuterol sulfate  (90 Base) MCG/ACT inhaler Inhale 2 puffs Every 4 (Four) Hours As Needed for Wheezing or Shortness of Air.   • B-D UF III MINI PEN NEEDLES 31G X 5 MM misc Use to inject insulin 6 times daily   • benzonatate (TESSALON) 200 MG capsule Take 200 mg by mouth 3 (Three) Times a Day As Needed.   • Chlorcyclizine-Pseudoephed (STAHIST AD) 25-60 MG tablet 1 by mouth every 6 hours as needed for congestion and allergies, not greater than 3 in 24 hours   • doxycycline (VIBRAMYICN) 100 MG tablet Take 100 mg by mouth 2 (Two) Times a Day.   • fluconazole (DIFLUCAN) 200 MG tablet TAKE 1 TABLET BY MOUTH EVERY DAY AS DIRECTED FOR RECURRENT FUNGAL INFECTION.   • fluocinonide-emollient (LIDEX-E) 0.05 % cream Apply topically twice daily as needed hand eczema   • fluticasone-salmeterol (Advair Diskus) 250-50 MCG/DOSE DISKUS Inhale 1 puff 2 (Two) Times a Day.   • ketoconazole (NIZORAL) 2 % cream Apply  topically to the appropriate area as directed Take As Directed.     No current facility-administered medications on file prior to visit.     Current Medications:  Scheduled Meds:  Continuous Infusions:No current facility-administered medications for this visit.    PRN Meds:.    Past Medical History:   Diagnosis Date   • Allergic rhinitis 10/24/2013    10/24/2013--skin testing revealed impressive reactions to grass following, tree pollen, weed pollen, ragweed, dust mite, and cat. Recommend immunotherapy.   • Benign essential hypertension 2/22/2016   • Chronic constipation 11/20/2017 11/20/2017--patient reports approximately 8 month  history of intermittent constipation that at times can last as much as a month.  He notes his blood sugar readings tend to increase when this happens.  He is scheduled for colonoscopy next week.  I recommend a generic probiotic daily as well as MiraLAX and adjust as needed.   • Chronic neck pain 10/30/2015    12/19/2015--patient reports his left shoulder pain has resolved. He continues to have neck pain. X-ray of the cervical spine ordered. Physical therapy ordered.   11/10/2015--left shoulder injected with 80 mg of Depo-Medrol with 3 mL of Xylocaine.   10/30/2015--patient presents with at least a one-month history of intermittent left-sided neck pain that is associated with left shoulder pain as well. The pain is described as shooting and is 8 out of 10 intensity at its worst. The pain is worse with certain movements of his head and left shoulder. No trauma. No numbness or paresthesias.   • Depression with anxiety 2/22/2016   • Diabetic eye exam (HCC) 5/27/2016    May 2016--patient reports a normal diabetic eye exam.   • Diabetic foot exam 4/27/2017 05/02/2017--routine diabetic foot exam reveals no evidence of diabetic foot ulcer or pre-ulcerative callus.  Pulses are palpable and there is no signs of ischemia.  Sensation subjectively intact.   • Dysfunction of both eustachian tubes 9/6/2019 September 6, 2019--patient with severe environmental allergies/allergic rhinitis presents with complaints of his ears popping like he is been on an airplane and a sensation of pressure at times.  At times his hearing is decreased.  On exam I do not see any definite serous otitis media.  I do think his environmental allergies are playing a role but I think it would be reasonable for ENT to evaluate   • Gastroesophageal reflux disease with esophagitis 5/22/2015 08/12/2015--EGD revealed esophagitis and a distal esophageal stricture that was dilated. Small sliding hiatal hernia. Proximal gastric ulcer and gastritis present.  Dysphagia resolved after dilatation. Pathology of the gastric antrum was benign with no significant histologic abnormality. Distal esophagus biopsy negative for intestinal metaplasia or dysplasia.   05/22/2015--patient presents with a several month history of progressively worsening intermittent dysphagia of solids but not liquids. He describes solid food sometimes sticking and points to his upper chest/lower throat. No other associated symptoms. Patient referred to Dr. Aime Parada for an EGD. This is negative, may need ENT evaluation.   • History of colon polyps, 11/29/2017--tubular adenoma times one.  Hyperplastic ×1.  Repeat 5 years. 12/18/2017 11/29/2017--colonoscopy revealed 2 small (3 mm) polyps in the sigmoid colon and cecum.  Removed.  Bowel prep.  Sigmoid diverticuli noted.  No hemorrhoids.  Pathology returned hyperplastic polyp of the sigmoid and the cecal polyp was a tubular adenoma.   • HIstory of eosinophilic gastroenteritis 1990s    1990s--patient had significant epigastric discomfort and workup including an EGD revealed eosinophilic gastroenteritis that was treated with prednisone and resulted in resolution.   • History of esophageal stricture 08/12/2015 08/12/2015--EGD revealed esophagitis and a distal esophageal stricture that was dilated. Small sliding hiatal hernia. Proximal gastric ulcer and gastritis present. Dysphagia resolved after dilatation. Pathology of the gastric antrum was benign with no significant histologic abnormality. Distal esophagus biopsy negative for intestinal metaplasia or dysplasia.   05/22/2015--patient presents with a s   • History of Pulmonary nodule, 03/07/2016--5 mm indeterminate nodule right lower lobe.  09/13/2016--consistent with calcified granuloma. 3/7/2016    09/13/2016--CT scan of the chest, abdomen, and pelvis with contrast reveals no lymphadenopathy within the abdomen or pelvis.  Mild hepatic steatosis.  Previously noted right lower lobe pulmonary nodule  is currently calcified and consistent with a calcified granuloma.  03/07/2016--CT scan of the abdomen performed for complaints of abdominal discomfort revealed a 5 mm nodular focus right lower lobe which is indeterminate.  Recommend repeat CT scan in 6 months.   • Hypogonadism male, no TRT. 12/26/2012 12/26/2012--treatment for hypogonadism begun.   • Microalbuminuria 10/19/2014    10/19/2014--urine microalbumin mildly elevated at 27.8. Normal range 0.0--17.0.   • Moderate persistent asthma without complication 2/22/2016    Seasonal, spring and fall.   • Morbid obesity (HCC) 2/22/2016 November 12, 2019--current weight is 191 pounds representing a 1 pound weight loss.  It appears the patient needs a drug holiday from the phentermine.  I will have him stay off of it for 1 month and then restarted back at the current dose and then we will reassess after the first the year when he comes in for his regular follow-up and physical.  September 6, 2019--current weight is 192 pounds repr   • Multiple environmental allergies 10/24/2013    10/24/2013--skin testing revealed impressive reactions to grass following, tree pollen, weed pollen, ragweed, dust mite, and cat. Recommend immunotherapy.   • Non morbid obesity 2/22/2016   • Right bundle branch block     ECG reveals sinus rhythm, rate of 75, right bundle branch block, left anterior hemiblock   • Subclinical hypothyroidism 8/17/2018 08/27/2018--thyroid ultrasound reveals subcentimeter nodule in the right thyroid lobe.  Possibly cystic.  There is a question of an ill-defined 1 cm nodular lesion posteriorly in the mid left lateral thyroid.  Appearance could be technical in origin.  Recommend repeat thyroid ultrasound in 6 months.  08/17/2018--routine follow-up.  TSH mildly elevated at 4.49.  Free T3 and free T4 are normal.  Rep   • Thyroid nodule 10/5/2018    08/27/2018--thyroid ultrasound reveals subcentimeter nodule in the right thyroid lobe.  Possibly cystic.  There  is a question of an ill-defined 1 cm nodular lesion posteriorly in the mid left lateral thyroid.  Appearance could be technical in origin.  Recommend repeat thyroid ultrasound in 6 months.  08/17/2018--routine follow-up.  TSH mildly elevated at 4.49.  Free T3 and free T4 are normal.  Repeat thyroid function tests ordered and returned normal.  Thyroid ultrasound ordered.   • Type 2 diabetes mellitus with microalbuminuria, with long-term current use of insulin ([facility]) 1/1/2004 2004--initial diagnosis of type 2 diabetes.   • Vitamin D deficiency 2/22/2016        Past Surgical History:   Procedure Laterality Date   • ATRIAL SEPTAL DEFECT REPAIR  03/2012 March 2012--percutaneous closure of secundum ASD.  Dr. Mcpherson   • COLONOSCOPY N/A 11/29/2017 11/29/2017--colonoscopy revealed 2 small (3 mm) polyps in the sigmoid colon and cecum.  Removed.  Bowel prep.  Sigmoid diverticuli noted.  No hemorrhoids.  Pathology returned hyperplastic polyp of the sigmoid and the cecal polyp was a tubular adenoma.   • ESOPHAGEAL DILATATION  08/12/2015 08/12/2015--EGD revealed esophagitis and a distal esophageal stricture that was dilated. Small sliding hiatal hernia. Proximal gastric ulcer and gastritis present. Dysphagia resolved after dilatation. 05/22/2015--patient presents with a several month history of progressively worsening intermittent dysphagia of solids but not liquids. He describes solid food sometimes sticking and points to his upp   • ESOPHAGOSCOPY / EGD  08/12/2015 08/12/2015--EGD revealed esophagitis and a distal esophageal stricture that was dilated. Small sliding hiatal hernia. Proximal gastric ulcer and gastritis present. Dysphagia resolved after dilatation. 05/22/2015--patient presents with a several month history of progressively worsening intermittent dysphagia of solids but not liquids. He describes solid food sometimes sticking and points to his upp   • EYE SURGERY  11/2021   • KNEE ACL RECONSTRUCTION Right  "02/11/2013 02/11/2013--knee arthroscopy with anterior cruciate ligament repair   • TONSILLECTOMY  2009 2009--tonsillectomy as an adult.        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 male  General Appearance:    Alert, cooperative, in no acute distress                   Vitals:    09/01/22 0832   Temp: 97.8 °F (36.6 °C)   Weight: 101 kg (223 lb 6.4 oz)   Height: 167.6 cm (66\")   PainSc:   3      Patient is alert and read ×3 no acute distress appears her above-listed at height weight and age.  Affect is normal respiratory rate is normal unlabored. Heart rate regular rate rhythm, sclera, dentition and hearing are normal for the purpose of this exam.    Ortho Exam Physical exam of the left shoulder reveals no overlying skin changes no lymphedema no lymphadenopathy.  Patient has active flexion 180 with mild symptoms abduction is similar external rotation is to 50 and internal rotation to the upper lumbar spine with mild symptoms.  Patient has good rotator cuff strength 4+ over 5 with isometric strength testing with pain.  Patient has a positive impingement and a positive Dykes sign.  Patient has good cervical range of motion which is full and asymptomatic no radicular symptoms.  Patient has a normal elbow exam.  Good distal pulses are presentPatient has pain with overhead activity and a positive Neer sign and a positive empty can " sign  They have a positive drop arm any definitive painful arc  Physical exam of the right shoulder reveals no overlying skin changes no lymphedema no lymphadenopathy.  Patient has active flexion 180 with mild symptoms abduction is similar external rotation is to 50 and internal rotation to the upper lumbar spine with mild symptoms.  Patient has good rotator cuff strength 4+ over 5 with isometric strength testing with pain.  Patient has a positive impingement and a positive Dykes sign.  Patient has good cervical range of motion which is full and asymptomatic no radicular symptoms.  Patient has a normal elbow exam.  Good distal pulses are present  Patient has pain with overhead activity and a positive Neer sign and a positive empty can sign , a positive drop arm and a definitive painful arc    Large Joint Arthrocentesis: L subacromial bursa  Date/Time: 9/1/2022 8:48 AM  Consent given by: patient  Site marked: site marked  Timeout: Immediately prior to procedure a time out was called to verify the correct patient, procedure, equipment, support staff and site/side marked as required   Supporting Documentation  Indications: pain   Procedure Details  Location: shoulder - L subacromial bursa  Preparation: Patient was prepped and draped in the usual sterile fashion  Needle gauge: 21G.  Approach: posterior  Medications administered: 2 mL lidocaine PF 1% 1 %; 80 mg methylPREDNISolone acetate 40 MG/ML  Patient tolerance: patient tolerated the procedure well with no immediate complications                Radiology:   AP, Scapular Y and Axillary Lateral of the right and left shoulder were ordered/reviewed to evauate shoulder pain.  I have compared x-rays done in 2019 on the left he has some acromioclavicular arthritis there is also small bony density seen probably sitting down on the axillary only seen on axillary lateral.  I do see it back in 2019 on the right he does have some acromioclavicular arthritis as well as some  sclerosis at the insertion of the cuff  Imaging Results (Most Recent)     Procedure Component Value Units Date/Time    XR Shoulder 2+ View Bilateral [[id number]] Resulted: 09/01/22 0818     Updated: 09/01/22 0818    Impression:      Ordering physician's impression is located in the Encounter Note dated 09/01/22. X-ray performed in the DR room.          Assessment/Plan: Bilateral shoulder pain I think this is impingement on both we talked about options as left worse than right he like to start with an injection on that really get back on his exercises if he fails to improve could pursue other means of testing.  He knows what to do his first cuff and core strengthening.  If the left leg gets better we can always inject the right  One he understands his risk of the injection are higher with his diabetes  Cortisone Injection. See procedure note.  Cortisone Injection for DIAGNOSTIC and THERAPUTIC purposes.

## 2022-09-03 RX ORDER — LIDOCAINE HYDROCHLORIDE 10 MG/ML
2 INJECTION, SOLUTION EPIDURAL; INFILTRATION; INTRACAUDAL; PERINEURAL
Status: DISCONTINUED | OUTPATIENT
Start: 2022-09-01 | End: 2022-09-03 | Stop reason: HOSPADM

## 2022-09-03 RX ORDER — METHYLPREDNISOLONE ACETATE 40 MG/ML
80 INJECTION, SUSPENSION INTRA-ARTICULAR; INTRALESIONAL; INTRAMUSCULAR; SOFT TISSUE
Status: DISCONTINUED | OUTPATIENT
Start: 2022-09-01 | End: 2022-09-03 | Stop reason: HOSPADM

## 2022-09-06 RX ORDER — FLURBIPROFEN SODIUM 0.3 MG/ML
SOLUTION/ DROPS OPHTHALMIC
Qty: 600 EACH | Refills: 3 | Status: SHIPPED | OUTPATIENT
Start: 2022-09-06

## 2022-09-07 DIAGNOSIS — E29.1 HYPOGONADISM MALE: ICD-10-CM

## 2022-09-08 RX ORDER — TESTOSTERONE 20.25 MG/1.25G
GEL TOPICAL
Qty: 75 G | Refills: 0 | Status: SHIPPED | OUTPATIENT
Start: 2022-09-08 | End: 2022-10-16

## 2022-09-16 ENCOUNTER — TREATMENT (OUTPATIENT)
Dept: PHYSICAL THERAPY | Facility: CLINIC | Age: 57
End: 2022-09-16

## 2022-09-16 ENCOUNTER — OFFICE VISIT (OUTPATIENT)
Dept: ORTHOPEDIC SURGERY | Facility: CLINIC | Age: 57
End: 2022-09-16

## 2022-09-16 VITALS — BODY MASS INDEX: 35.62 KG/M2 | HEIGHT: 66 IN | TEMPERATURE: 95.1 F | WEIGHT: 221.6 LBS

## 2022-09-16 DIAGNOSIS — R53.1 WEAKNESS GENERALIZED: ICD-10-CM

## 2022-09-16 DIAGNOSIS — M54.2 CERVICALGIA: Primary | ICD-10-CM

## 2022-09-16 DIAGNOSIS — M75.40 IMPINGEMENT SYNDROME OF SHOULDER REGION, UNSPECIFIED LATERALITY: Primary | ICD-10-CM

## 2022-09-16 DIAGNOSIS — G44.86 CERVICOGENIC HEADACHE: ICD-10-CM

## 2022-09-16 PROCEDURE — 97161 PT EVAL LOW COMPLEX 20 MIN: CPT | Performed by: PHYSICAL THERAPIST

## 2022-09-16 PROCEDURE — 97110 THERAPEUTIC EXERCISES: CPT | Performed by: PHYSICAL THERAPIST

## 2022-09-16 PROCEDURE — 99212 OFFICE O/P EST SF 10 MIN: CPT | Performed by: ORTHOPAEDIC SURGERY

## 2022-09-16 PROCEDURE — 97112 NEUROMUSCULAR REEDUCATION: CPT | Performed by: PHYSICAL THERAPIST

## 2022-09-16 NOTE — PROGRESS NOTES
Patient: Marek Diego  YOB: 1965  Date of Service: 9/16/2022    Chief Complaints: Bilateral shoulder pain    Subjective:    History of Present Illness: Pt is seen in the office today with complaints of bilateral shoulder pain we thought it was impingement last visit he did have some numbness and tingling suggestive of cervical symptoms we did inject him.,  A subacromial injection he got great relief from his shoulder and actually all of his numbness and tingling went away.  In view of that we can relate the numbness  To his shoulder not his neck he states the right one feels good now 2 he is in physical therapy.  His blood sugars went up a little but he accounted for them with increased insulin think I can think      Allergies:   Allergies   Allergen Reactions   • Latex Itching       Medications:   Home Medications:  Current Outpatient Medications on File Prior to Visit   Medication Sig   • Testosterone 1.62 % gel APPLY 2 PUMPS TOPICALLY TO THE APPROPRIATE AREA DAILY AS DIRECTED   • albuterol sulfate  (90 Base) MCG/ACT inhaler Inhale 2 puffs Every 4 (Four) Hours As Needed for Wheezing or Shortness of Air.   • amLODIPine-benazepril (LOTREL 5-20) 5-20 MG per capsule TAKE 1 CAPSULE BY MOUTH ONE TIME A DAY    • B-D UF III MINI PEN NEEDLES 31G X 5 MM misc Use to inject insulin 6 times daily   • benzonatate (TESSALON) 200 MG capsule Take 200 mg by mouth 3 (Three) Times a Day As Needed.   • Chlorcyclizine-Pseudoephed (STAHIST AD) 25-60 MG tablet 1 by mouth every 6 hours as needed for congestion and allergies, not greater than 3 in 24 hours   • Cholecalciferol (VITAMIN D3) 5000 UNITS capsule capsule Take 1 capsule by mouth daily.   • doxycycline (VIBRAMYICN) 100 MG tablet Take 100 mg by mouth 2 (Two) Times a Day.   • Dulaglutide (Trulicity) 4.5 MG/0.5ML solution pen-injector Inject 0.5 mL under the skin into the appropriate area as directed 1 (One) Time Per Week.   • esomeprazole (nexIUM) 40 MG  capsule TAKE 1 CAPSULE BY MOUTH EVERY DAY   • ezetimibe (ZETIA) 10 MG tablet TAKE 1 TABLET BY MOUTH EVERY DAY   • Farxiga 10 MG tablet TAKE 1 TABLET BY MOUTH ONE TIME A DAY before breakfast    • fluconazole (DIFLUCAN) 200 MG tablet TAKE 1 TABLET BY MOUTH EVERY DAY AS DIRECTED FOR RECURRENT FUNGAL INFECTION.   • fluocinonide-emollient (LIDEX-E) 0.05 % cream Apply topically twice daily as needed hand eczema   • fluticasone-salmeterol (Advair Diskus) 250-50 MCG/DOSE DISKUS Inhale 1 puff 2 (Two) Times a Day.   • Insulin Glargine (BASAGLAR KWIKPEN) 100 UNIT/ML injection pen inject 60 units into the skin in the morning and 60 units at bedtime   • ketoconazole (NIZORAL) 2 % cream Apply  topically to the appropriate area as directed Take As Directed.   • loratadine (CLARITIN) 10 MG tablet Take 10 mg by mouth Daily.   • metFORMIN (GLUCOPHAGE) 1000 MG tablet TAKE 1 TABLET BY MOUTH TWO TIMES A DAY WITH MEALS   • montelukast (SINGULAIR) 10 MG tablet TAKE 1 TABLET BY MOUTH EVERY DAY    • NovoLOG FlexPen 100 UNIT/ML solution pen-injector sc pen INJECT 15 UNITS INTO THE SKIN WITH EACH MEAL.  UNITS PER DAY   • rosuvastatin (CRESTOR) 40 MG tablet TAKE 1 TABLET BY MOUTH AT BEDTIME    • sertraline (ZOLOFT) 50 MG tablet TAKE 1 TABLET BY MOUTH ONE TIME A DAY    • sildenafil (VIAGRA) 100 MG tablet Take 100 mg by mouth Daily. Take 1/2 to 1 tablets by mouth as needed     No current facility-administered medications on file prior to visit.     Current Medications:  Scheduled Meds:  Continuous Infusions:No current facility-administered medications for this visit.    PRN Meds:.    I have reviewed the patient's medical history in detail and updated the computerized patient record.  Review and summarization of old records include:    Past Medical History:   Diagnosis Date   • Allergic rhinitis 10/24/2013    10/24/2013--skin testing revealed impressive reactions to grass following, tree pollen, weed pollen, ragweed, dust mite, and cat.  Recommend immunotherapy.   • Benign essential hypertension 2/22/2016   • Chronic constipation 11/20/2017 11/20/2017--patient reports approximately 8 month history of intermittent constipation that at times can last as much as a month.  He notes his blood sugar readings tend to increase when this happens.  He is scheduled for colonoscopy next week.  I recommend a generic probiotic daily as well as MiraLAX and adjust as needed.   • Chronic neck pain 10/30/2015    12/19/2015--patient reports his left shoulder pain has resolved. He continues to have neck pain. X-ray of the cervical spine ordered. Physical therapy ordered.   11/10/2015--left shoulder injected with 80 mg of Depo-Medrol with 3 mL of Xylocaine.   10/30/2015--patient presents with at least a one-month history of intermittent left-sided neck pain that is associated with left shoulder pain as well. The pain is described as shooting and is 8 out of 10 intensity at its worst. The pain is worse with certain movements of his head and left shoulder. No trauma. No numbness or paresthesias.   • Depression with anxiety 2/22/2016   • Diabetic eye exam (HCC) 5/27/2016    May 2016--patient reports a normal diabetic eye exam.   • Diabetic foot exam 4/27/2017 05/02/2017--routine diabetic foot exam reveals no evidence of diabetic foot ulcer or pre-ulcerative callus.  Pulses are palpable and there is no signs of ischemia.  Sensation subjectively intact.   • Dysfunction of both eustachian tubes 9/6/2019 September 6, 2019--patient with severe environmental allergies/allergic rhinitis presents with complaints of his ears popping like he is been on an airplane and a sensation of pressure at times.  At times his hearing is decreased.  On exam I do not see any definite serous otitis media.  I do think his environmental allergies are playing a role but I think it would be reasonable for ENT to evaluate   • Gastroesophageal reflux disease with esophagitis 5/22/2015     08/12/2015--EGD revealed esophagitis and a distal esophageal stricture that was dilated. Small sliding hiatal hernia. Proximal gastric ulcer and gastritis present. Dysphagia resolved after dilatation. Pathology of the gastric antrum was benign with no significant histologic abnormality. Distal esophagus biopsy negative for intestinal metaplasia or dysplasia.   05/22/2015--patient presents with a several month history of progressively worsening intermittent dysphagia of solids but not liquids. He describes solid food sometimes sticking and points to his upper chest/lower throat. No other associated symptoms. Patient referred to Dr. Aime Parada for an EGD. This is negative, may need ENT evaluation.   • History of colon polyps, 11/29/2017--tubular adenoma times one.  Hyperplastic ×1.  Repeat 5 years. 12/18/2017 11/29/2017--colonoscopy revealed 2 small (3 mm) polyps in the sigmoid colon and cecum.  Removed.  Bowel prep.  Sigmoid diverticuli noted.  No hemorrhoids.  Pathology returned hyperplastic polyp of the sigmoid and the cecal polyp was a tubular adenoma.   • HIstory of eosinophilic gastroenteritis 1990s    1990s--patient had significant epigastric discomfort and workup including an EGD revealed eosinophilic gastroenteritis that was treated with prednisone and resulted in resolution.   • History of esophageal stricture 08/12/2015 08/12/2015--EGD revealed esophagitis and a distal esophageal stricture that was dilated. Small sliding hiatal hernia. Proximal gastric ulcer and gastritis present. Dysphagia resolved after dilatation. Pathology of the gastric antrum was benign with no significant histologic abnormality. Distal esophagus biopsy negative for intestinal metaplasia or dysplasia.   05/22/2015--patient presents with a s   • History of Pulmonary nodule, 03/07/2016--5 mm indeterminate nodule right lower lobe.  09/13/2016--consistent with calcified granuloma. 3/7/2016    09/13/2016--CT scan of the chest,  abdomen, and pelvis with contrast reveals no lymphadenopathy within the abdomen or pelvis.  Mild hepatic steatosis.  Previously noted right lower lobe pulmonary nodule is currently calcified and consistent with a calcified granuloma.  03/07/2016--CT scan of the abdomen performed for complaints of abdominal discomfort revealed a 5 mm nodular focus right lower lobe which is indeterminate.  Recommend repeat CT scan in 6 months.   • Hypogonadism male, no TRT. 12/26/2012 12/26/2012--treatment for hypogonadism begun.   • Microalbuminuria 10/19/2014    10/19/2014--urine microalbumin mildly elevated at 27.8. Normal range 0.0--17.0.   • Moderate persistent asthma without complication 2/22/2016    Seasonal, spring and fall.   • Morbid obesity (HCC) 2/22/2016 November 12, 2019--current weight is 191 pounds representing a 1 pound weight loss.  It appears the patient needs a drug holiday from the phentermine.  I will have him stay off of it for 1 month and then restarted back at the current dose and then we will reassess after the first the year when he comes in for his regular follow-up and physical.  September 6, 2019--current weight is 192 pounds repr   • Multiple environmental allergies 10/24/2013    10/24/2013--skin testing revealed impressive reactions to grass following, tree pollen, weed pollen, ragweed, dust mite, and cat. Recommend immunotherapy.   • Non morbid obesity 2/22/2016   • Right bundle branch block     ECG reveals sinus rhythm, rate of 75, right bundle branch block, left anterior hemiblock   • Subclinical hypothyroidism 8/17/2018 08/27/2018--thyroid ultrasound reveals subcentimeter nodule in the right thyroid lobe.  Possibly cystic.  There is a question of an ill-defined 1 cm nodular lesion posteriorly in the mid left lateral thyroid.  Appearance could be technical in origin.  Recommend repeat thyroid ultrasound in 6 months.  08/17/2018--routine follow-up.  TSH mildly elevated at 4.49.  Free T3 and  free T4 are normal.  Rep   • Thyroid nodule 10/5/2018    08/27/2018--thyroid ultrasound reveals subcentimeter nodule in the right thyroid lobe.  Possibly cystic.  There is a question of an ill-defined 1 cm nodular lesion posteriorly in the mid left lateral thyroid.  Appearance could be technical in origin.  Recommend repeat thyroid ultrasound in 6 months.  08/17/2018--routine follow-up.  TSH mildly elevated at 4.49.  Free T3 and free T4 are normal.  Repeat thyroid function tests ordered and returned normal.  Thyroid ultrasound ordered.   • Type 2 diabetes mellitus with microalbuminuria, with long-term current use of insulin (HCC) 1/1/2004 2004--initial diagnosis of type 2 diabetes.   • Vitamin D deficiency 2/22/2016        Past Surgical History:   Procedure Laterality Date   • ATRIAL SEPTAL DEFECT REPAIR  03/2012 March 2012--percutaneous closure of secundum ASD.  Dr. Mcpherson   • COLONOSCOPY N/A 11/29/2017 11/29/2017--colonoscopy revealed 2 small (3 mm) polyps in the sigmoid colon and cecum.  Removed.  Bowel prep.  Sigmoid diverticuli noted.  No hemorrhoids.  Pathology returned hyperplastic polyp of the sigmoid and the cecal polyp was a tubular adenoma.   • ESOPHAGEAL DILATATION  08/12/2015 08/12/2015--EGD revealed esophagitis and a distal esophageal stricture that was dilated. Small sliding hiatal hernia. Proximal gastric ulcer and gastritis present. Dysphagia resolved after dilatation. 05/22/2015--patient presents with a several month history of progressively worsening intermittent dysphagia of solids but not liquids. He describes solid food sometimes sticking and points to his upp   • ESOPHAGOSCOPY / EGD  08/12/2015 08/12/2015--EGD revealed esophagitis and a distal esophageal stricture that was dilated. Small sliding hiatal hernia. Proximal gastric ulcer and gastritis present. Dysphagia resolved after dilatation. 05/22/2015--patient presents with a several month history of progressively worsening  intermittent dysphagia of solids but not liquids. He describes solid food sometimes sticking and points to his upp   • EYE SURGERY  11/2021   • KNEE ACL RECONSTRUCTION Right 02/11/2013 02/11/2013--knee arthroscopy with anterior cruciate ligament repair   • TONSILLECTOMY  2009 2009--tonsillectomy as an adult.        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     Allergies:   Allergies   Allergen Reactions   • Latex Itching     Constitutional:  Denies fever, shaking or chills   Eyes:  Denies change in visual acuity   HENT:  Denies nasal congestion or sore throat   Respiratory:  Denies cough or shortness of breath   Cardiovascular:  Denies chest pain or severe LE edema   GI:  Denies abdominal pain, nausea, vomiting, bloody stools or diarrhea   Musculoskeletal:  Numbness, tingling, or loss of motor function only as noted above in history of present illness.  : Denies painful urination or hematuria  Integument:  Denies rash, lesion or ulceration   Neurologic:  Denies headache or focal weakness  Endocrine:  Denies lymphadenopathy  Psych:  Denies confusion or change in mental status   Hem:  Denies active bleeding       Physical Exam: 57 y.o. male  Wt Readings from Last 3 Encounters:   09/01/22 101 kg (223 lb 6.4 oz)   07/06/22 100 kg (221 lb)   05/24/22 104 kg (228 lb 9.6 oz)       There is no  height or weight on file to calculate BMI.  No height and weight on file for this encounter.  There were no vitals filed for this visit.  Vital signs reviewed.   General Appearance:    Alert, cooperative, in no acute distress                    Ortho exam    Exam of both shoulders are normal he does have a little bit around his shoulder posture but his shoulder exams are good         .time    Assessment: Bilateral shoulder pain that have resolved with conservative measures he is content with physical therapy we will request those to home program as long as he is doing well and follow-up as needed    Plan:   Follow up as indicated.  Ice, elevate, and rest as needed.  Discussed conservative measures of pain control including ice, bracing.  Also talked about the importance of strengthening  Brianna Castillo M.D.

## 2022-09-21 ENCOUNTER — OFFICE VISIT (OUTPATIENT)
Dept: ENDOCRINOLOGY | Age: 57
End: 2022-09-21

## 2022-09-21 VITALS
SYSTOLIC BLOOD PRESSURE: 120 MMHG | WEIGHT: 223.6 LBS | BODY MASS INDEX: 35.93 KG/M2 | HEART RATE: 74 BPM | OXYGEN SATURATION: 97 % | TEMPERATURE: 96.8 F | HEIGHT: 66 IN | DIASTOLIC BLOOD PRESSURE: 72 MMHG

## 2022-09-21 DIAGNOSIS — E78.2 HYPERLIPEMIA, MIXED: ICD-10-CM

## 2022-09-21 DIAGNOSIS — E11.29 TYPE 2 DIABETES MELLITUS WITH MICROALBUMINURIA, WITH LONG-TERM CURRENT USE OF INSULIN: Primary | ICD-10-CM

## 2022-09-21 DIAGNOSIS — R80.9 TYPE 2 DIABETES MELLITUS WITH MICROALBUMINURIA, WITH LONG-TERM CURRENT USE OF INSULIN: Primary | ICD-10-CM

## 2022-09-21 DIAGNOSIS — Z79.4 TYPE 2 DIABETES MELLITUS WITH MICROALBUMINURIA, WITH LONG-TERM CURRENT USE OF INSULIN: Primary | ICD-10-CM

## 2022-09-21 DIAGNOSIS — E29.1 HYPOGONADISM MALE: ICD-10-CM

## 2022-09-21 PROCEDURE — 99214 OFFICE O/P EST MOD 30 MIN: CPT | Performed by: NURSE PRACTITIONER

## 2022-09-21 NOTE — PROGRESS NOTES
"Chief Complaint  Diabetes (Type 2: Patient does not have meter but does check bs 2 times a day. Up to date on eye exam, no hx of retinopathy or neuropathy. )    Subjective        Marek Diego presents to [facility] END[facility]OLOG[facility]  History of Present Illness     Type 2 dm    Diagnosed about 10 + years ago.   Today in clinic pt reports being on farxiga 10 mg po daily, metformin 1000 mg po bid, novolog - 18 units plus ssi, basaglar 60 units bid, trulicity - 4.5 mg subq once a week   Current sliding scale:  BG less than 150 - zero  151 - 200 - 3 unit  201 - 250 - 6 units  251 - 300 - 9 units  301 - 350 - 12 units  Above 350 mg/dl - 15 units.   Checks BG - 3x/day  High: 200s  Low: 50s ( \"sometimes around noon if breakfast is small\")  Dm retinopathy - x, + cataracts  Last eye exam - UTD 2022  Dm nephropathy - x  Dm neuropathy -x   CAD -x, history of cardiac surgery from congenital abnormality  CVA -x  On ace-I and statin       Hypogonadism  Restarted on androgel 1 pump each shoulder daily  Feeling better     Objective   Vital Signs:  /72   Pulse 74   Temp 96.8 °F (36 °C) (Temporal)   Ht 167.6 cm (65.98\")   Wt 101 kg (223 lb 9.6 oz)   SpO2 97%   BMI 36.11 kg/m²   Estimated body mass index is 36.11 kg/m² as calculated from the following:    Height as of this encounter: 167.6 cm (65.98\").    Weight as of this encounter: 101 kg (223 lb 9.6 oz).          Physical Exam  Vitals reviewed.   Constitutional:       General: He is not in acute distress.  HENT:      Head: Normocephalic and atraumatic.   Cardiovascular:      Rate and Rhythm: Normal rate and regular rhythm.   Pulmonary:      Effort: Pulmonary effort is normal. No respiratory distress.   Musculoskeletal:         General: No signs of injury. Normal range of motion.      Cervical back: Normal range of motion and neck supple.   Skin:     General: Skin is warm and dry.   Neurological:      Mental Status: He is alert and oriented to person, " place, and time. Mental status is at baseline.   Psychiatric:         Mood and Affect: Mood normal.         Behavior: Behavior normal.         Thought Content: Thought content normal.         Judgment: Judgment normal.        Result Review :  The following data was reviewed by: JEREMY Bennett on 09/21/2022:  Common labs    Common Labs 10/11/21 10/11/21 2/25/22 2/25/22 2/25/22 2/25/22 2/25/22 2/25/22 5/24/22 5/24/22 5/24/22 5/24/22    0819 0819 1035 1035 1035 1035 1035 1035 0957 [id number]   Glucose 115 (A)   123 (A)     103 (A)      BUN 23 (A)   25 (A)     23      Creatinine 1.05   1.23     1.19      eGFR Non  Am 73   66           eGFR  Am 89   76           Sodium 141   139     140      Potassium 5.2   5.4 (A)     5.2      Chloride 104   101     104      Calcium 9.5   9.3     9.7      Total Protein    6.5           Albumin    4.3           Total Bilirubin    0.3           Alkaline Phosphatase    78           AST (SGOT)    26           ALT (SGPT)    34           Hemoglobin        12.7 (A)   13.4    Hematocrit        38.2   39.9    Total Cholesterol     99 (A)          Triglycerides     140          HDL Cholesterol     28 (A)          LDL Cholesterol      46          Hemoglobin A1C  7.30 (A) 7.5 (A)       7.4 (A)     Microalbumin, Urine      <3.0         PSA       0.2     0.3   (A) Abnormal value       Comments are available for some flowsheets but are not being displayed.                     Assessment and Plan   Diagnoses and all orders for this visit:    1. Type 2 diabetes mellitus with microalbuminuria, with long-term current use of insulin (HCC) (Primary)  -     Hemoglobin A1c  -     TSH  -     T4, Free  -     T3, Free  -     Hemoglobin & Hematocrit, Blood  -     TestT+TestF+SHBG  -     PSA DIAGNOSTIC  -     Comprehensive Metabolic Panel  -     Lipid Panel  -     Microalbumin / Creatinine Urine Ratio - Urine, Clean Catch    2. Hyperlipemia, mixed  -     Hemoglobin A1c  -     TSH  -     T4,  Free  -     T3, Free  -     Hemoglobin & Hematocrit, Blood  -     TestT+TestF+SHBG  -     PSA DIAGNOSTIC  -     Comprehensive Metabolic Panel  -     Lipid Panel  -     Microalbumin / Creatinine Urine Ratio - Urine, Clean Catch    3. Hypogonadism male  -     Hemoglobin A1c  -     TSH  -     T4, Free  -     T3, Free  -     Hemoglobin & Hematocrit, Blood  -     TestT+TestF+SHBG  -     PSA DIAGNOSTIC  -     Comprehensive Metabolic Panel  -     Lipid Panel  -     Microalbumin / Creatinine Urine Ratio - Urine, Clean Catch             Follow Up   No follow-ups on file.     Labs same day   Will adjust diabetic regimen as needed to goal a1c <7.5%  Continue statin and zetia    Patient was given instructions and counseling regarding his condition or for health maintenance advice. Please see specific information pulled into the AVS if appropriate.     Ingrid Menard, APRN

## 2022-09-22 DIAGNOSIS — R80.9 TYPE 2 DIABETES MELLITUS WITH MICROALBUMINURIA, WITH LONG-TERM CURRENT USE OF INSULIN: ICD-10-CM

## 2022-09-22 DIAGNOSIS — Z79.4 TYPE 2 DIABETES MELLITUS WITH MICROALBUMINURIA, WITH LONG-TERM CURRENT USE OF INSULIN: ICD-10-CM

## 2022-09-22 DIAGNOSIS — E11.9 TYPE 2 DIABETES MELLITUS WITHOUT COMPLICATION, WITHOUT LONG-TERM CURRENT USE OF INSULIN: Chronic | ICD-10-CM

## 2022-09-22 DIAGNOSIS — E78.2 HYPERLIPEMIA, MIXED: ICD-10-CM

## 2022-09-22 DIAGNOSIS — E11.29 TYPE 2 DIABETES MELLITUS WITH MICROALBUMINURIA, WITH LONG-TERM CURRENT USE OF INSULIN: ICD-10-CM

## 2022-09-22 LAB
ALBUMIN SERPL-MCNC: 4.5 G/DL (ref 3.5–5.2)
ALBUMIN/CREAT UR: <4 MG/G CREAT (ref 0–29)
ALBUMIN/GLOB SERPL: 2.1 G/DL
ALP SERPL-CCNC: 74 U/L (ref 39–117)
ALT SERPL-CCNC: 39 U/L (ref 1–41)
AST SERPL-CCNC: 20 U/L (ref 1–40)
BILIRUB SERPL-MCNC: 0.3 MG/DL (ref 0–1.2)
BUN SERPL-MCNC: 18 MG/DL (ref 6–20)
BUN/CREAT SERPL: 15.1 (ref 7–25)
CALCIUM SERPL-MCNC: 10 MG/DL (ref 8.6–10.5)
CHLORIDE SERPL-SCNC: 104 MMOL/L (ref 98–107)
CHOLEST SERPL-MCNC: 94 MG/DL (ref 0–200)
CO2 SERPL-SCNC: 27.8 MMOL/L (ref 22–29)
CREAT SERPL-MCNC: 1.19 MG/DL (ref 0.76–1.27)
CREAT UR-MCNC: 68.7 MG/DL
EGFRCR SERPLBLD CKD-EPI 2021: 71.7 ML/MIN/1.73
GLOBULIN SER CALC-MCNC: 2.1 GM/DL
GLUCOSE SERPL-MCNC: 87 MG/DL (ref 65–99)
HBA1C MFR BLD: 8.4 % (ref 4.8–5.6)
HCT VFR BLD AUTO: 40.5 % (ref 37.5–51)
HDLC SERPL-MCNC: 31 MG/DL (ref 40–60)
HGB BLD-MCNC: 13.3 G/DL (ref 13–17.7)
IMP & REVIEW OF LAB RESULTS: NORMAL
LDLC SERPL CALC-MCNC: 44 MG/DL (ref 0–100)
MICROALBUMIN UR-MCNC: <3 UG/ML
POTASSIUM SERPL-SCNC: 5.6 MMOL/L (ref 3.5–5.2)
PROT SERPL-MCNC: 6.6 G/DL (ref 6–8.5)
PSA SERPL-MCNC: 0.41 NG/ML (ref 0–4)
SHBG SERPL-SCNC: 14 NMOL/L (ref 19.3–76.4)
SODIUM SERPL-SCNC: 143 MMOL/L (ref 136–145)
T3FREE SERPL-MCNC: 3.7 PG/ML (ref 2–4.4)
T4 FREE SERPL-MCNC: 0.88 NG/DL (ref 0.93–1.7)
TESTOST FREE SERPL-MCNC: 12.2 PG/ML (ref 7.2–24)
TESTOST SERPL-MCNC: 394 NG/DL (ref 264–916)
TRIGL SERPL-MCNC: 98 MG/DL (ref 0–150)
TSH SERPL DL<=0.005 MIU/L-ACNC: 3 UIU/ML (ref 0.27–4.2)
VLDLC SERPL CALC-MCNC: 19 MG/DL (ref 5–40)

## 2022-09-22 RX ORDER — DULAGLUTIDE 4.5 MG/.5ML
INJECTION, SOLUTION SUBCUTANEOUS
Qty: 2 ML | Refills: 0 | Status: SHIPPED | OUTPATIENT
Start: 2022-09-22 | End: 2022-10-25

## 2022-09-22 RX ORDER — INSULIN GLARGINE 100 [IU]/ML
INJECTION, SOLUTION SUBCUTANEOUS
Qty: 120 ML | Refills: 0 | Status: SHIPPED | OUTPATIENT
Start: 2022-09-22 | End: 2022-12-23

## 2022-09-22 RX ORDER — INSULIN ASPART 100 [IU]/ML
INJECTION, SOLUTION INTRAVENOUS; SUBCUTANEOUS
Qty: 90 ML | Refills: 0 | Status: SHIPPED | OUTPATIENT
Start: 2022-09-22 | End: 2022-12-23

## 2022-10-07 ENCOUNTER — PRIOR AUTHORIZATION (OUTPATIENT)
Dept: ENDOCRINOLOGY | Age: 57
End: 2022-10-07

## 2022-10-11 DIAGNOSIS — E29.1 HYPOGONADISM MALE: ICD-10-CM

## 2022-10-16 RX ORDER — TESTOSTERONE 20.25 MG/1.25G
GEL TOPICAL
Qty: 75 G | Refills: 0 | Status: SHIPPED | OUTPATIENT
Start: 2022-10-16 | End: 2022-11-22

## 2022-10-22 DIAGNOSIS — E11.9 TYPE 2 DIABETES MELLITUS WITHOUT COMPLICATION, WITHOUT LONG-TERM CURRENT USE OF INSULIN: Chronic | ICD-10-CM

## 2022-10-25 RX ORDER — DULAGLUTIDE 4.5 MG/.5ML
0.5 INJECTION, SOLUTION SUBCUTANEOUS WEEKLY
Qty: 2 ML | Refills: 0 | Status: SHIPPED | OUTPATIENT
Start: 2022-10-25 | End: 2022-11-21

## 2022-10-26 DIAGNOSIS — E11.9 TYPE 2 DIABETES MELLITUS WITHOUT COMPLICATION, WITHOUT LONG-TERM CURRENT USE OF INSULIN: Chronic | ICD-10-CM

## 2022-10-26 RX ORDER — DULAGLUTIDE 4.5 MG/.5ML
INJECTION, SOLUTION SUBCUTANEOUS
Qty: 2 ML | Refills: 0 | OUTPATIENT
Start: 2022-10-26

## 2022-11-21 DIAGNOSIS — E11.9 TYPE 2 DIABETES MELLITUS WITHOUT COMPLICATION, WITHOUT LONG-TERM CURRENT USE OF INSULIN: Chronic | ICD-10-CM

## 2022-11-21 RX ORDER — DULAGLUTIDE 4.5 MG/.5ML
INJECTION, SOLUTION SUBCUTANEOUS
Qty: 2 ML | Refills: 0 | Status: SHIPPED | OUTPATIENT
Start: 2022-11-21

## 2022-11-22 DIAGNOSIS — E29.1 HYPOGONADISM MALE: ICD-10-CM

## 2022-11-22 RX ORDER — TESTOSTERONE 20.25 MG/1.25G
GEL TOPICAL
Qty: 75 G | Refills: 0 | Status: SHIPPED | OUTPATIENT
Start: 2022-11-22 | End: 2022-12-23

## 2022-11-22 NOTE — TELEPHONE ENCOUNTER
Rx Refill Note  Requested Prescriptions     Pending Prescriptions Disp Refills   • Testosterone 1.62 % gel [Pharmacy Med Name: Testosterone Transdermal Gel 1.62 %] 75 g 0     Sig: APPLY 2 PUMPS TOPICALLY TO THE APPROPRIATE AREA DAILY AS DIRECTED      Last office visit with prescribing clinician: 5/24/2022      Next office visit with prescribing clinician: Visit date not found            Frederic Galicia MA  11/22/22, 08:25 EST

## 2022-12-22 DIAGNOSIS — E29.1 HYPOGONADISM MALE: ICD-10-CM

## 2022-12-23 DIAGNOSIS — E11.29 TYPE 2 DIABETES MELLITUS WITH MICROALBUMINURIA, WITH LONG-TERM CURRENT USE OF INSULIN: ICD-10-CM

## 2022-12-23 DIAGNOSIS — E78.2 HYPERLIPEMIA, MIXED: ICD-10-CM

## 2022-12-23 DIAGNOSIS — E11.9 TYPE 2 DIABETES MELLITUS WITHOUT COMPLICATION, WITHOUT LONG-TERM CURRENT USE OF INSULIN: Chronic | ICD-10-CM

## 2022-12-23 DIAGNOSIS — Z79.4 TYPE 2 DIABETES MELLITUS WITH MICROALBUMINURIA, WITH LONG-TERM CURRENT USE OF INSULIN: ICD-10-CM

## 2022-12-23 DIAGNOSIS — R80.9 TYPE 2 DIABETES MELLITUS WITH MICROALBUMINURIA, WITH LONG-TERM CURRENT USE OF INSULIN: ICD-10-CM

## 2022-12-23 RX ORDER — INSULIN GLARGINE 100 [IU]/ML
INJECTION, SOLUTION SUBCUTANEOUS
Qty: 120 ML | Refills: 0 | Status: SHIPPED | OUTPATIENT
Start: 2022-12-23 | End: 2023-03-31

## 2022-12-23 RX ORDER — TESTOSTERONE 20.25 MG/1.25G
GEL TOPICAL
Qty: 75 G | Refills: 0 | Status: SHIPPED | OUTPATIENT
Start: 2022-12-23 | End: 2023-01-20

## 2022-12-23 RX ORDER — INSULIN ASPART 100 [IU]/ML
INJECTION, SOLUTION INTRAVENOUS; SUBCUTANEOUS
Qty: 90 ML | Refills: 0 | Status: SHIPPED | OUTPATIENT
Start: 2022-12-23 | End: 2023-01-03

## 2022-12-23 NOTE — TELEPHONE ENCOUNTER
Rx Refill Note  Requested Prescriptions     Pending Prescriptions Disp Refills    Testosterone 1.62 % gel [Pharmacy Med Name: Testosterone Transdermal Gel 1.62 %] 75 g 0     Sig: APPLY 2 PUMPS TOPICALLY TO THE APPROPRIATE AREA DAILY AS DIRECTED      Last office visit with prescribing clinician: 5/24/2022   Last telemedicine visit with prescribing clinician: 12/23/2022   Next office visit with prescribing clinician: 12/23/2022                         Would you like a call back once the refill request has been completed: [] Yes [] No    If the office needs to give you a call back, can they leave a voicemail: [] Yes [] No    Marj Pedraza  12/23/22, 12:12 EST

## 2023-01-03 ENCOUNTER — TELEPHONE (OUTPATIENT)
Dept: ENDOCRINOLOGY | Age: 58
End: 2023-01-03
Payer: COMMERCIAL

## 2023-01-03 RX ORDER — INSULIN LISPRO 100 [IU]/ML
INJECTION, SOLUTION INTRAVENOUS; SUBCUTANEOUS
Qty: 45 ML | Refills: 1 | Status: SHIPPED | OUTPATIENT
Start: 2023-01-03

## 2023-01-20 DIAGNOSIS — E29.1 HYPOGONADISM MALE: ICD-10-CM

## 2023-01-20 DIAGNOSIS — E78.2 HYPERLIPEMIA, MIXED: ICD-10-CM

## 2023-01-20 DIAGNOSIS — E11.29 TYPE 2 DIABETES MELLITUS WITH MICROALBUMINURIA, WITH LONG-TERM CURRENT USE OF INSULIN: ICD-10-CM

## 2023-01-20 DIAGNOSIS — Z79.4 TYPE 2 DIABETES MELLITUS WITH MICROALBUMINURIA, WITH LONG-TERM CURRENT USE OF INSULIN: ICD-10-CM

## 2023-01-20 DIAGNOSIS — R80.9 TYPE 2 DIABETES MELLITUS WITH MICROALBUMINURIA, WITH LONG-TERM CURRENT USE OF INSULIN: ICD-10-CM

## 2023-01-20 RX ORDER — TESTOSTERONE 20.25 MG/1.25G
GEL TOPICAL
Qty: 75 G | Refills: 0 | Status: SHIPPED | OUTPATIENT
Start: 2023-01-20 | End: 2023-03-02

## 2023-01-20 RX ORDER — INSULIN ASPART 100 [IU]/ML
INJECTION, SOLUTION INTRAVENOUS; SUBCUTANEOUS
Qty: 90 ML | Refills: 0 | Status: SHIPPED | OUTPATIENT
Start: 2023-01-20

## 2023-01-20 NOTE — TELEPHONE ENCOUNTER
Requested Prescriptions     Name from pharmacy: NovoLOG FlexPen Subcutaneous Solution Pen-injector 100 UNIT/ML         Will file in chart as: NovoLOG FlexPen 100 UNIT/ML solution pen-injector sc pen        Sig: INJECT 15 UNITS INTO THE SKIN WITH EACH MEAL.  UNITS PER DAY    Disp:  90 mL    Refills:  0    MUNA     Start: 1/20/2023    Class: Normal    Non-formulary For: Type 2 diabetes mellitus with microalbuminuria, with long-term current use of insulin (HCC); Hyperlipemia, mixed    Last ordered: 4 months ago by JEREMY Bennett Last refill: 11/25/2022    Rx #: [id number]    Insulin Protocol Failed    Is not on active medication list    Lipid profile in past 12 months    No positive pregnancy test in past 12 months    Recent or future visit with prescriber (6M/30D)    A1c in past 6 months    Creatinine < 1.3 in past 12 months      To be filled at: [facility] [id number] [facility] [address] [facility] [phone] [facility] [phone] FX

## 2023-01-20 NOTE — TELEPHONE ENCOUNTER
Requested Prescriptions     Name from pharmacy: Testosterone Transdermal Gel 1.62 %         Will file in chart as: Testosterone 1.62 % gel    Sig: APPLY 2 PUMPS TOPICALLY TO THE APPROPRIATE AREA DAILY AS DIRECTED    Disp:  75 g    Refills:  0    Start: 1/20/2023    Class: Normal    Non-formulary For: Hypogonadism male    Last ordered: 4 weeks ago by Mana Scott MD Last refill: 12/23/2022    Rx #: [id number]    Rx Controlled Substance Protocol Failed    Urine Toxicology Performed in Last 12 Months    Recent Appt with me in Past 3 Months    No Benzodiazepines on Active Med List    Medication not refilled in past 28 days   Androgens Protocol Passed   Protocol Details  Recent or Future appt with me (6MO/30D)    AST or ALT Done in Past 12 Months    HCT less than 54% in Past 12 Months    Testosterone level done in past 12 months    PSA done in past 12 months      To be filled at: [facility] [id number] [facility] [address] [facility] [phone] [facility] [phone] FX

## 2023-02-22 ENCOUNTER — OFFICE VISIT (OUTPATIENT)
Dept: ENDOCRINOLOGY | Age: 58
End: 2023-02-22
Payer: COMMERCIAL

## 2023-02-22 VITALS
DIASTOLIC BLOOD PRESSURE: 80 MMHG | WEIGHT: 224.8 LBS | HEART RATE: 98 BPM | TEMPERATURE: 97.5 F | BODY MASS INDEX: 36.13 KG/M2 | OXYGEN SATURATION: 96 % | HEIGHT: 66 IN | SYSTOLIC BLOOD PRESSURE: 130 MMHG

## 2023-02-22 DIAGNOSIS — E66.9 OBESITY (BMI 35.0-39.9 WITHOUT COMORBIDITY): ICD-10-CM

## 2023-02-22 DIAGNOSIS — E29.1 HYPOGONADISM MALE: ICD-10-CM

## 2023-02-22 DIAGNOSIS — Z79.4 TYPE 2 DIABETES MELLITUS WITH HYPERGLYCEMIA, WITH LONG-TERM CURRENT USE OF INSULIN: Primary | ICD-10-CM

## 2023-02-22 DIAGNOSIS — Z12.5 SPECIAL SCREENING, PROSTATE CANCER: ICD-10-CM

## 2023-02-22 DIAGNOSIS — E78.2 HYPERLIPEMIA, MIXED: ICD-10-CM

## 2023-02-22 DIAGNOSIS — E11.65 TYPE 2 DIABETES MELLITUS WITH HYPERGLYCEMIA, WITH LONG-TERM CURRENT USE OF INSULIN: Primary | ICD-10-CM

## 2023-02-22 PROCEDURE — 99214 OFFICE O/P EST MOD 30 MIN: CPT | Performed by: NURSE PRACTITIONER

## 2023-02-22 RX ORDER — DULAGLUTIDE 1.5 MG/.5ML
1.5 INJECTION, SOLUTION SUBCUTANEOUS WEEKLY
Qty: 2 ML | Refills: 0 | Status: SHIPPED | OUTPATIENT
Start: 2023-02-22

## 2023-02-22 NOTE — PROGRESS NOTES
"Chief Complaint  Diabetes (Type 2: Pt doesn't have meter, checks bs 3 times a day, is up to date on eye exam, no hx of retinopathy or neuropathy. )    Subjective        Marek Diego presents to [facility] ENDOCRINOLOGY  History of Present Illness     Was very sick in dec/ with URI     Type 2 dm    Diagnosed about 10 + years ago.   Today in clinic pt reports being on farxiga 10 mg po daily- does struggle with fungal infections at times, has PRN diflucan,  Has not been able to take trulicity r/t backorder- off since December   On metformin 1000 mg po bid  Basaglar 60u BID- AM BS: 105-150 over the last 2 weeks   Novolo units plus ssi  Current sliding scale:  BG less than 150 - zero  151 - 200 - 3 unit  201 - 250 - 6 units  251 - 300 - 9 units  301 - 350 - 12 units  Above 350 mg/dl - 15 units    Checks BG - 3x/day  High: 200s  Denies hypoglycemia, keeps glucose tablets on his night stand    Last eye exam - UTD , had cataract surgery in 2022  Dm nephropathy - denies   Dm neuropathy -denies   history of cardiac surgery from congenital abnormality  On ace-I and statin with zetia         Hypogonadism   androgel 1 pump each shoulder daily    Objective   Vital Signs:  /80   Pulse 98   Temp 97.5 °F (36.4 °C) (Temporal)   Ht 167.6 cm (65.98\")   Wt 102 kg (224 lb 12.8 oz)   SpO2 96%   BMI 36.30 kg/m²   Estimated body mass index is 36.3 kg/m² as calculated from the following:    Height as of this encounter: 167.6 cm (65.98\").    Weight as of this encounter: 102 kg (224 lb 12.8 oz).             Physical Exam  Vitals reviewed.   Constitutional:       General: He is not in acute distress.  HENT:      Head: Normocephalic and atraumatic.   Eyes:      General:         Right eye: No discharge.         Left eye: No discharge.   Pulmonary:      Effort: Pulmonary effort is normal. No respiratory distress.   Musculoskeletal:         General: No signs of injury. Normal range of motion.    "   Cervical back: Normal range of motion and neck supple.   Skin:     General: Skin is warm and dry.   Neurological:      Mental Status: He is alert and oriented to person, place, and time. Mental status is at baseline.   Psychiatric:         Mood and Affect: Mood normal.         Behavior: Behavior normal.         Thought Content: Thought content normal.         Judgment: Judgment normal.        Result Review :  The following data was reviewed by: JEREMY Bennett on 02/22/2023:  Common labs    Common Labs 5/24/22 5/24/22 5/24/22 5/24/22 9/21/22 9/21/22 9/21/22 9/21/22 9/21/22 9/21/22 1/30/23    0957 [id number] 0957 0957 0929 0929 0929 0929 0929 0929    Glucose 103 (A)       87      BUN 23       18      Creatinine 1.19       1.19      Sodium 140       143      Potassium 5.2       5.6 (A)      Chloride 104       104      Calcium 9.7       10.0      Total Protein        6.6      Albumin        4.50      Total Bilirubin        0.3      Alkaline Phosphatase        74      AST (SGOT)        20      ALT (SGPT)        39      Hemoglobin   13.4   13.3        Hematocrit   39.9   40.5        Total Cholesterol         94     Triglycerides         98     HDL Cholesterol         31 (A)     LDL Cholesterol          44     Hemoglobin A1C  7.4 (A)   8.40 (A)      8.3 (A)   Microalbumin, Urine          <3.0    PSA    0.3   0.412       (A) Abnormal value       Comments are available for some flowsheets but are not being displayed.                        Assessment and Plan   Diagnoses and all orders for this visit:    1. Type 2 diabetes mellitus with hyperglycemia, with long-term current use of insulin (HCC) (Primary)    2. Hypogonadism male  -     Comprehensive Metabolic Panel; Future  -     Lipid Panel; Future  -     Microalbumin / Creatinine Urine Ratio - Urine, Clean Catch; Future  -     Vitamin D,25-Hydroxy; Future  -     Vitamin B12 & Folate; Future  -     Hemoglobin & Hematocrit, Blood; Future  -     TestT+TestF+SHBG; Future  -      PSA Screen; Future    3. Hyperlipemia, mixed  -     Comprehensive Metabolic Panel; Future  -     Lipid Panel; Future  -     Microalbumin / Creatinine Urine Ratio - Urine, Clean Catch; Future  -     Vitamin D,25-Hydroxy; Future  -     Vitamin B12 & Folate; Future  -     Hemoglobin & Hematocrit, Blood; Future  -     TestT+TestF+SHBG; Future  -     PSA Screen; Future    4. Obesity (BMI 35.0-39.9 without comorbidity)  -     Comprehensive Metabolic Panel; Future  -     Lipid Panel; Future  -     Microalbumin / Creatinine Urine Ratio - Urine, Clean Catch; Future  -     Vitamin D,25-Hydroxy; Future  -     Vitamin B12 & Folate; Future  -     Hemoglobin & Hematocrit, Blood; Future  -     TestT+TestF+SHBG; Future  -     PSA Screen; Future    5. Special screening, prostate cancer  -     PSA Screen; Future    Other orders  -     Dulaglutide (Trulicity) 1.5 MG/0.5ML solution pen-injector; Inject 1.5 mg under the skin into the appropriate area as directed 1 (One) Time Per Week.  Dispense: 2 mL; Refill: 0             Follow Up   No follow-ups on file.     Labs in 1-2 weeks ( labs closed at time of visit)  Resume trulicity at 1.5mg weekly, increase to 3mg in 4 weeks   a1c above target range  Will continue farxiga but if fungal infections more frequent, will need to stop medication    Continue statin and zetia     Patient was given instructions and counseling regarding his condition or for health maintenance advice. Please see specific information pulled into the AVS if appropriate.     JEREMY Bennett

## 2023-02-27 DIAGNOSIS — E66.9 OBESITY (BMI 35.0-39.9 WITHOUT COMORBIDITY): ICD-10-CM

## 2023-02-27 DIAGNOSIS — E29.1 HYPOGONADISM MALE: ICD-10-CM

## 2023-02-27 DIAGNOSIS — Z12.5 SPECIAL SCREENING, PROSTATE CANCER: ICD-10-CM

## 2023-02-27 DIAGNOSIS — E78.2 HYPERLIPEMIA, MIXED: ICD-10-CM

## 2023-03-01 DIAGNOSIS — E29.1 HYPOGONADISM MALE: ICD-10-CM

## 2023-03-02 RX ORDER — TESTOSTERONE 20.25 MG/1.25G
GEL TOPICAL
Qty: 75 G | Refills: 0 | Status: SHIPPED | OUTPATIENT
Start: 2023-03-02

## 2023-03-02 NOTE — TELEPHONE ENCOUNTER
Rx Refill Note  Requested Prescriptions     Pending Prescriptions Disp Refills    Testosterone 1.62 % gel [Pharmacy Med Name: Testosterone Transdermal Gel 1.62 %] 75 g 0     Sig: APPLY 2 PUMPS TOPICALLY TO THE APPROPRIATE AREA DAILY AS DIRECTED      Last office visit with prescribing clinician: Visit date not found   Last telemedicine visit with prescribing clinician: 5/22/2023   Next office visit with prescribing clinician: Visit date not found                         Would you like a call back once the refill request has been completed: [] Yes [] No    If the office needs to give you a call back, can they leave a voicemail: [] Yes [] No    Marj Pedraza  03/02/23, 07:42 EST

## 2023-03-04 LAB
25(OH)D3+25(OH)D2 SERPL-MCNC: 98.6 NG/ML (ref 30–100)
ALBUMIN SERPL-MCNC: 4.2 G/DL (ref 3.5–5.2)
ALBUMIN/CREAT UR: 5 MG/G CREAT (ref 0–29)
ALBUMIN/GLOB SERPL: 2.1 G/DL
ALP SERPL-CCNC: 68 U/L (ref 39–117)
ALT SERPL-CCNC: 21 U/L (ref 1–41)
AST SERPL-CCNC: 14 U/L (ref 1–40)
BILIRUB SERPL-MCNC: 0.3 MG/DL (ref 0–1.2)
BUN SERPL-MCNC: 23 MG/DL (ref 6–20)
BUN/CREAT SERPL: 18.7 (ref 7–25)
CALCIUM SERPL-MCNC: 9.1 MG/DL (ref 8.6–10.5)
CHLORIDE SERPL-SCNC: 103 MMOL/L (ref 98–107)
CHOLEST SERPL-MCNC: 79 MG/DL (ref 0–200)
CO2 SERPL-SCNC: 24.8 MMOL/L (ref 22–29)
CREAT SERPL-MCNC: 1.23 MG/DL (ref 0.76–1.27)
CREAT UR-MCNC: 91.1 MG/DL
EGFRCR SERPLBLD CKD-EPI 2021: 68.9 ML/MIN/1.73
FOLATE SERPL-MCNC: 8.12 NG/ML (ref 4.78–24.2)
GLOBULIN SER CALC-MCNC: 2 GM/DL
GLUCOSE SERPL-MCNC: 136 MG/DL (ref 65–99)
HCT VFR BLD AUTO: 38.6 % (ref 37.5–51)
HDLC SERPL-MCNC: 23 MG/DL (ref 40–60)
HGB BLD-MCNC: 12.6 G/DL (ref 13–17.7)
IMP & REVIEW OF LAB RESULTS: NORMAL
LDLC SERPL CALC-MCNC: 28 MG/DL (ref 0–100)
MICROALBUMIN UR-MCNC: 4.1 UG/ML
POTASSIUM SERPL-SCNC: 5.1 MMOL/L (ref 3.5–5.2)
PROT SERPL-MCNC: 6.2 G/DL (ref 6–8.5)
PSA SERPL-MCNC: 0.31 NG/ML (ref 0–4)
SHBG SERPL-SCNC: 14.1 NMOL/L (ref 19.3–76.4)
SODIUM SERPL-SCNC: 141 MMOL/L (ref 136–145)
TESTOST FREE SERPL-MCNC: 12.9 PG/ML (ref 7.2–24)
TESTOST SERPL-MCNC: 511 NG/DL (ref 264–916)
TRIGL SERPL-MCNC: 169 MG/DL (ref 0–150)
VIT B12 SERPL-MCNC: 373 PG/ML (ref 211–946)
VLDLC SERPL CALC-MCNC: 28 MG/DL (ref 5–40)

## 2023-03-31 DIAGNOSIS — Z79.4 TYPE 2 DIABETES MELLITUS WITH HYPERGLYCEMIA, WITH LONG-TERM CURRENT USE OF INSULIN: Primary | ICD-10-CM

## 2023-03-31 DIAGNOSIS — E11.65 TYPE 2 DIABETES MELLITUS WITH HYPERGLYCEMIA, WITH LONG-TERM CURRENT USE OF INSULIN: Primary | ICD-10-CM

## 2023-04-07 RX ORDER — INSULIN GLARGINE 300 U/ML
120 INJECTION, SOLUTION SUBCUTANEOUS DAILY
Qty: 36 ML | Refills: 1 | Status: SHIPPED | OUTPATIENT
Start: 2023-04-07 | End: 2023-07-06

## 2023-04-09 DIAGNOSIS — E29.1 HYPOGONADISM MALE: ICD-10-CM

## 2023-04-10 RX ORDER — TESTOSTERONE 20.25 MG/1.25G
GEL TOPICAL
Qty: 75 G | Refills: 0 | Status: SHIPPED | OUTPATIENT
Start: 2023-04-10

## 2023-04-10 RX ORDER — DULAGLUTIDE 1.5 MG/.5ML
INJECTION, SOLUTION SUBCUTANEOUS
Qty: 2 ML | Refills: 0 | Status: SHIPPED | OUTPATIENT
Start: 2023-04-10

## 2023-04-10 NOTE — TELEPHONE ENCOUNTER
Rx Controlled Substance Protocol Failed 04/09/2023 11:31 AM   Protocol Details  Urine Toxicology Performed in Last 12 Months    Medication not refilled in past 28 days    Recent Appt with me in Past 3 Months    No Benzodiazepines on Active Med List   Androgens Protocol Passed   Protocol Details  Recent or Future appt with me (6MO/30D)    AST or ALT Done in Past 12 Months    HCT less than 54% in Past 12 Months    Testosterone level done in past 12 months    PSA done in past 12 months

## 2023-05-15 DIAGNOSIS — E29.1 HYPOGONADISM MALE: ICD-10-CM

## 2023-05-16 RX ORDER — TESTOSTERONE 20.25 MG/1.25G
GEL TOPICAL
Qty: 75 G | Refills: 0 | OUTPATIENT
Start: 2023-05-16

## 2023-05-22 ENCOUNTER — PATIENT ROUNDING (BHMG ONLY) (OUTPATIENT)
Dept: ENDOCRINOLOGY | Age: 58
End: 2023-05-22
Payer: COMMERCIAL

## 2023-05-22 ENCOUNTER — OFFICE VISIT (OUTPATIENT)
Dept: ENDOCRINOLOGY | Age: 58
End: 2023-05-22
Payer: COMMERCIAL

## 2023-05-22 VITALS
HEART RATE: 76 BPM | BODY MASS INDEX: 35.87 KG/M2 | OXYGEN SATURATION: 95 % | WEIGHT: 223.2 LBS | HEIGHT: 66 IN | DIASTOLIC BLOOD PRESSURE: 64 MMHG | SYSTOLIC BLOOD PRESSURE: 128 MMHG

## 2023-05-22 DIAGNOSIS — E66.9 OBESITY (BMI 35.0-39.9 WITHOUT COMORBIDITY): ICD-10-CM

## 2023-05-22 DIAGNOSIS — E11.65 TYPE 2 DIABETES MELLITUS WITH HYPERGLYCEMIA, WITH LONG-TERM CURRENT USE OF INSULIN: Primary | ICD-10-CM

## 2023-05-22 DIAGNOSIS — E29.1 HYPOGONADISM MALE: ICD-10-CM

## 2023-05-22 DIAGNOSIS — Z79.4 TYPE 2 DIABETES MELLITUS WITH HYPERGLYCEMIA, WITH LONG-TERM CURRENT USE OF INSULIN: Primary | ICD-10-CM

## 2023-05-22 DIAGNOSIS — E78.2 HYPERLIPEMIA, MIXED: ICD-10-CM

## 2023-05-22 PROCEDURE — 99214 OFFICE O/P EST MOD 30 MIN: CPT | Performed by: INTERNAL MEDICINE

## 2023-05-22 RX ORDER — DULAGLUTIDE 1.5 MG/.5ML
1.5 INJECTION, SOLUTION SUBCUTANEOUS WEEKLY
Qty: 6 ML | Refills: 1 | Status: SHIPPED | OUTPATIENT
Start: 2023-05-22 | End: 2023-08-20

## 2023-05-22 RX ORDER — TESTOSTERONE 20.25 MG/1.25G
GEL TOPICAL
Qty: 75 G | Refills: 5 | Status: SHIPPED | OUTPATIENT
Start: 2023-05-22

## 2023-05-22 NOTE — PROGRESS NOTES
Chief complaint:  T2DM    HPI:   - 58 year old male here for management of diabetes mellitus type 2  - Has had diabetes for 15 years  - No known complications to date  - Is currently taking Toujeo 60 units bid, Humalog 18, Trulicity 1.5 mg weekly, Farxiga 10 mg daily, metformin 1 g bid  - He was previously on 4.5 mg of Trulicity weekly but states that his pharmacy does not have that dose in stock  - He states that his BG is typically above 200 in the morning since switching to Toujeo from Basaglar  - He also has hypogonadism of unknown etiology and is on testosterone topical 1.62%, 2 pumps daily  - He is also on Crestor 40 mg daily  - No complaints other than some mild joint pain      The following portions of the patient's history were reviewed and updated as appropriate: allergies, current medications, past family history, past medical history, past social history, past surgical history and problem list.    Review of Systems   Musculoskeletal: Positive for arthralgias.       Objective     Vitals:    05/22/23 0831   BP: 128/64   Pulse: 76   SpO2: 95%        Physical Exam  Constitutional:       Appearance: Normal appearance. He is obese.   HENT:      Head: Normocephalic and atraumatic.   Eyes:      General: No scleral icterus.  Pulmonary:      Effort: Pulmonary effort is normal. No respiratory distress.   Neurological:      Mental Status: He is alert.      Gait: Gait normal.   Psychiatric:         Mood and Affect: Mood normal.         Behavior: Behavior normal.         Thought Content: Thought content normal.         Judgment: Judgment normal.       Labs/Imaging:  Labs from 2/2023 show a total testosteron of 511, free testosterone of 12.9, HCT of 12.6    Assessment & Plan   1. Type 2 DM, uncontrolled due to hyperglycemia  - Increase Toujeo to 70 units bid  - Cont. Humalog 18, Trulicity 1.5 mg weekly, Farxiga 10 mg daily, metformin 1 g bid    2. Hypogonadism  - Etiology is not clear  - Free testosterone and HCT  were normal in 2/2023  - Cont. testosterone topical 1.62%, 2 pumps daily    3. Obesity  - Dietary changes and exercise will help glycemic control    4. Hyperlipidemia  - He is also on Crestor 40 mg daily    - Return to clinic in 3 months

## 2023-05-25 ENCOUNTER — PATIENT ROUNDING (BHMG ONLY) (OUTPATIENT)
Dept: ENDOCRINOLOGY | Age: 58
End: 2023-05-25
Payer: COMMERCIAL

## 2023-05-30 RX ORDER — INSULIN LISPRO 100 [IU]/ML
INJECTION, SOLUTION INTRAVENOUS; SUBCUTANEOUS
Qty: 45 ML | Refills: 0 | Status: SHIPPED | OUTPATIENT
Start: 2023-05-30

## 2023-06-11 DIAGNOSIS — Z79.4 TYPE 2 DIABETES MELLITUS WITH HYPERGLYCEMIA, WITH LONG-TERM CURRENT USE OF INSULIN: Primary | ICD-10-CM

## 2023-06-11 DIAGNOSIS — E11.65 TYPE 2 DIABETES MELLITUS WITH HYPERGLYCEMIA, WITH LONG-TERM CURRENT USE OF INSULIN: Primary | ICD-10-CM

## 2023-06-15 ENCOUNTER — OFFICE VISIT (OUTPATIENT)
Dept: ORTHOPEDIC SURGERY | Facility: CLINIC | Age: 58
End: 2023-06-15
Payer: COMMERCIAL

## 2023-06-15 VITALS — BODY MASS INDEX: 38.05 KG/M2 | TEMPERATURE: 97.6 F | WEIGHT: 228.4 LBS | HEIGHT: 65 IN

## 2023-06-15 DIAGNOSIS — S46.012A TRAUMATIC COMPLETE TEAR OF LEFT ROTATOR CUFF, INITIAL ENCOUNTER: ICD-10-CM

## 2023-06-15 DIAGNOSIS — M25.512 LEFT SHOULDER PAIN, UNSPECIFIED CHRONICITY: Primary | ICD-10-CM

## 2023-06-15 NOTE — PROGRESS NOTES
New Left Shoulder      Patient: Marek Diego        YOB: 1965    Medical Record Number: [id number]        Chief Complaints:   Left shoulder pain    History of Present Illness: This is a 58-year-old male seen in the past for left shoulder who presents complaining ofhe is right-hand dominant he fell 2 days ago catching himself with his left shoulder he has had pain and dysfunction since that time or he cannot actively forward flex his shoulder symptoms are moderate intermittent aching worse with activity somewhat better with rest his past medical history is remarkable for diabetes      Allergies:   Allergies   Allergen Reactions   • Latex Itching       Medications:   Home Medications:  Current Outpatient Medications on File Prior to Visit   Medication Sig   • amLODIPine-benazepril (LOTREL 5-20) 5-20 MG per capsule TAKE 1 CAPSULE BY MOUTH ONE TIME A DAY    • B-D UF III MINI PEN NEEDLES 31G X 5 MM misc Use to inject insulin 6 times daily   • Cholecalciferol (VITAMIN D3) 5000 UNITS capsule capsule Take 1 capsule by mouth Daily.   • doxycycline (VIBRAMYICN) 100 MG tablet Take 1 tablet by mouth 2 (Two) Times a Day.   • Dulaglutide (Trulicity) 1.5 MG/0.5ML solution pen-injector Inject 1.5 mg under the skin into the appropriate area as directed 1 (One) Time Per Week for 90 days.   • esomeprazole (nexIUM) 40 MG capsule TAKE 1 CAPSULE BY MOUTH EVERY DAY   • ezetimibe (ZETIA) 10 MG tablet TAKE 1 TABLET BY MOUTH EVERY DAY   • Farxiga 10 MG tablet TAKE 1 TABLET BY MOUTH ONE TIME A DAY before breakfast    • fluocinonide-emollient (LIDEX-E) 0.05 % cream Apply topically twice daily as needed hand eczema   • Insulin Lispro, 1 Unit Dial, (HumaLOG KwikPen) 100 UNIT/ML solution pen-injector Use as directed for meal & correction coverage up to 100 units a day.   • ketoconazole (NIZORAL) 2 % cream Apply  topically to the appropriate area as directed Take As Directed.   • loratadine (CLARITIN) 10 MG tablet Take 1  tablet by mouth Daily.   • metFORMIN (GLUCOPHAGE) 1000 MG tablet Take 1 tablet by mouth 2 (Two) Times a Day With Meals. Indications: Type 2 Diabetes   • montelukast (SINGULAIR) 10 MG tablet TAKE 1 TABLET BY MOUTH EVERY DAY    • rosuvastatin (CRESTOR) 40 MG tablet TAKE 1 TABLET BY MOUTH AT BEDTIME    • sertraline (ZOLOFT) 50 MG tablet TAKE 1 TABLET BY MOUTH ONE TIME A DAY    • sildenafil (VIAGRA) 100 MG tablet Take 1 tablet by mouth Daily. Take 1/2 to 1 tablets by mouth as needed   • Testosterone 1.62 % gel Apply 2 pumps topically daily   • Toujeo Max SoloStar 300 UNIT/ML solution pen-injector injection Inject 120 Units under the skin into the appropriate area as directed Daily for 90 days. Inject 60 units in the morning and 60 units at night   • albuterol sulfate  (90 Base) MCG/ACT inhaler Inhale 2 puffs Every 4 (Four) Hours As Needed for Wheezing or Shortness of Air. (Patient not taking: Reported on 6/15/2023)   • benzonatate (TESSALON) 200 MG capsule Take 1 capsule by mouth 3 (Three) Times a Day As Needed. (Patient not taking: Reported on 6/15/2023)   • fluconazole (DIFLUCAN) 200 MG tablet TAKE 1 TABLET BY MOUTH EVERY DAY AS DIRECTED FOR RECURRENT FUNGAL INFECTION. (Patient not taking: Reported on 6/15/2023)   • fluticasone-salmeterol (Advair Diskus) 250-50 MCG/DOSE DISKUS Inhale 1 puff 2 (Two) Times a Day. (Patient not taking: Reported on 6/15/2023)   • [DISCONTINUED] Chlorcyclizine-Pseudoephed (STAHIST AD) 25-60 MG tablet 1 by mouth every 6 hours as needed for congestion and allergies, not greater than 3 in 24 hours (Patient not taking: Reported on 6/15/2023)     No current facility-administered medications on file prior to visit.     Current Medications:  Scheduled Meds:  Continuous Infusions:No current facility-administered medications for this visit.    PRN Meds:.    Past Medical History:   Diagnosis Date   • Allergic rhinitis 10/24/2013    10/24/2013--skin testing revealed impressive reactions to  grass following, tree pollen, weed pollen, ragweed, dust mite, and cat. Recommend immunotherapy.   • Benign essential hypertension 2/22/2016   • Chronic constipation 11/20/2017 11/20/2017--patient reports approximately 8 month history of intermittent constipation that at times can last as much as a month.  He notes his blood sugar readings tend to increase when this happens.  He is scheduled for colonoscopy next week.  I recommend a generic probiotic daily as well as MiraLAX and adjust as needed.   • Chronic neck pain 10/30/2015    12/19/2015--patient reports his left shoulder pain has resolved. He continues to have neck pain. X-ray of the cervical spine ordered. Physical therapy ordered.   11/10/2015--left shoulder injected with 80 mg of Depo-Medrol with 3 mL of Xylocaine.   10/30/2015--patient presents with at least a one-month history of intermittent left-sided neck pain that is associated with left shoulder pain as well. The pain is described as shooting and is 8 out of 10 intensity at its worst. The pain is worse with certain movements of his head and left shoulder. No trauma. No numbness or paresthesias.   • Depression with anxiety 2/22/2016   • Diabetic eye exam 5/27/2016    May 2016--patient reports a normal diabetic eye exam.   • Diabetic foot exam 4/27/2017 05/02/2017--routine diabetic foot exam reveals no evidence of diabetic foot ulcer or pre-ulcerative callus.  Pulses are palpable and there is no signs of ischemia.  Sensation subjectively intact.   • Dysfunction of both eustachian tubes 9/6/2019 September 6, 2019--patient with severe environmental allergies/allergic rhinitis presents with complaints of his ears popping like he is been on an airplane and a sensation of pressure at times.  At times his hearing is decreased.  On exam I do not see any definite serous otitis media.  I do think his environmental allergies are playing a role but I think it would be reasonable for ENT to evaluate   •  Gastroesophageal reflux disease with esophagitis 5/22/2015 08/12/2015--EGD revealed esophagitis and a distal esophageal stricture that was dilated. Small sliding hiatal hernia. Proximal gastric ulcer and gastritis present. Dysphagia resolved after dilatation. Pathology of the gastric antrum was benign with no significant histologic abnormality. Distal esophagus biopsy negative for intestinal metaplasia or dysplasia.   05/22/2015--patient presents with a several month history of progressively worsening intermittent dysphagia of solids but not liquids. He describes solid food sometimes sticking and points to his upper chest/lower throat. No other associated symptoms. Patient referred to Dr. Aime Parada for an EGD. This is negative, may need ENT evaluation.   • History of colon polyps, 11/29/2017--tubular adenoma times one.  Hyperplastic ×1.  Repeat 5 years. 12/18/2017 11/29/2017--colonoscopy revealed 2 small (3 mm) polyps in the sigmoid colon and cecum.  Removed.  Bowel prep.  Sigmoid diverticuli noted.  No hemorrhoids.  Pathology returned hyperplastic polyp of the sigmoid and the cecal polyp was a tubular adenoma.   • HIstory of eosinophilic gastroenteritis 1990s    1990s--patient had significant epigastric discomfort and workup including an EGD revealed eosinophilic gastroenteritis that was treated with prednisone and resulted in resolution.   • History of esophageal stricture 08/12/2015 08/12/2015--EGD revealed esophagitis and a distal esophageal stricture that was dilated. Small sliding hiatal hernia. Proximal gastric ulcer and gastritis present. Dysphagia resolved after dilatation. Pathology of the gastric antrum was benign with no significant histologic abnormality. Distal esophagus biopsy negative for intestinal metaplasia or dysplasia.   05/22/2015--patient presents with a s   • History of Pulmonary nodule, 03/07/2016--5 mm indeterminate nodule right lower lobe.  09/13/2016--consistent with calcified  granuloma. 3/7/2016    09/13/2016--CT scan of the chest, abdomen, and pelvis with contrast reveals no lymphadenopathy within the abdomen or pelvis.  Mild hepatic steatosis.  Previously noted right lower lobe pulmonary nodule is currently calcified and consistent with a calcified granuloma.  03/07/2016--CT scan of the abdomen performed for complaints of abdominal discomfort revealed a 5 mm nodular focus right lower lobe which is indeterminate.  Recommend repeat CT scan in 6 months.   • Hypogonadism male, no TRT. 12/26/2012 12/26/2012--treatment for hypogonadism begun.   • Microalbuminuria 10/19/2014    10/19/2014--urine microalbumin mildly elevated at 27.8. Normal range 0.0--17.0.   • Moderate persistent asthma without complication 2/22/2016    Seasonal, spring and fall.   • Morbid obesity 2/22/2016 November 12, 2019--current weight is 191 pounds representing a 1 pound weight loss.  It appears the patient needs a drug holiday from the phentermine.  I will have him stay off of it for 1 month and then restarted back at the current dose and then we will reassess after the first the year when he comes in for his regular follow-up and physical.  September 6, 2019--current weight is 192 pounds repr   • Multiple environmental allergies 10/24/2013    10/24/2013--skin testing revealed impressive reactions to grass following, tree pollen, weed pollen, ragweed, dust mite, and cat. Recommend immunotherapy.   • Non morbid obesity 2/22/2016   • Right bundle branch block     ECG reveals sinus rhythm, rate of 75, right bundle branch block, left anterior hemiblock   • Subclinical hypothyroidism 8/17/2018 08/27/2018--thyroid ultrasound reveals subcentimeter nodule in the right thyroid lobe.  Possibly cystic.  There is a question of an ill-defined 1 cm nodular lesion posteriorly in the mid left lateral thyroid.  Appearance could be technical in origin.  Recommend repeat thyroid ultrasound in 6 months.  08/17/2018--routine  follow-up.  TSH mildly elevated at 4.49.  Free T3 and free T4 are normal.  Rep   • Thyroid nodule 10/5/2018    08/27/2018--thyroid ultrasound reveals subcentimeter nodule in the right thyroid lobe.  Possibly cystic.  There is a question of an ill-defined 1 cm nodular lesion posteriorly in the mid left lateral thyroid.  Appearance could be technical in origin.  Recommend repeat thyroid ultrasound in 6 months.  08/17/2018--routine follow-up.  TSH mildly elevated at 4.49.  Free T3 and free T4 are normal.  Repeat thyroid function tests ordered and returned normal.  Thyroid ultrasound ordered.   • Type 2 diabetes mellitus with microalbuminuria, with long-term current use of insulin 1/1/2004 2004--initial diagnosis of type 2 diabetes.   • Vitamin D deficiency 2/22/2016        Past Surgical History:   Procedure Laterality Date   • ATRIAL SEPTAL DEFECT REPAIR  03/2012 March 2012--percutaneous closure of secundum ASD.  Dr. Mcpherson   • CATARACT EXTRACTION EXTRACAPSULAR W/ INTRAOCULAR LENS IMPLANTATION     • COLONOSCOPY N/A 11/29/2017 11/29/2017--colonoscopy revealed 2 small (3 mm) polyps in the sigmoid colon and cecum.  Removed.  Bowel prep.  Sigmoid diverticuli noted.  No hemorrhoids.  Pathology returned hyperplastic polyp of the sigmoid and the cecal polyp was a tubular adenoma.   • ESOPHAGEAL DILATATION  08/12/2015 08/12/2015--EGD revealed esophagitis and a distal esophageal stricture that was dilated. Small sliding hiatal hernia. Proximal gastric ulcer and gastritis present. Dysphagia resolved after dilatation. 05/22/2015--patient presents with a several month history of progressively worsening intermittent dysphagia of solids but not liquids. He describes solid food sometimes sticking and points to his upp   • ESOPHAGOSCOPY / EGD  08/12/2015 08/12/2015--EGD revealed esophagitis and a distal esophageal stricture that was dilated. Small sliding hiatal hernia. Proximal gastric ulcer and gastritis present.  "Dysphagia resolved after dilatation. 05/22/2015--patient presents with a several month history of progressively worsening intermittent dysphagia of solids but not liquids. He describes solid food sometimes sticking and points to his upp   • EYE SURGERY  11/2021   • KNEE ACL RECONSTRUCTION Right 02/11/2013 02/11/2013--knee arthroscopy with anterior cruciate ligament repair   • TONSILLECTOMY  2009 2009--tonsillectomy as an adult.        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 male  General Appearance:    Alert, cooperative, in no acute distress                   Vitals:    06/15/23 1559   Temp: 97.6 °F (36.4 °C)   Weight: 104 kg (228 lb 6.4 oz)   Height: 165.1 cm (65\")   PainSc:   7      Patient is alert and read ×3 no acute distress appears her above-listed at height weight and age.  Affect is normal respiratory rate is normal unlabored. Heart rate regular rate rhythm, sclera, dentition and hearing are normal for the purpose of this exam.    Ortho Exam exam of left shoulder he can active forward flex to 90 passively I can get him to 180 abduction is similar rotator cuff strength is 4/5 no overlying skin changes    Procedures          Radiology:   AP, Scapular Y and Axillary Lateral of the left shoulder were ordered/reviewed to evauate shoulder pain.  I did compare to x-rays done several years ago he has some acromioclavicular arthritis no obvious " fracture  Imaging Results (Most Recent)     Procedure Component Value Units Date/Time    XR Shoulder 2+ View Left [[id number]] Resulted: 06/15/23 1544     Updated: 06/15/23 1544    Impression:      Ordering physician's impression is located in the Encounter Note dated 06/15/23. X-ray performed in the DR room.          Assessment/Plan: Severe left shoulder pain following a fall I would worry about a full-thickness rotator cuff tear as well as an occult fracture in view of the severity of the symptoms plan is to proceed with an MRI

## 2023-07-26 NOTE — PROGRESS NOTES
"Shoulder MRI Follow Up      Patient: Marek Diego        YOB: 1965            Chief Complaints: Shoulder pain left      History of Present Illness: The patient is here follow-up of an MRI of the shoulder MRI demonstrates rotator cuff tear he has some retraction but no significant atrophy left 1 is really bothering him he would like to go on to proceed with repair.  He has a known rotator cuff tear on the right with that we diagnosed in 2018 which is bothering him some but not as much as the left 1      Physical Exam: 58 y.o. male  General Appearance:    Alert, cooperative, in no acute distress                 There were no vitals filed for this visit.     Patient is alert and read ×3 no acute distress appears her above-listed at height weight and age.  Affect is normal respiratory rate is normal unlabored. Heart rate regular rate rhythm, sclera, dentition and hearing are normal for the purpose of this exam.      Ortho Exam  Physical exam of the left shoulder reveals no overlying skin changes no lymphedema no lymphadenopathy.  Patient has active flexion 180 with mild symptoms abduction is similar external rotation is to 50 and internal rotation to the upper lumbar spine with mild symptoms.  Patient has good rotator cuff strength 4+ over 5 with isometric strength testing with pain.  Patient has a positive impingement and a positive Dykes sign.  Patient has good cervical range of motion which is full and asymptomatic no radicular symptoms.  Patient has a normal elbow exam.  Good distal pulses are presentPatient has pain with overhead activity and a positive Neer sign and a positive empty can sign  They have a positive drop arm any definitive painful arc   Physical Exam: 58 y.o. male  General Appearance:    Alert, cooperative, in no acute distress                      Vitals:    07/28/23 1014   Temp: 97.6 °F (36.4 °C)   TempSrc: Temporal   Weight: 103 kg (226 lb 9.6 oz)   Height: 167.6 cm (66\") "   PainSc:   2        Head:    Normocephalic, without obvious abnormality, atraumatic   Eyes:            conjunctivae and sclerae normal, no pallor, corneas clear,    Ears:    Ears appear intact with no abnormalities noted   Throat:   No oral lesions, no thrush, oral mucosa moist   Neck:   No adenopathy, supple, trachea midline, no thyromegaly,    Back:     No kyphosis present, no scoliosis present, no skin lesions,      erythema or scars, no tenderness to percussion or                   palpation,   range of motion normal   Lungs:     ,respirations regular, even and                  unlabored    Heart:    Regular rhythm and normal rate               Chest Wall:    No abnormalities observed               Pulses:   Pulses palpable and equal bilaterally   Skin:   No bleeding, bruising or rash   Lymph nodes:   No palpable adenopathy   Neurologic:   Appears neurologic intact       MRI Results: MRIs as above I have reviewed the films myself and agree with findings reviewed them with the patient  Procedures      Assessment/Plan: Left shoulder rotator cuff tear plan is to proceed with repair had a long discussion with him regarding the natural history of rotator cuff pathology how we fix these postop recovery restrictions and duration of these restrictions.  We also talked about risk benefits and alternatives  The patient voiced understanding of the risks, benefits, and alternative forms of treatment that were discussed and the patient consents to proceed with the above listed surgery.  All risks, benefits and alternatives were discussed.  Risks including to but not exclusive to anesthetic complications, including death, MI, CVA, infection, bleeding DVT, fracture, residual pain and need for future surgery.  He understands and agrees to proceed

## 2023-07-28 ENCOUNTER — OFFICE VISIT (OUTPATIENT)
Dept: ORTHOPEDIC SURGERY | Facility: CLINIC | Age: 58
End: 2023-07-28
Payer: COMMERCIAL

## 2023-07-28 VITALS — WEIGHT: 226.6 LBS | BODY MASS INDEX: 36.42 KG/M2 | TEMPERATURE: 97.6 F | HEIGHT: 66 IN

## 2023-07-28 DIAGNOSIS — S46.012D TRAUMATIC COMPLETE TEAR OF LEFT ROTATOR CUFF, SUBSEQUENT ENCOUNTER: Primary | ICD-10-CM

## 2023-07-31 RX ORDER — INSULIN LISPRO 100 [IU]/ML
INJECTION, SOLUTION INTRAVENOUS; SUBCUTANEOUS
Qty: 45 ML | Refills: 0 | Status: SHIPPED | OUTPATIENT
Start: 2023-07-31

## 2023-08-23 ENCOUNTER — LAB (OUTPATIENT)
Dept: ENDOCRINOLOGY | Age: 58
End: 2023-08-23
Payer: COMMERCIAL

## 2023-08-23 DIAGNOSIS — Z79.4 TYPE 2 DIABETES MELLITUS WITH HYPERGLYCEMIA, WITH LONG-TERM CURRENT USE OF INSULIN: ICD-10-CM

## 2023-08-23 DIAGNOSIS — E29.1 HYPOGONADISM MALE: ICD-10-CM

## 2023-08-23 DIAGNOSIS — E11.65 TYPE 2 DIABETES MELLITUS WITH HYPERGLYCEMIA, WITH LONG-TERM CURRENT USE OF INSULIN: ICD-10-CM

## 2023-08-30 ENCOUNTER — OFFICE VISIT (OUTPATIENT)
Dept: ENDOCRINOLOGY | Age: 58
End: 2023-08-30
Payer: COMMERCIAL

## 2023-08-30 VITALS
HEART RATE: 78 BPM | WEIGHT: 227.8 LBS | OXYGEN SATURATION: 98 % | DIASTOLIC BLOOD PRESSURE: 70 MMHG | TEMPERATURE: 96.6 F | BODY MASS INDEX: 36.61 KG/M2 | SYSTOLIC BLOOD PRESSURE: 120 MMHG | HEIGHT: 66 IN

## 2023-08-30 DIAGNOSIS — Z79.4 TYPE 2 DIABETES MELLITUS WITH HYPERGLYCEMIA, WITH LONG-TERM CURRENT USE OF INSULIN: ICD-10-CM

## 2023-08-30 DIAGNOSIS — E78.5 HYPERLIPIDEMIA, UNSPECIFIED HYPERLIPIDEMIA TYPE: ICD-10-CM

## 2023-08-30 DIAGNOSIS — E11.65 TYPE 2 DIABETES MELLITUS WITH HYPERGLYCEMIA, WITH LONG-TERM CURRENT USE OF INSULIN: ICD-10-CM

## 2023-08-30 PROCEDURE — 99214 OFFICE O/P EST MOD 30 MIN: CPT | Performed by: NURSE PRACTITIONER

## 2023-08-30 RX ORDER — ACYCLOVIR 400 MG/1
1 TABLET ORAL
Qty: 9 EACH | Refills: 1 | Status: SHIPPED | OUTPATIENT
Start: 2023-08-30

## 2023-08-30 RX ORDER — ROSUVASTATIN CALCIUM 40 MG/1
40 TABLET, COATED ORAL
Qty: 90 TABLET | Refills: 1 | Status: SHIPPED | OUTPATIENT
Start: 2023-08-30

## 2023-08-30 RX ORDER — FLUTICASONE PROPIONATE 50 MCG
2 SPRAY, SUSPENSION (ML) NASAL DAILY
COMMUNITY
Start: 2023-03-06

## 2023-08-30 RX ORDER — DAPAGLIFLOZIN 10 MG/1
10 TABLET, FILM COATED ORAL DAILY
Qty: 90 TABLET | Refills: 1 | Status: SHIPPED | OUTPATIENT
Start: 2023-08-30

## 2023-08-30 RX ORDER — VALSARTAN 160 MG/1
160 TABLET ORAL DAILY
COMMUNITY

## 2023-08-30 RX ORDER — AMLODIPINE BESYLATE 5 MG/1
5 TABLET ORAL DAILY
COMMUNITY

## 2023-08-30 RX ORDER — INSULIN GLARGINE 300 U/ML
INJECTION, SOLUTION SUBCUTANEOUS
Qty: 45 ML | Refills: 1 | Status: SHIPPED | OUTPATIENT
Start: 2023-08-30 | End: 2023-11-28

## 2023-08-30 RX ORDER — EZETIMIBE 10 MG/1
10 TABLET ORAL DAILY
Qty: 90 TABLET | Refills: 1 | Status: SHIPPED | OUTPATIENT
Start: 2023-08-30

## 2023-08-30 RX ORDER — INSULIN LISPRO 100 [IU]/ML
INJECTION, SOLUTION INTRAVENOUS; SUBCUTANEOUS
Qty: 90 ML | Refills: 0 | Status: SHIPPED | OUTPATIENT
Start: 2023-08-30

## 2023-08-30 RX ORDER — INSULIN LISPRO 100 [IU]/ML
INJECTION, SOLUTION INTRAVENOUS; SUBCUTANEOUS
Qty: 90 ML | Refills: 0 | Status: SHIPPED | OUTPATIENT
Start: 2023-08-30 | End: 2023-08-30 | Stop reason: SDUPTHER

## 2023-08-30 RX ORDER — PEN NEEDLE, DIABETIC 29 G X1/2"
NEEDLE, DISPOSABLE MISCELLANEOUS
Qty: 500 EACH | Refills: 4 | Status: SHIPPED | OUTPATIENT
Start: 2023-08-30

## 2023-08-30 RX ORDER — ACYCLOVIR 400 MG/1
1 TABLET ORAL ONCE
Qty: 1 EACH | Refills: 0 | Status: SHIPPED | OUTPATIENT
Start: 2023-08-30 | End: 2023-08-30

## 2023-08-30 RX ORDER — DULAGLUTIDE 1.5 MG/.5ML
INJECTION, SOLUTION SUBCUTANEOUS
COMMUNITY
Start: 2023-08-28 | End: 2023-08-30

## 2023-08-30 NOTE — PROGRESS NOTES
"Chief Complaint  Diabetes (Checks sugars 3 times a day. /Need refill on toujeo and humalog.)    Subjective        Marek Diego presents to [facility] END[facility]  History of Present Illness    HPI:   - 58 year old male here for management of diabetes mellitus type 2  - Has had diabetes for 15+ years  - No known complications to date  -had cortisone shot in his shoulder this summer   - BS in the morning running around 180s  - currently taking Toujeo 70 units bid, Humalog 18u with meals + sliding scale, Trulicity 1.5 mg weekly (was originally on 4.5mg weekly but was not in stock), Farxiga 10 mg daily, metformin 1000 mg bid  - hypogonadism treated with dr gonzales, unknown etiology, on testosterone topical 1.62%, 2 pumps daily  - He is also on Crestor 40 mg daily with zetia        Current sliding scale:  151 - 200 - 3 unit  201 - 250 - 6 units  251 - 300 - 9 units  301 - 350 - 12 units  Above 350 mg/dl - 15 units    Objective   Vital Signs:  /70   Pulse 78   Temp 96.6 øF (35.9 øC) (Temporal)   Ht 167.6 cm (66\")   Wt 103 kg (227 lb 12.8 oz)   SpO2 98%   BMI 36.77 kg/mý   Estimated body mass index is 36.77 kg/mý as calculated from the following:    Height as of this encounter: 167.6 cm (66\").    Weight as of this encounter: 103 kg (227 lb 12.8 oz).             Physical Exam  Vitals reviewed.   Constitutional:       General: He is not in acute distress.  HENT:      Head: Normocephalic and atraumatic.   Cardiovascular:      Rate and Rhythm: Normal rate.   Pulmonary:      Effort: Pulmonary effort is normal. No respiratory distress.   Musculoskeletal:         General: No signs of injury. Normal range of motion.      Cervical back: Normal range of motion and neck supple.   Skin:     General: Skin is warm and dry.   Neurological:      Mental Status: He is alert and oriented to person, place, and time. Mental status is at baseline.   Psychiatric:         Mood and Affect: Mood normal.         " Behavior: Behavior normal.         Thought Content: Thought content normal.         Judgment: Judgment normal.        Result Review :  The following data was reviewed by: JEREMY Bennett on 08/30/2023:  Common labs          9/21/2022    09:29 1/30/2023    09:42 2/27/2023    08:14   Common Labs   Glucose 87   136    BUN 18   23    Creatinine 1.19   1.23    Sodium 143   141    Potassium 5.6   5.1    Chloride 104   103    Calcium 10.0   9.1    Total Protein 6.6   6.2    Albumin 4.50   4.2    Total Bilirubin 0.3   0.3    Alkaline Phosphatase 74   68    AST (SGOT) 20   14    ALT (SGPT) 39   21    Hemoglobin 13.3   12.6    Hematocrit 40.5   38.6    Total Cholesterol 94   79    Triglycerides 98   169    HDL Cholesterol 31   23    LDL Cholesterol  44   28    Hemoglobin A1C 8.40  8.3        Microalbumin, Urine <3.0   4.1    PSA 0.412  0.50     0.313       Details          This result is from an external source.                          Assessment and Plan   Diagnoses and all orders for this visit:    1. Type 2 diabetes mellitus with hyperglycemia, with long-term current use of insulin  -     metFORMIN (GLUCOPHAGE) 1000 MG tablet; Take 1 tablet by mouth 2 (Two) Times a Day With Meals. Indications: Type 2 Diabetes  Dispense: 180 tablet; Refill: 1    2. Hyperlipidemia, unspecified hyperlipidemia type  -     ezetimibe (ZETIA) 10 MG tablet; Take 1 tablet by mouth Daily.  Dispense: 90 tablet; Refill: 1    Other orders  -     Insulin Pen Needle (RELION PEN NEEDLE 31G/8MM) 31G X 8 MM misc; For use with pen device up to 5 times a day  Dispense: 500 each; Refill: 4  -     dapagliflozin Propanediol (Farxiga) 10 MG tablet; Take 10 mg by mouth Daily.  Dispense: 90 tablet; Refill: 1  -     Discontinue: Insulin Lispro, 1 Unit Dial, (HumaLOG KwikPen) 100 UNIT/ML solution pen-injector; Inject 18 Units under the skin into the appropriate area as directed Daily With Breakfast AND 18 Units Daily Before Lunch AND 24 Units Daily With Dinner.  Previous sig for INSULIN LISPRO, 1 UNIT DIAL, 100 UNIT/ML solution pen-injector:  Inject 18 Units under the skin into the appropriate area as directed Daily With Breakfast AND 18 Units Daily Before Lunch AND 24 Units Daily With Dinner. Do all this for 90 days. Use as directed for meal & correction coverage up to 100 units a day. Uses the above dose before meals plus sliding scale correction: 151 - 200 - 3 unit. 201 - 250 - 6 units. 251 - 300 - 9 units. 301 - 350 - 12 units. Above 350 mg/dl - 15 units  Dispense: 90 mL; Refill: 0  -     Toujeo Max SoloStar 300 UNIT/ML solution pen-injector injection; Inject 70 Units under the skin into the appropriate area as directed Every Morning AND 80 Units Every Evening. Do all this for 90 days. Inject 60 units in the morning and 60 units at night  Dispense: 45 mL; Refill: 1  -     Continuous Blood Gluc  (Dexcom G7 ) device; Use 1 each 1 (One) Time for 1 dose.  Dispense: 1 each; Refill: 0  -     Continuous Blood Gluc Sensor (Dexcom G7 Sensor) misc; Use 1 each Every 10 (Ten) Days.  Dispense: 9 each; Refill: 1  -     Insulin Lispro, 1 Unit Dial, (HumaLOG KwikPen) 100 UNIT/ML solution pen-injector; Inject 18 Units under the skin into the appropriate area as directed Daily With Breakfast AND 18 Units Daily Before Lunch AND 24 Units Daily With Dinner. Previous sig for INSULIN LISPRO, 1 UNIT DIAL, 100 UNIT/ML solution pen-injector: Inject 18 Units under the skin into the appropriate area as directed Daily With Breakfast AND 18 Units Daily Before Lunch AND 24 Units Daily With Dinner. Do all this for 90 days. Use as directed for meal & correction coverage up to 100 units a day. Uses the above dose before meals plus sliding scale correction: 151 - 200 - 3 unit. 201 - 250 - 6 units. 251 - 300 - 9 units. 301 - 350 - 12 units. Above 350 mg/dl - 15 units  Dispense: 90 mL; Refill: 0  -     rosuvastatin (CRESTOR) 40 MG tablet; Take 1 tablet by mouth every night at bedtime.   Dispense: 90 tablet; Refill: 1             Follow Up   Return in about 3 months (around 11/30/2023).    A1c not at goal, prolonged uncontrolled A1c- higher risk of complications    Increase pen needle length to ensure adequate absorption of insulin dose   Increase toujeo to 80u at night, continue 70u in the morning and increase humalog dose at dinner to 24u for largers meals- continue 18u for normal sized meals  Increase trulicity to 3mg weekly- educated on hypoglycemia caution with increased insulin dose and increased trulicity dose    Add cgm to improve glycemin monitoring and safety with insulin dose adjustments     Patient was given instructions and counseling regarding his condition or for health maintenance advice. Please see specific information pulled into the AVS if appropriate.     Ingrid Menard, APRN

## 2023-08-31 LAB
BUN SERPL-MCNC: 23 MG/DL (ref 6–20)
BUN/CREAT SERPL: 16 (ref 7–25)
CALCIUM SERPL-MCNC: 9.7 MG/DL (ref 8.6–10.5)
CHLORIDE SERPL-SCNC: 108 MMOL/L (ref 98–107)
CO2 SERPL-SCNC: 23.4 MMOL/L (ref 22–29)
CREAT SERPL-MCNC: 1.44 MG/DL (ref 0.76–1.27)
EGFRCR SERPLBLD CKD-EPI 2021: 56.7 ML/MIN/1.73
GLUCOSE SERPL-MCNC: 134 MG/DL (ref 65–99)
HBA1C MFR BLD: 8.6 % (ref 4.8–5.6)
POTASSIUM SERPL-SCNC: 5.4 MMOL/L (ref 3.5–5.2)
SODIUM SERPL-SCNC: 141 MMOL/L (ref 136–145)
TESTOST FREE SERPL-MCNC: 10.2 PG/ML (ref 7.2–24)
TESTOST SERPL-MCNC: 370 NG/DL (ref 264–916)

## 2023-09-29 ENCOUNTER — PRIOR AUTHORIZATION (OUTPATIENT)
Dept: ENDOCRINOLOGY | Age: 58
End: 2023-09-29
Payer: COMMERCIAL

## 2023-09-29 NOTE — TELEPHONE ENCOUNTER
TINY NOBLE Key: [id number] help? Call us at [phone]  Outcome  Additional Information Required  Drug is covered by current benefit plan. No further PA activity needed  Drug  Testosterone 1.62% gel  Form  [employer] Electronic PA Form (2017 [employer])   yes

## 2023-10-02 ENCOUNTER — TELEPHONE (OUTPATIENT)
Dept: ENDOCRINOLOGY | Age: 58
End: 2023-10-02
Payer: COMMERCIAL

## 2023-10-02 DIAGNOSIS — E29.1 HYPOGONADISM MALE: Primary | ICD-10-CM

## 2023-11-17 RX ORDER — DULAGLUTIDE 1.5 MG/.5ML
1.5 INJECTION, SOLUTION SUBCUTANEOUS WEEKLY
Qty: 6 ML | Refills: 0 | OUTPATIENT
Start: 2023-11-17

## 2023-11-27 DIAGNOSIS — R80.9 TYPE 2 DIABETES MELLITUS WITH MICROALBUMINURIA, WITH LONG-TERM CURRENT USE OF INSULIN: Primary | Chronic | ICD-10-CM

## 2023-11-27 DIAGNOSIS — Z79.4 TYPE 2 DIABETES MELLITUS WITH MICROALBUMINURIA, WITH LONG-TERM CURRENT USE OF INSULIN: Primary | Chronic | ICD-10-CM

## 2023-11-27 DIAGNOSIS — E11.29 TYPE 2 DIABETES MELLITUS WITH MICROALBUMINURIA, WITH LONG-TERM CURRENT USE OF INSULIN: Primary | Chronic | ICD-10-CM

## 2023-11-27 RX ORDER — DULAGLUTIDE 4.5 MG/.5ML
0.5 INJECTION, SOLUTION SUBCUTANEOUS WEEKLY
Qty: 3 ML | Refills: 0 | Status: SHIPPED | OUTPATIENT
Start: 2023-11-27 | End: 2023-11-28 | Stop reason: ALTCHOICE

## 2023-11-27 RX ORDER — DULAGLUTIDE 4.5 MG/.5ML
0.5 INJECTION, SOLUTION SUBCUTANEOUS WEEKLY
COMMUNITY
End: 2023-11-27 | Stop reason: SDUPTHER

## 2023-11-28 ENCOUNTER — SPECIALTY PHARMACY (OUTPATIENT)
Dept: ENDOCRINOLOGY | Age: 58
End: 2023-11-28
Payer: COMMERCIAL

## 2023-11-28 ENCOUNTER — OFFICE VISIT (OUTPATIENT)
Dept: ENDOCRINOLOGY | Age: 58
End: 2023-11-28
Payer: COMMERCIAL

## 2023-11-28 VITALS
OXYGEN SATURATION: 98 % | BODY MASS INDEX: 37.19 KG/M2 | TEMPERATURE: 96.6 F | SYSTOLIC BLOOD PRESSURE: 130 MMHG | HEART RATE: 77 BPM | DIASTOLIC BLOOD PRESSURE: 70 MMHG | WEIGHT: 230.4 LBS

## 2023-11-28 DIAGNOSIS — E66.01 CLASS 2 SEVERE OBESITY DUE TO EXCESS CALORIES WITH SERIOUS COMORBIDITY AND BODY MASS INDEX (BMI) OF 37.0 TO 37.9 IN ADULT: ICD-10-CM

## 2023-11-28 DIAGNOSIS — E78.2 HYPERLIPEMIA, MIXED: ICD-10-CM

## 2023-11-28 DIAGNOSIS — E11.65 TYPE 2 DIABETES MELLITUS WITH HYPERGLYCEMIA, WITH LONG-TERM CURRENT USE OF INSULIN: Primary | ICD-10-CM

## 2023-11-28 DIAGNOSIS — Z79.4 TYPE 2 DIABETES MELLITUS WITH HYPERGLYCEMIA, WITH LONG-TERM CURRENT USE OF INSULIN: Primary | ICD-10-CM

## 2023-11-28 PROCEDURE — 99214 OFFICE O/P EST MOD 30 MIN: CPT | Performed by: NURSE PRACTITIONER

## 2023-11-28 PROCEDURE — 95251 CONT GLUC MNTR ANALYSIS I&R: CPT | Performed by: NURSE PRACTITIONER

## 2023-11-28 RX ORDER — DULAGLUTIDE 3 MG/.5ML
3 INJECTION, SOLUTION SUBCUTANEOUS WEEKLY
Qty: 2 ML | Refills: 0 | Status: SHIPPED | OUTPATIENT
Start: 2023-11-28

## 2023-11-28 RX ORDER — DULAGLUTIDE 3 MG/.5ML
3 INJECTION, SOLUTION SUBCUTANEOUS WEEKLY
Qty: 2 ML | Refills: 0 | Status: SHIPPED | OUTPATIENT
Start: 2023-11-28 | End: 2023-11-28 | Stop reason: SDUPTHER

## 2023-11-28 NOTE — PROGRESS NOTES
Specialty Pharmacy Patient Management Program  Endocrinology Initial Assessment     Marek Diego was referred by an Endocrinology provider to the Endocrinology Patient Management program offered by [facility] for Type 2 Diabetes on 11/28/23.  An initial outreach was conducted, including assessment of therapy appropriateness and specialty medication education for Trulicity. The patient was introduced to services offered by [facility], including: regular assessments, refill coordination, curbside pick-up or mail order delivery options, prior authorization maintenance, and financial assistance programs as applicable. The patient was also provided with contact information for the pharmacy team.     Insurance Coverage & Financial Support  [employer]: Trulicity $0/30d     Relevant Past Medical History and Comorbidities  Relevant medical history and concomitant health conditions were discussed with the patient. The patient's chart has been reviewed for relevant past medical history and comorbid conditions and updated as necessary.  Past Medical History:   Diagnosis Date    Allergic rhinitis 10/24/2013    10/24/2013--skin testing revealed impressive reactions to grass following, tree pollen, weed pollen, ragweed, dust mite, and cat. Recommend immunotherapy.    Benign essential hypertension 2/22/2016    Chronic constipation 11/20/2017 11/20/2017--patient reports approximately 8 month history of intermittent constipation that at times can last as much as a month.  He notes his blood sugar readings tend to increase when this happens.  He is scheduled for colonoscopy next week.  I recommend a generic probiotic daily as well as MiraLAX and adjust as needed.    Chronic neck pain 10/30/2015    12/19/2015--patient reports his left shoulder pain has resolved. He continues to have neck pain. X-ray of the cervical spine ordered. Physical therapy ordered.   11/10/2015--left  shoulder injected with 80 mg of Depo-Medrol with 3 mL of Xylocaine.   10/30/2015--patient presents with at least a one-month history of intermittent left-sided neck pain that is associated with left shoulder pain as well. The pain is described as shooting and is 8 out of 10 intensity at its worst. The pain is worse with certain movements of his head and left shoulder. No trauma. No numbness or paresthesias.    Depression with anxiety 2/22/2016    Diabetic eye exam 5/27/2016    May 2016--patient reports a normal diabetic eye exam.    Diabetic foot exam 4/27/2017 05/02/2017--routine diabetic foot exam reveals no evidence of diabetic foot ulcer or pre-ulcerative callus.  Pulses are palpable and there is no signs of ischemia.  Sensation subjectively intact.    Dysfunction of both eustachian tubes 9/6/2019 September 6, 2019--patient with severe environmental allergies/allergic rhinitis presents with complaints of his ears popping like he is been on an airplane and a sensation of pressure at times.  At times his hearing is decreased.  On exam I do not see any definite serous otitis media.  I do think his environmental allergies are playing a role but I think it would be reasonable for ENT to evaluate    Gastroesophageal reflux disease with esophagitis 5/22/2015 08/12/2015--EGD revealed esophagitis and a distal esophageal stricture that was dilated. Small sliding hiatal hernia. Proximal gastric ulcer and gastritis present. Dysphagia resolved after dilatation. Pathology of the gastric antrum was benign with no significant histologic abnormality. Distal esophagus biopsy negative for intestinal metaplasia or dysplasia.   05/22/2015--patient presents with a several month history of progressively worsening intermittent dysphagia of solids but not liquids. He describes solid food sometimes sticking and points to his upper chest/lower throat. No other associated symptoms. Patient referred to Dr. Aime Parada for an EGD.  This is negative, may need ENT evaluation.    History of colon polyps, 11/29/2017--tubular adenoma times one.  Hyperplastic ×1.  Repeat 5 years. 12/18/2017 11/29/2017--colonoscopy revealed 2 small (3 mm) polyps in the sigmoid colon and cecum.  Removed.  Bowel prep.  Sigmoid diverticuli noted.  No hemorrhoids.  Pathology returned hyperplastic polyp of the sigmoid and the cecal polyp was a tubular adenoma.    HIstory of eosinophilic gastroenteritis 1990s 1990s--patient had significant epigastric discomfort and workup including an EGD revealed eosinophilic gastroenteritis that was treated with prednisone and resulted in resolution.    History of esophageal stricture 08/12/2015 08/12/2015--EGD revealed esophagitis and a distal esophageal stricture that was dilated. Small sliding hiatal hernia. Proximal gastric ulcer and gastritis present. Dysphagia resolved after dilatation. Pathology of the gastric antrum was benign with no significant histologic abnormality. Distal esophagus biopsy negative for intestinal metaplasia or dysplasia.   05/22/2015--patient presents with a s    History of Pulmonary nodule, 03/07/2016--5 mm indeterminate nodule right lower lobe.  09/13/2016--consistent with calcified granuloma. 3/7/2016    09/13/2016--CT scan of the chest, abdomen, and pelvis with contrast reveals no lymphadenopathy within the abdomen or pelvis.  Mild hepatic steatosis.  Previously noted right lower lobe pulmonary nodule is currently calcified and consistent with a calcified granuloma.  03/07/2016--CT scan of the abdomen performed for complaints of abdominal discomfort revealed a 5 mm nodular focus right lower lobe which is indeterminate.  Recommend repeat CT scan in 6 months.    Hypogonadism male, no TRT. 12/26/2012 12/26/2012--treatment for hypogonadism begun.    Microalbuminuria 10/19/2014    10/19/2014--urine microalbumin mildly elevated at 27.8. Normal range 0.0--17.0.    Moderate persistent asthma without  complication 2/22/2016    Seasonal, spring and fall.    Morbid obesity 2/22/2016 November 12, 2019--current weight is 191 pounds representing a 1 pound weight loss.  It appears the patient needs a drug holiday from the phentermine.  I will have him stay off of it for 1 month and then restarted back at the current dose and then we will reassess after the first the year when he comes in for his regular follow-up and physical.  September 6, 2019--current weight is 192 pounds repr    Multiple environmental allergies 10/24/2013    10/24/2013--skin testing revealed impressive reactions to grass following, tree pollen, weed pollen, ragweed, dust mite, and cat. Recommend immunotherapy.    Non morbid obesity 2/22/2016    Right bundle branch block     ECG reveals sinus rhythm, rate of 75, right bundle branch block, left anterior hemiblock    Subclinical hypothyroidism 8/17/2018 08/27/2018--thyroid ultrasound reveals subcentimeter nodule in the right thyroid lobe.  Possibly cystic.  There is a question of an ill-defined 1 cm nodular lesion posteriorly in the mid left lateral thyroid.  Appearance could be technical in origin.  Recommend repeat thyroid ultrasound in 6 months.  08/17/2018--routine follow-up.  TSH mildly elevated at 4.49.  Free T3 and free T4 are normal.  Rep    Thyroid nodule 10/5/2018    08/27/2018--thyroid ultrasound reveals subcentimeter nodule in the right thyroid lobe.  Possibly cystic.  There is a question of an ill-defined 1 cm nodular lesion posteriorly in the mid left lateral thyroid.  Appearance could be technical in origin.  Recommend repeat thyroid ultrasound in 6 months.  08/17/2018--routine follow-up.  TSH mildly elevated at 4.49.  Free T3 and free T4 are normal.  Repeat thyroid function tests ordered and returned normal.  Thyroid ultrasound ordered.    Type 2 diabetes mellitus with microalbuminuria, with long-term current use of insulin 1/1/2004 2004--initial diagnosis of type 2 diabetes.     Vitamin D deficiency 2/22/2016     Social History     Socioeconomic History    Marital status:      Spouse name: Luis     Number of children: 2    Years of education: 16    Highest education level: Bachelor's degree (e.g., BA, AB, BS)   Tobacco Use    Smoking status: Never    Smokeless tobacco: Never   Vaping Use    Vaping Use: Never used   Substance and Sexual Activity    Alcohol use: Yes    Drug use: No    Sexual activity: Yes     Partners: Female       Problem list reviewed by Kelsey Batista PharmD on 11/28/2023 at  5:11 PM    Allergies  Known allergies and reactions were discussed with the patient. The patient's chart has been reviewed for  allergy information and updated as necessary.   Allergies   Allergen Reactions    Latex Itching       Allergies reviewed by Kelsey Batista PharmD on 11/28/2023 at  5:09 PM    Relevant Laboratory Values  Relevant laboratory values were discussed with the patient. The following specialty medication dose adjustment(s) are recommended: No dosage adjustments recommended  A1C Last 3 Results          1/30/2023    09:42 8/23/2023    08:42 10/25/2023    08:47   HGBA1C Last 3 Results   Hemoglobin A1C 8.3     8.60  8.50       Details          This result is from an external source.             Lab Results   Component Value Date    HGBA1C 8.50 (H) 10/25/2023     Lab Results   Component Value Date    GLUCOSE 154 (H) 10/25/2023    CALCIUM 10.0 10/25/2023     10/25/2023    K 5.7 (H) 10/25/2023    CO2 25.2 10/25/2023     10/25/2023    BUN 28 (H) 10/25/2023    CREATININE 1.32 (H) 10/25/2023    EGFRIFAFRI >60 01/30/2023    EGFRIFNONA 66 02/25/2022    BCR 21.2 10/25/2023    ANIONGAP 12 01/30/2023     Lab Results   Component Value Date    CHLPL 76 10/25/2023    TRIG 127 10/25/2023    HDL 23 (L) 10/25/2023    LDL 30 10/25/2023     Microalbumin          2/27/2023    08:14   Microalbumin   Microalbumin, Urine 4.1        Current Medication List  This medication list has  been reviewed with the patient and evaluated for any interactions or necessary modifications/recommendations, and updated to include all prescription medications, OTC medications, and supplements the patient is currently taking.  This list reflects what is contained in the patient's profile, which has also been marked as reviewed to communicate to other providers it is the most up to date version of the patient's current medication therapy.     Current Outpatient Medications:     albuterol sulfate  (90 Base) MCG/ACT inhaler, Inhale 2 puffs Every 4 (Four) Hours As Needed for Wheezing or Shortness of Air., Disp: 18 g, Rfl: 0    amLODIPine (NORVASC) 5 MG tablet, Take 1 tablet by mouth Daily., Disp: , Rfl:     amLODIPine-benazepril (LOTREL 5-20) 5-20 MG per capsule, TAKE 1 CAPSULE BY MOUTH ONE TIME A DAY  (Patient not taking: Reported on 11/28/2023), Disp: 30 capsule, Rfl: 5    benzonatate (TESSALON) 200 MG capsule, Take 1 capsule by mouth 3 (Three) Times a Day As Needed., Disp: , Rfl:     Cholecalciferol (VITAMIN D3) 5000 UNITS capsule capsule, Take 1 capsule by mouth Daily., Disp: , Rfl:     Continuous Blood Gluc Sensor (Dexcom G7 Sensor) misc, Use 1 each Every 10 (Ten) Days., Disp: 9 each, Rfl: 1    dapagliflozin Propanediol (Farxiga) 10 MG tablet, Take 10 mg by mouth Daily., Disp: 90 tablet, Rfl: 1    doxycycline (VIBRAMYICN) 100 MG tablet, Take 1 tablet by mouth 2 (Two) Times a Day., Disp: , Rfl:     Dulaglutide (Trulicity) 3 MG/0.5ML solution pen-injector, Inject 0.5 mL under the skin into the appropriate area as directed 1 (One) Time Per Week., Disp: 2 mL, Rfl: 0    esomeprazole (nexIUM) 40 MG capsule, TAKE 1 CAPSULE BY MOUTH EVERY DAY, Disp: 90 capsule, Rfl: 0    ezetimibe (ZETIA) 10 MG tablet, Take 1 tablet by mouth Daily., Disp: 90 tablet, Rfl: 1    fluconazole (DIFLUCAN) 200 MG tablet, TAKE 1 TABLET BY MOUTH EVERY DAY AS DIRECTED FOR RECURRENT FUNGAL INFECTION., Disp: 7 tablet, Rfl: 2     fluocinonide-emollient (LIDEX-E) 0.05 % cream, Apply topically twice daily as needed hand eczema, Disp: 60 g, Rfl: 0    fluticasone (FLONASE) 50 MCG/ACT nasal spray, 2 sprays into the nostril(s) as directed by provider Daily., Disp: , Rfl:     fluticasone-salmeterol (Advair Diskus) 250-50 MCG/DOSE DISKUS, Inhale 1 puff 2 (Two) Times a Day., Disp: 60 each, Rfl: 11    Insulin Lispro, 1 Unit Dial, (HumaLOG KwikPen) 100 UNIT/ML solution pen-injector, Inject 18 Units under the skin into the appropriate area as directed Daily With Breakfast AND 18 Units Daily Before Lunch AND 24 Units Daily With Dinner. Previous sig for INSULIN LISPRO, 1 UNIT DIAL, 100 UNIT/ML solution pen-injector: Inject 18 Units under the skin into the appropriate area as directed Daily With Breakfast AND 18 Units Daily Before Lunch AND 24 Units Daily With Dinner. Do all this for 90 days. Use as directed for meal & correction coverage up to 100 units a day. Uses the above dose before meals plus sliding scale correction: 151 - 200 - 3 unit. 201 - 250 - 6 units. 251 - 300 - 9 units. 301 - 350 - 12 units. Above 350 mg/dl - 15 units, Disp: 90 mL, Rfl: 0    Insulin Pen Needle (RELION PEN NEEDLE 31G/8MM) 31G X 8 MM misc, For use with pen device up to 5 times a day, Disp: 500 each, Rfl: 4    ketoconazole (NIZORAL) 2 % cream, Apply  topically to the appropriate area as directed Take As Directed., Disp: , Rfl:     loratadine (CLARITIN) 10 MG tablet, Take 1 tablet by mouth Daily., Disp: , Rfl:     metFORMIN (GLUCOPHAGE) 1000 MG tablet, Take 1 tablet by mouth 2 (Two) Times a Day With Meals. Indications: Type 2 Diabetes, Disp: 180 tablet, Rfl: 1    montelukast (SINGULAIR) 10 MG tablet, TAKE 1 TABLET BY MOUTH EVERY DAY , Disp: 30 tablet, Rfl: 5    rosuvastatin (CRESTOR) 40 MG tablet, Take 1 tablet by mouth every night at bedtime., Disp: 90 tablet, Rfl: 1    sertraline (ZOLOFT) 50 MG tablet, TAKE 1 TABLET BY MOUTH ONE TIME A DAY , Disp: 30 tablet, Rfl: 6     sildenafil (VIAGRA) 100 MG tablet, Take 1 tablet by mouth Daily. Take 1/2 to 1 tablets by mouth as needed, Disp: , Rfl:     Testosterone 1.62 % gel, Apply 2 pumps topically daily, Disp: 75 g, Rfl: 5    Toujeo Max SoloStar 300 UNIT/ML solution pen-injector injection, Inject 70 Units under the skin into the appropriate area as directed Every Morning AND 80 Units Every Evening. Do all this for 90 days. Inject 60 units in the morning and 60 units at night, Disp: 45 mL, Rfl: 1    valsartan (DIOVAN) 160 MG tablet, Take 1 tablet by mouth Daily., Disp: , Rfl:     Medicines reviewed by Kelsey Batista, PharmD on 11/28/2023 at  5:11 PM    Drug Interactions  No significant DDIs identified  Recommended Medications Assessment  Aspirin: Not Taking Currently  Statin: Currently Taking   ACEi/ARB: Currently Taking     Initial Education Provided for Specialty Medication  The patient has been taking Trulicity for some time and tolerating well. Education provided here for patient's reference. Additional patient education shall be provided and documented upon request by the patient, provider, or payer.    TRULICITY® (dulaglutide)  Medication Expectations   Why am I taking this medication? You are taking Trulicity, along with diet and exercise, to lower blood sugar because you have type 2 diabetes. Diabetes is not curable but with proper medication and treatment, you can keep your blood sugar within your personalized target range. This medication may also reduce the risk of heart attack or stroke   What should I expect while on this medication? You should expect to see your blood sugar and A1c decrease over time and you may also lose some weight.   How does the medication work? Trulicity is a non-insulin injection that works with your body's own ability to lower blood sugar and A1c and helps your body release its own insulin in response to your blood sugar rising.  This medication also slows down food from leaving your stomach, making  you feel joiner for longer.   How long will I be on this medication for? The amount of time you will be on this medication will be determined by your doctor based on blood sugar and A1c control. You will most likely be on this medication or another diabetes medication throughout your lifetime. Do not abruptly stop this medication without talking to your doctor first.    How do I take this medication? Take as directed on your prescription label. Trulicity is supplied in a single-use pen for each dose and you will use a new pre-filled pen each week.  It is injected under the skin (subcutaneously) of your stomach, thigh or upper arm. Use this medication once weekly, on the same day each week, with or without food.      What are some possible side effects? In addition to decreased appetite, the most common side effects are nausea and constipation.  Redness, itching, and/or swelling can occur where the shot was given. Hypoglycemia can occur, especially if you are taking Trulicity with other medications that cause low blood sugar.    What happens if I miss a dose? If you miss a dose, take it as soon as you remember as long as there are at least 3 days until the next scheduled dose.  If there are less than 3 days, skip the missed dose and resume Trulicity on the regularly scheduled day.  Do not take 2 doses at the same time or extra doses.     Medication Safety   What are things I should warn my doctor immediately about? Do not use Trulicity if you or a family member have ever had medullary thyroid cancer (MTC) or Multiple Endocrine Neoplasia syndrome type 2 (MEN 2).  Tell your doctor if you have or have had problems with your kidneys or pancreas. Stop using Trulicity and get medical help right away if you have severe pain in your stomach area that will not go away. Talk to your doctor if you are pregnant, planning to become pregnant, or breastfeeding. Get medical help right away if you notice any signs/symptoms of an  allergic reaction (rash, hives, difficulty breathing, etc.).   What are things that I should be cautious of? Be cautious of any side effects from this medication. Talk to your doctor if any new ones develop or aren't getting better.   What are some medications that can interact with this one? Taking Trulicity with other medications that also lower your blood sugar such as insulin and glipizide/glimepiride/glyburide may increase the risk of low blood sugar. Your doctor may reduce the dose of these medications when you start Trulicity. Always tell your doctor or pharmacist immediately if you start taking any new medications, including over-the-counter medications, vitamins, and herbal supplements.      Medication Storage/Handling   How should I handle this medication? Keep this medication out of reach of pets/children and keep the pen capped   How does this medication need to be stored? Store Trulicity in the refrigerator. Do not freeze or use Trulicity that has been frozen.  You may store your Trulicity pens at room temperature for up to 14 days.     How should I dispose of this medication? Used Trulicity pens should discarded after each use (for single use only). If your doctor decides to stop this medication, take to your local police station for proper disposal. Some pharmacies also have take-back bins for medication drop-off.     Resources/Support   How can I remind myself to take this medication? You can download reminder apps to help you manage your refills, or set an alarm on your phone to remind you.    Is financial support available?  [employer] can provide co-pay cards if you have commercial insurance or patient assistance if you have Medicare or no insurance.    Which vaccines are recommended for me? Talk to your doctor about these vaccines: Flu, Coronavirus (COVID-19), Pneumococcal (pneumonia), Tdap, Hepatitis B, Zoster (shingles)       Adherence and Self-Administration  Adherence related to the patient's  specialty therapy was discussed with the patient. The Adherence segment of this outreach has been reviewed and updated.     Is there a concern with patient's ability to self administer the medication correctly and without issue?: No  Were any potential barriers to adherence identified during the initial assessment or patient education?: No  Are there any concerns regarding the patient's understanding of the importance of medication adherence?: No  Methods for Supporting Patient Adherence and/or Self-Administration: N/A    Open Medication Therapy Problems  No medication therapy recommendations to display    Goals of Therapy  Goals related to the patient's specialty therapy were discussed with the patient. The Patient Goals segment of this outreach has been reviewed and updated.   Goals Addressed Today        Specialty Pharmacy General Goal      A1c < 7%    Lab Results   Component Value Date    HGBA1C 8.50 (H) 10/25/2023    HGBA1C 8.60 (H) 08/23/2023    HGBA1C 8.3 (H) 01/30/2023    HGBA1C 8.40 (H) 09/21/2022    HGBA1C 7.4 (H) 05/24/2022               Reassessment Plan & Follow-Up  1. Medication Therapy Changes: Increase Trulicity to 3mg weekly from 1.5mg weekly. Plan to increase to 4.5mg once weekly in 4 weeks, if tolerated. If still no benefit on 4.5mg weekly, may pursue Mounjaro. Continue Toujeo 70 units every morning and 80 units every evening, Humalof 18 units with meals plus sliding scale, Farxiga 10mg daily, and metformin 1000mg twice daily.   2. Related Plans, Therapy Recommendations, or Therapy Problems to Be Addressed: None  3. Pharmacist to perform regular assessments no more than (6) months from the previous assessment.  4. Care Coordinator to set up future refill outreaches, coordinate prescription delivery, and escalate clinical questions to pharmacist.  5. Welcome information and patient satisfaction survey to be sent by specialty pharmacy team with patient's initial fill.    Attestation  Therapeutic  appropriateness: Appropriate   I attest the patient was actively involved in and has agreed to the above plan of care. If the prescribed therapy is at any point deemed not appropriate based on the current or future assessments, a consultation will be initiated with the patient's specialty care provider to determine the best course of action. The revised plan of therapy will be documented along with any required assessments and/or additional patient education provided.     Kelsey Batista PharmD, BCPS, BCCCP  Clinical Specialty Pharmacist, Endocrinology  11/28/2023  17:12 EST

## 2023-11-28 NOTE — PROGRESS NOTES
"Chief Complaint  Diabetes (Dexcom attached would like to  Discuss with dose of trulicity )    Subjective        Marek Diego presents to [facility] ENDOCRINOLOGY  History of Present Illness    Never got 3mg trulicity filled; has not started longer pen needles ordered last visit     HPI:   - 58 year old male here for management of diabetes mellitus type 2  - diabetes for 15+ years  - DM regimen: Toujeo 70 units QAM and 80u QPM, Humalog 18u with meals + sliding scale, Trulicity 1.5 mg weekly, Farxiga 10 mg daily, metformin 1000 mg bid  - hypogonadism (dr gonzales), unknown etiology, on testosterone topical 1.62%, 2 pumps daily  - on statin with zetia       Current sliding scale:  151 - 200 - 3 unit  201 - 250 - 6 units  251 - 300 - 9 units  301 - 350 - 12 units  Above 350 mg/dl - 15 units    Cgm reviewed  Average glucose 187  GMI 7.8%  53% time in range  46% high  < 1% low         Objective   Vital Signs:  /70   Pulse 77   Temp 96.6 °F (35.9 °C) (Temporal)   Wt 105 kg (230 lb 6.4 oz)   SpO2 98%   BMI 37.19 kg/m²   Estimated body mass index is 37.19 kg/m² as calculated from the following:    Height as of 8/30/23: 167.6 cm (66\").    Weight as of this encounter: 105 kg (230 lb 6.4 oz).             Physical Exam  Vitals reviewed.   Constitutional:       General: He is not in acute distress.  HENT:      Head: Normocephalic and atraumatic.   Cardiovascular:      Rate and Rhythm: Normal rate.   Pulmonary:      Effort: Pulmonary effort is normal. No respiratory distress.   Musculoskeletal:         General: No signs of injury. Normal range of motion.      Cervical back: Normal range of motion and neck supple.   Skin:     General: Skin is warm and dry.   Neurological:      Mental Status: He is alert and oriented to person, place, and time. Mental status is at baseline.   Psychiatric:         Mood and Affect: Mood normal.         Behavior: Behavior normal.         Thought Content: Thought content " normal.         Judgment: Judgment normal.        Result Review :  The following data was reviewed by: JEREMY Bennett on 11/28/2023:  Common labs          2/27/2023    08:14 8/23/2023    08:42 10/25/2023    08:47   Common Labs   Glucose 136  134  154    BUN 23  23  28    Creatinine 1.23  1.44  1.32    Sodium 141  141  139    Potassium 5.1  5.4  5.7    Chloride 103  108  105    Calcium 9.1  9.7  10.0    Total Protein 6.2   6.2    Albumin 4.2   4.5    Total Bilirubin 0.3   0.4    Alkaline Phosphatase 68   73    AST (SGOT) 14   18    ALT (SGPT) 21   24    Hemoglobin 12.6      Hematocrit 38.6   39.6    Total Cholesterol 79   76    Triglycerides 169   127    HDL Cholesterol 23   23    LDL Cholesterol  28   30    Hemoglobin A1C  8.60  8.50    Microalbumin, Urine 4.1      PSA 0.313                     Assessment and Plan   Diagnoses and all orders for this visit:    1. Type 2 diabetes mellitus with hyperglycemia, with long-term current use of insulin (Primary)  -     Ambulatory Referral to Podiatry    2. Class 2 severe obesity due to excess calories with serious comorbidity and body mass index (BMI) of 37.0 to 37.9 in adult    3. Hyperlipemia, mixed    Other orders  -     Discontinue: Dulaglutide (Trulicity) 3 MG/0.5ML solution pen-injector; Inject 0.5 mL under the skin into the appropriate area as directed 1 (One) Time Per Week.  Dispense: 2 mL; Refill: 0  -     Dulaglutide (Trulicity) 3 MG/0.5ML solution pen-injector; Inject 0.5 mL under the skin into the appropriate area as directed 1 (One) Time Per Week.  Dispense: 2 mL; Refill: 0             Follow Up   Return in about 3 months (around 2/28/2024).    A1c not at goal  Cgm reviewed, time in range not at goal    Increase trulicity to 3mg weekly x 4 weeks and then increase to 4.5mg- if still no improvement, will change to mounjaro   Suggest adding strength training a few days a week to improve insulin sensitivities and improve hyperglycemia   Continue statin and  zetia     Patient was given instructions and counseling regarding his condition or for health maintenance advice. Please see specific information pulled into the AVS if appropriate.     Ingrid Menard APRN

## 2023-12-05 RX ORDER — INSULIN LISPRO 100 [IU]/ML
INJECTION, SOLUTION INTRAVENOUS; SUBCUTANEOUS
Qty: 90 ML | Refills: 0 | Status: SHIPPED | OUTPATIENT
Start: 2023-12-05

## 2023-12-05 NOTE — TELEPHONE ENCOUNTER
Rx Refill Note  Requested Prescriptions     Pending Prescriptions Disp Refills    HumaLOG KwikPen 100 UNIT/ML solution pen-injector [Pharmacy Med Name: HumaLOG KwikPen Subcutaneous Solution Pen-injector 100 UNIT/ML] 90 mL 0     Sig: INJECT 18 UNITS AS DIRECTED WITH BREAKFAST & BEFORE LUNCH, & 24 UNITS WITH DINNER. USE FOR MEAL & SLIDING SCALE CORRECTION COVERAGE AS DIRECTED UP  UNITS PER DAY      Last office visit with prescribing clinician: 11/28/2023   Last telemedicine visit with prescribing clinician: Visit date not found   Next office visit with prescribing clinician: Visit date not found                         Would you like a call back once the refill request has been completed: [] Yes [] No    If the office needs to give you a call back, can they leave a voicemail: [] Yes [] No    Jade Hager  12/05/23, 10:40 EST

## 2023-12-15 DIAGNOSIS — E29.1 HYPOGONADISM MALE: ICD-10-CM

## 2023-12-15 RX ORDER — TESTOSTERONE 20.25 MG/1.25G
GEL TOPICAL
Qty: 75 G | Refills: 0 | Status: SHIPPED | OUTPATIENT
Start: 2023-12-15

## 2023-12-27 ENCOUNTER — SPECIALTY PHARMACY (OUTPATIENT)
Dept: ENDOCRINOLOGY | Age: 58
End: 2023-12-27
Payer: COMMERCIAL

## 2023-12-27 NOTE — PROGRESS NOTES
Specialty Pharmacy Refill Coordination Note     Patient has a supply of medication     Latrice Linn, Pharmacy Technician  Specialty Pharmacy Technician

## 2024-01-24 DIAGNOSIS — E29.1 HYPOGONADISM MALE: ICD-10-CM

## 2024-01-24 RX ORDER — TESTOSTERONE 20.25 MG/1.25G
GEL TOPICAL
Qty: 75 G | Refills: 0 | Status: SHIPPED | OUTPATIENT
Start: 2024-01-24

## 2024-01-24 NOTE — TELEPHONE ENCOUNTER
Rx Refill Note  Requested Prescriptions     Pending Prescriptions Disp Refills    Testosterone 1.62 % gel [Pharmacy Med Name: Testosterone Transdermal Gel 1.62 %] 75 g 0     Sig: APPLY 2 PUMPS TOPICALLY TO THE AFFECTED AREA(S) DAILY      Last office visit with prescribing clinician: 5/22/2023   Last telemedicine visit with prescribing clinician: Visit date not found   Next office visit with prescribing clinician: 2/29/2024                         Would you like a call back once the refill request has been completed: [] Yes [] No    If the office needs to give you a call back, can they leave a voicemail: [] Yes [] No    Jade Hager  01/24/24, 08:08 EST

## 2024-02-02 ENCOUNTER — SPECIALTY PHARMACY (OUTPATIENT)
Dept: ENDOCRINOLOGY | Age: 59
End: 2024-02-02
Payer: COMMERCIAL

## 2024-02-02 RX ORDER — DULAGLUTIDE 4.5 MG/.5ML
4.5 INJECTION, SOLUTION SUBCUTANEOUS WEEKLY
Qty: 6 ML | Refills: 0 | Status: SHIPPED | OUTPATIENT
Start: 2024-02-02

## 2024-02-02 NOTE — PROGRESS NOTES
Specialty Pharmacy Patient Management Program  Endocrinology Refill Outreach      Marek is a 58 y.o. male contacted today regarding refills of his medication(s).    Specialty medication(s) and dose(s) confirmed: trulicity  Other medication(s) being refilled: none    Refill Questions      Flowsheet Row Most Recent Value   Changes to allergies? No   Changes to medications? No   New conditions or infections since last clinic visit No   Unplanned office visit, urgent care, ED, or hospital admission in the last 4 weeks  No   How does patient/caregiver feel medication is working? Very good   Financial problems or insurance changes  No   Since the previous refill, were any specialty medication doses or scheduled injections missed or delayed?  No   Does this patient require a clinical escalation to a pharmacist? No          Delivery Questions      Flowsheet Row Most Recent Value   Delivery method Beeline   Delivery address verified with patient/caregiver? Yes   Delivery address Home   Number of medications in delivery 1   Medication(s) being filled and delivered Dulaglutide   Copay verified? Yes   Copay amount 25   Copay form of payment Credit/debit on file            Follow-Up: 84 days    Astrid Song, PharmD  Clinical Specialty Pharmacist, Endocrinology  2/2/2024  12:57 EST

## 2024-02-02 NOTE — TELEPHONE ENCOUNTER
Specialty Pharmacy Patient Management Program  Prescription Refill Request     Patient currently fills medications at  Pharmacy. Needing refill(s) on the following:      Requested Prescriptions     Pending Prescriptions Disp Refills    Dulaglutide (Trulicity) 4.5 MG/0.5ML solution pen-injector 6 mL 0     Sig: Inject 0.5 mL under the skin into the appropriate area as directed 1 (One) Time Per Week.     Last Visit: 11/28, Derian  Next Visit: 2/29, Ean     Pended for JEREMY Bennett or Dr. Mckoy to review, and approve if appropriate.     Emily Brown, PharmD, MM   Clinical Speciality Pharmacist, Endocrinology   2/2/2024  09:06 EST

## 2024-02-20 ENCOUNTER — TELEPHONE (OUTPATIENT)
Dept: ENDOCRINOLOGY | Age: 59
End: 2024-02-20
Payer: COMMERCIAL

## 2024-02-20 DIAGNOSIS — E11.65 TYPE 2 DIABETES MELLITUS WITH HYPERGLYCEMIA, WITH LONG-TERM CURRENT USE OF INSULIN: Primary | ICD-10-CM

## 2024-02-20 DIAGNOSIS — Z79.4 TYPE 2 DIABETES MELLITUS WITH HYPERGLYCEMIA, WITH LONG-TERM CURRENT USE OF INSULIN: Primary | ICD-10-CM

## 2024-02-22 DIAGNOSIS — E11.65 TYPE 2 DIABETES MELLITUS WITH HYPERGLYCEMIA, WITH LONG-TERM CURRENT USE OF INSULIN: ICD-10-CM

## 2024-02-22 DIAGNOSIS — Z79.4 TYPE 2 DIABETES MELLITUS WITH HYPERGLYCEMIA, WITH LONG-TERM CURRENT USE OF INSULIN: ICD-10-CM

## 2024-02-22 LAB
25(OH)D3+25(OH)D2 SERPL-MCNC: 52.3 NG/ML (ref 30–100)
BUN SERPL-MCNC: 22 MG/DL (ref 6–20)
BUN/CREAT SERPL: 17.5 (ref 7–25)
CALCIUM SERPL-MCNC: 9.4 MG/DL (ref 8.6–10.5)
CHLORIDE SERPL-SCNC: 104 MMOL/L (ref 98–107)
CO2 SERPL-SCNC: 24.2 MMOL/L (ref 22–29)
CREAT SERPL-MCNC: 1.26 MG/DL (ref 0.76–1.27)
EGFRCR SERPLBLD CKD-EPI 2021: 66.5 ML/MIN/1.73
GLUCOSE SERPL-MCNC: 148 MG/DL (ref 65–99)
HBA1C MFR BLD: 8.7 % (ref 4.8–5.6)
POTASSIUM SERPL-SCNC: 5.2 MMOL/L (ref 3.5–5.2)
SODIUM SERPL-SCNC: 141 MMOL/L (ref 136–145)

## 2024-02-29 ENCOUNTER — TELEPHONE (OUTPATIENT)
Dept: ENDOCRINOLOGY | Age: 59
End: 2024-02-29

## 2024-02-29 ENCOUNTER — OFFICE VISIT (OUTPATIENT)
Dept: ENDOCRINOLOGY | Age: 59
End: 2024-02-29
Payer: COMMERCIAL

## 2024-02-29 VITALS
HEIGHT: 66 IN | WEIGHT: 231.4 LBS | BODY MASS INDEX: 37.19 KG/M2 | OXYGEN SATURATION: 95 % | DIASTOLIC BLOOD PRESSURE: 82 MMHG | HEART RATE: 89 BPM | SYSTOLIC BLOOD PRESSURE: 126 MMHG

## 2024-02-29 DIAGNOSIS — E66.9 OBESITY (BMI 35.0-39.9 WITHOUT COMORBIDITY): ICD-10-CM

## 2024-02-29 DIAGNOSIS — Z79.4 TYPE 2 DIABETES MELLITUS WITH HYPERGLYCEMIA, WITH LONG-TERM CURRENT USE OF INSULIN: Primary | ICD-10-CM

## 2024-02-29 DIAGNOSIS — E11.65 TYPE 2 DIABETES MELLITUS WITH HYPERGLYCEMIA, WITH LONG-TERM CURRENT USE OF INSULIN: Primary | ICD-10-CM

## 2024-02-29 DIAGNOSIS — E11.69 HYPERLIPIDEMIA ASSOCIATED WITH TYPE 2 DIABETES MELLITUS: ICD-10-CM

## 2024-02-29 DIAGNOSIS — E78.5 HYPERLIPIDEMIA ASSOCIATED WITH TYPE 2 DIABETES MELLITUS: ICD-10-CM

## 2024-02-29 DIAGNOSIS — E11.65 TYPE 2 DIABETES MELLITUS WITH HYPERGLYCEMIA, WITH LONG-TERM CURRENT USE OF INSULIN: ICD-10-CM

## 2024-02-29 DIAGNOSIS — Z79.4 TYPE 2 DIABETES MELLITUS WITH HYPERGLYCEMIA, WITH LONG-TERM CURRENT USE OF INSULIN: ICD-10-CM

## 2024-02-29 DIAGNOSIS — E29.1 HYPOGONADISM MALE: ICD-10-CM

## 2024-02-29 PROCEDURE — 99214 OFFICE O/P EST MOD 30 MIN: CPT | Performed by: INTERNAL MEDICINE

## 2024-02-29 PROCEDURE — 95251 CONT GLUC MNTR ANALYSIS I&R: CPT | Performed by: INTERNAL MEDICINE

## 2024-02-29 RX ORDER — OLOPATADINE HYDROCHLORIDE 2 MG/ML
SOLUTION/ DROPS OPHTHALMIC
COMMUNITY
Start: 2023-12-21

## 2024-02-29 RX ORDER — MELOXICAM 15 MG/1
15 TABLET ORAL DAILY
COMMUNITY
Start: 2023-12-18 | End: 2024-03-27

## 2024-02-29 RX ORDER — INSULIN GLARGINE 300 U/ML
INJECTION, SOLUTION SUBCUTANEOUS
Qty: 45 ML | Refills: 1 | Status: SHIPPED | OUTPATIENT
Start: 2024-02-29 | End: 2024-05-29

## 2024-02-29 RX ORDER — PEN NEEDLE, DIABETIC 29 G X1/2"
NEEDLE, DISPOSABLE MISCELLANEOUS
Qty: 500 EACH | Refills: 4 | Status: SHIPPED | OUTPATIENT
Start: 2024-02-29

## 2024-02-29 RX ORDER — INSULIN LISPRO 100 [IU]/ML
INJECTION, SOLUTION INTRAVENOUS; SUBCUTANEOUS
Qty: 90 ML | Refills: 1 | Status: SHIPPED | OUTPATIENT
Start: 2024-02-29

## 2024-02-29 RX ORDER — INSULIN GLARGINE 300 U/ML
INJECTION, SOLUTION SUBCUTANEOUS
Qty: 45 ML | Refills: 1 | Status: SHIPPED | OUTPATIENT
Start: 2024-02-29 | End: 2024-02-29 | Stop reason: SDUPTHER

## 2024-02-29 NOTE — TELEPHONE ENCOUNTER
Called and spoke with Yvonne from [facility] pharmacy. Pharmacy was needing clarification on the [facility] directions. I read the directions that was listed in Dr. Mckoy note.

## 2024-02-29 NOTE — TELEPHONE ENCOUNTER
Hub staff attempted to follow warm transfer process and was unsuccessful     Caller: YAMILKA    Relationship to patient:     Best call back number:     [phone]       Patient is needing: PHARMACY IS WANTING TO GET CLARITY ON PT MEDICATION PLEASE CALL BACK AND ADVISE.

## 2024-02-29 NOTE — PROGRESS NOTES
Chief complaint/Reason for consult: diabetes mellitus type 2 and hypogonadism    HPI:   - 58 year old male here for management of diabetes mellitus type 2 and hypogonadism  - Since his last visit he had COVID, influenza, and a back injury  - Has had diabetes for 15 years  - No known complications to date  - Is currently taking Toujeo 70 units in the am and 80 units in the pm, Humalog 18-24 with meals, Trulicity 1.5 mg weekly, Farxiga 10 mg daily, metformin 1 g bid  - He was previously on 4.5 mg of Trulicity weekly but states that his pharmacy does not have that dose in stock  - He states that he has been snacking in the evening and has elevated BG as a result  - He also has hypogonadism of unknown etiology and is on testosterone topical 1.62%, 2 pumps daily  - He is also on Crestor 40 mg daily    The following portions of the patient's history were reviewed and updated as appropriate: allergies, current medications, past family history, past medical history, past social history, past surgical history, and problem list.    Objective     Vitals:    02/29/24 0848   BP: 126/82   Pulse: 89   SpO2: 95%        Physical Exam  Vitals reviewed.   Constitutional:       Appearance: Normal appearance.   HENT:      Head: Normocephalic and atraumatic.   Eyes:      General: No scleral icterus.  Pulmonary:      Effort: Pulmonary effort is normal. No respiratory distress.   Neurological:      Mental Status: He is alert.      Gait: Gait normal.   Psychiatric:         Mood and Affect: Mood normal.         Behavior: Behavior normal.         Thought Content: Thought content normal.         Judgment: Judgment normal.     CGM interpretation  Dates reviewed:  2/16-2/29/24  Data:  Avg of 171, 61% in range, 25% high, 12% very high  Interpretation:  Hyperglycemia after 8 p.m.    Assessment & Plan   1. Type 2 DM, uncontrolled due to hyperglycemia  - His DexCom download is better than his A1c however I did ask him to either stop snacking at  nigh or take 8-10 units of Humalog with his snacks  - Cont. Toujeo 70 units in the am and 80 units in the pm, Humalog 18-24 with meals, Trulicity 1.5 mg weekly, Farxiga 10 mg daily, metformin 1 g bid     2. Hypogonadism  - Etiology is not clear  - Free testosterone and HCT were normal in 10/2023  - Cont. testosterone topical 1.62%, 2 pumps daily     3. Obesity (BMI of 37)  - Dietary changes and exercise will help glycemic control     4. Hyperlipidemia  - He is also on Crestor 40 mg daily     - Return to clinic in 3 months

## 2024-03-03 DIAGNOSIS — E29.1 HYPOGONADISM MALE: ICD-10-CM

## 2024-03-04 RX ORDER — TESTOSTERONE 20.25 MG/1.25G
GEL TOPICAL
Qty: 75 G | Refills: 5 | Status: SHIPPED | OUTPATIENT
Start: 2024-03-04

## 2024-03-04 NOTE — TELEPHONE ENCOUNTER
Rx Refill Note  Requested Prescriptions     Pending Prescriptions Disp Refills    Testosterone 1.62 % gel [Pharmacy Med Name: Testosterone Transdermal Gel 1.62 %] 75 g 0     Sig: APPLY 2 PUMPS TOPICALLY TO THE AFFECTED AREA(S) DAILY      Last office visit with prescribing clinician: 2/29/2024   Last telemedicine visit with prescribing clinician: Visit date not found   Next office visit with prescribing clinician: 6/7/2024                         Would you like a call back once the refill request has been completed: [] Yes [] No    If the office needs to give you a call back, can they leave a voicemail: [] Yes [] No    Bree Hager MA  03/04/24, 07:57 EST

## 2024-03-12 DIAGNOSIS — E78.5 HYPERLIPIDEMIA, UNSPECIFIED HYPERLIPIDEMIA TYPE: ICD-10-CM

## 2024-03-13 RX ORDER — EZETIMIBE 10 MG/1
10 TABLET ORAL DAILY
Qty: 90 TABLET | Refills: 0 | Status: SHIPPED | OUTPATIENT
Start: 2024-03-13

## 2024-03-13 NOTE — TELEPHONE ENCOUNTER
Rx Refill Note  Requested Prescriptions     Pending Prescriptions Disp Refills    ezetimibe (ZETIA) 10 MG tablet [Pharmacy Med Name: Ezetimibe Oral Tablet 10 MG] 90 tablet 0     Sig: TAKE 1 TABLET BY MOUTH EVERY DAY      Last office visit with prescribing clinician: 11/28/2023   Last telemedicine visit with prescribing clinician: Visit date not found   Next office visit with prescribing clinician: Visit date not found                         Would you like a call back once the refill request has been completed: [] Yes [] No    If the office needs to give you a call back, can they leave a voicemail: [] Yes [] No    Bree Hager MA  03/13/24, 07:43 EDT

## 2024-03-14 DIAGNOSIS — E78.5 HYPERLIPIDEMIA, UNSPECIFIED HYPERLIPIDEMIA TYPE: ICD-10-CM

## 2024-03-14 RX ORDER — EZETIMIBE 10 MG/1
10 TABLET ORAL DAILY
Qty: 90 TABLET | Refills: 0 | OUTPATIENT
Start: 2024-03-14

## 2024-03-14 NOTE — TELEPHONE ENCOUNTER
Rx Refill Note  Requested Prescriptions     Pending Prescriptions Disp Refills    ezetimibe (ZETIA) 10 MG tablet [Pharmacy Med Name: Ezetimibe Oral Tablet 10 MG] 90 tablet 0     Sig: TAKE 1 TABLET BY MOUTH EVERY DAY      Last office visit with prescribing clinician: 11/28/2023   Last telemedicine visit with prescribing clinician: Visit date not found   Next office visit with prescribing clinician: Visit date not found                         Would you like a call back once the refill request has been completed: [] Yes [] No    If the office needs to give you a call back, can they leave a voicemail: [] Yes [] No    Jade Hager  03/14/24, 14:22 EDT

## 2024-03-18 RX ORDER — ACYCLOVIR 400 MG/1
TABLET ORAL
Qty: 9 EACH | Refills: 0 | Status: SHIPPED | OUTPATIENT
Start: 2024-03-18

## 2024-03-18 RX ORDER — ACYCLOVIR 400 MG/1
1 TABLET ORAL
Qty: 9 EACH | Refills: 0 | OUTPATIENT
Start: 2024-03-18

## 2024-03-18 NOTE — TELEPHONE ENCOUNTER
Rx Refill Note  Requested Prescriptions     Pending Prescriptions Disp Refills    Continuous Blood Gluc Sensor (Dexcom G7 Sensor) misc 9 each 0     Sig: Apply 1 each topically to the appropriate area as directed Every 10 (Ten) Days.      Last office visit with prescribing clinician: 11/28/2023   Last telemedicine visit with prescribing clinician: Visit date not found   Next office visit with prescribing clinician: Visit date not found                         Would you like a call back once the refill request has been completed: [] Yes [] No    If the office needs to give you a call back, can they leave a voicemail: [] Yes [] No    Jade Hager  03/18/24, 09:15 EDT

## 2024-03-18 NOTE — TELEPHONE ENCOUNTER
Rx Refill Note  Requested Prescriptions     Pending Prescriptions Disp Refills    Continuous Blood Gluc Sensor (Dexcom G7 Sensor) misc [Pharmacy Med Name: Dexcom G7 Sensor Miscellaneous] 9 each 0     Sig: APPLY AND CHANGE 1 SENSOR EVERY 10 DAYS      Last office visit with prescribing clinician: 11/28/2023   Last telemedicine visit with prescribing clinician: Visit date not found   Next office visit with prescribing clinician: Visit date not found                         Would you like a call back once the refill request has been completed: [] Yes [] No    If the office needs to give you a call back, can they leave a voicemail: [] Yes [] No    Bree Hager MA  03/18/24, 07:53 EDT

## 2024-04-24 RX ORDER — DULAGLUTIDE 4.5 MG/.5ML
4.5 INJECTION, SOLUTION SUBCUTANEOUS WEEKLY
Qty: 6 ML | Refills: 1 | Status: SHIPPED | OUTPATIENT
Start: 2024-04-24

## 2024-04-24 NOTE — TELEPHONE ENCOUNTER
Rx Refill Note  Requested Prescriptions     Pending Prescriptions Disp Refills    Dulaglutide (Trulicity) 4.5 MG/0.5ML solution pen-injector 6 mL 0     Sig: Inject 0.5 mL under the skin into the appropriate area as directed 1 (One) Time Per Week.      Last office visit with prescribing clinician: 2/29/2024   Last telemedicine visit with prescribing clinician: Visit date not found   Next office visit with prescribing clinician: 6/13/2024                         Would you like a call back once the refill request has been completed: [] Yes [] No    If the office needs to give you a call back, can they leave a voicemail: [] Yes [] No    Bree Hager MA  04/24/24, 09:00 EDT

## 2024-05-09 NOTE — PROGRESS NOTES
Physical Therapy Initial Evaluation and Plan of Care      Patient: Marek Diego   : 1965  Diagnosis/ICD-10 Code:  Cervicalgia [M54.2]  Referring practitioner: Brianna Castillo MD  Date of Initial Visit: 2022  Today's Date: 2022  Patient seen for 6 session       Visit Diagnoses:    ICD-10-CM ICD-9-CM   1. Cervicalgia  M54.2 723.1   2. Weakness generalized  R53.1 780.79   3. Cervicogenic headache  G44.86 784.0         Subjective  Chief Complaint/Subjective Report: Patient presented to the clinic today with complaints of pain in the neck and shoudlers. Patient reported no significant medical history today aside from that previously mentioned; no reports of CNS signs or symptoms, or indications of other sinister pathologies were given in the subjective history today.  Mechanism of Injury: Unknown  Functional Limitations: ADLs, work-related activities  Subjective Goals for PT: Return to PLOF, decreased pain with ADLs and community activities  Prior Treatment for Current Condition: PT, HEP  Imaging:Yes  Pain/VNRS (0-10/10): Worst: 7/10; Average: 4/10  Agg. Factors: Sitting, work  Relieving Factors: Rest  Subjective Questionnaire: NDI:14  PLOF: Independent with all functional tasks, ADLs, and community activities  Occupation:   Social:   PMH: See history section of patient chart  Precautions/Contraindications: No reported contraindications from subjective history today unless otherwise stated above.      Objective  AROM (% of Full --- * denotes degrees in place of % --- + denotes tested to be WFL)       -- Cervical Rotation Right:  75 Left:  75    -- Thoracic Rotation Right 65 Left 65    -- Cervical Flexion:   75      -- Cervical Extension:  80                   AROM Shoulder (% of Full --- * denotes degrees in place of % --- + denotes tested to be WFL)      -- Shoulder Flexion Right: + Left: +    -- Shoulder Abduction Right: + Left: +     -- Shoulder ER at Side Right: + Left: +  -- Shoulder IR  Behind Back: + Left: +    UE MMT (0-5/5 --- + denotes WFL)  -- Shoulder Flexion Right: 4+/5 Left: 4+/5  -- Shoulder Abduction Right: 4+/5 Left: 4+/5  -- Shoulder ER at Side Right: 4+/5 Left: 4+/5  -- Shoulder IR Behind Back: -/5 Left: -/5  -- Elbow Flexion Right: --/5 Left: -/5  -- Elbow Extension Right  -/5 Left: -/5  -- Wrist Flexion Right:  -/5 Left: --/5  -- Wrist Extension Right: -/5 Left: -/5      Functional Assessment: Impaired tolerance to prone activities  Genralized weakness in paraspinals     See Exercise, Manual, and Modality Logs for complete treatment.       Documentation of Assessment Details: Patient presented the clinic with signs and symptoms consistent with cervicogenic radiating referred pain. Patient demonstrated limitations and impairments in funcitonal mobility and activity tolerance during today's evaluation, and will benefit from skilled PT address current limitations and impairments to help patient regain functional mobility and strength necessary to return to PLOF, reduce pain, and improve current symptoms as patient progresses towards meeting current goals established at therapy today. The patient present with no comorbidities or personal factors that impact my POC and deficit in above mentioned areas. Based on these findings and results from valid tests and measures, I am classifying this patient's presentation as stable with uncomplicated characteristics, and a good prognosis for recovery.     Assessment & Plan     Assessment  Impairments: abnormal gait, abnormal or restricted ROM, activity intolerance, impaired physical strength, lacks appropriate home exercise program and pain with function  Functional Limitations: carrying objects, lifting, sleeping, walking, uncomfortable because of pain, sitting and standingPrognosis details: Based on valid tests and measures performed today I am classifying this patient as presently stable with uncomplicated characteristics and good prognosis for  recovery    Goals  Plan Goals: Pt will improve Subjective assessment by >75% within 6 weeks to demonstrate improvements in test and measure outcomes and overall functionality, and to show reduction of symptoms, improved activity tolerance, and ability to complete ADLs and work-related activities     Pt will improve functional mobility and pain free ROM to ranges that allow for pain free functional activities such as dressing, bathing, and completing ADLs within 8 weeks to demonstrate improvements in functional independence, mobility, and community participation to allow for a return to PLOF.    Pt will improve functional mobility and pain free ROM and functional strength to  allow for pain free functional activities such as safely squatting to retreive items from the floor and reaching overhead within 6 weeks to demonstrate improvements in functional independence, mobility, and community participation to allow for a return to PLOF.    Pt will demonstrate 80% full ROM for all measured ROMs within 8 weeks to demonstrate improvements in functional mobility and ability to complete ADLs and work-related activities Independently.    Pt will sleep through the night without waking d/t current symptoms >5/7 nights per week within 8 weeks to demonstrate improvements restful sleep, overall function, and symptom reduction    Pt will report pain <2/10 at worst with activity and at rest within 8 weeks to demonstrate improvements in pain-free ROM and function to improve functional mobility, activities tolerance, and ability to complete ADLs and work-related activities    Pt will be able to lift and carry objects >30lbs without worsening of symptoms within 8 weeks to demonstrate improvements in functional mobility for ease of home and community tasks and improved functional independence.    Pt will be able to ambulate >60 mins independently without worsening of symptoms within 8 weeks to demonstrate improvements in functional  mobility for ease of home and community ambulation and improved functional independence        Plan  Therapy options: will be seen for skilled therapy services  Planned modality interventions: dry needling, TENS, high voltage pulsed current (pain management), electrical stimulation/Russian stimulation and cryotherapy  Planned therapy interventions: ADL retraining, abdominal trunk stabilization, manual therapy, neuromuscular re-education, balance/weight-bearing training, body mechanics training, soft tissue mobilization, spinal/joint mobilization, joint mobilization, home exercise program, gait training, functional ROM exercises, strengthening, therapeutic activities and transfer training  Plan details: Duration: 2-3x/Wk for 4 Weeks - Upon completion of 4 weeks further evaluation and assessment with determine ongoing plan for continued care.    Continue with skilled physical therapy addressing previously mentioned limitations and impairments; progress HEP as tolerated; progress functional strengthening interventions to tolerance.        History # of Personal Factors and/or Comorbidities: LOW (0)  Examination of Body System(s): # of elements: LOW (1-2)  Clinical Presentation: STABLE   Clinical Decision Making: LOW       Timed:         Manual Therapy:    -     mins  86482;     Therapeutic Exercise:    15     mins  89001;     Neuromuscular Jairon:    10    mins  19232;    Therapeutic Activity:     -     mins  87040;     Gait Training:           mins  46408;     Ultrasound:          mins  24871;    Ionto                                  mins   95369  Self Care                           mins   63050  Canalith Repos         mins 43576    Un-Timed:  Electrical Stimulation:          mins  94952 ( );  Dry Needling         mins self-pay  Traction         mins 96267  Low Eval    20     Mins  33975  Mod Eval         Mins  54536  High Eval                            Mins  07943    Timed Treatment:   25   mins   Total  Treatment:     45   mins      PT: Cas Alvarado PT     License Number: [id number]  Electronically signed by Cas Alvarado, PT, 09/16/22, 2:47 PM EDT    Certification Period: 9/16/2022 thru 12/14/2022  I certify that the therapy services are furnished while this patient is under my care.  The services outlined above are required by this patient, and will be reviewed every 90 days.         Physician Signature:__________________________________________________    PHYSICIAN: Brianna Castillo MD  NPI: [id number]                                      DATE:      Please sign and return via fax to [email or web address] . Thank you, [facility].        Detail Level: Simple X Size Of Lesion In Cm (Optional): 0 Introduction Text (Please End With A Colon): The following procedure was deferred: Procedure To Be Performed At Next Visit: Biopsy by shave method Instructions (Optional): Pt aware of cosmetic fee Procedure To Be Performed At Next Visit: Skin Tag removal (Cosmetic)

## 2024-06-04 RX ORDER — ROSUVASTATIN CALCIUM 40 MG/1
TABLET, COATED ORAL
Qty: 90 TABLET | Refills: 0 | Status: SHIPPED | OUTPATIENT
Start: 2024-06-04

## 2024-06-07 RX ORDER — ACYCLOVIR 400 MG/1
TABLET ORAL
Qty: 9 EACH | Refills: 0 | Status: SHIPPED | OUTPATIENT
Start: 2024-06-07

## 2024-06-07 NOTE — TELEPHONE ENCOUNTER
Rx Refill Note  Requested Prescriptions     Pending Prescriptions Disp Refills    Continuous Glucose Sensor (Dexcom G7 Sensor) misc [Pharmacy Med Name: Dexcom G7 Sensor Miscellaneous] 9 each 0     Sig: APPLY AND CHANGE 1 SENSOR EVERY 10 DAYS      Last office visit with prescribing clinician: 2/29/2024   Last telemedicine visit with prescribing clinician: Visit date not found   Next office visit with prescribing clinician: 6/13/2024                         Would you like a call back once the refill request has been completed: [] Yes [] No    If the office needs to give you a call back, can they leave a voicemail: [] Yes [] No    Jade Hager  06/07/24, 07:28 EDT

## 2024-06-12 DIAGNOSIS — E78.5 HYPERLIPIDEMIA, UNSPECIFIED HYPERLIPIDEMIA TYPE: ICD-10-CM

## 2024-06-12 RX ORDER — EZETIMIBE 10 MG/1
10 TABLET ORAL DAILY
Qty: 90 TABLET | Refills: 0 | Status: SHIPPED | OUTPATIENT
Start: 2024-06-12

## 2024-06-12 NOTE — TELEPHONE ENCOUNTER
Rx Refill Note  Requested Prescriptions     Pending Prescriptions Disp Refills    ezetimibe (ZETIA) 10 MG tablet [Pharmacy Med Name: Ezetimibe Oral Tablet 10 MG] 90 tablet 0     Sig: TAKE 1 TABLET BY MOUTH EVERY DAY      Last office visit with prescribing clinician: 2/29/2024   Last telemedicine visit with prescribing clinician: Visit date not found   Next office visit with prescribing clinician: 6/13/2024                         Would you like a call back once the refill request has been completed: [] Yes [] No    If the office needs to give you a call back, can they leave a voicemail: [] Yes [] No    Jade Hager  06/12/24, 12:49 EDT

## 2024-06-13 ENCOUNTER — OFFICE VISIT (OUTPATIENT)
Dept: ENDOCRINOLOGY | Age: 59
End: 2024-06-13
Payer: COMMERCIAL

## 2024-06-13 ENCOUNTER — TELEPHONE (OUTPATIENT)
Dept: ENDOCRINOLOGY | Age: 59
End: 2024-06-13

## 2024-06-13 ENCOUNTER — SPECIALTY PHARMACY (OUTPATIENT)
Dept: ENDOCRINOLOGY | Age: 59
End: 2024-06-13
Payer: COMMERCIAL

## 2024-06-13 VITALS
OXYGEN SATURATION: 95 % | BODY MASS INDEX: 36.93 KG/M2 | HEART RATE: 82 BPM | DIASTOLIC BLOOD PRESSURE: 76 MMHG | SYSTOLIC BLOOD PRESSURE: 124 MMHG | HEIGHT: 66 IN | WEIGHT: 229.8 LBS

## 2024-06-13 DIAGNOSIS — E11.69 HYPERLIPIDEMIA ASSOCIATED WITH TYPE 2 DIABETES MELLITUS: ICD-10-CM

## 2024-06-13 DIAGNOSIS — Z79.4 TYPE 2 DIABETES MELLITUS WITH HYPERGLYCEMIA, WITH LONG-TERM CURRENT USE OF INSULIN: Primary | ICD-10-CM

## 2024-06-13 DIAGNOSIS — E29.1 HYPOGONADISM MALE: ICD-10-CM

## 2024-06-13 DIAGNOSIS — E66.9 OBESITY (BMI 35.0-39.9 WITHOUT COMORBIDITY): ICD-10-CM

## 2024-06-13 DIAGNOSIS — E11.65 TYPE 2 DIABETES MELLITUS WITH HYPERGLYCEMIA, WITH LONG-TERM CURRENT USE OF INSULIN: Primary | ICD-10-CM

## 2024-06-13 DIAGNOSIS — Z79.4 TYPE 2 DIABETES MELLITUS WITH HYPERGLYCEMIA, WITH LONG-TERM CURRENT USE OF INSULIN: ICD-10-CM

## 2024-06-13 DIAGNOSIS — E11.65 TYPE 2 DIABETES MELLITUS WITH HYPERGLYCEMIA, WITH LONG-TERM CURRENT USE OF INSULIN: ICD-10-CM

## 2024-06-13 DIAGNOSIS — E78.5 HYPERLIPIDEMIA ASSOCIATED WITH TYPE 2 DIABETES MELLITUS: ICD-10-CM

## 2024-06-13 LAB — POTASSIUM SERPL-SCNC: 5.3 MMOL/L (ref 3.5–5.2)

## 2024-06-13 PROCEDURE — 99214 OFFICE O/P EST MOD 30 MIN: CPT | Performed by: INTERNAL MEDICINE

## 2024-06-13 PROCEDURE — 95251 CONT GLUC MNTR ANALYSIS I&R: CPT | Performed by: INTERNAL MEDICINE

## 2024-06-13 RX ORDER — INSULIN GLARGINE 300 U/ML
INJECTION, SOLUTION SUBCUTANEOUS
Qty: 45 ML | Refills: 1 | Status: SHIPPED | OUTPATIENT
Start: 2024-06-13 | End: 2024-06-13 | Stop reason: SDUPTHER

## 2024-06-13 RX ORDER — INSULIN GLARGINE 300 U/ML
INJECTION, SOLUTION SUBCUTANEOUS
Qty: 45 ML | Refills: 1 | Status: SHIPPED | OUTPATIENT
Start: 2024-06-13 | End: 2024-09-11

## 2024-06-13 RX ORDER — DULAGLUTIDE 4.5 MG/.5ML
4.5 INJECTION, SOLUTION SUBCUTANEOUS WEEKLY
Qty: 6 ML | Refills: 1 | Status: SHIPPED | OUTPATIENT
Start: 2024-06-13

## 2024-06-13 NOTE — PROGRESS NOTES
Specialty Pharmacy Patient Management Program  Endocrinology Reassessment     Marek Diego was referred by an Endocrinology provider to the Endocrinology Patient Management program offered by [facility] for Type 2 Diabetes. A follow-up outreach was conducted, including assessment of continued therapy appropriateness, medication adherence, and side effect incidence and management for Trulicity.    Changes to Insurance Coverage or Financial Support  No changes    Relevant Past Medical History and Comorbidities  Relevant medical history and concomitant health conditions were discussed with the patient. The patient's chart has been reviewed for relevant past medical history and comorbid health conditions and updated as necessary.   Past Medical History:   Diagnosis Date    Allergic rhinitis 10/24/2013    10/24/2013--skin testing revealed impressive reactions to grass following, tree pollen, weed pollen, ragweed, dust mite, and cat. Recommend immunotherapy.    Benign essential hypertension 2/22/2016    Chronic constipation 11/20/2017 11/20/2017--patient reports approximately 8 month history of intermittent constipation that at times can last as much as a month.  He notes his blood sugar readings tend to increase when this happens.  He is scheduled for colonoscopy next week.  I recommend a generic probiotic daily as well as MiraLAX and adjust as needed.    Chronic neck pain 10/30/2015    12/19/2015--patient reports his left shoulder pain has resolved. He continues to have neck pain. X-ray of the cervical spine ordered. Physical therapy ordered.   11/10/2015--left shoulder injected with 80 mg of Depo-Medrol with 3 mL of Xylocaine.   10/30/2015--patient presents with at least a one-month history of intermittent left-sided neck pain that is associated with left shoulder pain as well. The pain is described as shooting and is 8 out of 10 intensity at its worst. The pain is worse with certain  movements of his head and left shoulder. No trauma. No numbness or paresthesias.    Depression with anxiety 2/22/2016    Diabetic eye exam 5/27/2016    May 2016--patient reports a normal diabetic eye exam.    Diabetic foot exam 4/27/2017 05/02/2017--routine diabetic foot exam reveals no evidence of diabetic foot ulcer or pre-ulcerative callus.  Pulses are palpable and there is no signs of ischemia.  Sensation subjectively intact.    Dysfunction of both eustachian tubes 9/6/2019 September 6, 2019--patient with severe environmental allergies/allergic rhinitis presents with complaints of his ears popping like he is been on an airplane and a sensation of pressure at times.  At times his hearing is decreased.  On exam I do not see any definite serous otitis media.  I do think his environmental allergies are playing a role but I think it would be reasonable for ENT to evaluate    Gastroesophageal reflux disease with esophagitis 5/22/2015 08/12/2015--EGD revealed esophagitis and a distal esophageal stricture that was dilated. Small sliding hiatal hernia. Proximal gastric ulcer and gastritis present. Dysphagia resolved after dilatation. Pathology of the gastric antrum was benign with no significant histologic abnormality. Distal esophagus biopsy negative for intestinal metaplasia or dysplasia.   05/22/2015--patient presents with a several month history of progressively worsening intermittent dysphagia of solids but not liquids. He describes solid food sometimes sticking and points to his upper chest/lower throat. No other associated symptoms. Patient referred to Dr. Aime Parada for an EGD. This is negative, may need ENT evaluation.    History of colon polyps, 11/29/2017--tubular adenoma times one.  Hyperplastic ×1.  Repeat 5 years. 12/18/2017 11/29/2017--colonoscopy revealed 2 small (3 mm) polyps in the sigmoid colon and cecum.  Removed.  Bowel prep.  Sigmoid diverticuli noted.  No hemorrhoids.  Pathology  returned hyperplastic polyp of the sigmoid and the cecal polyp was a tubular adenoma.    HIstory of eosinophilic gastroenteritis 1990s 1990s--patient had significant epigastric discomfort and workup including an EGD revealed eosinophilic gastroenteritis that was treated with prednisone and resulted in resolution.    History of esophageal stricture 08/12/2015 08/12/2015--EGD revealed esophagitis and a distal esophageal stricture that was dilated. Small sliding hiatal hernia. Proximal gastric ulcer and gastritis present. Dysphagia resolved after dilatation. Pathology of the gastric antrum was benign with no significant histologic abnormality. Distal esophagus biopsy negative for intestinal metaplasia or dysplasia.   05/22/2015--patient presents with a s    History of Pulmonary nodule, 03/07/2016--5 mm indeterminate nodule right lower lobe.  09/13/2016--consistent with calcified granuloma. 3/7/2016    09/13/2016--CT scan of the chest, abdomen, and pelvis with contrast reveals no lymphadenopathy within the abdomen or pelvis.  Mild hepatic steatosis.  Previously noted right lower lobe pulmonary nodule is currently calcified and consistent with a calcified granuloma.  03/07/2016--CT scan of the abdomen performed for complaints of abdominal discomfort revealed a 5 mm nodular focus right lower lobe which is indeterminate.  Recommend repeat CT scan in 6 months.    Hypogonadism male, no TRT. 12/26/2012 12/26/2012--treatment for hypogonadism begun.    Microalbuminuria 10/19/2014    10/19/2014--urine microalbumin mildly elevated at 27.8. Normal range 0.0--17.0.    Moderate persistent asthma without complication 2/22/2016    Seasonal, spring and fall.    Morbid obesity 2/22/2016 November 12, 2019--current weight is 191 pounds representing a 1 pound weight loss.  It appears the patient needs a drug holiday from the phentermine.  I will have him stay off of it for 1 month and then restarted back at the current dose and  then we will reassess after the first the year when he comes in for his regular follow-up and physical.  September 6, 2019--current weight is 192 pounds repr    Multiple environmental allergies 10/24/2013    10/24/2013--skin testing revealed impressive reactions to grass following, tree pollen, weed pollen, ragweed, dust mite, and cat. Recommend immunotherapy.    Non morbid obesity 2/22/2016    Right bundle branch block     ECG reveals sinus rhythm, rate of 75, right bundle branch block, left anterior hemiblock    Subclinical hypothyroidism 8/17/2018 08/27/2018--thyroid ultrasound reveals subcentimeter nodule in the right thyroid lobe.  Possibly cystic.  There is a question of an ill-defined 1 cm nodular lesion posteriorly in the mid left lateral thyroid.  Appearance could be technical in origin.  Recommend repeat thyroid ultrasound in 6 months.  08/17/2018--routine follow-up.  TSH mildly elevated at 4.49.  Free T3 and free T4 are normal.  Rep    Thyroid nodule 10/5/2018    08/27/2018--thyroid ultrasound reveals subcentimeter nodule in the right thyroid lobe.  Possibly cystic.  There is a question of an ill-defined 1 cm nodular lesion posteriorly in the mid left lateral thyroid.  Appearance could be technical in origin.  Recommend repeat thyroid ultrasound in 6 months.  08/17/2018--routine follow-up.  TSH mildly elevated at 4.49.  Free T3 and free T4 are normal.  Repeat thyroid function tests ordered and returned normal.  Thyroid ultrasound ordered.    Type 2 diabetes mellitus with microalbuminuria, with long-term current use of insulin 1/1/2004 2004--initial diagnosis of type 2 diabetes.    Vitamin D deficiency 2/22/2016     Social History     Socioeconomic History    Marital status:      Spouse name: Luis     Number of children: 2    Years of education: 16    Highest education level: Bachelor's degree (e.g., BA, AB, BS)   Tobacco Use    Smoking status: Never    Smokeless tobacco: Never   Vaping Use     Vaping status: Never Used   Substance and Sexual Activity    Alcohol use: Yes    Drug use: No    Sexual activity: Yes     Partners: Female     Problem list reviewed by Astrid Song PharmD on 6/13/2024 at 10:05 AM    Hospitalizations and Urgent Care Since Last Assessment  ED Visits, Admissions, or Hospitalizations: none  Urgent Office Visits: none    Allergies  Known allergies and reactions were discussed with the patient. The patient's chart has been reviewed for allergy information and updated as necessary.   Allergies   Allergen Reactions    Latex Itching     Allergies reviewed by Astrid Song PharmD on 6/13/2024 at 10:05 AM    Relevant Laboratory Values  Relevant laboratory values were discussed with the patient. The following specialty medication dose adjustment(s) are recommended: n/a  A1C Last 3 Results          2/22/2024    08:01 5/23/2024    09:09 5/30/2024    08:05   HGBA1C Last 3 Results   Hemoglobin A1C 8.70  8.8     8.80       Details          This result is from an external source.             Lab Results   Component Value Date    HGBA1C 8.80 (H) 05/30/2024     Lab Results   Component Value Date    GLUCOSE 195 (H) 05/30/2024    CALCIUM 9.5 05/30/2024     05/30/2024    K 5.7 (H) 05/30/2024    CO2 25.6 05/30/2024     05/30/2024    BUN 25 (H) 05/30/2024    CREATININE 1.24 05/30/2024    EGFRIFAFRI >60 01/30/2023    EGFRIFNONA 66 02/25/2022    BCR 20.2 05/30/2024    ANIONGAP 12 01/30/2023     Lab Results   Component Value Date    CHLPL 76 10/25/2023    TRIG 127 10/25/2023    HDL 23 (L) 10/25/2023    LDL 30 10/25/2023       Current Medication List  This medication list has been reviewed with the patient and evaluated for any interactions or necessary modifications/recommendations, and updated to include all prescription medications, OTC medications, and supplements the patient is currently taking.  This list reflects what is contained in the patient's profile, which has also been marked as  reviewed to communicate to other providers it is the most up to date version of the patient's current medication therapy.     Current Outpatient Medications:     albuterol sulfate  (90 Base) MCG/ACT inhaler, Inhale 2 puffs Every 4 (Four) Hours As Needed for Wheezing or Shortness of Air., Disp: 18 g, Rfl: 0    amLODIPine (NORVASC) 5 MG tablet, Take 1 tablet by mouth Daily., Disp: , Rfl:     Cholecalciferol (VITAMIN D3) 5000 UNITS capsule capsule, Take 1 capsule by mouth Daily., Disp: , Rfl:     Continuous Glucose Sensor (Dexcom G7 Sensor) misc, APPLY AND CHANGE 1 SENSOR EVERY 10 DAYS, Disp: 9 each, Rfl: 0    dapagliflozin Propanediol (Farxiga) 10 MG tablet, Take 10 mg by mouth Daily., Disp: 90 tablet, Rfl: 1    Dulaglutide (Trulicity) 4.5 MG/0.5ML solution pen-injector, Inject 0.5 mL under the skin into the appropriate area as directed 1 (One) Time Per Week., Disp: 6 mL, Rfl: 1    esomeprazole (nexIUM) 40 MG capsule, TAKE 1 CAPSULE BY MOUTH EVERY DAY, Disp: 90 capsule, Rfl: 0    ezetimibe (ZETIA) 10 MG tablet, TAKE 1 TABLET BY MOUTH EVERY DAY, Disp: 90 tablet, Rfl: 0    fluticasone (FLONASE) 50 MCG/ACT nasal spray, 2 sprays into the nostril(s) as directed by provider Daily., Disp: , Rfl:     fluticasone-salmeterol (Advair Diskus) 250-50 MCG/DOSE DISKUS, Inhale 1 puff 2 (Two) Times a Day., Disp: 60 each, Rfl: 11    Insulin Lispro, 1 Unit Dial, (HumaLOG KwikPen) 100 UNIT/ML solution pen-injector, INJECT 18 UNITS AS DIRECTED WITH BREAKFAST & BEFORE LUNCH, & 24 UNITS WITH DINNER. USE FOR MEAL & SLIDING SCALE CORRECTION COVERAGE AS DIRECTED UP  UNITS PER DA, Disp: 90 mL, Rfl: 1    loratadine (CLARITIN) 10 MG tablet, Take 1 tablet by mouth Daily., Disp: , Rfl:     metFORMIN (GLUCOPHAGE) 1000 MG tablet, Take 1 tablet by mouth 2 (Two) Times a Day With Meals. Indications: Type 2 Diabetes, Disp: 180 tablet, Rfl: 1    montelukast (SINGULAIR) 10 MG tablet, TAKE 1 TABLET BY MOUTH EVERY DAY , Disp: 30 tablet, Rfl: 5     olopatadine (PATADAY) 0.2 % solution ophthalmic solution, INSTILL 1 DROP INTO BOTH EYES AS NEEDED FOR ITCHY EYES, Disp: , Rfl:     rosuvastatin (CRESTOR) 40 MG tablet, TAKE 1 TABLET BY MOUTH EVERY DAY AT NIGHT at bedtime, Disp: 90 tablet, Rfl: 0    sertraline (ZOLOFT) 50 MG tablet, TAKE 1 TABLET BY MOUTH ONE TIME A DAY , Disp: 30 tablet, Rfl: 6    Testosterone 1.62 % gel, APPLY 2 PUMPS TOPICALLY TO THE AFFECTED AREA(S) DAILY, Disp: 75 g, Rfl: 5    Toujeo Max SoloStar 300 UNIT/ML solution pen-injector injection, Inject 70 Units under the skin into the appropriate area as directed Every Morning AND 80 Units Every Evening. Do all this for 90 days. Inject 60 units in the morning and 60 units at night, Disp: 45 mL, Rfl: 1    valsartan (DIOVAN) 160 MG tablet, Take 1 tablet by mouth Daily., Disp: , Rfl:     Insulin Pen Needle (RELION PEN NEEDLE 31G/8MM) 31G X 8 MM misc, For use with pen device up to 5 times a day, Disp: 500 each, Rfl: 4    Medicines reviewed by Astrid Song, PharmD on 6/13/2024 at  9:05 AM    Drug Interactions  None    Recommended Medications Assessment  Aspirin: Not Taking Currently  Statin: Currently Taking   ACEi/ARB: Currently Taking     Adverse Drug Reactions  Medication tolerability: Tolerating with no to minimal ADRs  Medication plan: Continue therapy with normal follow-up  Plan for ADR Management: n/a    Adherence, Self-Administration, and Current Therapy Problems  Adherence related to the patient's specialty therapy was discussed with the patient. The Adherence segment of this outreach has been reviewed and updated.     Adherence Questions  Linked Medication(s) Assessed: No medications found.  On average, how many doses/injections does the patient miss per month?: 0  What are the identified reasons for non-adherence or missed doses? : no problems identified  What is the estimated medication adherence level?: %  Based on the patient/caregiver response and refill history, does this patient  require an MTP to track adherence improvements?: no    Additional Barriers to Patient Self-Administration: none  Methods for Supporting Patient Self-Administration: n/a    Open Medication Therapy Problems  No medication therapy recommendations to display    Goals of Therapy  Goals related to the patient's specialty therapy were discussed with the patient. The Patient Goals segment of this outreach has been reviewed and updated.   Goals Addressed Today        Specialty Pharmacy General Goal      A1c < 7%    Lab Results   Component Value Date    HGBA1C 8.80 (H) 05/30/2024    HGBA1C 8.8 (H) 05/23/2024    HGBA1C 8.70 (H) 02/22/2024    HGBA1C 8.50 (H) 10/25/2023    HGBA1C 8.60 (H) 08/23/2023                   Quality of Life Assessment   Quality of Life related to the patient's enrollment in the patient management program and services provided was discussed with the patient. The QOL segment of this outreach has been reviewed and updated.  Quality of Life Improvement Scale: 7-Somewhat better    Reassessment Plan & Follow-Up  1. Medication Therapy Changes: No changes today  2. Related Plans, Therapy Recommendations, or Issues to Be Addressed: none  3. Pharmacist to perform regular assessments no more than (6) months from the previous assessment.  4. Care Coordinator to set up future refill outreaches, coordinate prescription delivery, and escalate clinical questions to pharmacist.       Specialty Pharmacy Patient Management Program  Endocrinology Refill Outreach      Marek is a 59 y.o. male contacted today regarding refills of his medication(s).    Specialty medication(s) and dose(s) confirmed: yes    Refill Questions      Flowsheet Row Most Recent Value   Changes to allergies? No   Changes to medications? No   New conditions or infections since last clinic visit No   Unplanned office visit, urgent care, ED, or hospital admission in the last 4 weeks  No   How does patient/caregiver feel medication is working? Good    Financial problems or insurance changes  No   Since the previous refill, were any specialty medication doses or scheduled injections missed or delayed?  No   Does this patient require a clinical escalation to a pharmacist? No          Delivery Questions      Flowsheet Row Most Recent Value   Delivery method  at Pharmacy   Number of medications in delivery 1   Medication(s) being filled and delivered Dulaglutide   Copay verified? Yes   Copay amount 25   Copay form of payment Pay at pickup            Follow-Up: 84 days    Attestation  Therapeutic appropriateness: Appropriate   I attest the patient was actively involved in and has agreed to the above plan of care.  If the prescribed therapy is at any point deemed not appropriate based on the current or future assessments, a consultation will be initiated with the patient's specialty care provider to determine the best course of action. The revised plan of therapy will be documented along with any required assessments and/or additional patient education provided.     Astrid Song, PharmD  Clinical Specialty Pharmacist, Endocrinology  6/13/2024  10:08 EDT

## 2024-06-13 NOTE — PROGRESS NOTES
Chief complaint/Reason for consult:  T2DM    HPI:   - 59 year old male here for management of diabetes mellitus type 2 and hypogonadism  - Was last seen in 2/2024  - Since his last visit he had pneomnia  - Has had diabetes for 15 years  - No known complications to date  - Is currently taking Toujeo 70 units in the am and 80 units in the pm, Humalog 18-24 with meals, Trulicity 4.5 mg weekly, Farxiga 10 mg daily, metformin 1 g bid  - He also has hypogonadism of unknown etiology and is on testosterone topical 1.62%, 2 pumps daily  - He is also on Crestor 40 mg daily    The following portions of the patient's history were reviewed and updated as appropriate: allergies, current medications, past family history, past medical history, past social history, past surgical history, and problem list.      Objective     Vitals:    06/13/24 0835   BP: 124/76   Pulse: 82   SpO2: 95%        Physical Exam  Vitals reviewed.   Constitutional:       Appearance: Normal appearance.   HENT:      Head: Normocephalic and atraumatic.   Eyes:      General: No scleral icterus.  Pulmonary:      Effort: Pulmonary effort is normal. No respiratory distress.   Neurological:      Mental Status: He is alert.      Gait: Gait normal.   Psychiatric:         Mood and Affect: Mood normal.         Behavior: Behavior normal.         Thought Content: Thought content normal.         Judgment: Judgment normal.         CGM interpretation  Dates reviewed:  5/31-5/13/24  Data:  Avg of 172, 61% time in range, 26% high, 11% very high  Interpretation:  Hyperglycemia 9 p-3 a      Assessment & Plan   1. Type 2 DM, uncontrolled due to hyperglycemia  - His DexCom download is better than his A1c however I did ask him to take 8-10 units of Humalog at around 9 p.m. if his BG is over 200  - Cont. Toujeo 70 units in the am and 80 units in the pm, Humalog 18-24 with meals, Trulicity 4.5 mg weekly, Farxiga 10 mg daily, metformin 1 g bid     2. Hypogonadism  - Etiology is  not clear  - Free testosterone and HCT were normal in 10/2023  - Cont. testosterone topical 1.62%, 2 pumps daily     3. Obesity (BMI of 37)  - Dietary changes and exercise will help glycemic control     4. Hyperlipidemia  - He is also on Crestor 40 mg daily    5. Hyperkalemia  - Will recheck potassium and lower ARB if still elevated     - Return to clinic in 4 months

## 2024-07-17 ENCOUNTER — TELEMEDICINE (OUTPATIENT)
Dept: SLEEP MEDICINE | Facility: HOSPITAL | Age: 59
End: 2024-07-17
Payer: COMMERCIAL

## 2024-07-17 DIAGNOSIS — G47.33 OBSTRUCTIVE SLEEP APNEA: Primary | ICD-10-CM

## 2024-07-17 PROCEDURE — 99213 OFFICE O/P EST LOW 20 MIN: CPT | Performed by: FAMILY MEDICINE

## 2024-07-17 NOTE — PROGRESS NOTES
Follow Up Sleep Disorders Center Note     Chief Complaint:  STERLING    The provider is located in [place] using a secure MyChart Video Visit through [employer]. Patient is being seen remotely via telehealth at their home address in [place], and stated that they are in a secure environment for this session. The patient's condition being diagnosed/treated is appropriate for telemedicine. The provider has identified herself as well as her credentials. The patient and/or patient's guardian, consent to be seen remotely, and when consent is given they understand that the consent allows for patient identifiable information to be sent to a third party as needed. They may refuse to be seen remotely at any time. The electronic data is encrypted and password protected, and the patient and/or guardian has been advised of the potential risks to privacy not withstanding such measures.   PT identifiers used: Name and .     You have chosen to receive care through a telehealth visit. Do you consent to use a video/audio connection for your medical care today? Yes.    Primary Care Physician: Neel Patel MD    Interval History:   The patient is a 59 y.o. male  who was last seen in the sleep lab: 2023.  Presents today for annual follow-up of STERLING.   HST showed AHI 49.8.  On auto CPAP 8-20.  No problems with mask machine hypersomnia nonrestorative sleep.    Downloaded PAP Data Reviewed For Compliance:  DME is [employer].  Downloads between 2024 - 7/15/2024.  Average usage is 7 hours 22 minutes.  Average AHI is 3.6.  Average auto CPAP pressure is 13.1 cm H2O    I have reviewed the above results and compared them with the patient's last downloads and reviewed with the patient.    Review of Systems:    A complete review of systems was done and all were negative with the exception of all negative    Social History:    Social History     Socioeconomic History    Marital status:      Spouse name: Savoy     Number of children: 2    Years of  education: 16    Highest education level: Bachelor's degree (e.g., BA, AB, BS)   Tobacco Use    Smoking status: Never    Smokeless tobacco: Never   Vaping Use    Vaping status: Never Used   Substance and Sexual Activity    Alcohol use: Yes    Drug use: No    Sexual activity: Yes     Partners: Female       Allergies:  Latex     Medication Review:  Reviewed.      Impression:   1. Obstructive sleep apnea        Obstructive sleep apnea adequately treated with auto CPAP 8-20. The patient appears to be at goal with good compliance and usage. The patient has no complaints of hypersomnolence.    Plan:  Good sleep hygiene measures should be maintained.      After evaluating the patient and assessing results available, the patient is benefiting from the treatment being provided.     The patient will continue auto PAP.  After clinical evaluation and review of downloads, I recommend no changes to the patient's pressures.  A new prescription will be sent to the patient's DME.    Caution during activities that require prolonged concentration is strongly advised if sleepiness returns. Changing of PAP supplies regularly is important for effective use. Patient needs to change cushion on the mask or plugs on nasal pillows along with disposable filters once every month and change mask frame, tubing, headgear and Velcro straps every 6 months at the minimum.    I answered all of the patient's questions.  The patient will call for any problems and will follow up in 1 year.      Saranya Burton MD  Sleep Medicine  07/17/24  08:35 EDT

## 2024-07-22 DIAGNOSIS — E11.65 TYPE 2 DIABETES MELLITUS WITH HYPERGLYCEMIA, WITH LONG-TERM CURRENT USE OF INSULIN: ICD-10-CM

## 2024-07-22 DIAGNOSIS — Z79.4 TYPE 2 DIABETES MELLITUS WITH HYPERGLYCEMIA, WITH LONG-TERM CURRENT USE OF INSULIN: ICD-10-CM

## 2024-07-23 NOTE — TELEPHONE ENCOUNTER
Rx Refill Note  Requested Prescriptions     Pending Prescriptions Disp Refills    metFORMIN (GLUCOPHAGE) 1000 MG tablet [Pharmacy Med Name: metFORMIN HCl Oral Tablet 1000 MG] 180 tablet 0     Sig: TAKE 1 TABLET BY MOUTH 2 TIMES A DAY WITH MEALS      Last office visit with prescribing clinician: 11/28/2023   Last telemedicine visit with prescribing clinician: Visit date not found   Next office visit with prescribing clinician: Visit date not found                         Would you like a call back once the refill request has been completed: [] Yes [] No    If the office needs to give you a call back, can they leave a voicemail: [] Yes [] No    Jade Hager  07/23/24, 07:35 EDT

## 2024-08-13 DIAGNOSIS — Z79.4 TYPE 2 DIABETES MELLITUS WITH HYPERGLYCEMIA, WITH LONG-TERM CURRENT USE OF INSULIN: ICD-10-CM

## 2024-08-13 DIAGNOSIS — E11.65 TYPE 2 DIABETES MELLITUS WITH HYPERGLYCEMIA, WITH LONG-TERM CURRENT USE OF INSULIN: ICD-10-CM

## 2024-08-13 RX ORDER — INSULIN LISPRO 100 [IU]/ML
INJECTION, SOLUTION INTRAVENOUS; SUBCUTANEOUS
Qty: 90 ML | Refills: 1 | Status: SHIPPED | OUTPATIENT
Start: 2024-08-13

## 2024-08-13 NOTE — TELEPHONE ENCOUNTER
Rx Refill Note  Requested Prescriptions     Pending Prescriptions Disp Refills    Insulin Lispro, 1 Unit Dial, (HumaLOG KwikPen) 100 UNIT/ML solution pen-injector [Pharmacy Med Name: HumaLOG KwikPen Subcutaneous Solution Pen-injector 100 UNIT/ML] 90 mL 0     Sig: INJECT 18 UNITS AS DIRECTED WITH BREAKFAST AND BEFORE LUNCH, 24 UNITS WITH DINNER, SLIDING SCALE AS DIRECTED UP  UNITS PER DAY      Last office visit with prescribing clinician: 6/13/2024   Last telemedicine visit with prescribing clinician: Visit date not found   Next office visit with prescribing clinician: 12/17/2024                         Would you like a call back once the refill request has been completed: [] Yes [] No    If the office needs to give you a call back, can they leave a voicemail: [] Yes [] No    Jade Hager  08/13/24, 09:03 EDT

## 2024-08-27 RX ORDER — ROSUVASTATIN CALCIUM 40 MG/1
40 TABLET, COATED ORAL
Qty: 90 TABLET | Refills: 0 | OUTPATIENT
Start: 2024-08-27

## 2024-08-27 NOTE — TELEPHONE ENCOUNTER
Rx Refill Note  Requested Prescriptions     Pending Prescriptions Disp Refills    rosuvastatin (CRESTOR) 40 MG tablet [Pharmacy Med Name: Rosuvastatin Calcium Oral Tablet 40 MG] 90 tablet 0     Sig: TAKE 1 TABLET BY MOUTH AT BEDTIME      Last office visit with prescribing clinician: 6/13/2024   Last telemedicine visit with prescribing clinician: Visit date not found   Next office visit with prescribing clinician: 12/17/2024                         Would you like a call back once the refill request has been completed: [] Yes [] No    If the office needs to give you a call back, can they leave a voicemail: [] Yes [] No    Jade Hager  08/27/24, 13:05 EDT

## 2024-08-30 ENCOUNTER — SPECIALTY PHARMACY (OUTPATIENT)
Dept: ENDOCRINOLOGY | Age: 59
End: 2024-08-30
Payer: COMMERCIAL

## 2024-08-30 NOTE — PROGRESS NOTES
Specialty Pharmacy Refill Coordination Note     Marek is a 59 y.o. male contacted today regarding refills of  1 specialty medication(s).    Reviewed and verified with patient:       Specialty medication(s) and dose(s) confirmed: yes    Refill Questions      Flowsheet Row Most Recent Value   Changes to allergies? No   Changes to medications? No   New conditions or infections since last clinic visit No   Unplanned office visit, urgent care, ED, or hospital admission in the last 4 weeks  No   How does patient/caregiver feel medication is working? Good   Financial problems or insurance changes  No   Since the previous refill, were any specialty medication doses or scheduled injections missed or delayed?  No   Does this patient require a clinical escalation to a pharmacist? No            Delivery Questions      Flowsheet Row Most Recent Value   Delivery method [employer]   Delivery address verified with patient/caregiver? Yes   Delivery address Home   Number of medications in delivery 1   Medication(s) being filled and delivered Dulaglutide   Doses left of specialty medications 1 week   Copay verified? Yes   Copay amount $25   Copay form of payment Credit/debit on file   Ship Date 09/03   Delivery Date 09/04   Signature Required No                   Follow-up: 85 day(s)     Latrice Linn, Pharmacy Technician  Specialty Pharmacy Technician

## 2024-09-05 RX ORDER — ACYCLOVIR 400 MG/1
TABLET ORAL
Qty: 9 EACH | Refills: 0 | Status: SHIPPED | OUTPATIENT
Start: 2024-09-05

## 2024-09-05 NOTE — TELEPHONE ENCOUNTER
Rx Refill Note  Requested Prescriptions     Pending Prescriptions Disp Refills    Continuous Glucose Sensor (Dexcom G7 Sensor) misc [Pharmacy Med Name: Dexcom G7 Sensor Miscellaneous] 9 each 0     Sig: APPLY AND CHANGE 1 SENSOR EVERY 10 DAYS      Last office visit with prescribing clinician: Visit date not found   Last telemedicine visit with prescribing clinician: Visit date not found   Next office visit with prescribing clinician: Visit date not found                         Would you like a call back once the refill request has been completed: [] Yes [] No    If the office needs to give you a call back, can they leave a voicemail: [] Yes [] No    Jade Hager  09/05/24, 09:42 EDT

## 2024-09-06 DIAGNOSIS — E29.1 HYPOGONADISM MALE: ICD-10-CM

## 2024-09-09 RX ORDER — TESTOSTERONE 20.25 MG/1.25G
GEL TOPICAL
Qty: 75 G | Refills: 1 | Status: SHIPPED | OUTPATIENT
Start: 2024-09-09

## 2024-09-09 NOTE — TELEPHONE ENCOUNTER
Rx Refill Note  Requested Prescriptions     Pending Prescriptions Disp Refills    Testosterone 1.62 % gel [Pharmacy Med Name: Testosterone Transdermal Gel 1.62 %] 75 g 0     Sig: APPLY 2 PUMPS TOPICALLY TO THE AFFECTED AREA(S) DAILY      Last office visit with prescribing clinician: 6/13/2024   Last telemedicine visit with prescribing clinician: Visit date not found   Next office visit with prescribing clinician: 12/17/2024                         Would you like a call back once the refill request has been completed: [] Yes [] No    If the office needs to give you a call back, can they leave a voicemail: [] Yes [] No    Bree Hager MA  09/09/24, 07:29 EDT

## 2024-09-10 RX ORDER — ROSUVASTATIN CALCIUM 40 MG/1
40 TABLET, COATED ORAL
Qty: 90 TABLET | Refills: 1 | Status: SHIPPED | OUTPATIENT
Start: 2024-09-10

## 2024-09-10 NOTE — TELEPHONE ENCOUNTER
Rx Refill Note  Requested Prescriptions     Pending Prescriptions Disp Refills    rosuvastatin (CRESTOR) 40 MG tablet [Pharmacy Med Name: Rosuvastatin Calcium Oral Tablet 40 MG] 90 tablet 0     Sig: TAKE 1 TABLET BY MOUTH AT BEDTIME      Last office visit with prescribing clinician: 6/13/2024   Last telemedicine visit with prescribing clinician: Visit date not found   Next office visit with prescribing clinician: 12/17/2024                         Would you like a call back once the refill request has been completed: [] Yes [] No    If the office needs to give you a call back, can they leave a voicemail: [] Yes [] No    Jade Hager  09/10/24, 11:37 EDT

## 2024-09-12 DIAGNOSIS — E78.5 HYPERLIPIDEMIA, UNSPECIFIED HYPERLIPIDEMIA TYPE: ICD-10-CM

## 2024-09-12 RX ORDER — EZETIMIBE 10 MG/1
10 TABLET ORAL DAILY
Qty: 90 TABLET | Refills: 0 | Status: SHIPPED | OUTPATIENT
Start: 2024-09-12

## 2024-09-12 NOTE — TELEPHONE ENCOUNTER
Rx Refill Note  Requested Prescriptions     Pending Prescriptions Disp Refills    ezetimibe (ZETIA) 10 MG tablet [Pharmacy Med Name: Ezetimibe Oral Tablet 10 MG] 90 tablet 0     Sig: TAKE 1 TABLET BY MOUTH EVERY DAY      Last office visit with prescribing clinician: 6/13/2024   Last telemedicine visit with prescribing clinician: Visit date not found   Next office visit with prescribing clinician: 12/17/2024                         Would you like a call back once the refill request has been completed: [] Yes [] No    If the office needs to give you a call back, can they leave a voicemail: [] Yes [] No    Jade Hager  09/12/24, 12:22 EDT

## 2024-09-23 RX ORDER — DAPAGLIFLOZIN 10 MG/1
1 TABLET, FILM COATED ORAL DAILY
Qty: 90 TABLET | Refills: 1 | Status: SHIPPED | OUTPATIENT
Start: 2024-09-23

## 2024-11-18 DIAGNOSIS — Z79.4 TYPE 2 DIABETES MELLITUS WITH HYPERGLYCEMIA, WITH LONG-TERM CURRENT USE OF INSULIN: ICD-10-CM

## 2024-11-18 DIAGNOSIS — E11.65 TYPE 2 DIABETES MELLITUS WITH HYPERGLYCEMIA, WITH LONG-TERM CURRENT USE OF INSULIN: ICD-10-CM

## 2024-11-18 DIAGNOSIS — E29.1 HYPOGONADISM MALE: Primary | ICD-10-CM

## 2024-11-25 DIAGNOSIS — E11.65 TYPE 2 DIABETES MELLITUS WITH HYPERGLYCEMIA, WITH LONG-TERM CURRENT USE OF INSULIN: ICD-10-CM

## 2024-11-25 DIAGNOSIS — Z79.4 TYPE 2 DIABETES MELLITUS WITH HYPERGLYCEMIA, WITH LONG-TERM CURRENT USE OF INSULIN: ICD-10-CM

## 2024-11-25 RX ORDER — DULAGLUTIDE 4.5 MG/.5ML
4.5 INJECTION, SOLUTION SUBCUTANEOUS WEEKLY
Qty: 6 ML | Refills: 1 | Status: SHIPPED | OUTPATIENT
Start: 2024-11-25

## 2024-11-25 NOTE — TELEPHONE ENCOUNTER
Rx Refill Note  Requested Prescriptions     Pending Prescriptions Disp Refills    Dulaglutide (Trulicity) 4.5 MG/0.5ML solution auto-injector 6 mL 1     Sig: Inject 0.5 mL under the skin into the appropriate area as directed 1 (One) Time Per Week.      Last office visit with prescribing clinician: 6/13/2024   Last telemedicine visit with prescribing clinician: Visit date not found   Next office visit with prescribing clinician: 12/17/2024                         Would you like a call back once the refill request has been completed: [] Yes [] No    If the office needs to give you a call back, can they leave a voicemail: [] Yes [] No    Jade Hager  11/25/24, 14:56 EST

## 2024-11-27 DIAGNOSIS — E29.1 HYPOGONADISM MALE: ICD-10-CM

## 2024-11-27 RX ORDER — TESTOSTERONE 20.25 MG/1.25G
GEL TOPICAL
Qty: 75 G | Refills: 0 | Status: SHIPPED | OUTPATIENT
Start: 2024-11-27

## 2024-11-27 NOTE — TELEPHONE ENCOUNTER
Rx Refill Note  Requested Prescriptions     Pending Prescriptions Disp Refills    Testosterone 1.62 % gel [Pharmacy Med Name: Testosterone Transdermal Gel 1.62 %] 75 g 0     Sig: APPLY 2 PUMPS TOPICALLY TO THE appropriate AREA(S) DAILY as directed      Last office visit with prescribing clinician: 6/13/2024   Last telemedicine visit with prescribing clinician: Visit date not found   Next office visit with prescribing clinician: 12/17/2024                         Would you like a call back once the refill request has been completed: [] Yes [] No    If the office needs to give you a call back, can they leave a voicemail: [] Yes [] No    Bree Hager MA  11/27/24, 07:38 EST

## 2024-12-02 ENCOUNTER — SPECIALTY PHARMACY (OUTPATIENT)
Dept: ENDOCRINOLOGY | Age: 59
End: 2024-12-02
Payer: COMMERCIAL

## 2024-12-02 NOTE — PROGRESS NOTES
Specialty Pharmacy Patient Management Program  Endocrinology Reassessment     Marek Diego was referred by an Endocrinology provider to the Endocrinology Patient Management program offered by [facility] for Type 2 Diabetes. A follow-up outreach was conducted, including assessment of continued therapy appropriateness, medication adherence, and side effect incidence and management for Trulicity.    Changes to Insurance Coverage or Financial Support  [employer] and  copay card     Relevant Past Medical History and Comorbidities  Relevant medical history and concomitant health conditions were discussed with the patient. The patient's chart has been reviewed for relevant past medical history and comorbid health conditions and updated as necessary.   Past Medical History:   Diagnosis Date    Allergic rhinitis 10/24/2013    10/24/2013--skin testing revealed impressive reactions to grass following, tree pollen, weed pollen, ragweed, dust mite, and cat. Recommend immunotherapy.    Benign essential hypertension 2/22/2016    Chronic constipation 11/20/2017 11/20/2017--patient reports approximately 8 month history of intermittent constipation that at times can last as much as a month.  He notes his blood sugar readings tend to increase when this happens.  He is scheduled for colonoscopy next week.  I recommend a generic probiotic daily as well as MiraLAX and adjust as needed.    Chronic neck pain 10/30/2015    12/19/2015--patient reports his left shoulder pain has resolved. He continues to have neck pain. X-ray of the cervical spine ordered. Physical therapy ordered.   11/10/2015--left shoulder injected with 80 mg of Depo-Medrol with 3 mL of Xylocaine.   10/30/2015--patient presents with at least a one-month history of intermittent left-sided neck pain that is associated with left shoulder pain as well. The pain is described as shooting and is 8 out of 10 intensity at its worst.  Daily Note     Today's date: 2019  Patient name: Hussein Doty  : 1945  MRN: [id number]  Referring provider: Ralph Aguiar DO  Dx:   Encounter Diagnosis     ICD-10-CM    1  Sacroili[facility], not [facility] ) M46 1        Start Time: 1391  Stop Time: 1566  Total time in clinic (min): 60 minutes    Subjective: Pt reports her leg pain is gone, her back still gets sharp spasms that are unpredictable but not frequent  Pt reports current pain 0/10 but sharp spasm is 10/10 when it comes  She does feel that moving in bed makes it worse  Objective: See treatment diary below      Assessment: Tolerated treatment well  Patient able to move with ease today on/off plinth  No pain or spasm during Rx  Plan: Continue per plan of care  Precautions: standard      Manual        LB/LEs  Leg pulls 15' 15' 15' 15' 15 15' 15      Sciatic flossing 30x nt 30x 30x active  30x   active 30x                                                 Exercise Diary        Hip ADD    20x 30x 30x 30x      Hip ABD    20x 30x 30x 30x      CHANG 5x  10x 10x x10 10x 10x 10x       Pelvic tilts  10x 2x10 20x 30x 30x 30x      bridging       2x10      slant     30"x4 nt 30"4      Nustep     5' nt 10'                                HEP 5'                                                                                                                                                                        HEP- pt instructed in CHANG and instructed to proceed if pain in her leg centralizes into back and to stop if it radiated or peripheralizes further down the leg  Pt demonstrated good understanding      Modalities  11/6 11/8 11/11 11/13 11/15 11/26 11/29      MH/HT ES 10' 10 10' -sitting nv 10' sit 10 10 10 sit                   ICE after  10' 10' The pain is worse with certain movements of his head and left shoulder. No trauma. No numbness or paresthesias.    Depression with anxiety 2/22/2016    Diabetic eye exam 5/27/2016    May 2016--patient reports a normal diabetic eye exam.    Diabetic foot exam 4/27/2017 05/02/2017--routine diabetic foot exam reveals no evidence of diabetic foot ulcer or pre-ulcerative callus.  Pulses are palpable and there is no signs of ischemia.  Sensation subjectively intact.    Dysfunction of both eustachian tubes 9/6/2019 September 6, 2019--patient with severe environmental allergies/allergic rhinitis presents with complaints of his ears popping like he is been on an airplane and a sensation of pressure at times.  At times his hearing is decreased.  On exam I do not see any definite serous otitis media.  I do think his environmental allergies are playing a role but I think it would be reasonable for ENT to evaluate    Gastroesophageal reflux disease with esophagitis 5/22/2015 08/12/2015--EGD revealed esophagitis and a distal esophageal stricture that was dilated. Small sliding hiatal hernia. Proximal gastric ulcer and gastritis present. Dysphagia resolved after dilatation. Pathology of the gastric antrum was benign with no significant histologic abnormality. Distal esophagus biopsy negative for intestinal metaplasia or dysplasia.   05/22/2015--patient presents with a several month history of progressively worsening intermittent dysphagia of solids but not liquids. He describes solid food sometimes sticking and points to his upper chest/lower throat. No other associated symptoms. Patient referred to Dr. Aime Parada for an EGD. This is negative, may need ENT evaluation.    History of colon polyps, 11/29/2017--tubular adenoma times one.  Hyperplastic ×1.  Repeat 5 years. 12/18/2017 11/29/2017--colonoscopy revealed 2 small (3 mm) polyps in the sigmoid colon and cecum.  Removed.  Bowel prep.  Sigmoid diverticuli noted.  No  hemorrhoids.  Pathology returned hyperplastic polyp of the sigmoid and the cecal polyp was a tubular adenoma.    HIstory of eosinophilic gastroenteritis 1990s    1990s--patient had significant epigastric discomfort and workup including an EGD revealed eosinophilic gastroenteritis that was treated with prednisone and resulted in resolution.    History of esophageal stricture 08/12/2015 08/12/2015--EGD revealed esophagitis and a distal esophageal stricture that was dilated. Small sliding hiatal hernia. Proximal gastric ulcer and gastritis present. Dysphagia resolved after dilatation. Pathology of the gastric antrum was benign with no significant histologic abnormality. Distal esophagus biopsy negative for intestinal metaplasia or dysplasia.   05/22/2015--patient presents with a s    History of Pulmonary nodule, 03/07/2016--5 mm indeterminate nodule right lower lobe.  09/13/2016--consistent with calcified granuloma. 3/7/2016    09/13/2016--CT scan of the chest, abdomen, and pelvis with contrast reveals no lymphadenopathy within the abdomen or pelvis.  Mild hepatic steatosis.  Previously noted right lower lobe pulmonary nodule is currently calcified and consistent with a calcified granuloma.  03/07/2016--CT scan of the abdomen performed for complaints of abdominal discomfort revealed a 5 mm nodular focus right lower lobe which is indeterminate.  Recommend repeat CT scan in 6 months.    Hypogonadism male, no TRT. 12/26/2012 12/26/2012--treatment for hypogonadism begun.    Microalbuminuria 10/19/2014    10/19/2014--urine microalbumin mildly elevated at 27.8. Normal range 0.0--17.0.    Moderate persistent asthma without complication 2/22/2016    Seasonal, spring and fall.    Morbid obesity 2/22/2016 November 12, 2019--current weight is 191 pounds representing a 1 pound weight loss.  It appears the patient needs a drug holiday from the phentermine.  I will have him stay off of it for 1 month and then restarted back  at the current dose and then we will reassess after the first the year when he comes in for his regular follow-up and physical.  September 6, 2019--current weight is 192 pounds repr    Multiple environmental allergies 10/24/2013    10/24/2013--skin testing revealed impressive reactions to grass following, tree pollen, weed pollen, ragweed, dust mite, and cat. Recommend immunotherapy.    Non morbid obesity 2/22/2016    Right bundle branch block     ECG reveals sinus rhythm, rate of 75, right bundle branch block, left anterior hemiblock    Subclinical hypothyroidism 8/17/2018 08/27/2018--thyroid ultrasound reveals subcentimeter nodule in the right thyroid lobe.  Possibly cystic.  There is a question of an ill-defined 1 cm nodular lesion posteriorly in the mid left lateral thyroid.  Appearance could be technical in origin.  Recommend repeat thyroid ultrasound in 6 months.  08/17/2018--routine follow-up.  TSH mildly elevated at 4.49.  Free T3 and free T4 are normal.  Rep    Thyroid nodule 10/5/2018    08/27/2018--thyroid ultrasound reveals subcentimeter nodule in the right thyroid lobe.  Possibly cystic.  There is a question of an ill-defined 1 cm nodular lesion posteriorly in the mid left lateral thyroid.  Appearance could be technical in origin.  Recommend repeat thyroid ultrasound in 6 months.  08/17/2018--routine follow-up.  TSH mildly elevated at 4.49.  Free T3 and free T4 are normal.  Repeat thyroid function tests ordered and returned normal.  Thyroid ultrasound ordered.    Type 2 diabetes mellitus with microalbuminuria, with long-term current use of insulin 1/1/2004    2004--initial diagnosis of type 2 diabetes.    Vitamin D deficiency 2/22/2016     Social History     Socioeconomic History    Marital status:      Spouse name: Luis     Number of children: 2    Years of education: 16    Highest education level: Bachelor's degree (e.g., BA, AB, BS)   Tobacco Use    Smoking status: Never    Smokeless  tobacco: Never   Vaping Use    Vaping status: Never Used   Substance and Sexual Activity    Alcohol use: Yes    Drug use: No    Sexual activity: Yes     Partners: Female          Hospitalizations and Urgent Care Since Last Assessment  ED Visits, Admissions, or Hospitalizations: None reported or documented  Urgent Office Visits: None reported or documented     Allergies  Known allergies and reactions were discussed with the patient. The patient's chart has been reviewed for allergy information and updated as necessary.   Allergies   Allergen Reactions    Latex Itching          Relevant Laboratory Values  Relevant laboratory values were discussed with the patient. The following specialty medication dose adjustment(s) are recommended: No adjustments needed at this time.   A1C Last 3 Results          2/22/2024    08:01 5/23/2024    09:09 5/30/2024    08:05   HGBA1C Last 3 Results   Hemoglobin A1C 8.70  8.8     8.80       Details          This result is from an external source.             Lab Results   Component Value Date    HGBA1C 8.80 (H) 05/30/2024     Lab Results   Component Value Date    GLUCOSE 195 (H) 05/30/2024    CALCIUM 9.5 05/30/2024     05/30/2024    K 5.3 (H) 06/13/2024    CO2 25.6 05/30/2024     05/30/2024    BUN 25 (H) 05/30/2024    CREATININE 1.24 05/30/2024    EGFRIFAFRI >60 01/30/2023    EGFRIFNONA 66 02/25/2022    BCR 20.2 05/30/2024    ANIONGAP 12 01/30/2023     Lab Results   Component Value Date    CHLPL 76 10/25/2023    TRIG 127 10/25/2023    HDL 23 (L) 10/25/2023    LDL 30 10/25/2023       Current Medication List  This medication list has been reviewed with the patient and evaluated for any interactions or necessary modifications/recommendations, and updated to include all prescription medications, OTC medications, and supplements the patient is currently taking.  This list reflects what is contained in the patient's profile, which has also been marked as reviewed to communicate to  other providers it is the most up to date version of the patient's current medication therapy.     Current Outpatient Medications:     albuterol sulfate  (90 Base) MCG/ACT inhaler, Inhale 2 puffs Every 4 (Four) Hours As Needed for Wheezing or Shortness of Air., Disp: 18 g, Rfl: 0    amLODIPine (NORVASC) 5 MG tablet, Take 1 tablet by mouth Daily., Disp: , Rfl:     Cholecalciferol (VITAMIN D3) 5000 UNITS capsule capsule, Take 1 capsule by mouth Daily., Disp: , Rfl:     Continuous Glucose Sensor (Dexcom G7 Sensor) misc, APPLY AND CHANGE 1 SENSOR EVERY 10 DAYS, Disp: 9 each, Rfl: 0    dapagliflozin Propanediol (Farxiga) 10 MG tablet, TAKE 1 TABLET BY MOUTH EVERY DAY, Disp: 90 tablet, Rfl: 1    Dulaglutide (Trulicity) 4.5 MG/0.5ML solution auto-injector, Inject 4.5 mg under the skin into the appropriate area as directed 1 (One) Time Per Week., Disp: 6 mL, Rfl: 1    esomeprazole (nexIUM) 40 MG capsule, TAKE 1 CAPSULE BY MOUTH EVERY DAY, Disp: 90 capsule, Rfl: 0    ezetimibe (ZETIA) 10 MG tablet, TAKE 1 TABLET BY MOUTH EVERY DAY, Disp: 90 tablet, Rfl: 0    fluticasone (FLONASE) 50 MCG/ACT nasal spray, 2 sprays into the nostril(s) as directed by provider Daily., Disp: , Rfl:     fluticasone-salmeterol (Advair Diskus) 250-50 MCG/DOSE DISKUS, Inhale 1 puff 2 (Two) Times a Day., Disp: 60 each, Rfl: 11    Insulin Lispro, 1 Unit Dial, (HumaLOG KwikPen) 100 UNIT/ML solution pen-injector, INJECT 18 UNITS AS DIRECTED WITH BREAKFAST AND BEFORE LUNCH, 24 UNITS WITH DINNER, SLIDING SCALE AS DIRECTED UP  UNITS PER DAY, Disp: 90 mL, Rfl: 1    Insulin Pen Needle (RELION PEN NEEDLE 31G/8MM) 31G X 8 MM misc, For use with pen device up to 5 times a day, Disp: 500 each, Rfl: 4    loratadine (CLARITIN) 10 MG tablet, Take 1 tablet by mouth Daily., Disp: , Rfl:     metFORMIN (GLUCOPHAGE) 1000 MG tablet, TAKE 1 TABLET BY MOUTH 2 TIMES A DAY WITH MEALS, Disp: 180 tablet, Rfl: 1    montelukast (SINGULAIR) 10 MG tablet, TAKE 1 TABLET  BY MOUTH EVERY DAY , Disp: 30 tablet, Rfl: 5    olopatadine (PATADAY) 0.2 % solution ophthalmic solution, INSTILL 1 DROP INTO BOTH EYES AS NEEDED FOR ITCHY EYES, Disp: , Rfl:     rosuvastatin (CRESTOR) 40 MG tablet, TAKE 1 TABLET BY MOUTH AT BEDTIME, Disp: 90 tablet, Rfl: 1    sertraline (ZOLOFT) 50 MG tablet, TAKE 1 TABLET BY MOUTH ONE TIME A DAY , Disp: 30 tablet, Rfl: 6    Testosterone 1.62 % gel, APPLY 2 PUMPS TOPICALLY TO THE appropriate AREA(S) DAILY as directed, Disp: 75 g, Rfl: 0    Toujeo Max SoloStar 300 UNIT/ML solution pen-injector injection, Inject 70 Units under the skin into the appropriate area as directed Every Morning AND 80 Units Every Evening. Do all this for 90 days., Disp: 45 mL, Rfl: 1    valsartan (DIOVAN) 160 MG tablet, Take 1 tablet by mouth Daily., Disp: , Rfl:          Drug Interactions (per Lexicomp)  Toujeo and Farxiga  Consider a decrease in insulin dose when initiating therapy with a sodium-glucose cotransporter 2 inhibitor and monitor patients for hypoglycemia.  Toujeo and Trulicity  Consider insulin dose reductions when used in combination with glucagon-like peptide-1 agonists. Monitor patients for hypoglycemia.    Recommended Medications Assessment  Aspirin: Not Taking Currently  Statin: Currently Taking   ACEi/ARB: Currently Taking     Adverse Drug Reactions  Medication tolerability: Tolerating with no to minimal ADRs  Medication plan: Continue therapy with normal follow-up  Plan for ADR Management: No ADRs reported    Adherence, Self-Administration, and Current Therapy Problems  Adherence related to the patient's specialty therapy was discussed with the patient. The Adherence segment of this outreach has been reviewed and updated.     Adherence Questions  Linked Medication(s) Assessed: Dulaglutide (Trulicity)  On average, how many doses/injections does the patient miss per month?: 0  What are the identified reasons for non-adherence or missed doses? : no problems identified  What  is the estimated medication adherence level?: % (Per patient and last few fills per data on days covered)  Based on the patient/caregiver response and refill history, does this patient require an MTP to track adherence improvements?: no    Additional Barriers to Patient Self-Administration: None identified or disclosed  Methods for Supporting Patient Self-Administration: Continue to follow with fills and clinical assessments     Open Medication Therapy Problems  No medication therapy recommendations to display    Goals of Therapy  Goals related to the patient's specialty therapy were discussed with the patient. The Patient Goals segment of this outreach has been reviewed and updated.   Goals Addressed Today    None         Quality of Life Assessment   Quality of Life related to the patient's enrollment in the patient management program and services provided was discussed with the patient. The QOL segment of this outreach has been reviewed and updated.  Quality of Life Improvement Scale: 8-Moderately better    Reassessment Plan & Follow-Up  1. Medication Therapy Changes: No changes  2. Related Plans, Therapy Recommendations, or Issues to Be Addressed: No issues   3. Pharmacist to perform regular assessments no more than (6) months from the previous assessment.  4. Care Coordinator to set up future refill outreaches, coordinate prescription delivery, and escalate clinical questions to pharmacist.    Attestation  Therapeutic appropriateness: Appropriate   I attest the patient was actively involved in and has agreed to the above plan of care.  If the prescribed therapy is at any point deemed not appropriate based on the current or future assessments, a consultation will be initiated with the patient's specialty care provider to determine the best course of action. The revised plan of therapy will be documented along with any required assessments and/or additional patient education provided.     Emily Brown  Augustine, JUAN C  Clinical Specialty Pharmacist, Endocrinology  12/2/2024  13:23 EST    Discussed the aforementioned information with the patient via Telephone.      Specialty Pharmacy Patient Management Program  Endocrinology Refill Outreach      Marek is a 59 y.o. male contacted today regarding refills of his medication(s).    Specialty medication(s) and dose(s) confirmed: Trulicity 4.5 mg    Refill Questions      Flowsheet Row Most Recent Value   Changes to allergies? No   Changes to medications? No   New conditions or infections since last clinic visit No   Unplanned office visit, urgent care, ED, or hospital admission in the last 4 weeks  No   How does patient/caregiver feel medication is working? Good   Financial problems or insurance changes  No   Since the previous refill, were any specialty medication doses or scheduled injections missed or delayed?  No   Does this patient require a clinical escalation to a pharmacist? No          Delivery Questions      Flowsheet Row Most Recent Value   Delivery method UPS   Delivery address verified with patient/caregiver? Yes   Delivery address Home   Number of medications in delivery 1   Medication(s) being filled and delivered Dulaglutide (Trulicity)   Doses left of specialty medications About a week   Copay verified? Yes   Copay amount $25   Copay form of payment Credit/debit on file   Ship Date 12/2   Delivery Date 12/3   Signature Required No            Follow-Up: About 3 months for future fills    Emily Brown PharmD, MM   Clinical Speciality Pharmacist, Rheumatology   12/2/2024  13:23 EST

## 2024-12-03 RX ORDER — ACYCLOVIR 400 MG/1
TABLET ORAL
Qty: 9 EACH | Refills: 0 | Status: SHIPPED | OUTPATIENT
Start: 2024-12-03

## 2024-12-03 NOTE — TELEPHONE ENCOUNTER
Rx Refill Note  Requested Prescriptions     Pending Prescriptions Disp Refills    Continuous Glucose Sensor (Dexcom G7 Sensor) misc [Pharmacy Med Name: Dexcom G7 Sensor Miscellaneous] 9 each 0     Sig: APPLY AND CHANGE 1 SENSOR EVERY 10 DAYS      Last office visit with prescribing clinician: 6/13/2024   Last telemedicine visit with prescribing clinician: Visit date not found   Next office visit with prescribing clinician: 12/17/2024                         Would you like a call back once the refill request has been completed: [] Yes [] No    If the office needs to give you a call back, can they leave a voicemail: [] Yes [] No    Theresa Box MA  12/03/24, 11:59 EST

## 2024-12-08 DIAGNOSIS — E78.5 HYPERLIPIDEMIA, UNSPECIFIED HYPERLIPIDEMIA TYPE: ICD-10-CM

## 2024-12-09 RX ORDER — EZETIMIBE 10 MG/1
10 TABLET ORAL DAILY
Qty: 90 TABLET | Refills: 0 | Status: SHIPPED | OUTPATIENT
Start: 2024-12-09

## 2024-12-09 NOTE — TELEPHONE ENCOUNTER
Rx Refill Note  Requested Prescriptions     Pending Prescriptions Disp Refills    ezetimibe (ZETIA) 10 MG tablet [Pharmacy Med Name: Ezetimibe Oral Tablet 10 MG] 90 tablet 0     Sig: TAKE 1 TABLET BY MOUTH EVERY DAY      Last office visit with prescribing clinician: 6/13/2024   Last telemedicine visit with prescribing clinician: Visit date not found   Next office visit with prescribing clinician: 12/17/2024                         Would you like a call back once the refill request has been completed: [] Yes [] No    If the office needs to give you a call back, can they leave a voicemail: [] Yes [] No    Bree Hager MA  12/09/24, 07:52 EST

## 2024-12-16 ENCOUNTER — TELEPHONE (OUTPATIENT)
Dept: ENDOCRINOLOGY | Age: 59
End: 2024-12-16
Payer: COMMERCIAL

## 2024-12-17 ENCOUNTER — OFFICE VISIT (OUTPATIENT)
Dept: ENDOCRINOLOGY | Age: 59
End: 2024-12-17
Payer: COMMERCIAL

## 2024-12-17 VITALS
HEART RATE: 84 BPM | WEIGHT: 236.8 LBS | HEIGHT: 66 IN | SYSTOLIC BLOOD PRESSURE: 126 MMHG | DIASTOLIC BLOOD PRESSURE: 84 MMHG | BODY MASS INDEX: 38.06 KG/M2 | OXYGEN SATURATION: 96 %

## 2024-12-17 DIAGNOSIS — E29.1 HYPOGONADISM MALE: ICD-10-CM

## 2024-12-17 DIAGNOSIS — E66.9 OBESITY (BMI 35.0-39.9 WITHOUT COMORBIDITY): ICD-10-CM

## 2024-12-17 DIAGNOSIS — Z79.4 TYPE 2 DIABETES MELLITUS WITH HYPERGLYCEMIA, WITH LONG-TERM CURRENT USE OF INSULIN: Primary | ICD-10-CM

## 2024-12-17 DIAGNOSIS — E11.69 HYPERLIPIDEMIA ASSOCIATED WITH TYPE 2 DIABETES MELLITUS: ICD-10-CM

## 2024-12-17 DIAGNOSIS — E11.65 TYPE 2 DIABETES MELLITUS WITH HYPERGLYCEMIA, WITH LONG-TERM CURRENT USE OF INSULIN: Primary | ICD-10-CM

## 2024-12-17 DIAGNOSIS — E78.5 HYPERLIPIDEMIA ASSOCIATED WITH TYPE 2 DIABETES MELLITUS: ICD-10-CM

## 2024-12-17 NOTE — PROGRESS NOTES
Chief complaint/Reason for consult:  T2DM    HPI:   - 59 year old male here for management of diabetes mellitus type 2 and hypogonadism  - Was last seen in 6/2024  - Complains of weight gain  - Has had diabetes for 15 years  - No known complications to date  - Is currently taking Toujeo 70 units in the am and 80 units in the pm, Humalog 18-24 with meals, Trulicity 4.5 mg weekly, Farxiga 10 mg daily, metformin 1 g bid  - He also has hypogonadism of unknown etiology and is on testosterone topical 1.62%, 2 pumps daily  - He is also on Crestor 40 mg daily      The following portions of the patient's history were reviewed and updated as appropriate: allergies, current medications, past family history, past medical history, past social history, past surgical history, and problem list.      Objective     Vitals:    12/17/24 0827   BP: 126/84   Pulse: 84   SpO2: 96%        Physical Exam  Vitals reviewed.   Constitutional:       Appearance: Normal appearance. He is obese.   HENT:      Head: Normocephalic and atraumatic.   Eyes:      General: No scleral icterus.  Pulmonary:      Effort: Pulmonary effort is normal. No respiratory distress.   Neurological:      Mental Status: He is alert.      Gait: Gait normal.   Psychiatric:         Mood and Affect: Mood normal.         Behavior: Behavior normal.         Thought Content: Thought content normal.         Judgment: Judgment normal.     CGM interpretation  Dates reviewed:  12/4-12/17/24  Data:  Avg of 190, 17% very high, 34% high, 49% in range  Interpretation:  Postprandial hyperglycemia after dinner    Assessment & Plan   1. Type 2 DM, uncontrolled due to hyperglycemia  - Cont. Toujeo 70 units in the am and 80 units in the pm, Humalog 18-24 with meals, Farxiga 10 mg daily, metformin 1 g bid  - Will try to switch Trulicity for Mounjaro due to weight gain     2. Hypogonadism  - Etiology is not clear  - Free testosterone and HCT were normal in 10/2023  - Cont. testosterone  topical 1.62%, 2 pumps daily     3. Obesity (BMI of 38)  - Dietary changes and exercise will help glycemic control     4. Hyperlipidemia  - He is also on Crestor 40 mg daily     5. Hyperkalemia  - Will recheck potassium at next visit  - We did discuss avoidance of foods that   - Will consider stopping ARB if it becomes more elevated but it would be best for him to stay on an ARB due to his HTN and CKD     - Return to clinic in 3 months

## 2025-01-01 DIAGNOSIS — E29.1 HYPOGONADISM MALE: ICD-10-CM

## 2025-01-02 RX ORDER — TESTOSTERONE 20.25 MG/1.25G
GEL TOPICAL
Qty: 75 G | Refills: 0 | Status: SHIPPED | OUTPATIENT
Start: 2025-01-02

## 2025-01-02 NOTE — TELEPHONE ENCOUNTER
Rx Refill Note  Requested Prescriptions     Pending Prescriptions Disp Refills    Testosterone 1.62 % gel [Pharmacy Med Name: Testosterone Transdermal Gel 1.62 %] 75 g 0     Sig: APPLY 2 PUMPS TOPICALLY TO APPROPRIATE AREA(S) DAILY AS DIRECTED      Last office visit with prescribing clinician: 12/17/2024   Last telemedicine visit with prescribing clinician: Visit date not found   Next office visit with prescribing clinician: 3/14/2025                         Would you like a call back once the refill request has been completed: [] Yes [] No    If the office needs to give you a call back, can they leave a voicemail: [] Yes [] No    Bree Hager MA  01/02/25, 07:32 EST

## 2025-01-16 DIAGNOSIS — E11.65 TYPE 2 DIABETES MELLITUS WITH HYPERGLYCEMIA, WITH LONG-TERM CURRENT USE OF INSULIN: ICD-10-CM

## 2025-01-16 DIAGNOSIS — Z79.4 TYPE 2 DIABETES MELLITUS WITH HYPERGLYCEMIA, WITH LONG-TERM CURRENT USE OF INSULIN: ICD-10-CM

## 2025-01-17 RX ORDER — INSULIN LISPRO 100 [IU]/ML
INJECTION, SOLUTION INTRAVENOUS; SUBCUTANEOUS
Qty: 90 ML | Refills: 0 | Status: SHIPPED | OUTPATIENT
Start: 2025-01-17

## 2025-01-17 NOTE — TELEPHONE ENCOUNTER
Rx Refill Note  Requested Prescriptions     Pending Prescriptions Disp Refills    Insulin Lispro, 1 Unit Dial, (HumaLOG KwikPen) 100 UNIT/ML solution pen-injector [Pharmacy Med Name: HumaLOG KwikPen Subcutaneous Solution Pen-injector 100 UNIT/ML] 90 mL 0     Sig: INJECT 18 UNITS AS DIRECTED WITH BREAKFAST AND BEFORE LUNCH, 24 UNITS WITH DINNER, SLIDING SCALE AS DIRECTED UP  UNITS PER DAY      Last office visit with prescribing clinician: 12/17/2024   Last telemedicine visit with prescribing clinician: Visit date not found   Next office visit with prescribing clinician: 3/14/2025                         Would you like a call back once the refill request has been completed: [] Yes [] No    If the office needs to give you a call back, can they leave a voicemail: [] Yes [] No    Bree Hager MA  01/17/25, 07:40 EST

## 2025-01-28 DIAGNOSIS — Z79.4 TYPE 2 DIABETES MELLITUS WITH HYPERGLYCEMIA, WITH LONG-TERM CURRENT USE OF INSULIN: ICD-10-CM

## 2025-01-28 DIAGNOSIS — E11.65 TYPE 2 DIABETES MELLITUS WITH HYPERGLYCEMIA, WITH LONG-TERM CURRENT USE OF INSULIN: ICD-10-CM

## 2025-01-28 RX ORDER — INSULIN GLARGINE 300 U/ML
INJECTION, SOLUTION SUBCUTANEOUS
Qty: 42 ML | Refills: 0 | Status: SHIPPED | OUTPATIENT
Start: 2025-01-28

## 2025-01-28 NOTE — TELEPHONE ENCOUNTER
Rx Refill Note  Requested Prescriptions     Pending Prescriptions Disp Refills    Toujustin David SoloStar 300 UNIT/ML solution pen-injector injection [Pharmacy Med Name: Toujeo Max SoloStar Subcutaneous Solution Pen-injector 300 UNIT/ML] 42 mL 0     Sig: INJECT 70 UNITS UNDER THE SKIN EVERY MORNING AND 80 UNITS EVERY EVENING AS DIRECTED FOR 90 DAYS      Last office visit with prescribing clinician: 12/17/2024   Last telemedicine visit with prescribing clinician: Visit date not found   Next office visit with prescribing clinician: 3/14/2025                         Would you like a call back once the refill request has been completed: [] Yes [] No    If the office needs to give you a call back, can they leave a voicemail: [] Yes [] No    Jade Hager  01/28/25, 13:00 EST

## 2025-01-29 DIAGNOSIS — Z79.4 TYPE 2 DIABETES MELLITUS WITH HYPERGLYCEMIA, WITH LONG-TERM CURRENT USE OF INSULIN: ICD-10-CM

## 2025-01-29 DIAGNOSIS — E11.65 TYPE 2 DIABETES MELLITUS WITH HYPERGLYCEMIA, WITH LONG-TERM CURRENT USE OF INSULIN: ICD-10-CM

## 2025-01-29 NOTE — TELEPHONE ENCOUNTER
Rx Refill Note  Requested Prescriptions     Pending Prescriptions Disp Refills    metFORMIN (GLUCOPHAGE) 1000 MG tablet [Pharmacy Med Name: metFORMIN HCl Oral Tablet 1000 MG] 180 tablet 0     Sig: TAKE 1 TABLET BY MOUTH 2 TIMES A DAY WITH MEALS      Last office visit with prescribing clinician: 12/17/2024   Last telemedicine visit with prescribing clinician: Visit date not found   Next office visit with prescribing clinician: 3/14/2025                         Would you like a call back once the refill request has been completed: [] Yes [] No    If the office needs to give you a call back, can they leave a voicemail: [] Yes [] No    Jade Hager  01/29/25, 12:11 EST

## 2025-02-08 DIAGNOSIS — E29.1 HYPOGONADISM MALE: ICD-10-CM

## 2025-02-10 RX ORDER — TESTOSTERONE 20.25 MG/1.25G
GEL TOPICAL
Qty: 75 G | Refills: 1 | Status: SHIPPED | OUTPATIENT
Start: 2025-02-10

## 2025-02-10 NOTE — TELEPHONE ENCOUNTER
Rx Refill Note  Requested Prescriptions     Pending Prescriptions Disp Refills    Testosterone 1.62 % gel [Pharmacy Med Name: Testosterone Transdermal Gel 1.62 %] 75 g 0     Sig: APPLY 2 PUMPS TOPICALLY TO APPROPRIATE AREA(S) DAILY AS DIRECTED      Last office visit with prescribing clinician: 12/17/2024   Last telemedicine visit with prescribing clinician: Visit date not found   Next office visit with prescribing clinician: 3/14/2025                         Would you like a call back once the refill request has been completed: [] Yes [] No    If the office needs to give you a call back, can they leave a voicemail: [] Yes [] No    Bree Hager MA  02/10/25, 07:17 EST

## 2025-02-25 ENCOUNTER — SPECIALTY PHARMACY (OUTPATIENT)
Dept: ENDOCRINOLOGY | Age: 60
End: 2025-02-25
Payer: COMMERCIAL

## 2025-02-25 NOTE — PROGRESS NOTES
"   Specialty Pharmacy Patient Management Program  Refill Outreach     Removing trulicity as per chart notes from Dr Mckoy on 12/17/2024: \"- Will try to switch Trulicity for Mounjaro due to weight gain\"    Will re-add outreach in the future if needed.     Ingrid Moore, Pharmacy Technician  2/25/2025  10:51 EST    "

## 2025-03-03 RX ORDER — ACYCLOVIR 400 MG/1
TABLET ORAL
Qty: 9 EACH | Refills: 0 | Status: SHIPPED | OUTPATIENT
Start: 2025-03-03

## 2025-03-03 NOTE — TELEPHONE ENCOUNTER
Rx Refill Note  Requested Prescriptions     Pending Prescriptions Disp Refills    Continuous Glucose Sensor (Dexcom G7 Sensor) misc [Pharmacy Med Name: Dexcom G7 Sensor Miscellaneous] 9 each 0     Sig: APPLY 1 SENSOR FOR CONTINUOUS GLUCOSE MONITORING FOR 10 DAYS THEN CHANGE SENSOR      Last office visit with prescribing clinician: 12/17/2024   Last telemedicine visit with prescribing clinician: Visit date not found   Next office visit with prescribing clinician: 3/14/2025                         Would you like a call back once the refill request has been completed: [] Yes [] No    If the office needs to give you a call back, can they leave a voicemail: [] Yes [] No    Bree Hager MA  03/03/25, 07:47 EST

## 2025-03-04 RX ORDER — ROSUVASTATIN CALCIUM 40 MG/1
40 TABLET, COATED ORAL
Qty: 90 TABLET | Refills: 0 | Status: SHIPPED | OUTPATIENT
Start: 2025-03-04

## 2025-03-04 NOTE — TELEPHONE ENCOUNTER
Rx Refill Note  Requested Prescriptions     Pending Prescriptions Disp Refills    rosuvastatin (CRESTOR) 40 MG tablet [Pharmacy Med Name: Rosuvastatin Calcium Oral Tablet 40 MG] 90 tablet 0     Sig: TAKE 1 TABLET BY MOUTH AT BEDTIME      Last office visit with prescribing clinician: 12/17/2024   Last telemedicine visit with prescribing clinician: Visit date not found   Next office visit with prescribing clinician: 3/14/2025                         Would you like a call back once the refill request has been completed: [] Yes [] No    If the office needs to give you a call back, can they leave a voicemail: [] Yes [] No    Bree Hager MA  03/04/25, 11:35 EST

## 2025-03-06 DIAGNOSIS — Z79.4 TYPE 2 DIABETES MELLITUS WITH HYPERGLYCEMIA, WITH LONG-TERM CURRENT USE OF INSULIN: ICD-10-CM

## 2025-03-06 DIAGNOSIS — E78.5 HYPERLIPIDEMIA, UNSPECIFIED HYPERLIPIDEMIA TYPE: ICD-10-CM

## 2025-03-06 DIAGNOSIS — E11.65 TYPE 2 DIABETES MELLITUS WITH HYPERGLYCEMIA, WITH LONG-TERM CURRENT USE OF INSULIN: ICD-10-CM

## 2025-03-07 RX ORDER — PEN NEEDLE, DIABETIC 31 GX5/16"
NEEDLE, DISPOSABLE MISCELLANEOUS
Qty: 500 EACH | Refills: 0 | Status: SHIPPED | OUTPATIENT
Start: 2025-03-07

## 2025-03-07 RX ORDER — EZETIMIBE 10 MG/1
10 TABLET ORAL DAILY
Qty: 90 TABLET | Refills: 0 | Status: SHIPPED | OUTPATIENT
Start: 2025-03-07

## 2025-03-07 NOTE — TELEPHONE ENCOUNTER
Rx Refill Note  Requested Prescriptions     Pending Prescriptions Disp Refills    Insulin Pen Needle (B-D ULTRAFINE III SHORT PEN) 31G X 8 MM misc [Pharmacy Med Name: BD Pen Needle Short U/F Miscellaneous 31G X 8 MM] 500 each 0     Sig: FOR USE WITH PEN DEVICE UP TO 5 TIMES DAILY    ezetimibe (ZETIA) 10 MG tablet [Pharmacy Med Name: Ezetimibe Oral Tablet 10 MG] 90 tablet 0     Sig: TAKE 1 TABLET BY MOUTH EVERY DAY      Last office visit with prescribing clinician: 12/17/2024   Last telemedicine visit with prescribing clinician: Visit date not found   Next office visit with prescribing clinician: 3/14/2025                         Would you like a call back once the refill request has been completed: [] Yes [] No    If the office needs to give you a call back, can they leave a voicemail: [] Yes [] No    Bree Hager MA  03/07/25, 07:38 EST

## 2025-03-14 ENCOUNTER — OFFICE VISIT (OUTPATIENT)
Dept: ENDOCRINOLOGY | Age: 60
End: 2025-03-14
Payer: COMMERCIAL

## 2025-03-14 VITALS
OXYGEN SATURATION: 96 % | DIASTOLIC BLOOD PRESSURE: 76 MMHG | HEART RATE: 84 BPM | BODY MASS INDEX: 36.52 KG/M2 | HEIGHT: 66 IN | WEIGHT: 227.2 LBS | SYSTOLIC BLOOD PRESSURE: 126 MMHG

## 2025-03-14 DIAGNOSIS — E11.65 TYPE 2 DIABETES MELLITUS WITH HYPERGLYCEMIA, WITH LONG-TERM CURRENT USE OF INSULIN: Primary | ICD-10-CM

## 2025-03-14 DIAGNOSIS — E29.1 HYPOGONADISM MALE: ICD-10-CM

## 2025-03-14 DIAGNOSIS — Z79.4 TYPE 2 DIABETES MELLITUS WITH HYPERGLYCEMIA, WITH LONG-TERM CURRENT USE OF INSULIN: Primary | ICD-10-CM

## 2025-03-14 NOTE — PROGRESS NOTES
Chief complaint/Reason for consult: diabetes mellitus type 2 and hypogonadism    HPI:   - 59 year old male here for management of diabetes mellitus type 2 and hypogonadism  - Was last seen in 6/2024  - Complains of weight gain  - Has had diabetes for 15 years  - No known complications to date  - Is currently taking Toujeo 70 units in the am and 80 units in the pm, Humalog 18-24 with meals, Mounjaro 10 mg daily, Farxiga 10 mg daily, metformin 1 g bid  - He also has hypogonadism of unknown etiology and is on testosterone topical 1.62%, 2 pumps daily  - He is also on Crestor 40 mg daily    The following portions of the patient's history were reviewed and updated as appropriate: allergies, current medications, past family history, past medical history, past social history, past surgical history, and problem list.      Objective     Vitals:    03/14/25 0823   BP: 126/76   Pulse: 84   SpO2: 96%        Physical Exam  Vitals reviewed.   Constitutional:       Appearance: Normal appearance.   HENT:      Head: Normocephalic and atraumatic.   Eyes:      General: No scleral icterus.  Pulmonary:      Effort: Pulmonary effort is normal. No respiratory distress.   Neurological:      Mental Status: He is alert.      Gait: Gait normal.   Psychiatric:         Mood and Affect: Mood normal.         Behavior: Behavior normal.         Thought Content: Thought content normal.         Judgment: Judgment normal.     CGM interpretation  Dates reviewed:  3/1-3/14/25  Data:  Avg of 147, 2% very high, 17% high, 81% in range  Interpretation:  Improved glycemic control    Assessment & Plan   1. Type 2 DM  - Glycemic control has improved  - Cont. Toujeo 70 units in the am and 80 units in the pm, Humalog 18-24 with meals, Farxiga 10 mg daily, metformin 1 g bid, Mounjaro 10 mg daily     2. Hypogonadism  - Etiology is not clear  - Free testosterone and HCT were normal in 10/2023  - Cont. testosterone topical 1.62%, 2 pumps daily     3. Obesity  (BMI of 37)  - Dietary changes and exercise will help glycemic control     4. Hyperlipidemia  - He is also on Crestor 40 mg daily     - Return to clinic in 4 months

## 2025-03-21 ENCOUNTER — SPECIALTY PHARMACY (OUTPATIENT)
Dept: ENDOCRINOLOGY | Age: 60
End: 2025-03-21
Payer: COMMERCIAL

## 2025-03-21 NOTE — PROGRESS NOTES
Specialty Pharmacy Patient Management Program  Refill Outreach     Marke was contacted today regarding refills of their medication(s).    Refill Questions      Flowsheet Row Most Recent Value   Changes to allergies? No   Changes to medications? No   New conditions or infections since last clinic visit No   Unplanned office visit, urgent care, ED, or hospital admission in the last 4 weeks  No   How does patient/caregiver feel medication is working? Good   Financial problems or insurance changes  No   Since the previous refill, were any specialty medication doses or scheduled injections missed or delayed?  No   Does this patient require a clinical escalation to a pharmacist? No            Delivery Questions      Flowsheet Row Most Recent Value   Delivery method [employer]   Delivery address verified with patient/caregiver? Yes   Delivery address Home   Number of medications in delivery 1   Medication(s) being filled and delivered Tirzepatide   Doses left of specialty medications 1 week   Copay verified? Yes   Copay amount $60.00   Copay form of payment Credit/debit on file   Delivery Date Selection 03/25/25   Signature Required No                 Follow-up: 77 day(s)     Ingrid Moore, Pharmacy Technician  3/21/2025  12:14 EDT

## 2025-03-24 RX ORDER — DAPAGLIFLOZIN 10 MG/1
1 TABLET, FILM COATED ORAL DAILY
Qty: 90 TABLET | Refills: 1 | Status: SHIPPED | OUTPATIENT
Start: 2025-03-24

## 2025-03-24 NOTE — TELEPHONE ENCOUNTER
Rx Refill Note  Requested Prescriptions     Pending Prescriptions Disp Refills    Farxiga 10 MG tablet [Pharmacy Med Name: Farxiga Oral Tablet 10 MG] 90 tablet 0     Sig: TAKE 1 TABLET BY MOUTH EVERY DAY      Last office visit with prescribing clinician: 3/14/2025   Last telemedicine visit with prescribing clinician: Visit date not found   Next office visit with prescribing clinician: 7/24/2025                         Would you like a call back once the refill request has been completed: [] Yes [] No    If the office needs to give you a call back, can they leave a voicemail: [] Yes [] No    Jade Hager  03/24/25, 13:27 EDT

## 2025-04-20 DIAGNOSIS — E11.65 TYPE 2 DIABETES MELLITUS WITH HYPERGLYCEMIA, WITH LONG-TERM CURRENT USE OF INSULIN: ICD-10-CM

## 2025-04-20 DIAGNOSIS — Z79.4 TYPE 2 DIABETES MELLITUS WITH HYPERGLYCEMIA, WITH LONG-TERM CURRENT USE OF INSULIN: ICD-10-CM

## 2025-04-21 RX ORDER — INSULIN LISPRO 100 [IU]/ML
INJECTION, SOLUTION INTRAVENOUS; SUBCUTANEOUS
Qty: 90 ML | Refills: 1 | Status: SHIPPED | OUTPATIENT
Start: 2025-04-21

## 2025-04-21 RX ORDER — INSULIN GLARGINE 300 U/ML
INJECTION, SOLUTION SUBCUTANEOUS
Qty: 45 ML | Refills: 1 | Status: SHIPPED | OUTPATIENT
Start: 2025-04-21

## 2025-04-21 NOTE — TELEPHONE ENCOUNTER
Rx Refill Note  Requested Prescriptions     Pending Prescriptions Disp Refills    Jessica David SoloStar 300 UNIT/ML solution pen-injector injection [Pharmacy Med Name: Toujeo Max SoloStar Subcutaneous Solution Pen-injector 300 UNIT/ML] 42 mL 0     Sig: INJECT 70 UNITS UNDER THE SKIN EVERY MORNING AND 80 UNITS EVERY EVENING AS DIRECTED    Insulin Lispro, 1 Unit Dial, (HumaLOG KwikPen) 100 UNIT/ML solution pen-injector [Pharmacy Med Name: HumaLOG KwikPen Subcutaneous Solution Pen-injector 100 UNIT/ML] 90 mL 0     Sig: INJECT 18 UNITS UNDER THE SKIN AS DIRECTED WITH BREAKFAST AND BEFORE LUNCH, 24 UNITS WITH DINNER, SLIDING SCALE AS DIRECTED UP  UNITS PER DAY      Last office visit with prescribing clinician: 3/14/2025   Last telemedicine visit with prescribing clinician: Visit date not found   Next office visit with prescribing clinician: 7/24/2025                         Would you like a call back once the refill request has been completed: [] Yes [] No    If the office needs to give you a call back, can they leave a voicemail: [] Yes [] No    Bree Hager MA  04/21/25, 07:46 EDT

## 2025-04-26 DIAGNOSIS — E29.1 HYPOGONADISM MALE: ICD-10-CM

## 2025-04-28 RX ORDER — TESTOSTERONE 20.25 MG/1.25G
GEL TOPICAL
Qty: 75 G | Refills: 3 | Status: SHIPPED | OUTPATIENT
Start: 2025-04-28

## 2025-04-28 NOTE — TELEPHONE ENCOUNTER
Rx Refill Note  Requested Prescriptions     Pending Prescriptions Disp Refills    Testosterone 1.62 % gel [Pharmacy Med Name: Testosterone Transdermal Gel 1.62 %] 75 g 0     Sig: APPLY 2 PUMPS TOPICALLY TO APPROPRIATE AREA(S) DAILY AS DIRECTED      Last office visit with prescribing clinician: 3/14/2025   Last telemedicine visit with prescribing clinician: Visit date not found   Next office visit with prescribing clinician: 7/24/2025                         Would you like a call back once the refill request has been completed: [] Yes [] No    If the office needs to give you a call back, can they leave a voicemail: [] Yes [] No    Bree Hager MA  04/28/25, 07:54 EDT

## 2025-05-12 DIAGNOSIS — Z79.4 TYPE 2 DIABETES MELLITUS WITH HYPERGLYCEMIA, WITH LONG-TERM CURRENT USE OF INSULIN: ICD-10-CM

## 2025-05-12 DIAGNOSIS — E11.65 TYPE 2 DIABETES MELLITUS WITH HYPERGLYCEMIA, WITH LONG-TERM CURRENT USE OF INSULIN: ICD-10-CM

## 2025-05-12 NOTE — TELEPHONE ENCOUNTER
Rx Refill Note  Requested Prescriptions     Pending Prescriptions Disp Refills    metFORMIN (GLUCOPHAGE) 1000 MG tablet [Pharmacy Med Name: metFORMIN HCl Oral Tablet 1000 MG] 180 tablet 0     Sig: TAKE 1 TABLET BY MOUTH 2 TIMES A DAY WITH MEALS      Last office visit with prescribing clinician: 3/14/2025   Last telemedicine visit with prescribing clinician: Visit date not found   Next office visit with prescribing clinician: 7/24/2025                         Would you like a call back once the refill request has been completed: [] Yes [] No    If the office needs to give you a call back, can they leave a voicemail: [] Yes [] No    Bree Hager MA  05/12/25, 13:10 EDT

## 2025-05-19 NOTE — PROGRESS NOTES
New Knee      Patient: Marek Diego        YOB: 1965    Medical Record Number: [id number]        Chief Complaints: Left knee pain      History of Present Illness: This is a 60-year-old  who I have seen in the past for right knee for an ACL reconstruction and for shoulder who presents complaining of left knee pain he states he was warming up soccer practice on Saturday when he planted his left foot he went to hit the ball with his right foot he felt a pop and the pain in the left knee did have some swelling but is much improved he has no night pain he states overall symptoms are a lot better they are anterior and a little bit posterior        Allergies:   Allergies   Allergen Reactions    Latex Itching and Unknown - Low Severity       Medications:   Home Medications:  Current Outpatient Medications on File Prior to Visit   Medication Sig    albuterol sulfate  (90 Base) MCG/ACT inhaler Inhale 2 puffs Every 4 (Four) Hours As Needed for Wheezing or Shortness of Air.    amLODIPine (NORVASC) 5 MG tablet Take 1 tablet by mouth Daily.    Cholecalciferol (VITAMIN D3) 5000 UNITS capsule capsule Take 1 capsule by mouth Daily.    Continuous Glucose Sensor (Dexcom G7 Sensor) misc APPLY 1 SENSOR FOR CONTINUOUS GLUCOSE MONITORING FOR 10 DAYS THEN CHANGE SENSOR    dapagliflozin Propanediol (Farxiga) 10 MG tablet TAKE 1 TABLET BY MOUTH EVERY DAY    esomeprazole (nexIUM) 40 MG capsule TAKE 1 CAPSULE BY MOUTH EVERY DAY    ezetimibe (ZETIA) 10 MG tablet TAKE 1 TABLET BY MOUTH EVERY DAY    fluticasone (FLONASE) 50 MCG/ACT nasal spray Administer 2 sprays into the nostril(s) as directed by provider Daily.    fluticasone-salmeterol (Advair Diskus) 250-50 MCG/DOSE DISKUS Inhale 1 puff 2 (Two) Times a Day.    Insulin Lispro, 1 Unit Dial, (HumaLOG KwikPen) 100 UNIT/ML solution pen-injector INJECT 18 UNITS UNDER THE SKIN AS DIRECTED WITH BREAKFAST AND BEFORE LUNCH, 24 UNITS WITH DINNER, SLIDING SCALE AS  DIRECTED UP  UNITS PER DAY    Insulin Pen Needle (B-D ULTRAFINE III SHORT PEN) 31G X 8 MM misc FOR USE WITH PEN DEVICE UP TO 5 TIMES DAILY    loratadine (CLARITIN) 10 MG tablet Take 1 tablet by mouth Daily.    metFORMIN (GLUCOPHAGE) 1000 MG tablet TAKE 1 TABLET BY MOUTH 2 TIMES A DAY WITH MEALS    montelukast (SINGULAIR) 10 MG tablet TAKE 1 TABLET BY MOUTH EVERY DAY     olopatadine (PATADAY) 0.2 % solution ophthalmic solution INSTILL 1 DROP INTO BOTH EYES AS NEEDED FOR ITCHY EYES    rosuvastatin (CRESTOR) 40 MG tablet TAKE 1 TABLET BY MOUTH AT BEDTIME    sertraline (ZOLOFT) 50 MG tablet TAKE 1 TABLET BY MOUTH ONE TIME A DAY     Testosterone 1.62 % gel APPLY 2 PUMPS TOPICALLY TO APPROPRIATE AREA(S) DAILY AS DIRECTED    Tirzepatide 10 MG/0.5ML solution auto-injector Inject 10 mg under the skin into the appropriate area as directed 1 (One) Time Per Week.    Toujeo Max SoloStar 300 UNIT/ML solution pen-injector injection INJECT 70 UNITS UNDER THE SKIN EVERY MORNING AND 80 UNITS EVERY EVENING AS DIRECTED    valsartan (DIOVAN) 160 MG tablet Take 1 tablet by mouth Daily.     No current facility-administered medications on file prior to visit.     Current Medications:  Scheduled Meds:  Continuous Infusions:No current facility-administered medications for this visit.    PRN Meds:.    Past Medical History:   Diagnosis Date    Allergic rhinitis 10/24/2013    10/24/2013--skin testing revealed impressive reactions to grass following, tree pollen, weed pollen, ragweed, dust mite, and cat. Recommend immunotherapy.    Arthritis 10/30/2015    Benign essential hypertension 02/22/2016    Chronic constipation 11/20/2017 11/20/2017--patient reports approximately 8 month history of intermittent constipation that at times can last as much as a month.  He notes his blood sugar readings tend to increase when this happens.  He is scheduled for colonoscopy next week.  I recommend a generic probiotic daily as well as MiraLAX and  adjust as needed.    Chronic neck pain 10/30/2015    12/19/2015--patient reports his left shoulder pain has resolved. He continues to have neck pain. X-ray of the cervical spine ordered. Physical therapy ordered.   11/10/2015--left shoulder injected with 80 mg of Depo-Medrol with 3 mL of Xylocaine.   10/30/2015--patient presents with at least a one-month history of intermittent left-sided neck pain that is associated with left shoulder pain as well. The pain is described as shooting and is 8 out of 10 intensity at its worst. The pain is worse with certain movements of his head and left shoulder. No trauma. No numbness or paresthesias.    Depression with anxiety 02/22/2016    Diabetic eye exam 05/27/2016    May 2016--patient reports a normal diabetic eye exam.    Diabetic foot exam 04/27/2017 05/02/2017--routine diabetic foot exam reveals no evidence of diabetic foot ulcer or pre-ulcerative callus.  Pulses are palpable and there is no signs of ischemia.  Sensation subjectively intact.    Dysfunction of both eustachian tubes 09/06/2019 September 6, 2019--patient with severe environmental allergies/allergic rhinitis presents with complaints of his ears popping like he is been on an airplane and a sensation of pressure at times.  At times his hearing is decreased.  On exam I do not see any definite serous otitis media.  I do think his environmental allergies are playing a role but I think it would be reasonable for ENT to evaluate    Gastroesophageal reflux disease with esophagitis 05/22/2015 08/12/2015--EGD revealed esophagitis and a distal esophageal stricture that was dilated. Small sliding hiatal hernia. Proximal gastric ulcer and gastritis present. Dysphagia resolved after dilatation. Pathology of the gastric antrum was benign with no significant histologic abnormality. Distal esophagus biopsy negative for intestinal metaplasia or dysplasia.   05/22/2015--patient presents with a several month history of  progressively worsening intermittent dysphagia of solids but not liquids. He describes solid food sometimes sticking and points to his upper chest/lower throat. No other associated symptoms. Patient referred to Dr. Aime Parada for an EGD. This is negative, may need ENT evaluation.    History of colon polyps, 11/29/2017--tubular adenoma times one.  Hyperplastic ×1.  Repeat 5 years. 12/18/2017 11/29/2017--colonoscopy revealed 2 small (3 mm) polyps in the sigmoid colon and cecum.  Removed.  Bowel prep.  Sigmoid diverticuli noted.  No hemorrhoids.  Pathology returned hyperplastic polyp of the sigmoid and the cecal polyp was a tubular adenoma.    HIstory of eosinophilic gastroenteritis 1990s 1990s--patient had significant epigastric discomfort and workup including an EGD revealed eosinophilic gastroenteritis that was treated with prednisone and resulted in resolution.    History of esophageal stricture 08/12/2015 08/12/2015--EGD revealed esophagitis and a distal esophageal stricture that was dilated. Small sliding hiatal hernia. Proximal gastric ulcer and gastritis present. Dysphagia resolved after dilatation. Pathology of the gastric antrum was benign with no significant histologic abnormality. Distal esophagus biopsy negative for intestinal metaplasia or dysplasia.   05/22/2015--patient presents with a s    History of Pulmonary nodule, 03/07/2016--5 mm indeterminate nodule right lower lobe.  09/13/2016--consistent with calcified granuloma. 03/07/2016 09/13/2016--CT scan of the chest, abdomen, and pelvis with contrast reveals no lymphadenopathy within the abdomen or pelvis.  Mild hepatic steatosis.  Previously noted right lower lobe pulmonary nodule is currently calcified and consistent with a calcified granuloma.  03/07/2016--CT scan of the abdomen performed for complaints of abdominal discomfort revealed a 5 mm nodular focus right lower lobe which is indeterminate.  Recommend repeat CT scan in 6 months.     Hyperlipidemia 2/22/2016    Hypogonadism male, no TRT. 12/26/2012 12/26/2012--treatment for hypogonadism begun.    Microalbuminuria 10/19/2014    10/19/2014--urine microalbumin mildly elevated at 27.8. Normal range 0.0--17.0.    Moderate persistent asthma without complication 02/22/2016    Seasonal, spring and fall.    Morbid obesity 02/22/2016 November 12, 2019--current weight is 191 pounds representing a 1 pound weight loss.  It appears the patient needs a drug holiday from the phentermine.  I will have him stay off of it for 1 month and then restarted back at the current dose and then we will reassess after the first the year when he comes in for his regular follow-up and physical.  September 6, 2019--current weight is 192 pounds repr    Multiple environmental allergies 10/24/2013    10/24/2013--skin testing revealed impressive reactions to grass following, tree pollen, weed pollen, ragweed, dust mite, and cat. Recommend immunotherapy.    Non morbid obesity 02/22/2016    Right bundle branch block     ECG reveals sinus rhythm, rate of 75, right bundle branch block, left anterior hemiblock    Subclinical hypothyroidism 08/17/2018 08/27/2018--thyroid ultrasound reveals subcentimeter nodule in the right thyroid lobe.  Possibly cystic.  There is a question of an ill-defined 1 cm nodular lesion posteriorly in the mid left lateral thyroid.  Appearance could be technical in origin.  Recommend repeat thyroid ultrasound in 6 months.  08/17/2018--routine follow-up.  TSH mildly elevated at 4.49.  Free T3 and free T4 are normal.  Rep    Testosterone deficiency     Thyroid nodule 10/05/2018    08/27/2018--thyroid ultrasound reveals subcentimeter nodule in the right thyroid lobe.  Possibly cystic.  There is a question of an ill-defined 1 cm nodular lesion posteriorly in the mid left lateral thyroid.  Appearance could be technical in origin.  Recommend repeat thyroid ultrasound in 6 months.  08/17/2018--routine  follow-up.  TSH mildly elevated at 4.49.  Free T3 and free T4 are normal.  Repeat thyroid function tests ordered and returned normal.  Thyroid ultrasound ordered.    Type 2 diabetes mellitus with microalbuminuria, with long-term current use of insulin 01/01/2004 2004--initial diagnosis of type 2 diabetes.    Vitamin D deficiency 02/22/2016        Past Surgical History:   Procedure Laterality Date    ATRIAL SEPTAL DEFECT REPAIR  03/2012 March 2012--percutaneous closure of secundum ASD.  Dr. Mcpherson    CATARACT EXTRACTION EXTRACAPSULAR W/ INTRAOCULAR LENS IMPLANTATION      COLONOSCOPY N/A 11/29/2017 11/29/2017--colonoscopy revealed 2 small (3 mm) polyps in the sigmoid colon and cecum.  Removed.  Bowel prep.  Sigmoid diverticuli noted.  No hemorrhoids.  Pathology returned hyperplastic polyp of the sigmoid and the cecal polyp was a tubular adenoma.    ESOPHAGEAL DILATATION  08/12/2015 08/12/2015--EGD revealed esophagitis and a distal esophageal stricture that was dilated. Small sliding hiatal hernia. Proximal gastric ulcer and gastritis present. Dysphagia resolved after dilatation. 05/22/2015--patient presents with a several month history of progressively worsening intermittent dysphagia of solids but not liquids. He describes solid food sometimes sticking and points to his upp    ESOPHAGOSCOPY / EGD  08/12/2015 08/12/2015--EGD revealed esophagitis and a distal esophageal stricture that was dilated. Small sliding hiatal hernia. Proximal gastric ulcer and gastritis present. Dysphagia resolved after dilatation. 05/22/2015--patient presents with a several month history of progressively worsening intermittent dysphagia of solids but not liquids. He describes solid food sometimes sticking and points to his upp    EYE SURGERY  11/2021    KNEE ACL RECONSTRUCTION Right 02/11/2013 02/11/2013--knee arthroscopy with anterior cruciate ligament repair    TONSILLECTOMY  2009 2009--tonsillectomy as an adult.       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)   Tobacco Use    Smoking status: Never    Smokeless tobacco: Never   Vaping Use    Vaping status: Never Used   Substance and Sexual Activity    Alcohol use: Yes    Drug use: No    Sexual activity: Yes     Partners: Female      Social History     Social History Narrative    Not on file        Family History   Problem Relation Age of Onset    Diabetes Mother     Stomach cancer Mother     Emphysema Mother     Cancer Mother     Diabetes Maternal Grandmother     Diabetes Paternal Grandmother     Diabetes Sister     Emphysema Sister              Review of Systems:     Review of Systems      Physical Exam: 60 y.o. male  General Appearance:    Alert, cooperative, in no acute distress                   Vitals:    05/23/25 0849   Temp: 98.4 °F (36.9 °C)   TempSrc: Temporal   Weight: 101 kg (223 lb)   Height: 167.6 cm (66\")   PainSc: 1    PainLoc: Knee      Patient is alert and read ×3 no acute distress appears her above-listed at height weight and age.  Affect is normal respiratory rate is normal unlabored. Heart rate regular rate rhythm, sclera, dentition and hearing are normal for the purpose of this exam.        Ortho Exam  Physical exam of the left knee reveals no effusion, no erythema.  The patient has no palpable tenderness along the medial joint line, no tenderness about the lateral joint line.  Patient does have crepitus with patellofemoral range of motion.  They also have subjective symptoms anteriorly during exam.  The patient has a negative bounce home, negative Kianna and a stable ligamentous exam.  Quad tone is reasonable and symmetric.  There are no overlying skin changes no lymphedema no lymphadenopathy.  There is good hip range of motion which is full symmetric and asymptomatic and a normal ankle exam.  Hamstrings and IT band are tight bilaterally.   Procedures             Radiology:   AP, Lateral and " merchant views of the left knee  were ordered/reviewed to evauateknee pain.  I have no comparative films of this knee he does have evidence of ACL reconstruction on the right he has some moderate patellofemoral OA on the left  Imaging Results (Most Recent)       Procedure Component Value Units Date/Time    XR Knee 3 View Left [[id number]] Resulted: 05/23/25 0843     Updated: 05/23/25 0843    Impression:      Ordering physician's impression is located in the Encounter Note dated 05/23/25. X-ray performed in the DR room.            Assessment/Plan:        Left knee pain could be meniscal in origin but he is gotten so much better so quick he has no effusion no signs or symptoms of meniscal pathology today he will start progressing his activities if he plateaus or worsens he will call me we can either injected or do an MRI.  He has seen physical therapy in the past he knows what to do he will get back on those exercises

## 2025-05-23 ENCOUNTER — OFFICE VISIT (OUTPATIENT)
Dept: ORTHOPEDIC SURGERY | Facility: CLINIC | Age: 60
End: 2025-05-23
Payer: COMMERCIAL

## 2025-05-23 VITALS — TEMPERATURE: 98.4 F | WEIGHT: 223 LBS | BODY MASS INDEX: 35.84 KG/M2 | HEIGHT: 66 IN

## 2025-05-23 DIAGNOSIS — M25.562 LEFT KNEE PAIN, UNSPECIFIED CHRONICITY: Primary | ICD-10-CM

## 2025-05-23 PROCEDURE — 99213 OFFICE O/P EST LOW 20 MIN: CPT | Performed by: ORTHOPAEDIC SURGERY

## 2025-05-28 ENCOUNTER — SPECIALTY PHARMACY (OUTPATIENT)
Dept: ENDOCRINOLOGY | Age: 60
End: 2025-05-28
Payer: COMMERCIAL

## 2025-05-28 NOTE — PROGRESS NOTES
Specialty Pharmacy Patient Management Program  Endocrinology Medication Addition Assessment     Marek Diego was referred by an Endocrinology provider to the Endocrinology Patient Management Program offered by [facility] for Type 2 Diabetes. This assessment was conducted as a result of a specialty medication addition or substitution. The patient was previously introduced to services offered by [facility], including: regular assessments, refill coordination, curbside pick-up or mail order delivery options, prior authorization maintenance, and financial assistance programs as applicable. The patient was also provided with contact information for the pharmacy team.      An initial outreach was conducted, including assessment of therapy appropriateness and specialty medication education for Mounjaro.     A follow-up outreach was NOT conducted, as Trulicity was discontinued and replaced with Mounjaro.     Insurance Coverage & Financial Support  [employer]      Relevant Past Medical History and Comorbidities  Relevant medical history and concomitant health conditions were discussed with the patient. The patient's chart has been reviewed for relevant past medical history and comorbid conditions and updated as necessary.  Past Medical History:   Diagnosis Date    Allergic rhinitis 10/24/2013    10/24/2013--skin testing revealed impressive reactions to grass following, tree pollen, weed pollen, ragweed, dust mite, and cat. Recommend immunotherapy.    Arthritis 10/30/2015    Benign essential hypertension 02/22/2016    Chronic constipation 11/20/2017 11/20/2017--patient reports approximately 8 month history of intermittent constipation that at times can last as much as a month.  He notes his blood sugar readings tend to increase when this happens.  He is scheduled for colonoscopy next week.  I recommend a generic probiotic daily as well as MiraLAX and adjust  as needed.    Chronic neck pain 10/30/2015    12/19/2015--patient reports his left shoulder pain has resolved. He continues to have neck pain. X-ray of the cervical spine ordered. Physical therapy ordered.   11/10/2015--left shoulder injected with 80 mg of Depo-Medrol with 3 mL of Xylocaine.   10/30/2015--patient presents with at least a one-month history of intermittent left-sided neck pain that is associated with left shoulder pain as well. The pain is described as shooting and is 8 out of 10 intensity at its worst. The pain is worse with certain movements of his head and left shoulder. No trauma. No numbness or paresthesias.    Depression with anxiety 02/22/2016    Diabetic eye exam 05/27/2016    May 2016--patient reports a normal diabetic eye exam.    Diabetic foot exam 04/27/2017 05/02/2017--routine diabetic foot exam reveals no evidence of diabetic foot ulcer or pre-ulcerative callus.  Pulses are palpable and there is no signs of ischemia.  Sensation subjectively intact.    Dysfunction of both eustachian tubes 09/06/2019 September 6, 2019--patient with severe environmental allergies/allergic rhinitis presents with complaints of his ears popping like he is been on an airplane and a sensation of pressure at times.  At times his hearing is decreased.  On exam I do not see any definite serous otitis media.  I do think his environmental allergies are playing a role but I think it would be reasonable for ENT to evaluate    Gastroesophageal reflux disease with esophagitis 05/22/2015 08/12/2015--EGD revealed esophagitis and a distal esophageal stricture that was dilated. Small sliding hiatal hernia. Proximal gastric ulcer and gastritis present. Dysphagia resolved after dilatation. Pathology of the gastric antrum was benign with no significant histologic abnormality. Distal esophagus biopsy negative for intestinal metaplasia or dysplasia.   05/22/2015--patient presents with a several month history of  progressively worsening intermittent dysphagia of solids but not liquids. He describes solid food sometimes sticking and points to his upper chest/lower throat. No other associated symptoms. Patient referred to Dr. Aime Parada for an EGD. This is negative, may need ENT evaluation.    History of colon polyps, 11/29/2017--tubular adenoma times one.  Hyperplastic ×1.  Repeat 5 years. 12/18/2017 11/29/2017--colonoscopy revealed 2 small (3 mm) polyps in the sigmoid colon and cecum.  Removed.  Bowel prep.  Sigmoid diverticuli noted.  No hemorrhoids.  Pathology returned hyperplastic polyp of the sigmoid and the cecal polyp was a tubular adenoma.    HIstory of eosinophilic gastroenteritis 1990s 1990s--patient had significant epigastric discomfort and workup including an EGD revealed eosinophilic gastroenteritis that was treated with prednisone and resulted in resolution.    History of esophageal stricture 08/12/2015 08/12/2015--EGD revealed esophagitis and a distal esophageal stricture that was dilated. Small sliding hiatal hernia. Proximal gastric ulcer and gastritis present. Dysphagia resolved after dilatation. Pathology of the gastric antrum was benign with no significant histologic abnormality. Distal esophagus biopsy negative for intestinal metaplasia or dysplasia.   05/22/2015--patient presents with a s    History of Pulmonary nodule, 03/07/2016--5 mm indeterminate nodule right lower lobe.  09/13/2016--consistent with calcified granuloma. 03/07/2016 09/13/2016--CT scan of the chest, abdomen, and pelvis with contrast reveals no lymphadenopathy within the abdomen or pelvis.  Mild hepatic steatosis.  Previously noted right lower lobe pulmonary nodule is currently calcified and consistent with a calcified granuloma.  03/07/2016--CT scan of the abdomen performed for complaints of abdominal discomfort revealed a 5 mm nodular focus right lower lobe which is indeterminate.  Recommend repeat CT scan in 6 months.     Hyperlipidemia 2/22/2016    Hypogonadism male, no TRT. 12/26/2012 12/26/2012--treatment for hypogonadism begun.    Microalbuminuria 10/19/2014    10/19/2014--urine microalbumin mildly elevated at 27.8. Normal range 0.0--17.0.    Moderate persistent asthma without complication 02/22/2016    Seasonal, spring and fall.    Morbid obesity 02/22/2016 November 12, 2019--current weight is 191 pounds representing a 1 pound weight loss.  It appears the patient needs a drug holiday from the phentermine.  I will have him stay off of it for 1 month and then restarted back at the current dose and then we will reassess after the first the year when he comes in for his regular follow-up and physical.  September 6, 2019--current weight is 192 pounds repr    Multiple environmental allergies 10/24/2013    10/24/2013--skin testing revealed impressive reactions to grass following, tree pollen, weed pollen, ragweed, dust mite, and cat. Recommend immunotherapy.    Non morbid obesity 02/22/2016    Right bundle branch block     ECG reveals sinus rhythm, rate of 75, right bundle branch block, left anterior hemiblock    Subclinical hypothyroidism 08/17/2018 08/27/2018--thyroid ultrasound reveals subcentimeter nodule in the right thyroid lobe.  Possibly cystic.  There is a question of an ill-defined 1 cm nodular lesion posteriorly in the mid left lateral thyroid.  Appearance could be technical in origin.  Recommend repeat thyroid ultrasound in 6 months.  08/17/2018--routine follow-up.  TSH mildly elevated at 4.49.  Free T3 and free T4 are normal.  Rep    Testosterone deficiency     Thyroid nodule 10/05/2018    08/27/2018--thyroid ultrasound reveals subcentimeter nodule in the right thyroid lobe.  Possibly cystic.  There is a question of an ill-defined 1 cm nodular lesion posteriorly in the mid left lateral thyroid.  Appearance could be technical in origin.  Recommend repeat thyroid ultrasound in 6 months.  08/17/2018--routine  follow-up.  TSH mildly elevated at 4.49.  Free T3 and free T4 are normal.  Repeat thyroid function tests ordered and returned normal.  Thyroid ultrasound ordered.    Type 2 diabetes mellitus with microalbuminuria, with long-term current use of insulin 01/01/2004 2004--initial diagnosis of type 2 diabetes.    Vitamin D deficiency 02/22/2016     Social History     Socioeconomic History    Marital status:      Spouse name: Luis     Number of children: 2    Years of education: 16    Highest education level: Bachelor's degree (e.g., BA, AB, BS)   Tobacco Use    Smoking status: Never    Smokeless tobacco: Never   Vaping Use    Vaping status: Never Used   Substance and Sexual Activity    Alcohol use: Yes    Drug use: No    Sexual activity: Yes     Partners: Female       Problem list reviewed by Samantha Kidd, [place] on 5/28/2025 at  2:55 PM    Hospitalizations and Urgent Care Since Last Assessment  ED Visits, Admissions, or Hospitalizations: none   Urgent Office Visits: none     Allergies  Known allergies and reactions were discussed with the patient. The patient's chart has been reviewed for  allergy information and updated as necessary.   Allergies   Allergen Reactions    Latex Itching and Unknown - Low Severity       Allergies reviewed by Samantha Kidd, PharmD on 5/28/2025 at  3:02 PM    Relevant Laboratory Values  Relevant laboratory values were discussed with the patient. The following specialty medication dose adjustment(s) are recommended: A1c above goal   A1C Last 3 Results          3/7/2025    08:51   HGBA1C Last 3 Results   Hemoglobin A1C 8.30      Lab Results   Component Value Date    HGBA1C 8.30 (H) 03/07/2025     Lab Results   Component Value Date    GLUCOSE 140 (H) 03/07/2025    CALCIUM 9.7 03/07/2025     03/07/2025    K 5.1 03/07/2025    CO2 26.6 03/07/2025     03/07/2025    BUN 21 (H) 03/07/2025    CREATININE 1.27 03/07/2025    EGFRIFAFRI >60 01/30/2023    EGFRIFNONA 66 02/25/2022     BCR 16.5 03/07/2025    ANIONGAP 12 01/30/2023     Lab Results   Component Value Date    CHLPL 63 03/07/2025    TRIG 116 03/07/2025    HDL 18 (L) 03/07/2025    LDL 23 03/07/2025         Current Medication List  This medication list has been reviewed with the patient and evaluated for any interactions or necessary modifications/recommendations, and updated to include all prescription medications, OTC medications, and supplements the patient is currently taking.  This list reflects what is contained in the patient's profile, which has also been marked as reviewed to communicate to other providers it is the most up to date version of the patient's current medication therapy.     Current Outpatient Medications:     albuterol sulfate  (90 Base) MCG/ACT inhaler, Inhale 2 puffs Every 4 (Four) Hours As Needed for Wheezing or Shortness of Air., Disp: 18 g, Rfl: 0    amLODIPine (NORVASC) 5 MG tablet, Take 1 tablet by mouth Daily., Disp: , Rfl:     Cholecalciferol (VITAMIN D3) 5000 UNITS capsule capsule, Take 1 capsule by mouth Daily., Disp: , Rfl:     dapagliflozin Propanediol (Farxiga) 10 MG tablet, TAKE 1 TABLET BY MOUTH EVERY DAY, Disp: 90 tablet, Rfl: 1    esomeprazole (nexIUM) 40 MG capsule, TAKE 1 CAPSULE BY MOUTH EVERY DAY, Disp: 90 capsule, Rfl: 0    ezetimibe (ZETIA) 10 MG tablet, TAKE 1 TABLET BY MOUTH EVERY DAY, Disp: 90 tablet, Rfl: 0    fluticasone (FLONASE) 50 MCG/ACT nasal spray, Administer 2 sprays into the nostril(s) as directed by provider Daily., Disp: , Rfl:     fluticasone-salmeterol (Advair Diskus) 250-50 MCG/DOSE DISKUS, Inhale 1 puff 2 (Two) Times a Day. (Patient taking differently: Inhale 1 puff 2 (Two) Times a Day As Needed.), Disp: 60 each, Rfl: 11    Insulin Lispro, 1 Unit Dial, (HumaLOG KwikPen) 100 UNIT/ML solution pen-injector, INJECT 18 UNITS UNDER THE SKIN AS DIRECTED WITH BREAKFAST AND BEFORE LUNCH, 24 UNITS WITH DINNER, SLIDING SCALE AS DIRECTED UP  UNITS PER DAY, Disp: 90 mL,  Rfl: 1    loratadine (CLARITIN) 10 MG tablet, Take 1 tablet by mouth Daily., Disp: , Rfl:     metFORMIN (GLUCOPHAGE) 1000 MG tablet, TAKE 1 TABLET BY MOUTH 2 TIMES A DAY WITH MEALS, Disp: 180 tablet, Rfl: 0    montelukast (SINGULAIR) 10 MG tablet, TAKE 1 TABLET BY MOUTH EVERY DAY , Disp: 30 tablet, Rfl: 5    rosuvastatin (CRESTOR) 40 MG tablet, TAKE 1 TABLET BY MOUTH AT BEDTIME, Disp: 90 tablet, Rfl: 0    sertraline (ZOLOFT) 50 MG tablet, TAKE 1 TABLET BY MOUTH ONE TIME A DAY , Disp: 30 tablet, Rfl: 6    Testosterone 1.62 % gel, APPLY 2 PUMPS TOPICALLY TO APPROPRIATE AREA(S) DAILY AS DIRECTED, Disp: 75 g, Rfl: 3    Tirzepatide 10 MG/0.5ML solution auto-injector, Inject 10 mg under the skin into the appropriate area as directed 1 (One) Time Per Week., Disp: 2 mL, Rfl: 5    Toujeo Max SoloStar 300 UNIT/ML solution pen-injector injection, INJECT 70 UNITS UNDER THE SKIN EVERY MORNING AND 80 UNITS EVERY EVENING AS DIRECTED, Disp: 45 mL, Rfl: 1    valsartan (DIOVAN) 160 MG tablet, Take 1 tablet by mouth Daily., Disp: , Rfl:     Continuous Glucose Sensor (Dexcom G7 Sensor) misc, APPLY 1 SENSOR FOR CONTINUOUS GLUCOSE MONITORING FOR 10 DAYS THEN CHANGE SENSOR, Disp: 9 each, Rfl: 0    Insulin Pen Needle (B-D ULTRAFINE III SHORT PEN) 31G X 8 MM misc, FOR USE WITH PEN DEVICE UP TO 5 TIMES DAILY, Disp: 500 each, Rfl: 0    olopatadine (PATADAY) 0.2 % solution ophthalmic solution, INSTILL 1 DROP INTO BOTH EYES AS NEEDED FOR ITCHY EYES, Disp: , Rfl:     Medicines reviewed by Samantha Kidd, PharmD on 5/28/2025 at  2:54 PM    Drug Interactions  No Clinically Significant DDIs Were Identified at Present Time Upon Marking Medications Reviewed     Recommended Medications Assessment  Aspirin: Not Taking Currently  Statin: Currently Taking   ACEi/ARB: Currently Taking     Initial Education Provided for Specialty Medication  The patient has been provided with the following education and any applicable administration techniques (i.e.  self-injection) have been demonstrated for the therapies indicated. All questions and concerns have been addressed prior to the patient receiving the medication, and the patient has verbalized comprehension of the education and any materials provided. Additional patient education shall be provided and documented upon request by the patient, provider, or payer.    Mounjaro® (tirzepatide)   How long will I be on this medication for?  The amount of time you will be on this medication will be determined by your doctor based on blood sugar and A1c control. You will most likely be on this medication or another diabetes medication throughout your lifetime. Do not abruptly stop this medication without talking to your doctor first.     How do I take this medication?  Take as directed on your prescription label. Mounjaro is supplied in a single-use pen for each dose and you will use a new pre-filled pen each week.  It is injected under the skin (subcutaneously) of your stomach, thigh or upper arm.  You may inject in the same body area each week, on the same day each week, with or without food.      What are some possible side effects?  In addition to decreased appetite, the most common side effects are nausea and indigestion. Redness, itching, and/or swelling at the injection site may occur. You should also monitor for low blood sugar (hypoglycemia) if you are taking Mounjaro with other medications that cause low blood sugar.     What happens if I miss a dose?  If you miss a dose, take it as soon as you remember as long as there are at least 3 days until the next scheduled dose.  If there are less than 3 days, skip the missed dose and resume  Mounjaro on the regularly scheduled day.  Do not take 2 doses at the same time or extra doses.      Medication Safety    What are things I should warn my doctor immediately about?  Do not use Mounjaro if you or a family member have ever had medullary thyroid cancer (MTC) or Multiple  Endocrine Neoplasia syndrome type 2 (MEN 2).  Tell your doctor if you have or have had problems with your kidneys or pancreas.  Talk to your doctor if you are pregnant, planning to become pregnant, or breastfeeding. Stop using Mounjaro and get medical help right away if you have severe pain in your stomach area that will not go away, or if you notice any signs/symptoms of an allergic reaction (rash, hives, difficulty breathing, etc.).    What are things that I should be cautious of?  Be cautious of any side effects from this medication. Talk to your doctor if any new ones develop or aren't getting better.    What are some medications that can interact with this one?  Taking Mounjaro with other medications that also lower your blood sugar such as insulin and glipizide/glimepiride/glyburide may increase the risk of low blood sugar. Your doctor may reduce the dose of these medications when you start Mounjaro to minimize low blood sugars.  Always tell your doctor or pharmacist immediately if you start taking any new medications, including over-the-counter medications, vitamins, and herbal supplements.        Medication Storage/Handling    How should I handle this medication?  Keep this medication out of reach of pets/children and keep the pen capped when not in use.  Do not share your medicine pens with others.    How does this medication need to be stored?  Store Mounjaro in the refrigerator. Do not freeze and do not use if Mounjaro has been frozen.  You may store your Mounjaro pens at room temperature for up to 21 days.  Protect from excessive heat and sunlight.  Keep in the original carton until time of administration.    How should I dispose of this medication?  Used Mounjaro pens should discarded after each use in an approved sharps container after use.  If you do not have a sharps container, you may use a household container made of heavy-duty plastic with a tight-fitting lid that is leak resistant (e.g.,  heavy-duty plastic laundry detergent bottle). If your doctor decides to stop this medication, take to your local police station for proper disposal. Some pharmacies also have take-back bins for medication drop-off.       Resources/Support    How can I remind myself to take this medication?  You can download reminder apps to help you manage your refills. You may also set an alarm on your phone to remind you.     Is financial support available?   [employer] can provide co-pay cards if you have commercial insurance or patient assistance if you have Medicare or no insurance.     Which vaccines are recommended for me?  Talk to your doctor about these vaccines: Flu, Coronavirus (COVID-19), Pneumococcal (pneumonia), Tdap, Hepatitis B, Zoster (shingles)          Adherence, Self-Administration, and Current Therapy Problems  Adherence related to the patient's specialty therapy was discussed with the patient. The Adherence segment of this outreach has been reviewed and updated.     Is there a concern with patient's ability to self administer the medication correctly and without issue?: No  Were any potential barriers to adherence identified during the initial assessment or patient education?: No  Are there any concerns regarding the patient's understanding of the importance of medication adherence?: No    Adherence Questions  Linked Medication(s) Assessed: Tirzepatide  On average, how many doses/injections does the patient miss per month?: 1  What are the identified reasons for non-adherence or missed doses? : no problems identified, patient forgets  What is the estimated medication adherence level?: % (PDC in [facility] of 98%)  Based on the patient/caregiver response and refill history, does this patient require an MTP to track adherence improvements?: no    Additional Barriers to Patient Self-Administration: No known barriers at this time   Methods for Supporting Patient Self-Administration: n/a    Open Medication Therapy  Problems  No medication therapy recommendations to display    Adverse Drug Reactions  Medication tolerability: Tolerating with no to minimal ADRs  Medication plan: Continue therapy with normal follow-up  Plan for ADR Management: N/A at this time.  Pt reports they are tolerating therapy well.     Goals of Therapy  Goals related to the patient's specialty therapy were discussed with the patient. The Patient Goals segment of this outreach has been reviewed and updated.   Goals Addressed Today        Specialty Pharmacy General Goal      Achieve A1c < 7%    A1c  Date     05/28/2025 Reassessment/Med Addition (Mounjaro changed from Trulicity in 12/2025) - A1c improved from last check but remains above goal - pt tolerating medications and reports adherence - no medication changes recommended today    8.30 (H) 03/07/2025    8.80 (H) 05/30/2024    8.8 (H) 05/23/2024    8.70 (H) 02/22/2024    8.50 (H) 10/25/2023    8.60 (H) 08/23/2023                  Quality of Life Assessment   Quality of Life related to the patient's enrollment in the patient management program and services provided was discussed with the patient. The QOL segment of this outreach has been reviewed and updated.    Quality of Life Improvement Scale: 8-Moderately better    Reassessment Plan & Follow-Up  1. Medication Therapy Changes: no changes recommended today   2. Related Plans, Therapy Recommendations, or Therapy Problems to Be Addressed: none   3. Pharmacist to perform regular assessments no more than (6) months from the previous assessment.  4. Care Coordinator to set up future refill outreaches, coordinate prescription delivery, and escalate clinical questions to pharmacist.    Attestation  Therapeutic appropriateness: Appropriate   I attest the patient was actively involved in and has agreed to the above plan of care. If the prescribed therapy is at any point deemed not appropriate based on the current or future assessments, a consultation will be  initiated with the patient's specialty care provider to determine the best course of action. The revised plan of therapy will be documented along with any required assessments and/or additional patient education provided.     Samantha Kidd, PharmD, BCACP, BC-ADM, [facility]  Clinical Specialty Pharmacist, Endocrinology  5/28/2025  15:27 EDT    Discussed the aforementioned information with the patient via Telephone.

## 2025-06-05 DIAGNOSIS — E78.5 HYPERLIPIDEMIA, UNSPECIFIED HYPERLIPIDEMIA TYPE: ICD-10-CM

## 2025-06-05 RX ORDER — EZETIMIBE 10 MG/1
10 TABLET ORAL DAILY
Qty: 90 TABLET | Refills: 0 | Status: SHIPPED | OUTPATIENT
Start: 2025-06-05

## 2025-06-05 NOTE — TELEPHONE ENCOUNTER
Rx Refill Note  Requested Prescriptions     Pending Prescriptions Disp Refills    ezetimibe (ZETIA) 10 MG tablet [Pharmacy Med Name: Ezetimibe Oral Tablet 10 MG] 90 tablet 0     Sig: TAKE 1 TABLET BY MOUTH EVERY DAY      Last office visit with prescribing clinician: 3/14/2025   Last telemedicine visit with prescribing clinician: Visit date not found   Next office visit with prescribing clinician: 7/24/2025                         Would you like a call back once the refill request has been completed: [] Yes [] No    If the office needs to give you a call back, can they leave a voicemail: [] Yes [] No  Bree Hager MA  6/5/2025  07:44 EDT

## 2025-06-09 RX ORDER — ACYCLOVIR 400 MG/1
TABLET ORAL
Qty: 9 EACH | Refills: 0 | Status: SHIPPED | OUTPATIENT
Start: 2025-06-09

## 2025-06-09 NOTE — TELEPHONE ENCOUNTER
Rx Refill Note  Requested Prescriptions     Pending Prescriptions Disp Refills    Continuous Glucose Sensor (Dexcom G7 Sensor) misc [Pharmacy Med Name: Dexcom G7 Sensor Miscellaneous] 9 each 0     Sig: APPLY 1 SENSOR FOR CONTINUOUS GLUCOSE MONITORING FOR 10 DAYS THEN CHANGE SENSOR      Last office visit with prescribing clinician: 3/14/2025   Last telemedicine visit with prescribing clinician: Visit date not found   Next office visit with prescribing clinician: 7/24/2025                         Would you like a call back once the refill request has been completed: [] Yes [] No    If the office needs to give you a call back, can they leave a voicemail: [] Yes [] No  Bree Hager MA  6/9/2025  07:50 EDT

## 2025-06-16 RX ORDER — ROSUVASTATIN CALCIUM 40 MG/1
40 TABLET, COATED ORAL
Qty: 90 TABLET | Refills: 0 | Status: SHIPPED | OUTPATIENT
Start: 2025-06-16

## 2025-06-16 NOTE — TELEPHONE ENCOUNTER
Rx Refill Note  Requested Prescriptions     Pending Prescriptions Disp Refills    rosuvastatin (CRESTOR) 40 MG tablet [Pharmacy Med Name: Rosuvastatin Calcium Oral Tablet 40 MG] 90 tablet 0     Sig: TAKE 1 TABLET BY MOUTH AT BEDTIME      Last office visit with prescribing clinician: 3/14/2025   Last telemedicine visit with prescribing clinician: Visit date not found   Next office visit with prescribing clinician: 7/24/2025                         Would you like a call back once the refill request has been completed: [] Yes [] No    If the office needs to give you a call back, can they leave a voicemail: [] Yes [] No  Bree Hager MA  6/16/2025  07:50 EDT

## 2025-07-01 ENCOUNTER — SPECIALTY PHARMACY (OUTPATIENT)
Dept: ENDOCRINOLOGY | Age: 60
End: 2025-07-01
Payer: COMMERCIAL

## 2025-07-01 NOTE — PROGRESS NOTES
Specialty Pharmacy Patient Management Program  Refill Outreach     Marek was contacted today regarding refills of their medication(s).    Refill Questions      Flowsheet Row Most Recent Value   Changes to allergies? No   Changes to medications? No   New conditions or infections since last clinic visit No   Unplanned office visit, urgent care, ED, or hospital admission in the last 4 weeks  No   How does patient/caregiver feel medication is working? Good   Financial problems or insurance changes  No   Since the previous refill, were any specialty medication doses or scheduled injections missed or delayed?  No   Does this patient require a clinical escalation to a pharmacist? No            Delivery Questions      Flowsheet Row Most Recent Value   Delivery method UPS   Delivery address verified with patient/caregiver? Yes   Delivery address Home   Number of medications in delivery 1   Medication(s) being filled and delivered Tirzepatide   Doses left of specialty medications 1 week   Copay verified? Yes   Copay amount $60.00   Copay form of payment Credit/debit on file   Delivery Date Selection 07/02/25   Signature Required No   Do you consent to receive electronic handouts?  No                 Follow-up: 77 day(s)     Ingrid Moore, Pharmacy Technician  7/1/2025  11:16 EDT

## 2025-07-16 ENCOUNTER — OFFICE VISIT (OUTPATIENT)
Dept: SLEEP MEDICINE | Facility: HOSPITAL | Age: 60
End: 2025-07-16
Payer: COMMERCIAL

## 2025-07-16 VITALS — BODY MASS INDEX: 36.16 KG/M2 | OXYGEN SATURATION: 92 % | WEIGHT: 225 LBS | HEART RATE: 96 BPM | HEIGHT: 66 IN

## 2025-07-16 DIAGNOSIS — E66.812 CLASS 2 SEVERE OBESITY WITH SERIOUS COMORBIDITY AND BODY MASS INDEX (BMI) OF 36.0 TO 36.9 IN ADULT, UNSPECIFIED OBESITY TYPE: ICD-10-CM

## 2025-07-16 DIAGNOSIS — E66.01 CLASS 2 SEVERE OBESITY WITH SERIOUS COMORBIDITY AND BODY MASS INDEX (BMI) OF 36.0 TO 36.9 IN ADULT, UNSPECIFIED OBESITY TYPE: ICD-10-CM

## 2025-07-16 DIAGNOSIS — G47.33 SEVERE OBSTRUCTIVE SLEEP APNEA: Primary | ICD-10-CM

## 2025-07-16 PROCEDURE — G0463 HOSPITAL OUTPT CLINIC VISIT: HCPCS

## 2025-07-16 PROCEDURE — 99213 OFFICE O/P EST LOW 20 MIN: CPT | Performed by: NURSE PRACTITIONER

## 2025-07-16 NOTE — PROGRESS NOTES
"  [facility]  [address]  [address]  [place] [address]  Phone   Fax         SLEEP CLINIC FOLLOW-UP PROGRESS NOTE    Marek Diego  [id number]   1965  60 y.o.  male      PCP: Neel Patel MD    DATE OF VISIT: 7/16/2025          CHIEF COMPLAINT: Obstructive sleep apnea    HPI:  This is a 60 y.o. year old patient of Dr. Burton who presents to the clinic today for the management of obstructive sleep apnea.  Patient had a(n) home sleep study 4/2021 showing severe obstructive sleep apnea with AHI of 49.8/hr.  This patient is using positive airway pressure therapy with auto CPAP at 8 to 20 cm H2O.       Patient presents today for 1 year follow-up.  Device download as of 7/14/2025 shows that patient's sleep apnea remains improved with this therapy with residual AHI 2/hr.   Average usage is 7 hours 27 minutes per night on nights used.  No significant mask leak noted.  Mean pressure on download was 11.3 cm H2O with device pressure less than/equal 90% the time at 14.1 cm H2O.  Patient tolerating device well and improved with treatment.  Denies issues or concerns with sleep or PAP since last visit.          MEDICATIONS: reviewed     ALLERGIES:  Latex    SOCIAL HISTORY (habits pertaining to sleep medicine):  Tobacco use: No   Alcohol use: 0 per week  Caffeine use: 2-3     REVIEW OF SYSTEMS:   Pertinent positive symptoms are:  Boutte Sleepiness Scale :Total score: 2         PHYSICAL EXAMINATION:  CONSTITUTIONAL:  Vitals:    07/16/25 0836   Pulse: 96   SpO2: 92%   Weight: 102 kg (225 lb)   Height: 167.6 cm (66\")    Body mass index is 36.32 kg/m².   HEAD: atraumatic, normocephalic  RESP SYSTEM: not in respiratory distress, breathing unlabored  CARDIOVASULAR: normal rate, no edema noted   NEURO: Alert and oriented x 3, mood and affect appeared appropriate      DATA REVIEWED:  The PAP compliance summary downloaded on 7/14/2025 has been reviewed independently by me and " discussed with the patient.   Compliance: 100%  More than 4 hr use: 96.7%  Average use of the device: 7 hours 27 minutes per night  Residual AHI: 2/hr (goal < 5.0 /hr)  Device: DreamStation 2 auto CPAP  DME: [employer]          ASSESSMENT AND PLAN:  Obstructive Sleep Apnea: sleep apnea has improved with the device and treatment.  Patient has excellent compliance with the device for treatment of sleep apnea.  I have personally reviewed the smart card download and discussed the download data with the patient and encouarged continued use of the device.  The residual AHI is acceptable. The device is benefiting the patient and the device is medically necessary.  Recommend patient get supplies from the DME company, change them on a regular basis, and clean as directed.  A prescription for supplies has been sent to the [email or web address] company.  Recommend continued usage of PAP device, most insurances require minimum usage of 4 hours per night at least 70% of the time, would recommend using all night every night if possible for optimum health.  Recommend prioritizing sleep to aid in overall health.  Do not drive or operate heavy machinery or do activities that require high concentration if feeling tired or drowsy.  Severe Obesity w/ comorbidity: Body mass index is 36.32 kg/m².. Patients who are overweight or obese are at increased risk of sleep apnea/ sleep disordered breathing. Weight reduction and healthy lifestyle are encouraged in overweight/ obese patients as part of a comprehensive approach to sleep apnea treatment.  On tirzepatide for type 2 diabetes per endocrinology.  Weight down about 6 pounds since previous study, no significant changes since last study.  We discussed that we would never want to assume sleep apnea resolves or improves with weight loss, however if he does continue to lose weight and if he gets to a point where he is not sure he still notices benefit from PAP device would recommend he follow-up with our office at  which point we could discuss further and see if follow-up sleep study indicated.      Patient will follow-up in 1 year or follow-up sooner for any issues or concerns.  Patient's questions were answered.        Thank you for allowing me to participate in the care of this patient.     Penelope Echols DNP, APRN  [facility]

## 2025-07-24 ENCOUNTER — OFFICE VISIT (OUTPATIENT)
Dept: ENDOCRINOLOGY | Age: 60
End: 2025-07-24
Payer: COMMERCIAL

## 2025-07-24 VITALS
OXYGEN SATURATION: 96 % | WEIGHT: 225 LBS | DIASTOLIC BLOOD PRESSURE: 68 MMHG | HEIGHT: 66 IN | BODY MASS INDEX: 36.16 KG/M2 | HEART RATE: 85 BPM | SYSTOLIC BLOOD PRESSURE: 124 MMHG

## 2025-07-24 DIAGNOSIS — D64.9 ANEMIA, UNSPECIFIED TYPE: Primary | ICD-10-CM

## 2025-07-24 DIAGNOSIS — E29.1 HYPOGONADISM MALE: ICD-10-CM

## 2025-07-24 DIAGNOSIS — Z79.4 TYPE 2 DIABETES MELLITUS WITH HYPERGLYCEMIA, WITH LONG-TERM CURRENT USE OF INSULIN: ICD-10-CM

## 2025-07-24 DIAGNOSIS — E11.65 TYPE 2 DIABETES MELLITUS WITH HYPERGLYCEMIA, WITH LONG-TERM CURRENT USE OF INSULIN: ICD-10-CM

## 2025-07-24 DIAGNOSIS — Z79.4 TYPE 2 DIABETES MELLITUS WITH HYPERGLYCEMIA, WITH LONG-TERM CURRENT USE OF INSULIN: Primary | ICD-10-CM

## 2025-07-24 DIAGNOSIS — E11.65 TYPE 2 DIABETES MELLITUS WITH HYPERGLYCEMIA, WITH LONG-TERM CURRENT USE OF INSULIN: Primary | ICD-10-CM

## 2025-07-24 LAB
BASOPHILS # BLD AUTO: 0.07 10*3/MM3 (ref 0–0.2)
BASOPHILS NFR BLD AUTO: 1.1 % (ref 0–1.5)
EOSINOPHIL # BLD AUTO: 0.32 10*3/MM3 (ref 0–0.4)
EOSINOPHIL NFR BLD AUTO: 5.1 % (ref 0.3–6.2)
ERYTHROCYTE [DISTWIDTH] IN BLOOD BY AUTOMATED COUNT: 14.4 % (ref 12.3–15.4)
FERRITIN SERPL-MCNC: 14.1 NG/ML (ref 30–400)
FOLATE SERPL-MCNC: 5.04 NG/ML (ref 4.78–24.2)
HCT VFR BLD AUTO: 39.1 % (ref 37.5–51)
HGB BLD-MCNC: 12.5 G/DL (ref 13–17.7)
IMM GRANULOCYTES # BLD AUTO: 0.02 10*3/MM3 (ref 0–0.05)
IMM GRANULOCYTES NFR BLD AUTO: 0.3 % (ref 0–0.5)
IRON SATN MFR SERPL: 10 % (ref 20–50)
IRON SERPL-MCNC: 53 MCG/DL (ref 59–158)
LYMPHOCYTES # BLD AUTO: 1.96 10*3/MM3 (ref 0.7–3.1)
LYMPHOCYTES NFR BLD AUTO: 31.4 % (ref 19.6–45.3)
MCH RBC QN AUTO: 28.5 PG (ref 26.6–33)
MCHC RBC AUTO-ENTMCNC: 32 G/DL (ref 31.5–35.7)
MCV RBC AUTO: 89.3 FL (ref 79–97)
MONOCYTES # BLD AUTO: 0.58 10*3/MM3 (ref 0.1–0.9)
MONOCYTES NFR BLD AUTO: 9.3 % (ref 5–12)
NEUTROPHILS # BLD AUTO: 3.3 10*3/MM3 (ref 1.7–7)
NEUTROPHILS NFR BLD AUTO: 52.8 % (ref 42.7–76)
NRBC BLD AUTO-RTO: 0 /100 WBC (ref 0–0.2)
PLATELET # BLD AUTO: 266 10*3/MM3 (ref 140–450)
RBC # BLD AUTO: 4.38 10*6/MM3 (ref 4.14–5.8)
TIBC SERPL-MCNC: 545 MCG/DL
UIBC SERPL-MCNC: 492 MCG/DL (ref 112–346)
VIT B12 SERPL-MCNC: 339 PG/ML (ref 211–946)
WBC # BLD AUTO: 6.25 10*3/MM3 (ref 3.4–10.8)

## 2025-07-24 PROCEDURE — 95251 CONT GLUC MNTR ANALYSIS I&R: CPT | Performed by: INTERNAL MEDICINE

## 2025-07-24 PROCEDURE — 99214 OFFICE O/P EST MOD 30 MIN: CPT | Performed by: INTERNAL MEDICINE

## 2025-07-24 NOTE — PROGRESS NOTES
Chief complaint/Reason for consult:  T2DM    HPI:   - 60 year old male here for management of diabetes mellitus type 2 and hypogonadism  - Was last seen in 3/2025  - Complains of fatigue  - Has had diabetes for 15 years  - No known complications to date  - Is currently taking Toujeo 70 units in the am and 80 units in the pm, Humalog 18-24 with meals, Mounjaro 10 mg weekly, Farxiga 10 mg daily, metformin 1 g bid  - He also has hypogonadism of unknown etiology and is on testosterone topical 1.62%, 2 pumps daily  - He is also on Crestor 40 mg daily    The following portions of the patient's history were reviewed and updated as appropriate: allergies, current medications, past family history, past medical history, past social history, past surgical history, and problem list.      Objective     Vitals:    07/24/25 0823   BP: 124/68   Pulse: 85   SpO2: 96%        Physical Exam  Vitals reviewed.   Constitutional:       Appearance: Normal appearance.   HENT:      Head: Normocephalic and atraumatic.   Eyes:      General: No scleral icterus.  Pulmonary:      Effort: Pulmonary effort is normal. No respiratory distress.   Neurological:      Mental Status: He is alert.      Gait: Gait normal.   Psychiatric:         Mood and Affect: Mood normal.         Behavior: Behavior normal.         Thought Content: Thought content normal.         Judgment: Judgment normal.     CGM interpretation  Dates reviewed:  7/11-7/24/25  Data:  Avg of 181, 17% very high, 28% high, 54% in range, 1% low  Interpretation:  Hyperglycemia after supper      Assessment & Plan   1. Type 2 DM, uncontrolled due to hyperglycemia  - Glycemic control has improved  - Cont. Toujeo 70 units in the am and 80 units in the pm, Humalog 18-24 with meals, Farxiga 10 mg daily, metformin 1 g bid,   - Increase Mounjaro to 12.5 mg weekly     2. Hypogonadism  - Etiology is not clear  - Free testosterone and HCT were normal recently  - Cont. testosterone topical 1.62%, 2  pumps daily     3. Obesity (BMI of 36.33)  - Dietary changes and exercise will help glycemic control     4. Hyperlipidemia  - He is also on Crestor 40 mg daily    5. Anemia  - May be causing fatigue  - Ordered lab evaluation     - Return to clinic in 4 months

## 2025-08-03 DIAGNOSIS — Z79.4 TYPE 2 DIABETES MELLITUS WITH HYPERGLYCEMIA, WITH LONG-TERM CURRENT USE OF INSULIN: ICD-10-CM

## 2025-08-03 DIAGNOSIS — E11.65 TYPE 2 DIABETES MELLITUS WITH HYPERGLYCEMIA, WITH LONG-TERM CURRENT USE OF INSULIN: ICD-10-CM

## 2025-08-08 ENCOUNTER — OFFICE VISIT (OUTPATIENT)
Dept: ORTHOPEDIC SURGERY | Facility: CLINIC | Age: 60
End: 2025-08-08
Payer: COMMERCIAL

## 2025-08-08 VITALS — BODY MASS INDEX: 35.93 KG/M2 | TEMPERATURE: 98.4 F | HEIGHT: 66 IN | WEIGHT: 223.6 LBS

## 2025-08-08 DIAGNOSIS — M17.10 PATELLOFEMORAL ARTHRITIS: Primary | ICD-10-CM

## 2025-08-08 DIAGNOSIS — S83.242D TEAR OF MEDIAL MENISCUS OF LEFT KNEE, CURRENT, UNSPECIFIED TEAR TYPE, SUBSEQUENT ENCOUNTER: ICD-10-CM

## 2025-08-08 RX ORDER — FLUCONAZOLE 150 MG/1
1 TABLET ORAL DAILY
COMMUNITY
Start: 2025-07-31

## 2025-08-08 RX ORDER — METHYLPREDNISOLONE ACETATE 80 MG/ML
80 INJECTION, SUSPENSION INTRA-ARTICULAR; INTRALESIONAL; INTRAMUSCULAR; SOFT TISSUE
Status: COMPLETED | OUTPATIENT
Start: 2025-08-08 | End: 2025-08-08

## 2025-08-08 RX ORDER — LIDOCAINE HYDROCHLORIDE 10 MG/ML
2 INJECTION, SOLUTION EPIDURAL; INFILTRATION; INTRACAUDAL; PERINEURAL
Status: COMPLETED | OUTPATIENT
Start: 2025-08-08 | End: 2025-08-08

## 2025-08-08 RX ADMIN — METHYLPREDNISOLONE ACETATE 80 MG: 80 INJECTION, SUSPENSION INTRA-ARTICULAR; INTRALESIONAL; INTRAMUSCULAR; SOFT TISSUE at 08:51

## 2025-08-08 RX ADMIN — LIDOCAINE HYDROCHLORIDE 2 ML: 10 INJECTION, SOLUTION EPIDURAL; INFILTRATION; INTRACAUDAL; PERINEURAL at 08:51

## 2025-08-26 ENCOUNTER — TREATMENT (OUTPATIENT)
Dept: PHYSICAL THERAPY | Facility: CLINIC | Age: 60
End: 2025-08-26
Payer: COMMERCIAL

## 2025-08-26 DIAGNOSIS — S83.242D TEAR OF MEDIAL MENISCUS OF LEFT KNEE, UNSPECIFIED TEAR TYPE, UNSPECIFIED WHETHER OLD OR CURRENT TEAR, SUBSEQUENT ENCOUNTER: Primary | ICD-10-CM

## 2025-08-26 DIAGNOSIS — R29.898 WEAKNESS OF LEFT LOWER EXTREMITY: ICD-10-CM

## 2025-08-26 DIAGNOSIS — M25.562 ACUTE PAIN OF LEFT KNEE: ICD-10-CM

## 2025-08-26 DIAGNOSIS — R68.89 ACTIVITY INTOLERANCE: ICD-10-CM

## (undated) DEVICE — CANN NASL CO2 TRULINK W/O2 A/

## (undated) DEVICE — THE SINGLE USE ETRAP – POLYP TRAP IS USED FOR SUCTION RETRIEVAL OF ENDOSCOPICALLY REMOVED POLYPS.: Brand: ETRAP

## (undated) DEVICE — ERBE NESSY®PLATE 170 SPLIT; 168CM²; CABLE 3M: Brand: ERBE

## (undated) DEVICE — Device: Brand: DEFENDO AIR/WATER/SUCTION AND BIOPSY VALVE

## (undated) DEVICE — TBG 02 CRUSH RESIST LF CLR 7FT

## (undated) DEVICE — FRCP BIOP RADLJAW4 HOT 2.2X240 BX40

## (undated) DEVICE — THE TORRENT IRRIGATION SCOPE CONNECTOR IS USED WITH THE TORRENT IRRIGATION TUBING TO PROVIDE IRRIGATION FLUIDS SUCH AS STERILE WATER DURING GASTROINTESTINAL ENDOSCOPIC PROCEDURES WHEN USED IN CONJUNCTION WITH AN IRRIGATION PUMP (OR ELECTROSURGICAL UNIT).: Brand: TORRENT

## (undated) DEVICE — TUBING, SUCTION, 1/4" X 10', STRAIGHT: Brand: MEDLINE

## (undated) DEVICE — SNAR POLYP SENSATION STDOVL 27 240 BX40